# Patient Record
Sex: FEMALE | Race: WHITE | Employment: OTHER | URBAN - NONMETROPOLITAN AREA
[De-identification: names, ages, dates, MRNs, and addresses within clinical notes are randomized per-mention and may not be internally consistent; named-entity substitution may affect disease eponyms.]

---

## 2020-10-21 PROBLEM — I10 HTN (HYPERTENSION): Status: ACTIVE | Noted: 2020-10-21

## 2020-10-21 PROBLEM — D64.9 ANEMIA: Status: ACTIVE | Noted: 2020-10-21

## 2020-10-21 PROBLEM — N28.9 RENAL INSUFFICIENCY: Status: ACTIVE | Noted: 2020-10-21

## 2020-10-21 PROBLEM — E03.9 HYPOTHYROIDISM: Status: ACTIVE | Noted: 2020-10-21

## 2020-10-21 PROBLEM — M19.90 ARTHRITIS: Status: ACTIVE | Noted: 2020-10-21

## 2020-10-21 PROBLEM — Z79.899 OTHER LONG TERM (CURRENT) DRUG THERAPY: Status: ACTIVE | Noted: 2020-10-21

## 2020-10-22 PROBLEM — D63.1 ANEMIA DUE TO STAGE 3 CHRONIC KIDNEY DISEASE (HCC): Status: ACTIVE | Noted: 2020-10-22

## 2020-10-22 PROBLEM — N18.30 ANEMIA DUE TO STAGE 3 CHRONIC KIDNEY DISEASE (HCC): Status: ACTIVE | Noted: 2020-10-22

## 2021-02-01 RX ORDER — CEPHALEXIN 250 MG/1
CAPSULE ORAL
Qty: 30 CAPSULE | Refills: 5 | Status: ON HOLD
Start: 2021-02-01 | End: 2021-04-25 | Stop reason: HOSPADM

## 2021-02-04 ENCOUNTER — TELEPHONE (OUTPATIENT)
Dept: UROLOGY | Age: 86
End: 2021-02-04

## 2021-02-04 RX ORDER — CEFDINIR 300 MG/1
300 CAPSULE ORAL 2 TIMES DAILY
Qty: 20 CAPSULE | Refills: 0 | Status: SHIPPED | OUTPATIENT
Start: 2021-02-04 | End: 2021-02-14

## 2021-02-04 NOTE — TELEPHONE ENCOUNTER
Please call patient's daughter. UACS is positive for infection. I sent in culture specific omnicef. Take this antibiotic until gone. Take this with food and eat yogurt once per day to prevent GI upset. If you develop nausea, vomiting, or fevers call the office or go to the ER. If your urinary symptoms do not improve once completing the antibiotics call our office. Stop prophylaxis dose of Keflex while on Omnicef after she is completed a course of Omnicef she is to resume the Keflex.     Make sure she is either eating yogurt every day or taking a daily probiotic

## 2021-02-24 PROBLEM — E78.2 MIXED HYPERLIPIDEMIA: Status: ACTIVE | Noted: 2021-02-24

## 2021-02-24 PROBLEM — K21.9 GASTROESOPHAGEAL REFLUX DISEASE WITHOUT ESOPHAGITIS: Status: ACTIVE | Noted: 2021-02-24

## 2021-02-24 PROBLEM — N39.0 CHRONIC UTI: Status: ACTIVE | Noted: 2021-02-24

## 2021-03-10 PROBLEM — M16.0 PRIMARY OSTEOARTHRITIS OF BOTH HIPS: Status: ACTIVE | Noted: 2021-03-10

## 2021-03-10 PROBLEM — Z96.652 S/P REVISION OF TOTAL KNEE, LEFT: Status: ACTIVE | Noted: 2021-03-10

## 2021-03-10 PROBLEM — M25.362 OTHER INSTABILITY, LEFT KNEE: Status: ACTIVE | Noted: 2021-03-10

## 2021-03-10 PROBLEM — M17.11 PRIMARY OSTEOARTHRITIS OF RIGHT KNEE: Status: ACTIVE | Noted: 2021-03-10

## 2021-03-10 PROBLEM — M62.81 MUSCULAR WEAKNESS: Status: ACTIVE | Noted: 2021-03-10

## 2021-04-01 ENCOUNTER — APPOINTMENT (OUTPATIENT)
Dept: CT IMAGING | Age: 86
DRG: 062 | End: 2021-04-01
Payer: MEDICARE

## 2021-04-01 ENCOUNTER — HOSPITAL ENCOUNTER (INPATIENT)
Age: 86
LOS: 6 days | Discharge: NURSING, REHAB OR LONG-TERM CARE | DRG: 062 | End: 2021-04-07
Attending: EMERGENCY MEDICINE | Admitting: PSYCHIATRY & NEUROLOGY
Payer: MEDICARE

## 2021-04-01 DIAGNOSIS — I63.9 CEREBROVASCULAR ACCIDENT (CVA), UNSPECIFIED MECHANISM (HCC): Primary | ICD-10-CM

## 2021-04-01 LAB
% CKMB: 2.5 % (ref 0–3)
ABSOLUTE EOS #: 0.17 K/UL (ref 0–0.44)
ABSOLUTE IMMATURE GRANULOCYTE: <0.03 K/UL (ref 0–0.3)
ABSOLUTE LYMPH #: 1.21 K/UL (ref 1.1–3.7)
ABSOLUTE MONO #: 0.62 K/UL (ref 0.1–1.2)
ALLEN TEST: ABNORMAL
ANION GAP SERPL CALCULATED.3IONS-SCNC: 12 MMOL/L (ref 9–17)
ANION GAP: 8 MMOL/L (ref 7–16)
BASOPHILS # BLD: 1 % (ref 0–2)
BASOPHILS ABSOLUTE: 0.05 K/UL (ref 0–0.2)
BUN BLDV-MCNC: 18 MG/DL (ref 8–23)
BUN/CREAT BLD: ABNORMAL (ref 9–20)
CALCIUM SERPL-MCNC: 8.8 MG/DL (ref 8.6–10.4)
CHLORIDE BLD-SCNC: 100 MMOL/L (ref 98–107)
CK MB: 1.6 NG/ML
CKMB INTERPRETATION: ABNORMAL
CO2: 21 MMOL/L (ref 20–31)
CREAT SERPL-MCNC: 0.93 MG/DL (ref 0.5–0.9)
DIFFERENTIAL TYPE: ABNORMAL
EOSINOPHILS RELATIVE PERCENT: 2 % (ref 1–4)
FIO2: ABNORMAL
GFR AFRICAN AMERICAN: >60 ML/MIN
GFR NON-AFRICAN AMERICAN: 45 ML/MIN
GFR NON-AFRICAN AMERICAN: 57 ML/MIN
GFR SERPL CREATININE-BSD FRML MDRD: 55 ML/MIN
GFR SERPL CREATININE-BSD FRML MDRD: ABNORMAL ML/MIN/{1.73_M2}
GLUCOSE BLD-MCNC: 106 MG/DL (ref 74–100)
GLUCOSE BLD-MCNC: 90 MG/DL (ref 70–99)
HCO3 VENOUS: 26.3 MMOL/L (ref 22–29)
HCT VFR BLD CALC: 34.9 % (ref 36.3–47.1)
HEMOGLOBIN: 11.2 G/DL (ref 11.9–15.1)
IMMATURE GRANULOCYTES: 0 %
INR BLD: 1
LYMPHOCYTES # BLD: 16 % (ref 24–43)
MCH RBC QN AUTO: 31 PG (ref 25.2–33.5)
MCHC RBC AUTO-ENTMCNC: 32.1 G/DL (ref 28.4–34.8)
MCV RBC AUTO: 96.7 FL (ref 82.6–102.9)
MODE: ABNORMAL
MONOCYTES # BLD: 8 % (ref 3–12)
MYOGLOBIN: 51 NG/ML (ref 25–58)
NEGATIVE BASE EXCESS, VEN: ABNORMAL (ref 0–2)
NRBC AUTOMATED: 0 PER 100 WBC
O2 DEVICE/FLOW/%: ABNORMAL
O2 SAT, VEN: 9 % (ref 60–85)
PARTIAL THROMBOPLASTIN TIME: 23.9 SEC (ref 20.5–30.5)
PATIENT TEMP: ABNORMAL
PCO2, VEN: 45 MM HG (ref 41–51)
PDW BLD-RTO: 14 % (ref 11.8–14.4)
PH VENOUS: 7.38 (ref 7.32–7.43)
PLATELET # BLD: 255 K/UL (ref 138–453)
PLATELET ESTIMATE: ABNORMAL
PMV BLD AUTO: 11.2 FL (ref 8.1–13.5)
PO2, VEN: 10.2 MM HG (ref 30–50)
POC CHLORIDE: 103 MMOL/L (ref 98–107)
POC CREATININE: 1.13 MG/DL (ref 0.51–1.19)
POC HEMATOCRIT: 36 % (ref 36–46)
POC HEMOGLOBIN: 12.3 G/DL (ref 12–16)
POC IONIZED CALCIUM: 1.17 MMOL/L (ref 1.15–1.33)
POC LACTIC ACID: 1.14 MMOL/L (ref 0.56–1.39)
POC PCO2 TEMP: ABNORMAL MM HG
POC PH TEMP: ABNORMAL
POC PO2 TEMP: ABNORMAL MM HG
POC POTASSIUM: 4.6 MMOL/L (ref 3.5–4.5)
POC SODIUM: 137 MMOL/L (ref 138–146)
POSITIVE BASE EXCESS, VEN: 1 (ref 0–3)
POTASSIUM SERPL-SCNC: 4.5 MMOL/L (ref 3.7–5.3)
PROTHROMBIN TIME: 10.6 SEC (ref 9.1–12.3)
RBC # BLD: 3.61 M/UL (ref 3.95–5.11)
RBC # BLD: ABNORMAL 10*6/UL
SAMPLE SITE: ABNORMAL
SEG NEUTROPHILS: 73 % (ref 36–65)
SEGMENTED NEUTROPHILS ABSOLUTE COUNT: 5.41 K/UL (ref 1.5–8.1)
SODIUM BLD-SCNC: 133 MMOL/L (ref 135–144)
TOTAL CK: 65 U/L (ref 26–192)
TOTAL CO2, VENOUS: 28 MMOL/L (ref 23–30)
TROPONIN INTERP: ABNORMAL
TROPONIN T: ABNORMAL NG/ML
TROPONIN, HIGH SENSITIVITY: 19 NG/L (ref 0–14)
WBC # BLD: 7.5 K/UL (ref 3.5–11.3)
WBC # BLD: ABNORMAL 10*3/UL

## 2021-04-01 PROCEDURE — 99223 1ST HOSP IP/OBS HIGH 75: CPT | Performed by: PSYCHIATRY & NEUROLOGY

## 2021-04-01 PROCEDURE — 82550 ASSAY OF CK (CPK): CPT

## 2021-04-01 PROCEDURE — 82553 CREATINE MB FRACTION: CPT

## 2021-04-01 PROCEDURE — 82803 BLOOD GASES ANY COMBINATION: CPT

## 2021-04-01 PROCEDURE — 70450 CT HEAD/BRAIN W/O DYE: CPT

## 2021-04-01 PROCEDURE — 84132 ASSAY OF SERUM POTASSIUM: CPT

## 2021-04-01 PROCEDURE — 84484 ASSAY OF TROPONIN QUANT: CPT

## 2021-04-01 PROCEDURE — 70496 CT ANGIOGRAPHY HEAD: CPT

## 2021-04-01 PROCEDURE — 82435 ASSAY OF BLOOD CHLORIDE: CPT

## 2021-04-01 PROCEDURE — 80048 BASIC METABOLIC PNL TOTAL CA: CPT

## 2021-04-01 PROCEDURE — 6360000002 HC RX W HCPCS: Performed by: STUDENT IN AN ORGANIZED HEALTH CARE EDUCATION/TRAINING PROGRAM

## 2021-04-01 PROCEDURE — 3E03317 INTRODUCTION OF OTHER THROMBOLYTIC INTO PERIPHERAL VEIN, PERCUTANEOUS APPROACH: ICD-10-PCS | Performed by: STUDENT IN AN ORGANIZED HEALTH CARE EDUCATION/TRAINING PROGRAM

## 2021-04-01 PROCEDURE — 93005 ELECTROCARDIOGRAM TRACING: CPT | Performed by: STUDENT IN AN ORGANIZED HEALTH CARE EDUCATION/TRAINING PROGRAM

## 2021-04-01 PROCEDURE — 6360000004 HC RX CONTRAST MEDICATION: Performed by: STUDENT IN AN ORGANIZED HEALTH CARE EDUCATION/TRAINING PROGRAM

## 2021-04-01 PROCEDURE — 82947 ASSAY GLUCOSE BLOOD QUANT: CPT

## 2021-04-01 PROCEDURE — 82565 ASSAY OF CREATININE: CPT

## 2021-04-01 PROCEDURE — 99283 EMERGENCY DEPT VISIT LOW MDM: CPT

## 2021-04-01 PROCEDURE — 85025 COMPLETE CBC W/AUTO DIFF WBC: CPT

## 2021-04-01 PROCEDURE — 83605 ASSAY OF LACTIC ACID: CPT

## 2021-04-01 PROCEDURE — 85610 PROTHROMBIN TIME: CPT

## 2021-04-01 PROCEDURE — 2000000003 HC NEURO ICU R&B

## 2021-04-01 PROCEDURE — 84295 ASSAY OF SERUM SODIUM: CPT

## 2021-04-01 PROCEDURE — 83874 ASSAY OF MYOGLOBIN: CPT

## 2021-04-01 PROCEDURE — 85014 HEMATOCRIT: CPT

## 2021-04-01 PROCEDURE — 82330 ASSAY OF CALCIUM: CPT

## 2021-04-01 PROCEDURE — 85730 THROMBOPLASTIN TIME PARTIAL: CPT

## 2021-04-01 RX ORDER — SENNA PLUS 8.6 MG/1
1 TABLET ORAL DAILY PRN
Status: DISCONTINUED | OUTPATIENT
Start: 2021-04-01 | End: 2021-04-07 | Stop reason: HOSPADM

## 2021-04-01 RX ORDER — ONDANSETRON 2 MG/ML
4 INJECTION INTRAMUSCULAR; INTRAVENOUS EVERY 6 HOURS PRN
Status: DISCONTINUED | OUTPATIENT
Start: 2021-04-01 | End: 2021-04-07 | Stop reason: HOSPADM

## 2021-04-01 RX ORDER — SODIUM CHLORIDE 0.9 % (FLUSH) 0.9 %
10 SYRINGE (ML) INJECTION PRN
Status: DISCONTINUED | OUTPATIENT
Start: 2021-04-01 | End: 2021-04-07 | Stop reason: HOSPADM

## 2021-04-01 RX ORDER — AMLODIPINE BESYLATE 5 MG/1
5 TABLET ORAL DAILY
Status: DISCONTINUED | OUTPATIENT
Start: 2021-04-02 | End: 2021-04-07 | Stop reason: HOSPADM

## 2021-04-01 RX ORDER — SODIUM CHLORIDE 0.9 % (FLUSH) 0.9 %
10 SYRINGE (ML) INJECTION EVERY 12 HOURS SCHEDULED
Status: DISCONTINUED | OUTPATIENT
Start: 2021-04-01 | End: 2021-04-07 | Stop reason: HOSPADM

## 2021-04-01 RX ORDER — ASPIRIN 81 MG/1
81 TABLET ORAL DAILY
Status: DISCONTINUED | OUTPATIENT
Start: 2021-04-02 | End: 2021-04-07 | Stop reason: HOSPADM

## 2021-04-01 RX ORDER — ATORVASTATIN CALCIUM 80 MG/1
80 TABLET, FILM COATED ORAL NIGHTLY
Status: DISCONTINUED | OUTPATIENT
Start: 2021-04-01 | End: 2021-04-02

## 2021-04-01 RX ORDER — PROMETHAZINE HYDROCHLORIDE 12.5 MG/1
12.5 TABLET ORAL EVERY 6 HOURS PRN
Status: DISCONTINUED | OUTPATIENT
Start: 2021-04-01 | End: 2021-04-07 | Stop reason: HOSPADM

## 2021-04-01 RX ORDER — LEVOTHYROXINE SODIUM 112 UG/1
112 TABLET ORAL DAILY
Status: DISCONTINUED | OUTPATIENT
Start: 2021-04-02 | End: 2021-04-07 | Stop reason: HOSPADM

## 2021-04-01 RX ORDER — DEXTROSE MONOHYDRATE 25 G/50ML
12.5 INJECTION, SOLUTION INTRAVENOUS
Status: ACTIVE | OUTPATIENT
Start: 2021-04-01 | End: 2021-04-01

## 2021-04-01 RX ORDER — 0.9 % SODIUM CHLORIDE 0.9 %
50 INTRAVENOUS SOLUTION INTRAVENOUS ONCE
Status: DISCONTINUED | OUTPATIENT
Start: 2021-04-01 | End: 2021-04-07 | Stop reason: HOSPADM

## 2021-04-01 RX ORDER — ASPIRIN 300 MG/1
300 SUPPOSITORY RECTAL DAILY
Status: DISCONTINUED | OUTPATIENT
Start: 2021-04-02 | End: 2021-04-07 | Stop reason: HOSPADM

## 2021-04-01 RX ORDER — LANOLIN ALCOHOL/MO/W.PET/CERES
325 CREAM (GRAM) TOPICAL 2 TIMES DAILY
Status: DISCONTINUED | OUTPATIENT
Start: 2021-04-01 | End: 2021-04-07 | Stop reason: HOSPADM

## 2021-04-01 RX ORDER — OXYBUTYNIN CHLORIDE 5 MG/1
5 TABLET, EXTENDED RELEASE ORAL DAILY
Status: DISCONTINUED | OUTPATIENT
Start: 2021-04-02 | End: 2021-04-07 | Stop reason: HOSPADM

## 2021-04-01 RX ORDER — PANTOPRAZOLE SODIUM 40 MG/1
40 TABLET, DELAYED RELEASE ORAL
Status: DISCONTINUED | OUTPATIENT
Start: 2021-04-02 | End: 2021-04-07 | Stop reason: HOSPADM

## 2021-04-01 RX ORDER — SODIUM CHLORIDE 9 MG/ML
25 INJECTION, SOLUTION INTRAVENOUS PRN
Status: DISCONTINUED | OUTPATIENT
Start: 2021-04-01 | End: 2021-04-07 | Stop reason: HOSPADM

## 2021-04-01 RX ADMIN — IOPAMIDOL 90 ML: 755 INJECTION, SOLUTION INTRAVENOUS at 19:26

## 2021-04-01 RX ADMIN — ALTEPLASE 60.6 MG: KIT at 20:34

## 2021-04-01 RX ADMIN — ALTEPLASE 6.7 MG: KIT at 20:28

## 2021-04-01 ASSESSMENT — PAIN SCALES - GENERAL: PAINLEVEL_OUTOF10: 0

## 2021-04-01 ASSESSMENT — PAIN DESCRIPTION - PAIN TYPE: TYPE: ACUTE PAIN

## 2021-04-01 NOTE — CONSULTS
Department of Endovascular Neurosurgery                                         Resident Consult Note  Stroke Alert paged @ 5247  ER Room # 14  Arrival to patient bedside @ 3684        Reason for Consult:  RACE ALERT  Requesting Physician:  Dr. Stan Lewis  Endovascular Neurosurgeon:   [x]Dr. Ranjit Be  []Dr. Carlitos Jiménez    History Obtained From:  patient, family member - daughter    CHIEF COMPLAINT:       Weakness, dysarthria    HISTORY OF PRESENT ILLNESS:       The patient is a 80 y.o. female who presents with acute left side weakness causing her to fall out of her chair that started suddenly around 200 per family. She called the neighbors who subsequently called her daughter. 911 was called and she was found to be have significant left sided weakness, worse than her previous state. She does have chronic LLE weakness from previous double knee replacements and LUE weakness due to a shoulder injury per the daughter. However MES noticed left facial droop and dysarthria. [facility] was called and transferred to [facility] as a RACE alert with a score of 5. On arrival,  squad reports NIH 5 with improving LUE drift that had resolved, however on ym examination she did have signifncant drift in the LUE with moderate left facial droop and dysarthria. The patient was adamant that this was baeline however after speaking to family it clearly was not. The aptient was AOx3. CTH was negative, CTA H/N negative. Decision was made to give IV tPA to which the patient and gave cenonest. The daughter was also made aware and agreed with the patients decision. The patient takes a baby aspirin at home. No other history fo previous TIA/CVA. Not on AC. SBP in the field 140s and on arrival SBP maintained 140s without chemical intervention. IV tPA bolus administered @ 2028 followed by infusion.  Approximately 10 minutes after the tPA was infusied, the patient began to have worsening dysarthria and became hysterical, where she would cry and laugh and was not able to explain why. She appeared frightened. SBP increased to 170s for x3 readings. Exam remains unchanged with the exception of the worsening dysarthria and LUE weakness appeared slightly weaker than previous examination. TPA was immediately stopped and she was sent for a STAT NCCT. This proved to be unremarkable and her tPA was restarted. Family updated at bedside. NIHSS 6  LKW 1715  SBP 140s  On ASA 81    IV TPA @         NIH Stroke Scale       Time: 10:00 PM  Person Administering Scale: Mely Archer   Administer stroke scale items in the order listed. Record performance in each category after each subscale exam. Do not go back and change scores. Follow directions provided for each exam technique. Scores should reflect what the patient does, not what the clinician thinks the patient can do. The clinician should record answers while administering the exam and work quickly. Except where indicated, the patient should not be coached (i.e., repeated requests to patient to make a special effort). 1a  Level of consciousness: 0=alert; keenly responsive   1b. LOC questions:  0=Performs both tasks correctly   1c. LOC commands: 0=Performs both tasks correctly   2. Best Gaze: 0=normal   3. Visual: 0=No visual loss   4. Facial Palsy: 2=Partial paralysis (total or near total paralysis of the lower face)   5a. Motor left arm: 2=Some effort against gravity, limb cannot get to or maintain (if cured) 90 (or 45) degrees, drifts down to bed, but has some effort against gravity   5b. Motor right arm: 0=No drift, limb holds 90 (or 45) degrees for full 10 seconds   6a. motor left le=Drift, limb holds 90 (or 45) degrees but drifts down before full 10 seconds: does not hit bed   6b  Motor right le=No drift, limb holds 90 (or 45) degrees for full 10 seconds   7. Limb Ataxia: 0=Absent   8. Sensory: 0=Normal; no sensory loss   9. Best Language:  0=No aphasia, normal   10. Dysarthria: 1=Mild to moderate, patient slurs at least some words and at worst, can be understood with some difficulty   11. Extinction and Inattention: 0=No abnormality   12. Distal motor function: 0=Normal    Total:  6            PAST MEDICAL HISTORY :       Past Medical History:        Diagnosis Date    Anemia     Hypertension     Hypothyroidism     Osteoarthritis     Other long term (current) drug therapy     Renal insufficiency        Past Surgical History:    No past surgical history on file. Social History:   Social History     Socioeconomic History    Marital status:       Spouse name: Not on file    Number of children: Not on file    Years of education: Not on file    Highest education level: Not on file   Occupational History    Not on file   Social Needs    Financial resource strain: Not on file    Food insecurity     Worry: Not on file     Inability: Not on file    Transportation needs     Medical: Not on file     Non-medical: Not on file   Tobacco Use    Smoking status: Never Smoker    Smokeless tobacco: Never Used   Substance and Sexual Activity    Alcohol use: Never     Frequency: Never    Drug use: Never    Sexual activity: Not on file   Lifestyle    Physical activity     Days per week: Not on file     Minutes per session: Not on file    Stress: Not on file   Relationships    Social connections     Talks on phone: Not on file     Gets together: Not on file     Attends Jain service: Not on file     Active member of club or organization: Not on file     Attends meetings of clubs or organizations: Not on file     Relationship status: Not on file    Intimate partner violence     Fear of current or ex partner: Not on file     Emotionally abused: Not on file     Physically abused: Not on file     Forced sexual activity: Not on file   Other Topics Concern    Not on file   Social History Narrative    Not on file       Family History:   No family history on PRN    Historical Provider, MD   Apoaequorin (PREVAGEN) 10 MG CAPS Take by mouth daily    Historical Provider, MD   GLUCOSA-CHONDR-NA CHONDR-MSM PO Take by mouth    Historical Provider, MD       Current Medications:   Current Facility-Administered Medications: [COMPLETED] alteplase (ACTIVASE) injection 6.7 mg, 0.09 mg/kg, Intravenous, Once **FOLLOWED BY** [COMPLETED] alteplase (ACTIVASE) injection 60.6 mg, 0.81 mg/kg, Intravenous, Once **FOLLOWED BY** 0.9 % sodium chloride bolus, 50 mL, Intravenous, Once  sodium chloride flush 0.9 % injection 10 mL, 10 mL, Intravenous, 2 times per day  sodium chloride flush 0.9 % injection 10 mL, 10 mL, Intravenous, PRN  0.9 % sodium chloride infusion, 25 mL, Intravenous, PRN  dextrose 50 % IV solution, 12.5 g, Intravenous, Once PRN    REVIEW OF SYSTEMS:       CONSTITUTIONAL: negative for fatigue and malaise   EYES: negative for double vision and photophobia    HEENT: negative for tinnitus and sore throat   RESPIRATORY: negative for cough, shortness of breath   CARDIOVASCULAR: negative for chest pain, palpitations   GASTROINTESTINAL: negative for nausea, vomiting   GENITOURINARY: negative for incontinence   MUSCULOSKELETAL: negative for neck or back pain   NEUROLOGICAL: negative for seizures   PSYCHIATRIC: negative for fatigue     Review of systems otherwise negative. PHYSICAL EXAM:       BP (!) 177/93   Pulse 85   Resp 12   Wt 165 lb (74.8 kg)   SpO2 98%   BMI 25.84 kg/m²     Physical Exam  Vitals signs and nursing note reviewed. Constitutional:       General: She is not in acute distress. Appearance: She is well-developed. She is not diaphoretic. HENT:      Head: Normocephalic and atraumatic. Right Ear: External ear normal.      Left Ear: External ear normal.   Eyes:      General: No visual field deficit or scleral icterus. Right eye: No discharge. Left eye: No discharge.       Conjunctiva/sclera: Conjunctivae normal.      Pupils: Pupils are equal, round, and reactive to light. Neck:      Musculoskeletal: Normal range of motion. Pulmonary:      Effort: Pulmonary effort is normal.   Abdominal:      General: There is no distension. Palpations: Abdomen is soft. Tenderness: There is no abdominal tenderness. There is no guarding. Musculoskeletal: Normal range of motion. General: No tenderness or deformity. Skin:     General: Skin is warm and dry. Capillary Refill: Capillary refill takes less than 2 seconds. Findings: No erythema or rash. Neurological:      Mental Status: She is alert and oriented to person, place, and time. GCS: GCS eye subscore is 4. GCS verbal subscore is 5. GCS motor subscore is 6. Cranial Nerves: Dysarthria and facial asymmetry present. No cranial nerve deficit. Sensory: Sensation is intact. No sensory deficit. Motor: Weakness present. No abnormal muscle tone or seizure activity. Coordination: Coordination normal. Finger-Nose-Finger Test normal.      Deep Tendon Reflexes: Reflexes are normal and symmetric. Reflexes normal.      Reflex Scores:       Tricep reflexes are 2+ on the right side and 2+ on the left side. Bicep reflexes are 2+ on the right side and 2+ on the left side. Brachioradialis reflexes are 2+ on the right side and 2+ on the left side. Patellar reflexes are 2+ on the right side and 2+ on the left side. Achilles reflexes are 2+ on the right side and 2+ on the left side. Psychiatric:         Behavior: Behavior normal.         Neurologic Exam     Mental Status   Oriented to person, place, and time. Cranial Nerves     CN III, IV, VI   Pupils are equal, round, and reactive to light.     Gait, Coordination, and Reflexes     Coordination   Finger to nose coordination: normal    Reflexes   Right brachioradialis: 2+  Left brachioradialis: 2+  Right biceps: 2+  Left biceps: 2+  Right triceps: 2+  Left triceps: 2+  Right patellar: 2+  Left patellar: 2+  Right achilles: 2+  Left achilles: 2+         Modified Bighorn Score Scale:     [] Zero: No symptoms at all   [] 1: No significant disability despite symptoms; able to carry out all usual duties and activities   [x] 2: Slight disability; unable to carry out all previous activities, but able to look after own affairs without assistance   [] 3:Moderate disability; requiring some help, but able to walk without assistance   [] 4: Moderately severe disability; unable to walk and attend to bodily needs without assistance   [] 5:Severe disability; bedridden, incontinent and requiring constant nursing care and attention    LABS AND IMAGING:     CBC with Differential:    Lab Results   Component Value Date    WBC 7.5 04/01/2021    RBC 3.61 04/01/2021    RBC 3.51 02/25/2021    HGB 11.2 04/01/2021    HCT 34.9 04/01/2021     04/01/2021    MCV 96.7 04/01/2021    MCH 31.0 04/01/2021    MCHC 32.1 04/01/2021    RDW 14.0 04/01/2021    LYMPHOPCT 16 04/01/2021    LYMPHOPCT 12 02/25/2021    MONOPCT 8 04/01/2021    BASOPCT 1 04/01/2021    MONOSABS 0.62 04/01/2021    MONOSABS 0.3 02/25/2021    LYMPHSABS 1.21 04/01/2021    LYMPHSABS 0.7 02/25/2021    EOSABS 0.17 04/01/2021    EOSABS 0.1 02/25/2021    BASOSABS 0.05 04/01/2021    DIFFTYPE NOT REPORTED 04/01/2021     BMP:    Lab Results   Component Value Date     04/01/2021    K 4.5 04/01/2021     04/01/2021    CO2 21 04/01/2021    BUN 18 04/01/2021    LABALBU 3.9 02/25/2021    CREATININE 0.93 04/01/2021    CREATININE 1.06 02/25/2021    CALCIUM 8.8 04/01/2021    GFRAA >60 04/01/2021    LABGLOM 57 04/01/2021    GLUCOSE 90 04/01/2021    GLUCOSE 96 02/25/2021       Radiology Review:    1.) CT Head without contrast: (Reviewed at 0720PM)  Impression   No evidence for acute intracranial hemorrhage, territorial infarction or   intracranial mass lesion.       Moderate chronic microangiopathic ischemic disease.       Moderate generalized volume loss.       Scattered areas of calcification throughout the head and neck vasculature. Otherwise unremarkable CTA of the head and neck.  No hemodynamically   significant stenosis.  No dissection.  No aneurysm. 2.) CTA Head/Neck: (Reviewed at 0750 PM)  Impression   No evidence for acute intracranial hemorrhage, territorial infarction or   intracranial mass lesion.       Moderate chronic microangiopathic ischemic disease.       Moderate generalized volume loss.       Scattered areas of calcification throughout the head and neck vasculature. Otherwise unremarkable CTA of the head and neck.  No hemodynamically   significant stenosis.  No dissection.  No aneurysm. 3.) Brain MRI W/O:   PENDING    ASSESSMENT AND PLAN:       Patient Active Problem List   Diagnosis    Anemia    Arthritis    HTN (hypertension)    Hypothyroidism    Renal insufficiency    Other long term (current) drug therapy    Anemia due to stage 3 chronic kidney disease    Chronic UTI    Mixed hyperlipidemia    Gastroesophageal reflux disease without esophagitis    S/P revision of total knee, left    Muscular weakness    Other instability, left knee    Primary osteoarthritis of both hips    Primary osteoarthritis of right knee    Acute ischemic stroke [facility])       Assessment                 80 y.o. female who presents with acute onset left sided weakness. 1. Last Known Well (date and time): [id number]    2. Candidate for IV tPA therapy     Yes [x]     No  [] due to the following exclusion criteria:     3.  Candidate for Thrombectomy    Yes []      No [x] due to the following exclusion criteria: no LVO    - Discussed with Dr. Princess Jones     Recommendations:    [] General Neurology Care Status - prefer 5th floor (5A/5C)   [] Internal Medicine General Care Status   [x] NICU Status - (5B)     [] MICU Status   [] Observation Status    Please use the following admission order set for stroke admission:   [] [id number] - ANAMARIA Intercerebral Hemorrhage Admission   [] [id number] - ANAMARIA Sub Arachnoid Hemorrhage Admission   [x] [id number] - ANAMARIA Ischemic Stroke TPA Treatment Focused   [] [id number] - IP Ischemic Stroke ICU Post Alteplase (TPA) Admission    [] [id number] - GEN Ischemic Stroke Non-Thrombolytic Focussed     - CTH WO -negative   - CTA H/N - negative    - MRI Brain WO - PENDING    - ECHO    - Folic acid 1mg BID    - Lipitor 40mg nightly     - Fasting Lipid panel    - PT, OT, Speech eval     - Telemetry     - Neuro checks per protocol   - We recommend SBP <180   - Blood glucose goal less than 180   - Please avoid dextrose containing solutions    Additional recommendations may follow    Please contact [facility] with any changes in patients neurologic status. Thank you for your consult.        Lore Reich MD, [facility]  PGY-3 Neurology   4/1/2021 at 10:01 PM

## 2021-04-01 NOTE — ED PROVIDER NOTES
[facility]  ED  Emergency Department Encounter  Emergency Medicine Resident     Pt Name: Gaurang Plata  MRN: [id number]  Armstrongfjessica 2/2/1932  Date of evaluation: 4/1/21  PCP:  VERNELL Cagle CNP    CHIEF COMPLAINT       Chief Complaint   Patient presents with    Cerebrovascular Accident       HISTORY OFPRESENT ILLNESS  (Location/Symptom, Timing/Onset, Context/Setting, Quality, Duration, Modifying Cristóbal Sunil.)      Gaurang Plata is a 80 y.o. female who presents with [facility] for from a scene stroke call, [facility] floor landed in patient's backyard after per family patient had a last known well of 1815, when an event happened where patient slumped over in her chair and fell to the ground. Unknown what patient's physical exam findings are at baseline, however her chief complaint was slurred speech, left-sided facial droop left-sided hemiparesis with drift. Patient is not on anticoagulation antiplatelet therapy. Patient is alert and oriented answering questions appropriately. Patient denies any symptoms of chest pain shortness of breath, nausea vomiting, fever, chills. Stroke alert called at 1850, stroke team present during initial evaluation when patient was brought in. Patient taken to the CT scanner at 1910. Delay in CT scan as there was already a patient in the CT scanner prior to arrival.    PAST MEDICAL / SURGICAL / SOCIAL / FAMILY HISTORY      has a past medical history of Anemia, Hypertension, Hypothyroidism, Osteoarthritis, Other long term (current) drug therapy, and Renal insufficiency. has no past surgical history on file. Social History     Socioeconomic History    Marital status:       Spouse name: Not on file    Number of children: Not on file    Years of education: Not on file    Highest education level: Not on file   Occupational History    Not on file   Social Needs    Financial resource strain: Not on file    Food insecurity     Worry: Not on file     Inability: Not on file    Transportation needs     Medical: Not on file     Non-medical: Not on file   Tobacco Use    Smoking status: Never Smoker    Smokeless tobacco: Never Used   Substance and Sexual Activity    Alcohol use: Never     Frequency: Never    Drug use: Never    Sexual activity: Not on file   Lifestyle    Physical activity     Days per week: Not on file     Minutes per session: Not on file    Stress: Not on file   Relationships    Social connections     Talks on phone: Not on file     Gets together: Not on file     Attends Mandaen service: Not on file     Active member of club or organization: Not on file     Attends meetings of clubs or organizations: Not on file     Relationship status: Not on file    Intimate partner violence     Fear of current or ex partner: Not on file     Emotionally abused: Not on file     Physically abused: Not on file     Forced sexual activity: Not on file   Other Topics Concern    Not on file   Social History Narrative    Not on file       No family history on file. Allergies:  Ciprofloxacin, Hydrocodone, Macrobid [nitrofurantoin], and Ciprofloxacin    Home Medications:  Prior to Admission medications    Medication Sig Start Date End Date Taking?  Authorizing Provider   amLODIPine (NORVASC) 5 MG tablet Take 1 tablet by mouth daily 3/1/21   VERNELL Hartmann CNP   atorvastatin (LIPITOR) 20 MG tablet Take 1 tablet by mouth nightly 3/1/21   VERNELL Hartmann CNP   Ferrous Sulfate 324 MG TBEC Take 1 tablet by mouth 2 times daily 3/1/21   VERNELL Hartmann CNP   levothyroxine (SYNTHROID) 112 MCG tablet Take 1 tablet by mouth Daily 3/1/21   VERNELL Hartmann CNP   omeprazole (PRILOSEC) 20 MG delayed release capsule Take 1 capsule by mouth daily 3/1/21   VERNELL Hartmann CNP   sodium bicarbonate 650 MG tablet Take 1 tablet by mouth 4 times daily Two tabs BID 3/1/21   VERNELL Hartmann CNP   metoprolol tartrate Temp Temp src Pulse Resp SpO2 Height Weight   -- -- -- -- -- -- -- --       Physical Exam  Constitutional:       Appearance: She is well-developed. Comments: Alert and oriented female with some dysarthria and difficulty with word finding, speaking in full sentences answering questions appropriately   HENT:      Head: Normocephalic and atraumatic. Eyes:      Pupils: Pupils are equal, round, and reactive to light. Neck:      Musculoskeletal: Normal range of motion and neck supple. Cardiovascular:      Rate and Rhythm: Normal rate and regular rhythm. Pulmonary:      Effort: Pulmonary effort is normal. No respiratory distress. Breath sounds: Normal breath sounds. No stridor. Abdominal:      General: Bowel sounds are normal. There is no distension. Palpations: Abdomen is soft. Musculoskeletal: Normal range of motion. General: No deformity. Skin:     General: Skin is warm and dry. Capillary Refill: Capillary refill takes less than 2 seconds. Neurological:      Mental Status: She is alert and oriented to person, place, and time.       Comments: Left-sided facial droop, left-sided drift on upper and lower extremities with left-sided weakness,   Psychiatric:         Behavior: Behavior normal.         DIFFERENTIAL  DIAGNOSIS     PLAN (LABS / IMAGING / EKG):  Orders Placed This Encounter   Procedures    CT HEAD WO CONTRAST    CTA HEAD NECK W CONTRAST    CT HEAD WO CONTRAST    CT head without contrast    STROKE PANEL    Hemoglobin and hematocrit, blood    SODIUM (POC)    POTASSIUM (POC)    CHLORIDE (POC)    CALCIUM, IONIC (POC)    CBC    Hemoglobin A1c    Lipid panel - fasting    Basic Metabolic Panel w/ Reflex to MG    Diet NPO Effective Now    Vital signs during and post alteplase (tPA)    Notify physician prior to alteplase (tPA)    Notify physician during and post alteplase (tPA)    Notify physician during and post alteplase (tPA)    Bedrest during and post alteplase (tPA)    Elevate HOB during and post alteplase (tPA)    Pulse Oximetry    NIH Stroke Scale (NIHSS) during and post alteplase (tPA)    Implement alteplase (tPA) hemorrhage/bleeding precautions    Avoid antiplatelet and antithrombotic medications for 24 hours post administration of alteplase (tPA)    No Anticoagulants for 24 hours after alteplase (tPA)    Implement suspected intracerebral hemorrhage (ICH) guidelines    Telemetry monitoring - continuous duration    Vital signs during and post alteplase (tPA)    Notify physician during and post alteplase (tPA)    Notify physician during and post alteplase (tPA)    Bedrest during and post alteplase (tPA)    Elevate HOB during and post alteplase (tPA)    Adv Diet as Tolerated (nurse communication)  Diet General    NIH Stroke Scale (NIHSS) during and post alteplase (tPA)    Implement alteplase (tPA) hemorrhage/bleeding precautions    Avoid antiplatelet and antithrombotic medications for 24 hours post administration of alteplase (tPA)    No Anticoagulants for 24 hours after alteplase (tPA)    Implement suspected intracerebral hemorrhage (ICH) guidelines    Swallow screen by nursing before diet and oral medications started.  Stroke education    Encourage deep breathing and coughing every two hours while awake    Place intermittent pneumatic compression device    Telemetry monitoring - continuous duration    Full Code    Inpatient consult to Stroke Team    Inpatient consult to Neurocritical care    Pharmacy to Dose: Other - See Comments: The pharmacist will review this patient's medication profile to evaluate IV medications and change all base solutions to 0.9% sodium chloride if possible based on compatibility and product availability. This. .. [address] [place] [address] to change base solutions.     OT eval and treat    PT evaluation and treat    Initiate Oxygen Therapy Protocol    Initiate Oxygen Therapy Protocol    Incentive spirometry    Pulse oximetry, continuous    Speech Language Pathology (SLP) eval and treat    Venous Blood Gas, POC    Creatinine W/GFR Point of Care    Lactic Acid, POC    POCT Glucose    Anion Gap (Calc) POC    EKG 12 Lead    Initiate minimum 2 IV lines for alteplase (tPA) infusion    Initiate minimum 2 IV lines for alteplase (tPA) infusion    PATIENT STATUS (FROM ED OR OR/PROCEDURAL) Inpatient    Fall precautions    Fall precautions       MEDICATIONS ORDERED:  Orders Placed This Encounter   Medications    DISCONTD: iopamidol (ISOVUE-370) 76 % injection 90 mL    DISCONTD: iopamidol (ISOVUE-370) 76 % injection 90 mL    iopamidol (ISOVUE-370) 76 % injection 90 mL    FOLLOWED BY Linked Order Group     alteplase (ACTIVASE) injection 6.7 mg     alteplase (ACTIVASE) injection 60.6 mg     0.9 % sodium chloride bolus    sodium chloride flush 0.9 % injection 10 mL    sodium chloride flush 0.9 % injection 10 mL    0.9 % sodium chloride infusion    dextrose 50 % IV solution    amLODIPine (NORVASC) tablet 5 mg    DISCONTD: atorvastatin (LIPITOR) tablet 80 mg    ferrous sulfate (FE TABS 325) EC tablet 325 mg    levothyroxine (SYNTHROID) tablet 112 mcg    metoprolol tartrate (LOPRESSOR) tablet 12.5 mg    pantoprazole (PROTONIX) tablet 40 mg    oxybutynin (DITROPAN-XL) extended release tablet 5 mg    sodium chloride flush 0.9 % injection 10 mL    sodium chloride flush 0.9 % injection 10 mL    0.9 % sodium chloride infusion    OR Linked Order Group     promethazine (PHENERGAN) tablet 12.5 mg     ondansetron (ZOFRAN) injection 4 mg    enoxaparin (LOVENOX) injection 40 mg    OR Linked Order Group     aspirin EC tablet 81 mg     aspirin suppository 300 mg    senna (SENOKOT) tablet 8.6 mg    atorvastatin (LIPITOR) tablet 40 mg    0.9 % sodium chloride infusion    melatonin tablet 6 mg       DDX: Cerebrovascular accident, intracranial lesion or mass, encephalitis, meningitis, seizure activity, metabolic disturbance, altered mental status, acute coronary syndrome    Initial MDM/Plan: 80 y.o. female who presents with [facility] floor after a stroke seen was called, [facility] floor landed at scene, with concerns of new onset left-sided deficits with associated left-sided facial droop and difficulty with word finding. Patient was with the family when they noticed that she slumped over in her chair on 1815 this was patient's last known well. Neurology was at bedside, stroke alert was ordered, patient was taken to CT scanner, found to have negative CT head, TPA was determined to be started by neuro interventional care. Patient did have an episode where she had acute changes during TPA delivery, patient had TPA stopped, was taken to CT scanner, no acute changes. Neuro critical care was consulted, patient was continued on bolus after reviewed by interventional specialist, and taken upstairs for admission. Patient's vitals remained stable. Patient's action saturation remained stable, patient had no decline in mental status.     DIAGNOSTIC RESULTS / EMERGENCYDEPARTMENT COURSE / MDM     LABS:  Labs Reviewed   STROKE PANEL - Abnormal; Notable for the following components:       Result Value    CREATININE 0.93 (*)     Sodium 133 (*)     GFR Non- 57 (*)     RBC 3.61 (*)     Hemoglobin 11.2 (*)     Hematocrit 34.9 (*)     Seg Neutrophils 73 (*)     Lymphocytes 16 (*)     Troponin, High Sensitivity 19 (*)     All other components within normal limits   SODIUM (POC) - Abnormal; Notable for the following components:    POC Sodium 137 (*)     All other components within normal limits   POTASSIUM (POC) - Abnormal; Notable for the following components:    POC Potassium 4.6 (*)     All other components within normal limits   VENOUS BLOOD GAS, POINT OF CARE - Abnormal; Notable for the following components:    pO2, Jovi 10.2 (*)     O2 Sat, Jovi 9 (*)     All other components within normal limits   CREATININE W/GFR abnormality of the visualized skull or soft tissues. No acute fracture. No scalp hematoma. No evidence of acute intracranial process. Moderate atrophy and extensive chronic small vessel ischemic changes noted. There has been no significant change from the recent prior study. Ct Head Wo Contrast    Result Date: 4/1/2021  EXAMINATION: CT OF THE HEAD WITHOUT CONTRAST; CTA OF THE HEAD AND NECK WITH CONTRAST 4/1/2021 7:18 pm; 4/1/2021 7:20 pm: TECHNIQUE: CT of the head was performed without the administration of intravenous contrast. Dose modulation, iterative reconstruction, and/or weight based adjustment of the mA/kV was utilized to reduce the radiation dose to as low as reasonably achievable.; CTA of the head and neck was performed with the administration of intravenous contrast. Multiplanar reformatted images are provided for review. MIP images are provided for review. Stenosis of the internal carotid arteries measured using NASCET criteria. Dose modulation, iterative reconstruction, and/or weight based adjustment of the mA/kV was utilized to reduce the radiation dose to as low as reasonably achievable. Noncontrast CT of the head with reconstructed 2-D images are also provided for review. COMPARISON: None. HISTORY: ORDERING SYSTEM PROVIDED HISTORY: stroke TECHNOLOGIST PROVIDED HISTORY: stroke Decision Support Exception->Emergency Medical Condition (MA); ORDERING SYSTEM PROVIDED HISTORY: stroke TECHNOLOGIST PROVIDED HISTORY: stroke Decision Support Exception->Emergency Medical Condition (MA) Reason for Exam: stroke Acuity: Unknown FINDINGS: CT HEAD: There is no acute infarction, intracranial hemorrhage or intracranial mass lesion. No mass effect, midline shift or extra-axial collection is noted. There are moderate mild nonspecific foci of periventricular and subcortical cerebral white matter hypodensity, most likely representing chronic microangiopathic disease in this age group.  The brain parenchyma is the head was performed without the administration of intravenous contrast. Dose modulation, iterative reconstruction, and/or weight based adjustment of the mA/kV was utilized to reduce the radiation dose to as low as reasonably achievable.; CTA of the head and neck was performed with the administration of intravenous contrast. Multiplanar reformatted images are provided for review. MIP images are provided for review. Stenosis of the internal carotid arteries measured using NASCET criteria. Dose modulation, iterative reconstruction, and/or weight based adjustment of the mA/kV was utilized to reduce the radiation dose to as low as reasonably achievable. Noncontrast CT of the head with reconstructed 2-D images are also provided for review. COMPARISON: None. HISTORY: ORDERING SYSTEM PROVIDED HISTORY: stroke TECHNOLOGIST PROVIDED HISTORY: stroke Decision Support Exception->Emergency Medical Condition (MA); ORDERING SYSTEM PROVIDED HISTORY: stroke TECHNOLOGIST PROVIDED HISTORY: stroke Decision Support Exception->Emergency Medical Condition (MA) Reason for Exam: stroke Acuity: Unknown FINDINGS: CT HEAD: There is no acute infarction, intracranial hemorrhage or intracranial mass lesion. No mass effect, midline shift or extra-axial collection is noted. There are moderate mild nonspecific foci of periventricular and subcortical cerebral white matter hypodensity, most likely representing chronic microangiopathic disease in this age group. The brain parenchyma is otherwise normal. The cerebellar tonsils are in normal position. The ventricles, sulci, and cisterns are mildly prominent suggestive of moderate generalized volume loss. The globes and orbits are within normal limits. The visualized extracranial structures including paranasal sinuses and mastoid air cells are unremarkable. No fracture is identified. CTA NECK: AORTIC ARCH/ARCH VESSELS: Mild calcification of aortic arch. No dissection or arterial injury.   No significant stenosis of the brachiocephalic or subclavian arteries. CAROTID ARTERIES: No dissection, arterial injury, or hemodynamically significant stenosis by NASCET criteria. VERTEBRAL ARTERIES: No dissection, arterial injury, or significant stenosis. SOFT TISSUES: The lung apices are clear. No cervical or superior mediastinal lymphadenopathy. The larynx and pharynx are unremarkable. No acute abnormality of the salivary and thyroid glands. BONES: No acute osseous abnormality. CTA HEAD: ANTERIOR CIRCULATION: Mild calcification of bilateral cavernous and supraclinoid carotid arteries. No significant stenosis of the intracranial internal carotid, anterior cerebral, or middle cerebral arteries. No aneurysm. POSTERIOR CIRCULATION: No significant stenosis of the vertebral, basilar, or posterior cerebral arteries. No aneurysm. OTHER: No dural venous sinus thrombosis on this non-dedicated study     No evidence for acute intracranial hemorrhage, territorial infarction or intracranial mass lesion. Moderate chronic microangiopathic ischemic disease. Moderate generalized volume loss. Scattered areas of calcification throughout the head and neck vasculature. Otherwise unremarkable CTA of the head and neck. No hemodynamically significant stenosis. No dissection. No aneurysm. EKG  No acute st elevations or changes, see chart for full read     All EKG's are interpreted by the Emergency Department Physicianwho either signs or Co-signs this chart in the absence of a cardiologist.    EMERGENCY DEPARTMENT COURSE:  ED Course as of Apr 02 0044   Thu Apr 01, 2021   [facility] Patient arrived at [phone], according to Ramona Horowitz Dr last known well was 1815 as per family. Patient currently being assessed at [facility] by neuro stroke team.    [address] [facility] [address] Neurology spoke with family specifically daughter and patient, the decision has been made to start TPA.   Nursing will document times, will continue to monitor    [ARELY]   2106 After receiving bolus of TPA patient began to have neuro status changes, began to become nervous, and was crying, TPA was stopped and patient was placed back in the scanner. Neurology is continue to hold, discussion with attending will be had. [ARELY]   Fri Apr 02, 2021   1801 Patient was deemed okay by neuro interventional specialist, TPA was resumed. Patient was admitted to neuro critical care. [ARELY]      ED Course User Index  [ARELY] Armando Pena DO           PROCEDURES:  None    CONSULTS:  IP CONSULT TO STROKE TEAM  IP CONSULT TO NEUROCRITICAL CARE  IP CONSULT TO PHARMACY  PHARMACY TO CHANGE BASE FLUIDS    CRITICAL CARE:  Please see attending note    FINAL IMPRESSION      1. Cerebrovascular accident (CVA), unspecified mechanism ([facility] [address]          DISPOSITION / [address] Admitted 04/01/2021 08:51:32 PM      PATIENT REFERRED TO:  No follow-up provider specified.     DISCHARGE MEDICATIONS:  Current Discharge Medication List          Taty Robledo DO  Emergency Medicine Resident    (Please note that portions of this note were completed with a voice recognition program.Efforts were made to edit the dictations but occasionally words are mis-transcribed.)        Taty Robledo DO  Resident  04/02/21 5717

## 2021-04-01 NOTE — PROGRESS NOTES
[facility] [address]  Flight Physician       Pt Carlie Thakur  MRN: [id number]  Indiogfjessica 2/2/1932  Date of evaluation: 4/1/21  PCP:  VERNELL Solano CNP    REASON FOR FLIGHT      Patient was transported from scene to [place] [address] due to possible stroke Flight was indicated for need for emergent imaging, neurological evaluation and possible immediate intervention    HISTORY OF PRESENT ILLNESS     Taty Lai is a 80 y.o. female who has a w/PMHx of HTN, HLD who's last known well was [id number] had a sudden onset of L sided weakness causing her to fall out of her chair and onto the ground. Did not hit her head or loose consciousness. Takes a baby ASA but is not on any other thinners. Per family she is alert and oriented and largely independent. On our arrival we met with EMS who gave patient a RACE score of 8. Noted L sided facial droop, slurred speech, and left upper and lower extremity weakness They started an 18G IV in the L forearm. HTN for EMS. On our arrival AOx3. KCT11. NIH 5 for L sided facial droop, dysarthria, L lower extremity weakness and decreased sensation. No outward signs of trauma. No longer had L sided upper extremity weakness for us as compared to EMS. Patient placed on monitor and loaded onto cot as she was within window for tPA. Noticed worsening of L sided facial droop in flight. Arrived at [place] [address] Room 14 and sign out was given to Dr. Gordo Stewart and Dr. Silvia Ren. All questions were answered and home med list handed over      [facility]    Physical Exam  Vitals signs and nursing note reviewed. Constitutional:       General: She is not in acute distress. Appearance: She is well-developed. She is not diaphoretic. HENT:      Head: Normocephalic and atraumatic.       Right Ear: External ear normal.      Left Ear: External ear normal.      Nose: Nose normal.      Mouth/Throat:      Mouth: Mucous membranes are moist.   Eyes: completed with a voicerecognition program.  Efforts were made to edit the dictations but occasionally words are mis-transcribed.)

## 2021-04-01 NOTE — ED NOTES
The following specimens labeled with pt sticker and tubed to lab:     [x] Lavender   [] on ice   [x] Blue   [x] Green/yellow  [x] Green/black [] on ice  [] Pink  [] Red  [] Yellow     [] Urine Sample    [] Covid19 Swab    [] Blood Cultures x         Estela Yadav RN  04/01/21 1927

## 2021-04-01 NOTE — ED PROVIDER NOTES
[address] [facility]     Emergency Department     Faculty Attestation        I performed a history and physical examination of the patient and discussed management with the resident. I reviewed the residents note and agree with the documented findings and plan of care. Any areas of disagreement are noted on the chart. I was personally present for the key portions of any procedures. I have documented in the chart those procedures where I was not present during the key portions. I have reviewed the emergency nurses triage note. I agree with the chief complaint, past medical history, past surgical history, allergies, medications, social and family history as documented unless otherwise noted below. For mid-level providers such as nurse practitioners as well as physicians assistants:    I have personally seen and evaluated the patient. I find the patient's history and physical exam are consistent with NP/PA documentation. I agree with the care provided, treatment rendered, disposition, & follow-up plan. Additional findings are as noted. Vital Signs: Wt 165 lb (74.8 kg)   BMI 25.84 kg/m²   PCP:  VERNELL GOLDBERG - CNP    Pertinent Comments:     Patient [facility] from scene for concern of stroke. She had abrupt onset of a left-sided facial droop with left upper and left lower extremity weakness. Blood sugar was normal at that time. She is awake alert and oriented but intermittently confused with the above deficits. No signs suggest trauma. Stroke team at bedside on patient's arrival.    Due to the immediate potential for life-threatening deterioration due to stroke, I spent 15 minutes providing critical care. This time is excluding time spent performing procedures.             Slick Ash MD  Attending Emergency Medicine Physician              Marc Ball MD  04/01/21 9664

## 2021-04-02 ENCOUNTER — APPOINTMENT (OUTPATIENT)
Dept: MRI IMAGING | Age: 86
DRG: 062 | End: 2021-04-02
Payer: MEDICARE

## 2021-04-02 LAB
ANION GAP SERPL CALCULATED.3IONS-SCNC: 14 MMOL/L (ref 9–17)
BUN BLDV-MCNC: 14 MG/DL (ref 8–23)
BUN/CREAT BLD: ABNORMAL (ref 9–20)
CALCIUM SERPL-MCNC: 8.8 MG/DL (ref 8.6–10.4)
CHLORIDE BLD-SCNC: 104 MMOL/L (ref 98–107)
CHOLESTEROL/HDL RATIO: 2.2
CHOLESTEROL: 121 MG/DL
CO2: 19 MMOL/L (ref 20–31)
CREAT SERPL-MCNC: 0.84 MG/DL (ref 0.5–0.9)
EKG ATRIAL RATE: 81 BPM
EKG P AXIS: 84 DEGREES
EKG P-R INTERVAL: 152 MS
EKG Q-T INTERVAL: 432 MS
EKG QRS DURATION: 100 MS
EKG QTC CALCULATION (BAZETT): 501 MS
EKG R AXIS: 20 DEGREES
EKG T AXIS: 75 DEGREES
EKG VENTRICULAR RATE: 81 BPM
GFR AFRICAN AMERICAN: >60 ML/MIN
GFR NON-AFRICAN AMERICAN: >60 ML/MIN
GFR SERPL CREATININE-BSD FRML MDRD: ABNORMAL ML/MIN/{1.73_M2}
GFR SERPL CREATININE-BSD FRML MDRD: ABNORMAL ML/MIN/{1.73_M2}
GLUCOSE BLD-MCNC: 102 MG/DL (ref 70–99)
HCT VFR BLD CALC: 35.1 % (ref 36.3–47.1)
HDLC SERPL-MCNC: 55 MG/DL
HEMOGLOBIN: 11 G/DL (ref 11.9–15.1)
LDL CHOLESTEROL: 54 MG/DL (ref 0–130)
MCH RBC QN AUTO: 30.3 PG (ref 25.2–33.5)
MCHC RBC AUTO-ENTMCNC: 31.3 G/DL (ref 28.4–34.8)
MCV RBC AUTO: 96.7 FL (ref 82.6–102.9)
NRBC AUTOMATED: 0 PER 100 WBC
PDW BLD-RTO: 13.8 % (ref 11.8–14.4)
PLATELET # BLD: 240 K/UL (ref 138–453)
PMV BLD AUTO: 11.2 FL (ref 8.1–13.5)
POTASSIUM SERPL-SCNC: 4.2 MMOL/L (ref 3.7–5.3)
RBC # BLD: 3.63 M/UL (ref 3.95–5.11)
SODIUM BLD-SCNC: 137 MMOL/L (ref 135–144)
TRIGL SERPL-MCNC: 61 MG/DL
VLDLC SERPL CALC-MCNC: NORMAL MG/DL (ref 1–30)
WBC # BLD: 9.6 K/UL (ref 3.5–11.3)

## 2021-04-02 PROCEDURE — 70551 MRI BRAIN STEM W/O DYE: CPT

## 2021-04-02 PROCEDURE — 85027 COMPLETE CBC AUTOMATED: CPT

## 2021-04-02 PROCEDURE — 6360000002 HC RX W HCPCS: Performed by: GENERAL PRACTICE

## 2021-04-02 PROCEDURE — 2580000003 HC RX 258: Performed by: GENERAL PRACTICE

## 2021-04-02 PROCEDURE — 80048 BASIC METABOLIC PNL TOTAL CA: CPT

## 2021-04-02 PROCEDURE — 97535 SELF CARE MNGMENT TRAINING: CPT

## 2021-04-02 PROCEDURE — 83036 HEMOGLOBIN GLYCOSYLATED A1C: CPT

## 2021-04-02 PROCEDURE — 92523 SPEECH SOUND LANG COMPREHEN: CPT

## 2021-04-02 PROCEDURE — 92610 EVALUATE SWALLOWING FUNCTION: CPT

## 2021-04-02 PROCEDURE — 97530 THERAPEUTIC ACTIVITIES: CPT

## 2021-04-02 PROCEDURE — 2000000003 HC NEURO ICU R&B

## 2021-04-02 PROCEDURE — 6370000000 HC RX 637 (ALT 250 FOR IP): Performed by: GENERAL PRACTICE

## 2021-04-02 PROCEDURE — 99223 1ST HOSP IP/OBS HIGH 75: CPT | Performed by: PSYCHIATRY & NEUROLOGY

## 2021-04-02 PROCEDURE — 36415 COLL VENOUS BLD VENIPUNCTURE: CPT

## 2021-04-02 PROCEDURE — 97162 PT EVAL MOD COMPLEX 30 MIN: CPT

## 2021-04-02 PROCEDURE — 80061 LIPID PANEL: CPT

## 2021-04-02 PROCEDURE — 97166 OT EVAL MOD COMPLEX 45 MIN: CPT

## 2021-04-02 RX ORDER — CLOPIDOGREL BISULFATE 75 MG/1
75 TABLET ORAL DAILY
Status: DISCONTINUED | OUTPATIENT
Start: 2021-04-02 | End: 2021-04-07 | Stop reason: HOSPADM

## 2021-04-02 RX ORDER — LANOLIN ALCOHOL/MO/W.PET/CERES
6 CREAM (GRAM) TOPICAL NIGHTLY PRN
Status: CANCELLED | OUTPATIENT
Start: 2021-04-02

## 2021-04-02 RX ORDER — SODIUM CHLORIDE 9 MG/ML
INJECTION, SOLUTION INTRAVENOUS CONTINUOUS
Status: DISCONTINUED | OUTPATIENT
Start: 2021-04-02 | End: 2021-04-03

## 2021-04-02 RX ORDER — LANOLIN ALCOHOL/MO/W.PET/CERES
6 CREAM (GRAM) TOPICAL NIGHTLY PRN
Status: DISCONTINUED | OUTPATIENT
Start: 2021-04-02 | End: 2021-04-07 | Stop reason: HOSPADM

## 2021-04-02 RX ORDER — ATORVASTATIN CALCIUM 40 MG/1
40 TABLET, FILM COATED ORAL NIGHTLY
Status: DISCONTINUED | OUTPATIENT
Start: 2021-04-02 | End: 2021-04-07 | Stop reason: HOSPADM

## 2021-04-02 RX ADMIN — PANTOPRAZOLE SODIUM 40 MG: 40 TABLET, DELAYED RELEASE ORAL at 08:31

## 2021-04-02 RX ADMIN — METOPROLOL TARTRATE 12.5 MG: 25 TABLET ORAL at 01:04

## 2021-04-02 RX ADMIN — ONDANSETRON 4 MG: 2 INJECTION INTRAMUSCULAR; INTRAVENOUS at 04:14

## 2021-04-02 RX ADMIN — FERROUS SULFATE TAB EC 325 MG (65 MG FE EQUIVALENT) 325 MG: 325 (65 FE) TABLET DELAYED RESPONSE at 08:31

## 2021-04-02 RX ADMIN — FERROUS SULFATE TAB EC 325 MG (65 MG FE EQUIVALENT) 325 MG: 325 (65 FE) TABLET DELAYED RESPONSE at 21:56

## 2021-04-02 RX ADMIN — METOPROLOL TARTRATE 12.5 MG: 25 TABLET ORAL at 21:56

## 2021-04-02 RX ADMIN — LEVOTHYROXINE SODIUM 112 MCG: 112 TABLET ORAL at 12:14

## 2021-04-02 RX ADMIN — SODIUM CHLORIDE, PRESERVATIVE FREE 10 ML: 5 INJECTION INTRAVENOUS at 21:00

## 2021-04-02 RX ADMIN — Medication 6 MG: at 01:10

## 2021-04-02 RX ADMIN — METOPROLOL TARTRATE 12.5 MG: 25 TABLET ORAL at 08:31

## 2021-04-02 RX ADMIN — SODIUM CHLORIDE, PRESERVATIVE FREE 10 ML: 5 INJECTION INTRAVENOUS at 01:09

## 2021-04-02 RX ADMIN — FERROUS SULFATE TAB EC 325 MG (65 MG FE EQUIVALENT) 325 MG: 325 (65 FE) TABLET DELAYED RESPONSE at 01:04

## 2021-04-02 RX ADMIN — DESMOPRESSIN ACETATE 40 MG: 0.2 TABLET ORAL at 21:56

## 2021-04-02 RX ADMIN — SODIUM CHLORIDE: 9 INJECTION, SOLUTION INTRAVENOUS at 01:11

## 2021-04-02 RX ADMIN — AMLODIPINE BESYLATE 5 MG: 5 TABLET ORAL at 08:31

## 2021-04-02 RX ADMIN — OXYBUTYNIN CHLORIDE 5 MG: 5 TABLET, EXTENDED RELEASE ORAL at 08:31

## 2021-04-02 ASSESSMENT — PAIN SCALES - GENERAL
PAINLEVEL_OUTOF10: 0
PAINLEVEL_OUTOF10: 0

## 2021-04-02 NOTE — ED NOTES
Patient begins to have a raspy cough and states I cant swallow  Patient continues to cough  Patient unable to follow commands due to crying  Dr. Dionte Dillon in immediately to evaluate patient  Alteplase stopped per his order  Noted increase in slurred voice, no other changes to prior neuro deficits     Troy Fitzpatrick RN  04/01/21 2123

## 2021-04-02 NOTE — ED NOTES
Report called to 5B  Patient status has improved slightly, is able to follow some commands with NIH checklist  NIH charted on form  Family at bedside     Nereyda Bridges [facility]  04/01/21 [address]

## 2021-04-02 NOTE — PROGRESS NOTES
deficits characterized by difficulty with recall, verbal reasoning, deductive reasoning, thought flexibility, task insight, and word generation. Pt. Presents with mild dysarthria, characterized by imprecise articulatory precision. Pt presents with O/M deficits at this time, characterized by a left facial droop. ST to follow up and provide treatment to address noted deficits. Education provided. Further therapy recommended at discharge. Recommendations:  Requires SLP Intervention: Yes  Duration/Frequency of Treatment: 3-5x week  D/C Recommendations: Further therapy recommended at discharge. Plan:   Goals:  Short-term Goals  Goal 1: Patient will recall 3-5 units with and without distractions with 90% accuracy  Goal 2: Patient will utilize memory compensatory strategies to aid in recall  Goal 3: Patient will complete verbal and deductive reasoning tasks with 90% accuracy  Goal 4: Patient will complete thought flexibility tasks with 90% accuracy  Goal 5: Patient will complete task insight tasks with 90% accuracy  Goal 6: Patient will generate 6 units in a category with 90% accuracy   Patient/family involved in developing goals and treatment plan: Yes    Subjective:   Previous level of function and limitations: Patient did not work or drive.   General  Chart Reviewed: No  Family / Caregiver Present: No  Social/Functional History  Lives With: Alone  Vision  Vision: Within Functional Limits  Hearing  Hearing: Within functional limits        Objective:     Oral/Motor  Oral Motor: Exceptions to [facility]  Labial ROM: Reduced left  Labial Symmetry: Abnormal symmetry left       Motor Speech  Motor Speech: Exceptions to [place]  Intelligibility: Mild  Dysarthria : Mild     Cognition:      Orientation  Overall Orientation Status: Within Normal Limits  Attention  Attention: Within Functional Limits  Memory  Memory: Exceptions to WFL(immediate recall: 3/3, 3/5, 3/3)  Short-term Memory: Severe(delayed recall: 0/3, 0/3)  Problem Solving  Problem Solving: Exceptions to [facility]  Verbal Reasoning Skills: Mild(antonyms: 3/4; deductive reasonin/4; word associations: 4/5)  Abstract Reasoning  Abstract Reasoning: Exceptions to [facility]  Divergent Thinking: Mild(named 5 animals in 30 seconds)  Safety/Judgement  Safety/Judgement: Exceptions to [facility]  Insight: Mild  (task insight: 2/3)  Flexibility of Thought: Moderate(1/3, 1/3)   Sequencing: Within Functional Limits (4/4)       Prognosis:  Speech Therapy Prognosis  Prognosis: Fair    Education:  Patient Education: Yes  Patient Education Response: Verbalizes understanding          Therapy Time:   Individual Concurrent Group Co-treatment   Time In 4780         Time Out 1042         Minutes 27                 Completed by:  Greg Lenz  Clinician    Cosigned By: Brianna LOOMIS CCC/SLP      2021 11:02 AM

## 2021-04-02 NOTE — CARE COORDINATION
Case Management Initial Discharge Plan  Ashleigh Coats,             Met with:patient to discuss discharge plans. Information verified: address, contacts, phone number, , insurance Yes    Emergency Contact/Next of Kin name & number: Olivia Schafer and Derrick Portillo as per face sheet    PCP: VERNELL Tejada CNP  Date of last visit: unknown cant remember    Insurance Provider: medicare and medical mutual    Discharge Planning    Living Arrangements:  Alone   Support Systems:  Children, Family Members, Friends/Neighbors    Home has 2 stories  no stairs to climb to get into front door, no stairs to climb to reach second floor  Location of bedroom/bathroom in home main    Patient able to perform ADL's:Independent    Current Services (outpatient & in home) none  DME equipment: ramp, wheelchair, walker  DME provider: na    Receiving oral anticoagulation therapy? No    If indicated:   Physician managing anticoagulation treatment: na  Where does patient obtain lab work for ATC treatment? na      Potential Assistance Needed:  Outpatient PT/OT    Patient agreeable to home care: No  [place] of choice provided:  has had in past cant remember who    Prior SNF/Rehab Placement and Facility: none  Agreeable to SNF/Rehab: Yes  [facility] of choice provided: patient would like daughter to decide a facility     Evaluation: no    Expected Discharge date:  21    Patient expects to be discharged to:  rehab vs home  Follow Up Appointment: Best Day/ Time: Monday AM    Transportation provider: NORA  Transportation arrangements needed for discharge: Yes    Readmission Risk              Risk of Unplanned Readmission:        14             Does patient have a readmission risk score greater than 14?: No  If yes, follow-up appointment must be made within 7 days of discharge.      Goals of Care: comfort      Discharge Plan: SNF, spoke with patients daughter [employer] she relates patient has been in [facility] skilled nursing and rehab and is agreeable to referral, referral sent          Electronically signed by Praveen Sinclair RN on 4/2/21 at 5:18 PM EDT

## 2021-04-02 NOTE — ED NOTES
Back in room  Dr. Toy Singh and Dr. Basilio Hdz at bedside  Patient continues to have same deficits     Alexandre López RN  04/01/21 2126

## 2021-04-02 NOTE — PROGRESS NOTES
Physical Therapy    Facility/Department: [address]  Initial Assessment    NAME: Melissa Hickey  : 1932  MRN: [id number]    Date of Service: 2021  Per H&P\"   The patient is a 80 y.o. female presented with acute sudden onset left-sided weakness at approximately 1835 last night. EMS was called by patient's daughter. Patient has baseline left lower weakness from knee and hip pain, and a prior shoulder injury causing some baseline deficits in the left shoulder and inability to raise arm above the head. Patient states her weakness on the left is increased from baseline as well as a left sided facial droop and dysarthria which are new. Patient underwent thrombolytic therapy in the emergency department, and is now admitted to the neuro ICU post TPA administration. The strength in her left upper and lower extremity have improved from initial presentation\"  Discharge Recommendations:  Patient would benefit from continued therapy after discharge   PT Equipment Recommendations  Equipment Needed: Yes  Other: continue to assess    Assessment   Body structures, Functions, Activity limitations: Decreased functional mobility ; Decreased balance;Decreased strength;Decreased endurance  Assessment: Pt presents with worsening weakness of the L LE and L UE per Dr. Elizabeth Cortez. Pt is unable to move L side IND for mobility. Pt also presents with deconditioning which increases her fall risk. Pt would greatly benefit from continued therapy in order to build strength, endurance, and improve balance. Prognosis: Guarded  Decision Making: Medium Complexity  PT Education: Goals;Gait Training;General Safety;PT Role;Plan of Care; Functional Mobility Training;Transfer Training  REQUIRES PT FOLLOW UP: Yes  Activity Tolerance  Activity Tolerance: Patient Tolerated treatment well;Patient limited by fatigue;Patient limited by endurance       Patient Diagnosis(es): The encounter diagnosis was Cerebrovascular accident (CVA), unspecified mechanism [facility] has a past medical history of Anemia, Hypertension, Hypothyroidism, Osteoarthritis, Other long term (current) drug therapy, and Renal insufficiency. has no past surgical history on file. Restrictions  Restrictions/Precautions  Restrictions/Precautions: Fall Risk, General Precautions  Required Braces or Orthoses?: No  Vision/Hearing  Vision: Impaired  Vision Exceptions: Wears glasses at all times  Hearing: Within functional limits     Subjective  General  Patient assessed for rehabilitation services?: Yes  Response To Previous Treatment: Not applicable  Family / Caregiver Present: No  Follows Commands: Within Functional Limits  General Comment  Comments: During session Dr. Mehrdad Osborne entered room and began talking to the patient. Report on what the patient was able to do was provided by the SPT.  states that the pt has gotten weaker since he has seen her and believes it is an envolving situation. PT is okay to continue and he will check back later. Subjective  Subjective: Pt supine in bed upon entry, asleep. Pt states she will do whatever she needs to do and will try anything. However pt reports fatigue. Pain Screening  Patient Currently in Pain: No  Vital Signs  Patient Currently in Pain: No       Orientation  Orientation  Overall Orientation Status: Within Functional Limits  Social/Functional History  Social/Functional History  Lives With: Alone  Type of Home: House  Home Layout: One level  Home Access: Level entry  Bathroom Shower/Tub: Walk-in shower, Shower chair with back  [employer]: Standard  Bathroom Equipment: (railings and grab bars throughout the house.)  Home Equipment: Alphonso Benavides, 4 wheeled walker  Receives Help From: Family, Personal care attendant(Pt has three ladies who come to help her bath, dress, cook and clean.  Family is also close by to assist.)  ADL Assistance: Needs assistance  Homemaking Assistance: Needs assistance  Ambulation Assistance: Independent(uses railings throughout the

## 2021-04-02 NOTE — H&P
Neuro ICU History & Physical    Patient Name: Melissa Hickey  Patient : 1932  Room/Bed: 5097/0637-33  Code Status: Full  Allergies: Allergies   Allergen Reactions    Ciprofloxacin     Hydrocodone     Macrobid [Nitrofurantoin]     Ciprofloxacin Rash       CHIEF COMPLAINT     CVA    HPI    History Obtained From: EMS and Patient    The patient is a 80 y.o. female presented with acute sudden onset left-sided weakness at approximately 1835 last night. EMS was called by patient's daughter. Patient has baseline left lower weakness from knee and hip pain, and a prior shoulder injury causing some baseline deficits in the left shoulder and inability to raise arm above the head. Patient states her weakness on the left is increased from baseline as well as a left sided facial droop and dysarthria which are new. Patient underwent thrombolytic therapy in the emergency department, and is now admitted to the neuro ICU post TPA administration. The strength in her left upper and lower extremity have improved from initial presentation    Admitted to ICU From: ER  Reason for ICU Admission: CVA       PATIENT HISTORY   Past Medical History:        Diagnosis Date    Anemia     Hypertension     Hypothyroidism     Osteoarthritis     Other long term (current) drug therapy     Renal insufficiency        Past Surgical History:    No past surgical history on file. Social History:   Social History     Socioeconomic History    Marital status:       Spouse name: Not on file    Number of children: Not on file    Years of education: Not on file    Highest education level: Not on file   Occupational History    Not on file   Social Needs    Financial resource strain: Not on file    Food insecurity     Worry: Not on file     Inability: Not on file    Transportation needs     Medical: Not on file     Non-medical: Not on file   Tobacco Use    Smoking status: Never Smoker    Smokeless tobacco: Never Used Substance and Sexual Activity    Alcohol use: Never     Frequency: Never    Drug use: Never    Sexual activity: Not on file   Lifestyle    Physical activity     Days per week: Not on file     Minutes per session: Not on file    Stress: Not on file   Relationships    Social connections     Talks on phone: Not on file     Gets together: Not on file     Attends [employer] service: Not on file     Active member of club or organization: Not on file     Attends meetings of clubs or organizations: Not on file     Relationship status: Not on file    Intimate partner violence     Fear of current or ex partner: Not on file     Emotionally abused: Not on file     Physically abused: Not on file     Forced sexual activity: Not on file   Other Topics Concern    Not on file   Social History Narrative    Not on file       Family History:   No family history on file. Allergies:    Ciprofloxacin, Hydrocodone, Macrobid [nitrofurantoin], and Ciprofloxacin    Medications Prior to Admission:    Medications Prior to Admission: amLODIPine (NORVASC) 5 MG tablet, Take 1 tablet by mouth daily  atorvastatin (LIPITOR) 20 MG tablet, Take 1 tablet by mouth nightly  Ferrous Sulfate 324 MG TBEC, Take 1 tablet by mouth 2 times daily  levothyroxine (SYNTHROID) 112 MCG tablet, Take 1 tablet by mouth Daily  omeprazole (PRILOSEC) 20 MG delayed release capsule, Take 1 capsule by mouth daily  sodium bicarbonate 650 MG tablet, Take 1 tablet by mouth 4 times daily Two tabs BID  metoprolol tartrate (LOPRESSOR) 25 MG tablet, Take 0.5 tablets by mouth 2 times daily Take 1/2 tablet BID  docusate sodium (COLACE) 100 MG capsule, Take 1 capsule by mouth every other day  cephALEXin (KEFLEX) 250 MG capsule, TAKE ONE CAPSULE BY MOUTH ONCE A DAY. oxybutynin (DITROPAN-XL) 5 MG extended release tablet, Take 1 tablet by mouth daily  fluorouracil (EFUDEX) 5 % cream, Apply topically for 4 weeks .   Calcium Carb-Cholecalciferol (CALCIUM + D3) 600-200 MG-UNIT TABS tablet, Take 1 tablet by mouth Daily with lunch  Multiple Vitamins-Minerals (THERAPEUTIC MULTIVITAMIN-MINERALS) tablet, Take 1 tablet by mouth daily  magnesium oxide (MAG-OX) 400 MG tablet, Take 400 mg by mouth daily  acetaminophen (TYLENOL) 325 MG tablet, Take 650 mg by mouth Take 2 tablets at bedtime for sleeping comfort PRN  Apoaequorin (PREVAGEN) 10 MG CAPS, Take by mouth daily  GLUCOSA-CHONDR-NA CHONDR-MSM PO, Take by mouth    Current Medications:  Current Facility-Administered Medications: [COMPLETED] alteplase (ACTIVASE) injection 6.7 mg, 0.09 mg/kg, Intravenous, Once **FOLLOWED BY** [COMPLETED] alteplase (ACTIVASE) injection 60.6 mg, 0.81 mg/kg, Intravenous, Once **FOLLOWED BY** 0.9 % sodium chloride bolus, 50 mL, Intravenous, Once  sodium chloride flush 0.9 % injection 10 mL, 10 mL, Intravenous, 2 times per day  sodium chloride flush 0.9 % injection 10 mL, 10 mL, Intravenous, PRN  0.9 % sodium chloride infusion, 25 mL, Intravenous, PRN  amLODIPine (NORVASC) tablet 5 mg, 5 mg, Oral, Daily  atorvastatin (LIPITOR) tablet 80 mg, 80 mg, Oral, Nightly  ferrous sulfate (FE TABS 325) EC tablet 325 mg, 325 mg, Oral, BID  levothyroxine (SYNTHROID) tablet 112 mcg, 112 mcg, Oral, Daily  metoprolol tartrate (LOPRESSOR) tablet 12.5 mg, 12.5 mg, Oral, BID  pantoprazole (PROTONIX) tablet 40 mg, 40 mg, Oral, QAM AC  oxybutynin (DITROPAN-XL) extended release tablet 5 mg, 5 mg, Oral, Daily  sodium chloride flush 0.9 % injection 10 mL, 10 mL, Intravenous, 2 times per day  sodium chloride flush 0.9 % injection 10 mL, 10 mL, Intravenous, PRN  0.9 % sodium chloride infusion, 25 mL, Intravenous, PRN  promethazine (PHENERGAN) tablet 12.5 mg, 12.5 mg, Oral, Q6H PRN **OR** ondansetron (ZOFRAN) injection 4 mg, 4 mg, Intravenous, Q6H PRN  enoxaparin (LOVENOX) injection 40 mg, 40 mg, Subcutaneous, Daily  aspirin EC tablet 81 mg, 81 mg, Oral, Daily **OR** aspirin suppository 300 mg, 300 mg, Rectal, Daily  senna (SENOKOT) tablet 8.6 mg, 1 tablet, Oral, Daily PRN    REVIEW OF SYSTEMS     CONSTITUTIONAL: negative for fatigue and malaise   EYES: negative for double vision and photophobia    HEENT: negative for tinnitus and sore throat   RESPIRATORY: negative for cough, shortness of breath   CARDIOVASCULAR: negative for chest pain, palpitations, or syncope   GASTROINTESTINAL: negative for abdominal pain, nausea, vomiting, diarrhea, or constipation    GENITOURINARY: negative for incontinence or retention    MUSCULOSKELETAL: negative for neck or back pain, negative for extremity pain   NEUROLOGICAL:  Positive for left sided weakness and dysarthria   PSYCHIATRIC: negative for agitation, hallucination, SI/HI   SKIN Negative for spontaneous contusions, rashes, or lesions      PHYSICAL EXAM:     BP (!) 156/69   Pulse 79   Temp 97.1 °F (36.2 °C) (Oral)   Resp 20   Wt 165 lb (74.8 kg)   SpO2 98%   BMI 25.84 kg/m²     PHYSICAL EXAM:  CONSTITUTIONAL:  Well developed, well nourished, alert and oriented x 3, in no acute distress. GCS 15. Nontoxic. Mild dysarthria   HEAD:  normocephalic, atraumatic left sided facial droop   EYES:  PERRLA, EOMI. visual acuity intact in both eyes   ENT:  moist mucous membranes   LUNGS:  Equal air entry bilaterally   CARDIOVASCULAR:  normal s1 / s2   ABDOMEN:  Soft, no rigidity   NECK supple, symmetric, no midline tenderness to palpation    BACK without midline tenderness, step-offs or deformities    EXTREMITIES  Limited range of motion of left upper extremity and pain at the hip and knee on the left (chronic per patient)   NEUROLOGIC:  Mental Status: Alert and oriented x3             Cranial Nerves:    Radial nerves II through XII grossly intact    Motor Exam:    Drift: Drift in left upper   extremity tone: Weakness in left upper extremity    Strength: 4/5 in left upper and lower extremities, 5/5 in right upper and lower extremities. Sensation intact throughout with no deficits.   Finger-to-nose intact bilaterally, however difficult secondary to weakness in the left. Visual acuity normal in both eyes with no extinction. Mild dysarthria with no appreciable aphasia     SKIN No obvious ecchymosis, rashes, or lesions    NIH Stroke Scale Total (if not done complete detailed one below):    1a.  Level of consciousness:  0 - alert; keenly responsive  1b. Level of consciousness questions:  0 - answers both questions correctly  1c. Level of consciousness questions:  0 - performs both tasks correctly  2. Best Gaze:  0 - normal  3. Visual:  0 - no visual loss  4. Facial Palsy:  1 - minor paralysis (flattened nasolabial fold, asymmetric on smiling)  5a. Motor left arm:  1 - drift, limb holds 90 (or 45) degrees but drifts down before full 10 seconds: does not hit bed  5b. Motor right arm:  0 - no drift, limb holds 90 (or 45) degrees for full 10 seconds  6a. Motor left le - some effort against gravity; leg falls to bed by 5 seconds but has some effort against gravity  6b. Motor right le - no drift; leg holds 30 degree position for full 5 seconds  7. Limb Ataxia:  0 - absent  8. Sensory:  0 - normal; no sensory loss  9. Best Language:  0 - no aphasia, normal  10. Dysarthria:  1 - mild to moderate, patient slurs at least some words and at worst, can be understood with some difficulty  11.   Extinction and Inattention:  0 - no abnormality    LABS AND IMAGING:     RECENT LABS:  CBC with Differential:    Lab Results   Component Value Date    WBC 7.5 2021    RBC 3.61 2021    RBC 3.51 2021    HGB 11.2 2021    HCT 34.9 2021     2021    MCV 96.7 2021    MCH 31.0 2021    MCHC 32.1 2021    RDW 14.0 2021    LYMPHOPCT 16 2021    LYMPHOPCT 12 2021    MONOPCT 8 2021    BASOPCT 1 2021    MONOSABS 0.62 2021    MONOSABS 0.3 2021    LYMPHSABS 1.21 2021    LYMPHSABS 0.7 2021    EOSABS 0.17 2021    EOSABS 0.1 02/25/2021    BASOSABS 0.05 04/01/2021    DIFFTYPE NOT REPORTED 04/01/2021     BMP:    Lab Results   Component Value Date     04/01/2021    K 4.5 04/01/2021     04/01/2021    CO2 21 04/01/2021    BUN 18 04/01/2021    LABALBU 3.9 02/25/2021    CREATININE 0.93 04/01/2021    CREATININE 1.06 02/25/2021    CALCIUM 8.8 04/01/2021    GFRAA >60 04/01/2021    LABGLOM 57 04/01/2021    GLUCOSE 90 04/01/2021    GLUCOSE 96 02/25/2021       RADIOLOGY:   CT HEAD WO CONTRAST   Final Result   No evidence of acute intracranial process. Moderate atrophy and extensive   chronic small vessel ischemic changes noted. There has been no significant change from the recent prior study. CTA HEAD NECK W CONTRAST   Final Result   No evidence for acute intracranial hemorrhage, territorial infarction or   intracranial mass lesion. Moderate chronic microangiopathic ischemic disease. Moderate generalized volume loss. Scattered areas of calcification throughout the head and neck vasculature. Otherwise unremarkable CTA of the head and neck. No hemodynamically   significant stenosis. No dissection. No aneurysm. CT HEAD WO CONTRAST   Final Result   No evidence for acute intracranial hemorrhage, territorial infarction or   intracranial mass lesion. Moderate chronic microangiopathic ischemic disease. Moderate generalized volume loss. Scattered areas of calcification throughout the head and neck vasculature. Otherwise unremarkable CTA of the head and neck. No hemodynamically   significant stenosis. No dissection. No aneurysm. CT head without contrast    (Results Pending)               Labs and Images reviewed with:    [] Arline Hughes MD    [] Sonia Morataya MD  [x] Jami Johnson MD  --[] there are no new interval images to review. ASSESSMENT AND PLAN:         ASSESSMENT:     This is a 80 y.o. female with acute left-sided weakness, dysarthria.   Status post TPA    Patient care will be discussed with attending, will reevaluate patient along with attending. PLAN/MEDICAL DECISION MAKING:    NEUROLOGIC:  -Repeat head CT in 24 hours  -MRI brain pending  -Aspirin, Plavix to start 24 hours after TPA  -Lipitor 40 mg daily.   Pending fasting lipid panel  -SBP <180  - Neuro checks per protocol    CARDIOVASCULAR:  - Goal SBP <180  - Continue telemetry    PULMONARY:  -SPO2 normal on room air    RENAL/FLUID/ELECTROLYTE:  - BUN 18, creatinine 0.93  -Monitor UOP    GI/NUTRITION:  NUTRITION:  Diet NPO Effective Now pending swallow and speech evaluation  - Bowel regimen: Senna  - GI prophylaxis: Not applicable    ID:  -Afebrile no concern for infectious process  - Continue to monitor for fevers  - Daily CBC    HEME:   -Hemoglobin 11.2, platelets 112  - Daily CBC    ENDOCRINE:  - Continue to monitor blood glucose, goal <180  -No home insulin requirements    OTHER:  - PT/OT/ST pending Recs    PROPHYLAXIS:  Stress ulcer: Not applicable    DVT PROPHYLAXIS:  -Lovenox held till 24 hours post TPA  -Aspirin and Plavix to start 24 hours after TPA    DISPOSITION: Remain in [address] [place] [address],   Neuro Critical Care Service   Pager [phone]  4/2/2021     12:02 AM

## 2021-04-02 NOTE — ED NOTES
Patient taken to CT  Patient tearful and laughing at same time  NAN now present at bedside  Dr. Catrina Lynn present at bedside     [id number]  04/01/21 2124

## 2021-04-02 NOTE — ED NOTES
Per Dr. Cornelius Sin restarted  Family at bedside  This caregiver remains at bedside     Alexandre López [facility]  04/01/21 2127

## 2021-04-02 NOTE — PROGRESS NOTES
least three hours of therapy per day over 5 days or 15 hours over 7 days. Treatment Diagnosis: CVA  Prognosis: Good  Decision Making: Medium Complexity  OT Education: Plan of Care;OT Role;Orientation; Family Education  Patient Education: Pt and family educated on OT role and POC with verbalized understanding. Pt educated on orientation, today's date, and why she is in the hospital with acknowledgement. Barriers to Learning: decreased cognition, confusion of recent events  REQUIRES OT FOLLOW UP: Yes  Activity Tolerance  Activity Tolerance: Patient limited by fatigue  Activity Tolerance: fatigue noted during sitting at EOB,needing to lay supine following. Safety Devices  Safety Devices in place: Yes  Type of devices: Bed alarm in place; Left in bed;Call light within reach;Nurse notified  Restraints  Initially in place: No           Patient Diagnosis(es): The encounter diagnosis was Cerebrovascular accident (CVA), unspecified mechanism ([facility] [address] has a past medical history of Anemia, Hypertension, Hypothyroidism, Osteoarthritis, Other long term (current) drug therapy, and Renal insufficiency. has no past surgical history on file. Treatment Diagnosis: CVA      Restrictions  Restrictions/Precautions  Restrictions/Precautions: Fall Risk, General Precautions  Required Braces or Orthoses?: No    Subjective   General  Chart Reviewed: Yes  Patient assessed for rehabilitation services?: Yes  Family / Caregiver Present: Yes(daughter and son in law)  General Comment  Comments: RN okayed for Pt to be seen for OT evaluation. Pt pleasant and participative throughout.   Patient Currently in Pain: Denies  Vital Signs  Level of Consciousness: Alert (0)  Patient Currently in Pain: Denies  Social/Functional History  Social/Functional History  Lives With: Alone  Type of Home: House  Home Layout: One level  Home Access: Level entry  Bathroom Shower/Tub: Walk-in shower, Shower chair with back  Bathroom Toilet: Standard  Bathroom Equipment: Grab bars in shower, Grab bars around toilet  Bathroom Accessibility: Tip Bazan: [employer], 4 wheeled walker  Gianfranco Help From: Family, Personal care attendant, Friend(s)  ADL Assistance: Needs assistance  Bath: Maximum assistance(Pt recieves help daily for bathing from friend that comes every morning.)  Dressing: Stand by assistance(Pt gets help from friend as needed, fluctuating between MOD IND and MIN A for dressing tasks.)  Grooming: Modified independent   Feeding: Modified independent   Toileting: Needs assistance(SBA)  Homemaking Assistance: Needs assistance(Pt's friends and family complete all IADL tasks.)  Homemaking Responsibilities: Yes  Meal Prep Responsibility: Secondary(light meal prep, microwave meals)  Ambulation Assistance: Independent(using grab bars or RW)  Transfer Assistance: Independent  Active : No  Patient's  Info: family  Mode of Transportation: Car  Occupation: Retired  Leisure & Hobbies: put together puzzles  Additional Comments: Pt's daughter is supportive and able to assist as needed. Pt's friends come daily to assist with bathing/dressing. Objective   Vision: Impaired  Vision Exceptions: Wears glasses at all times  Hearing: Exceptions to [facility]  Hearing Exceptions: Hard of hearing/hearing concerns    Orientation  Overall Orientation Status: Impaired  Orientation Level: Oriented to person;Disoriented to time;Disoriented to situation;Disoriented to place     Balance  Sitting Balance: Minimal assistance(Pt required MIN A initially to sit at EOB, progressing to SBA, and then requiring increased assist of MIN/MOD A d/t fatigue. Pt able to sit up about 8 minutes.)  Standing Balance: Unable to assess(comment)(DNT d/t fatigue from sitting EOB and need to lay supine.)  Functional Mobility  Functional Mobility Comments: DNT d/t fatigue from sitting EOB and need to lay supine.   ADL  Feeding: Stand by assistance;Setup  Grooming: Minimal assistance  UE Bathing: Maximum assistance  LE Bathing: Maximum assistance  UE Dressing: Maximum assistance  LE Dressing: Dependent/Total  Toileting: Dependent/Total  Additional Comments: Seated at EOB, Pt washed face and doff/donned glasses with MIN A needed for thoroughness of left side of face. Pt appeared to have decreased sensation in left side of face but Pt denied. Pt unable to lean down or trial donning socks, needing DEP assist. Pt is able to log roll side to side with MOD A x2 but does not participate in brief change task, needing DEP. Pt required MAX A to don a gown seated at EOB and would require MAX A to don a typical tshift. Other ADL scores based on skilld observations and clinical impressions unless otherwise indicated. Tone RUE  RUE Tone: Normotonic  Tone LUE  LUE Tone: Normotonic  Coordination  Movements Are Fluid And Coordinated: No  Coordination and Movement description: Decreased speed;Decreased accuracy; Left UE;Resting tremors; Intention tremors;Gross motor impairments; Fine motor impairments  Quality of Movement Other  Comment: impaired LUE ROM, strength, FMC/GMC     Bed mobility  Rolling to Left: Moderate assistance;2 Person assistance  Rolling to Right: Moderate assistance;2 Person assistance  Supine to Sit: Moderate assistance  Sit to Supine: Moderate assistance;2 Person assistance  Scooting: Moderate assistance  Comment: OTR facilitated Pt in log rolling side to side with MOD A x2 for brief change. Pt able to use bed rail to assist in bed mobility. Pt engaged in supine to EOB transfer with MOD A and MOD A for scooting, needing direct verbal instruction to engage in task. Pt engaged in sit to supine transfer with MOD A x2 secondary to overall fatigue from sitting up. Pt required MAX A x2 to be boosted up in bed. Transfers  Transfer Comments: DNT d/t fatigue from sitting EOB and need to lay supine.      Cognition  Overall Cognitive Status: Exceptions  Arousal/Alertness: Appropriate responses to stimuli  Following Commands: Follows one step commands consistently; Follows one step commands with increased time  Attention Span: Attends with cues to redirect  Memory: Decreased recall of recent events  Safety Judgement: Decreased awareness of need for assistance;Decreased awareness of need for safety  Problem Solving: Assistance required to generate solutions;Decreased awareness of errors;Assistance required to implement solutions  Insights: Decreased awareness of deficits  Initiation: Requires cues for all  Sequencing: Requires cues for all        Sensation  Overall Sensation Status: WFL(OTR tested face, arms, and legs with no sensation impairments noted.)        LUE PROM (degrees)  LUE PROM: Exceptions  LUE General PROM: limited shoulder movement at baseline, elbow/wrist/fingers WFL for PROM  L Shoulder Flex  0-180: 0-90  L Shoulder ABduction 0-180: 0-80  LUE AROM (degrees)  LUE AROM : Exceptions  L Shoulder Flexion 0-180: 0 degrees actively  L Shoulder Extension 0-45: 0 degrees actively  L Shoulder ABduction 0-180: 0 degrees actively  L Elbow Flexion 0-145: 0-45  L Elbow Extension 145-0: 45-0  L Wrist Flexion 0-80: 0-10  L Wrist Extension 0-70: 0-10  Left Hand PROM (degrees)  Left Hand PROM: WFL  Left Hand AROM (degrees)  Left Hand AROM: Exceptions  Left Hand General AROM: unable to make a fist  RUE AROM (degrees)  RUE AROM : WFL  Right Hand AROM (degrees)  Right Hand AROM: WFL  LUE Strength  Gross LUE Strength: Exceptions to [facility]  L Shoulder Flex: 1+/5  L Shoulder ABduction: 1+/5  L Elbow Flex: 2-/5  L Elbow Ext: 2-/5  L Wrist Flex: 2-/5  L Wrist Ext: 2-/5  L Hand General: 2-/5  RUE Strength  Gross RUE Strength: Exceptions to [facility]  R Shoulder Flex: 3+/5  R Shoulder ABduction: 3+/5  R Elbow Flex: 3+/5  R Elbow Ext: 3+/5  R Wrist Flex: 3+/5  R Wrist Ext: 3+/5  R Hand General: 4-/5                   Plan   Plan  Times per week: 4-5x/wk  Current Treatment Recommendations: Strengthening, ROM, Safety Education & Training, Positioning, Balance Training, Patient/Caregiver Education & Training, Self-Care / ADL, Functional Mobility Training, Endurance Training, Neuromuscular Re-education      AM-PAC Score        AM-PAC Inpatient Daily Activity Raw Score: 13 (04/02/21 1507)  AM-PAC Inpatient ADL T-Scale Score : 32.03 (04/02/21 1507)  ADL Inpatient CMS 0-100% Score: 63.03 (04/02/21 1507)  ADL Inpatient CMS G-Code Modifier : CL (04/02/21 1507)    Goals  Short term goals  Time Frame for Short term goals: Pt will, by discharge  Short term goal 1: Demonstrate MIN A for UB dressing tasks while incorporating bilateral integration as able. Short term goal 2: Demonstrate MOD A for LB dressing tasks while incorporating bilateral integration as able and with good safety awareness using LRD. Short term goal 3: Increase sitting tolerance at EOB to 10+ minutes with fair sitting balance while performing an ADL tasks for improved endurance for self care. Short term goal 4: Complete sit to stand transfers with CGA using LRD during ADL tasks. Short term goal 5: Notify OTR if goals need to be upgraded as Pt progresses.   Patient Goals   Patient goals : be strong and get back to my puzzles       Therapy Time   Individual Concurrent Group Co-treatment   Time In 0216         Time Out 0245         Minutes 29         Timed Code Treatment Minutes: 24 Minutes       Sagar Tommie, OTD, OTR/L

## 2021-04-02 NOTE — FLOWSHEET NOTE
[facility] [address]     Emergency/Trauma Note    PATIENT NAME: Samira Mendosa    Shift date: 2021  Shift day: Thursday   Shift # 2    Room # 0581/0851-46   Name: Samira Mendosa            Age: 80 y.o. Gender: female          Restorationist: Unknown  Place of Catholic: Unknown    Trauma/Incident type: Stroke Alert  Admit Date & Time: 2021  6:53 PM  TRAUMA NAME: None    ADVANCE DIRECTIVES IN CHART? No    NAME OF DECISION MAKER: None    RELATIONSHIP OF DECISION MAKER TO PATIENT: None    PATIENT/EVENT DESCRIPTION:  Samira Mendosa is a 80 y.o. female who arrived as a STROKE ALERT due to stroke-like symptoms. Pt to be admitted to . SPIRITUAL ASSESSMENT/INTERVENTION:  Patient appeared to be calm and coping. States that she has two children, son Bhavin Stallings [phone]) and daughter Jewels Godwin [phone]). Patient states that she is almost 80 and her  . Uses laughter to cope with stressful situations. Patient seemed to be coping fairly well but then when she was told that she had a stroke, patient appeared to become emotional and anxious.  remained bedside with patient and followed up with physician to see if family had been contacted. Family was in contact with medical staff.  provided space for patient to express feelings, thoughts, and concerns. Determined support to be available. PATIENT BELONGINGS:  No belongings noted    ANY BELONGINGS OF SIGNIFICANT VALUE NOTED:  None    REGISTRATION STAFF NOTIFIED? Yes      WHAT IS YOUR SPIRITUAL CARE PLAN FOR THIS PATIENT?:   Chaplains will remain available to offer spiritual and emotional support as needed.       Electronically signed by Laura Rucker on 2021 at 2:06 AM.  [facility]  [phone]       21   Encounter Summary   Services provided to: Patient   Referral/Consult From: Multi-disciplinary team   Support

## 2021-04-02 NOTE — PLAN OF CARE
Problem: HEMODYNAMIC STATUS  Goal: Patient has stable vital signs and fluid balance  Outcome: Ongoing   Neuro assessment completed, fall precautions in place, aspirations precautions in place, assess for barriers in communication and mobility, interventions to assist in communication and mobility in place, encouraged to call for assistance, adaptive devices used as needed, assess emotional state and support offered, encouraged patient to communicate by available means, and support systems included in patient care. Problem: ACTIVITY INTOLERANCE/IMPAIRED MOBILITY  Goal: Mobility/activity is maintained at optimum level for patient  Outcome: Ongoing     Problem: COMMUNICATION IMPAIRMENT  Goal: Ability to express needs and understand communication  Outcome: Ongoing     Problem: Skin Integrity:  Goal: Will show no infection signs and symptoms  Description: Will show no infection signs and symptoms  Outcome: Ongoing   Skin assessed for breakdown and redness, patient turned regularly, and heels elevated off of bed on pillows. Problem: Falls - Risk of:  Goal: Absence of physical injury  Description: Absence of physical injury  Outcome: Ongoing   Fall assessment preformed. Bed in low locked position with call light and tray table within reach. Education given. Will continue to monitor.

## 2021-04-02 NOTE — PROGRESS NOTES
Speech Language Pathology  Facility/Department: [address]   CLINICAL BEDSIDE SWALLOW EVALUATION    NAME: Anette Brambila  : 1932  MRN: [id number]    ADMISSION DATE: 2021  ADMITTING DIAGNOSIS: has Anemia; Arthritis; HTN (hypertension); Hypothyroidism; Renal insufficiency; Other long term (current) drug therapy; Anemia due to stage 3 chronic kidney disease; Chronic UTI; Mixed hyperlipidemia; Gastroesophageal reflux disease without esophagitis; S/P revision of total knee, left; Muscular weakness; Other instability, left knee; Primary osteoarthritis of both hips; Primary osteoarthritis of right knee; and Acute ischemic stroke ([facility] [address] on their problem list.    Date of Eval: 2021  Evaluating Therapist: Ana Pruett    Current Diet level:  Current Diet : Regular  Current Liquid Diet : Thin    Primary Complaint  The patient is a 80 y.o. female presented with acute sudden onset left-sided weakness at approximately 1835 last night. EMS was called by patient's daughter. Patient has baseline left lower weakness from knee and hip pain, and a prior shoulder injury causing some baseline deficits in the left shoulder and inability to raise arm above the head. Patient states her weakness on the left is increased from baseline as well as a left sided facial droop and dysarthria which are new. Patient underwent thrombolytic therapy in the emergency department, and is now admitted to the neuro ICU post TPA administration.   The strength in her left upper and lower extremity have improved from initial presentation    Pain:  Pain Assessment  Pain Assessment: 0-10  Pain Level: 0  Patient's Stated Pain Goal: No pain  Pain Type: Acute pain    Reason for Referral  Anette Brambila was referred for a bedside swallow evaluation to assess the efficiency of her swallow function, identify signs and symptoms of aspiration and make recommendations regarding safe dietary consistencies, effective compensatory strategies, and safe eating environment. Impression  Patient presents with probable safe swallow for Dysphagia III: Soft and bite sized diet with Mildly Thick (Nectar) liquids as evidenced by no overt s/s of aspiration noted with consistencies tested. Recommend small sips and bites, only feed when alert and awake and upright at 90 degrees for all PO intake. Recommend close monitoring for overt/clinical s/s of aspiration and D/C PO intake and complete Modified Barium Swallow Study should they occur. Results and recommendations reported to RN. Dysphagia Diagnosis: Suspected needs further assessment;Mild pharyngeal stage dysphagia;Mild oral stage dysphagia  Dysphagia Outcome Severity Scale: Level 4: Mild moderate dysphagia- Intermittent supervision/cueing. One - two diet consistencies restricted     Treatment Plan  Requires SLP Intervention: Yes  Duration/Frequency of Treatment: 1-2x per week  D/C Recommendations: Further therapy recommended at discharge. Recommended Diet and Intervention  Diet Solids Recommendation: Dysphagia Soft and Bite-Sized (Dysphagia III)  Liquid Consistency Recommendation: Mildly Thick (Nectar)  Recommended Form of Meds: Meds in puree  Therapeutic Interventions: Diet tolerance monitoring;Patient/Family education    Compensatory Swallowing Strategies  Compensatory Swallowing Strategies: Upright as possible for all oral intake;Small bites/sips; Check for pocketing of food on the Left;Assist feed    Treatment/Goals  Dysphagia Goals: The patient will tolerate recommended diet without observed clinical signs of aspiration    General  Chart Reviewed: Yes  Behavior/Cognition: Alert; Cooperative  Temperature Spikes Noted: No  Respiratory Status: Room air  O2 Device: None (Room air)  Communication Observation: Functional;Dysarthria  Follows Directions: Simple  Dentition: Dentures bottom; Dentures top  Patient Positioning: Upright in bed  Baseline Vocal Quality: Normal  Consistencies Administered: Dysphagia Soft and Bite-Sized (Dysphagia III); Dysphagia Pureed (Dysphagia I); Nectar - straw;Nectar - teaspoon; Thin - teaspoon; Thin - straw    Vision/Hearing  Vision  Vision: Within Functional Limits  Hearing  Hearing: Within functional limits    Oral Motor Deficits  Oral/Motor  Oral Motor: Exceptions to [facility]  Labial ROM: Reduced left  Labial Symmetry: Abnormal symmetry left    Oral Phase Dysfunction  Oral Phase  Oral Phase: WFL  Oral Phase  Oral Phase - Comment: +mod extended mastication with soft solid consistency, however bolus manipulation and oral tranasit appear WFL for consistencies tested     Indicators of Pharyngeal Phase Dysfunction   Pharyngeal Phase  Pharyngeal Phase: WFL  Pharyngeal Phase   Pharyngeal: +immediate throat clear, +latent cough with thin liquid by teaspoon and straw. +throat clear x1/2 trials of soft solid. However, no other overt s/s of aspiration noted.     Prognosis  Prognosis  Prognosis for safe diet advancement: fair  Individuals consulted  Consulted and agree with results and recommendations: Patient;RN    Education  Patient Education: yes  Patient Education Response: Verbalizes understanding             Therapy Time  SLP Individual Minutes  Time In: 3701  Time Out: 0444  Minutes: DORON Lugo  4/2/2021 1:26 PM

## 2021-04-03 LAB
ANION GAP SERPL CALCULATED.3IONS-SCNC: 11 MMOL/L (ref 9–17)
BUN BLDV-MCNC: 12 MG/DL (ref 8–23)
BUN/CREAT BLD: ABNORMAL (ref 9–20)
CALCIUM SERPL-MCNC: 8 MG/DL (ref 8.6–10.4)
CHLORIDE BLD-SCNC: 107 MMOL/L (ref 98–107)
CO2: 18 MMOL/L (ref 20–31)
CREAT SERPL-MCNC: 0.91 MG/DL (ref 0.5–0.9)
GFR AFRICAN AMERICAN: >60 ML/MIN
GFR NON-AFRICAN AMERICAN: 58 ML/MIN
GFR SERPL CREATININE-BSD FRML MDRD: ABNORMAL ML/MIN/{1.73_M2}
GFR SERPL CREATININE-BSD FRML MDRD: ABNORMAL ML/MIN/{1.73_M2}
GLUCOSE BLD-MCNC: 83 MG/DL (ref 70–99)
HCT VFR BLD CALC: 30.9 % (ref 36.3–47.1)
HEMOGLOBIN: 9.6 G/DL (ref 11.9–15.1)
MCH RBC QN AUTO: 30.3 PG (ref 25.2–33.5)
MCHC RBC AUTO-ENTMCNC: 31.1 G/DL (ref 28.4–34.8)
MCV RBC AUTO: 97.5 FL (ref 82.6–102.9)
NRBC AUTOMATED: 0 PER 100 WBC
PDW BLD-RTO: 13.7 % (ref 11.8–14.4)
PLATELET # BLD: 215 K/UL (ref 138–453)
PMV BLD AUTO: 11 FL (ref 8.1–13.5)
POTASSIUM SERPL-SCNC: 3.9 MMOL/L (ref 3.7–5.3)
RBC # BLD: 3.17 M/UL (ref 3.95–5.11)
SODIUM BLD-SCNC: 136 MMOL/L (ref 135–144)
WBC # BLD: 7.9 K/UL (ref 3.5–11.3)

## 2021-04-03 PROCEDURE — 80048 BASIC METABOLIC PNL TOTAL CA: CPT

## 2021-04-03 PROCEDURE — 83036 HEMOGLOBIN GLYCOSYLATED A1C: CPT

## 2021-04-03 PROCEDURE — 6370000000 HC RX 637 (ALT 250 FOR IP): Performed by: GENERAL PRACTICE

## 2021-04-03 PROCEDURE — 2580000003 HC RX 258: Performed by: GENERAL PRACTICE

## 2021-04-03 PROCEDURE — 85027 COMPLETE CBC AUTOMATED: CPT

## 2021-04-03 PROCEDURE — 99222 1ST HOSP IP/OBS MODERATE 55: CPT | Performed by: PSYCHIATRY & NEUROLOGY

## 2021-04-03 PROCEDURE — 36415 COLL VENOUS BLD VENIPUNCTURE: CPT

## 2021-04-03 PROCEDURE — 99233 SBSQ HOSP IP/OBS HIGH 50: CPT | Performed by: PSYCHIATRY & NEUROLOGY

## 2021-04-03 PROCEDURE — 2580000003 HC RX 258: Performed by: STUDENT IN AN ORGANIZED HEALTH CARE EDUCATION/TRAINING PROGRAM

## 2021-04-03 PROCEDURE — 6360000002 HC RX W HCPCS: Performed by: GENERAL PRACTICE

## 2021-04-03 PROCEDURE — 97530 THERAPEUTIC ACTIVITIES: CPT

## 2021-04-03 PROCEDURE — 2060000000 HC ICU INTERMEDIATE R&B

## 2021-04-03 PROCEDURE — 97535 SELF CARE MNGMENT TRAINING: CPT

## 2021-04-03 RX ADMIN — ONDANSETRON 4 MG: 2 INJECTION INTRAMUSCULAR; INTRAVENOUS at 16:27

## 2021-04-03 RX ADMIN — METOPROLOL TARTRATE 12.5 MG: 25 TABLET ORAL at 21:42

## 2021-04-03 RX ADMIN — SENNOSIDES 8.6 MG: 8.6 TABLET, FILM COATED ORAL at 09:32

## 2021-04-03 RX ADMIN — SODIUM CHLORIDE, PRESERVATIVE FREE 10 ML: 5 INJECTION INTRAVENOUS at 09:34

## 2021-04-03 RX ADMIN — CLOPIDOGREL 75 MG: 75 TABLET, FILM COATED ORAL at 09:31

## 2021-04-03 RX ADMIN — METOPROLOL TARTRATE 12.5 MG: 25 TABLET ORAL at 09:30

## 2021-04-03 RX ADMIN — AMLODIPINE BESYLATE 5 MG: 5 TABLET ORAL at 09:33

## 2021-04-03 RX ADMIN — FERROUS SULFATE TAB EC 325 MG (65 MG FE EQUIVALENT) 325 MG: 325 (65 FE) TABLET DELAYED RESPONSE at 09:32

## 2021-04-03 RX ADMIN — SODIUM CHLORIDE, PRESERVATIVE FREE 10 ML: 5 INJECTION INTRAVENOUS at 21:43

## 2021-04-03 RX ADMIN — Medication 81 MG: at 09:32

## 2021-04-03 RX ADMIN — FERROUS SULFATE TAB EC 325 MG (65 MG FE EQUIVALENT) 325 MG: 325 (65 FE) TABLET DELAYED RESPONSE at 21:42

## 2021-04-03 RX ADMIN — SODIUM CHLORIDE, PRESERVATIVE FREE 10 ML: 5 INJECTION INTRAVENOUS at 21:42

## 2021-04-03 RX ADMIN — OXYBUTYNIN CHLORIDE 5 MG: 5 TABLET, EXTENDED RELEASE ORAL at 09:33

## 2021-04-03 RX ADMIN — DESMOPRESSIN ACETATE 40 MG: 0.2 TABLET ORAL at 21:40

## 2021-04-03 RX ADMIN — LEVOTHYROXINE SODIUM 112 MCG: 112 TABLET ORAL at 06:09

## 2021-04-03 RX ADMIN — PANTOPRAZOLE SODIUM 40 MG: 40 TABLET, DELAYED RELEASE ORAL at 06:09

## 2021-04-03 RX ADMIN — ENOXAPARIN SODIUM 40 MG: 40 INJECTION SUBCUTANEOUS at 09:29

## 2021-04-03 ASSESSMENT — PAIN DESCRIPTION - PAIN TYPE: TYPE: ACUTE PAIN

## 2021-04-03 ASSESSMENT — PAIN SCALES - GENERAL: PAINLEVEL_OUTOF10: 0

## 2021-04-03 NOTE — PROGRESS NOTES
Daily Progress Note  Neuro Critical Care    Patient Name: Elijah Joseph  Patient : 1932  Room/Bed: 6933/2541-79  Code Status: full code  Allergies: Allergies   Allergen Reactions    Ciprofloxacin     Hydrocodone     Macrobid [Nitrofurantoin]     Ciprofloxacin Rash       CHIEF COMPLAINT:     Left-sided weakness  INTERVAL HISTORY    Initial Presentation (Admitted 2021): The patient is a 80 y.o. female presented with acute sudden onset left-sided weakness at approximately 1835 last night. EMS was called by patient's daughter. Patient has baseline left lower weakness from knee and hip pain, and a prior shoulder injury causing some baseline deficits in the left shoulder and inability to raise arm above the head. Patient states her weakness on the left is increased from baseline as well as a left sided facial droop and dysarthria which are new. Patient underwent thrombolytic therapy in the emergency department, and is now admitted to the neuro ICU post TPA administration. The strength in her left upper and lower extremity have improved from initial presentation      Last 24h:   MRI performed yesterday showed a pontine infarct. Patient's overall neurological status continues to improve, she is antigravity with the left upper and lower extremity along with her dysarthria showing significant improvement. PT/OT along with speech are on board. Patient has been started on dual antiplatelet therapy will. Echocardiogram is still pending.     CURRENT MEDICATIONS:  SCHEDULED MEDICATIONS:   atorvastatin  40 mg Oral Nightly    clopidogrel  75 mg Oral Daily    sodium chloride  50 mL Intravenous Once    sodium chloride flush  10 mL Intravenous 2 times per day    amLODIPine  5 mg Oral Daily    ferrous sulfate  325 mg Oral BID    levothyroxine  112 mcg Oral Daily    metoprolol tartrate  12.5 mg Oral BID    pantoprazole  40 mg Oral QAM AC    oxybutynin  5 mg Oral Daily    sodium chloride flush  10 04/03/2021    CHOL 121 04/02/2021    TRIG 61 04/02/2021    HDL 55 04/02/2021    ALT 18 02/25/2021    AST 35 02/25/2021     04/03/2021    K 3.9 04/03/2021     04/03/2021    CREATININE 0.91 (H) 04/03/2021    BUN 12 04/03/2021    CO2 18 (L) 04/03/2021    TSH 2.470 11/04/2020    INR 1.0 04/01/2021     24 HOUR INTAKE/OUTPUT:    Intake/Output Summary (Last 24 hours) at 4/3/2021 0918  Last data filed at 4/3/2021 0500  Gross per 24 hour   Intake 900 ml   Output 1200 ml   Net -300 ml       IMAGING:  MRI -pontine infarct      . Labs and Images reviewed with:  [] Dr. Lawrence Young. Donovan    [] Dr. Silvia Mejias  [x] Dr. Junaid Tejeda  [] There are no new interval images to review. PHYSICAL EXAM       CONSTITUTIONAL:  Well developed, well nourished, alert and oriented x 2, in no acute distress. GCS 15. Nontoxic. No aphasia, mild dysarthria   HEAD:  normocephalic, atraumatic    EYES:  PERRLA, EOMI. no gaze deviation   ENT:  moist mucous membranes   NECK:  supple, symmetric   LUNGS:  Equal air entry bilaterally   CARDIOVASCULAR:  normal s1 / s2, RRR, distal pulses intact   ABDOMEN:  Soft, no rigidity   NEUROLOGIC:  Mental Status:  A & O x2, able to follow two-step commands. Pupils equal and reactive, EOMI, no gaze deviation, left facial droop, able to close eyes and raise eyebrows  Left upper and lower extremity paresis, antigravity 3+/5 with extension and flexion at the elbow, wrist, knee and ankle. Spontaneously moving right upper and lower extremity  Decreased sensation to LT in left upper and lower extremity. Coordination-ataxia secondary to weakness on the left side  Gait deferred     NIH Stroke Scale Total (if not done complete detailed one below):    1a.  Level of consciousness:  0 - alert; keenly responsive  1b. Level of consciousness questions:  0 - answers both questions correctly  1c. Level of consciousness questions:  0 - performs both tasks correctly  2. Best Gaze:  0 - normal  3. Visual:  0 - no visual loss  4. Facial Palsy:  2 - partial paralysis (total or near total paralysis of the lower face)  5a. Motor left arm:  1 - drift, limb holds 90 (or 45) degrees but drifts down before full 10 seconds: does not hit bed  5b. Motor right arm:  0 - no drift, limb holds 90 (or 45) degrees for full 10 seconds  6a. Motor left le - drift; leg falls by the end of the 5 second period but does not hit bed  6b. Motor right le - no drift; leg holds 30 degree position for full 5 seconds  7. Limb Ataxia:  0 - absent  8. Sensory:  1 - mild to moderate sensory loss; patient feels pinprick is less sharp or is dull on the affected side; there is a loss of superficial pain with pinprick but patient is aware of being touched   9. Best Language:  0 - no aphasia, normal  10. Dysarthria:  1 - mild to moderate, patient slurs at least some words and at worst, can be understood with some difficulty  11. Extinction and Inattention:  0 - no abnormality  TOTAL:  6    DRAINS:  [x] There are no drains for Neuro Critical Care to monitor at this time. ASSESSMENT AND PLAN:       55-year-old female who presented with left-sided weakness. She is s/p TPA. Patient's current NIH stroke scale is 6, there has been significant improvement in her dysarthria and left-sided paresis. Etiology remains unclear at this time, additional stroke work-up including A1c and echocardiogram are still pending. She continues to work with PT/OT.      NEUROLOGIC: Pontine infarct  -MRI shows right pontine infarct  -Continue to monitor for any neurological changes  -Continue dual antiplatelet therapy  -Additional stroke work-up pending including echocardiogram and A1c  -Continue statin therapy  -PT/OT on board  -SLP follow-up appreciated    CARDIOVASCULAR:  - Goal SBP <180  - Continue telemetry  -Echocardiogram with bubble study pending.  -Labetalol/hydralazine as needed if BP out of parameters  -Cleviprex drip if BP persistently elevated    PULMONARY:  -Monitor for any changes in patient's respiratory status      RENAL/FLUID/ELECTROLYTE:  - BUN 12/ Creatinine 0.91  - Urine output 1199/1200  -We will discontinue IV fluids  - Replace electrolytes PRN  -     GI/NUTRITION:  NUTRITION:  DIET GENERAL; Dysphagia Soft and Bite-Sized; Mildly Thick (Nectar)  - Bowel regimen: In place  -     ID:  -Monitor for hypo and hyperthermia, T-max 98.6  -Tylenol as needed if T-max greater than 101.  -Panculture if febrile    HEME:   -CBC stable, follow-up in a.m.  -DVT prophylaxis    ENDOCRINE:  - Continue to monitor blood glucose, goal <180  -Follow-up A1c    OTHER:  - PT/OT/ST on board  - Code Status: Full    PROPHYLAXIS:  Stress ulcer: Not indicated    DVT PROPHYLAXIS:  - SCD sleeves - Thigh High   -Enoxaparin was initiated yesterday    DISPOSITION:  [] To remain ICU:  [x] OK for out of ICU from Neuro Critical Care standpoint    We will continue to follow along. For any changes in exam or patient status please contact Neuro Critical Care.       Sheela Restrepo MD  Neuro Critical Care  Pager [phone]  4/3/2021     9:18 AM

## 2021-04-03 NOTE — PLAN OF CARE
Resting comfortably in bed significant improvement in left side now able to lift left arn and leg up.  VSS   remains a/ox4

## 2021-04-03 NOTE — H&P
[facility], [address], [address] [place] [address]  Ph: [phone] or [phone]  FAX: [phone]        Reason for consult: Ischemic stroke    I had the pleasure of seeing your patient in neurology consultation for her symptoms. As you would recall Ana Obrien is a 80 y.o. yo female admitted on 4/1/2021. The patient presented with acute onset of the left face arm and leg weakness. She came in as a stroke. The patient received IV TPA. Initial NIH stroke scale was 5. CT angiogram head and neck was negative. MRI of the scan of the brain showed acute right paramedian pontine ischemic stroke. The patient reports improvement in her weakness. Previously she was not taking any antiplatelet medication. Past Medical History:   Diagnosis Date    Anemia     Hypertension     Hypothyroidism     Osteoarthritis     Other long term (current) drug therapy     Renal insufficiency      No past surgical history on file. Social History     Socioeconomic History    Marital status:       Spouse name: Not on file    Number of children: Not on file    Years of education: Not on file    Highest education level: Not on file   Occupational History    Not on file   Social Needs    Financial resource strain: Not on file    Food insecurity     Worry: Not on file     Inability: Not on file    Transportation needs     Medical: Not on file     Non-medical: Not on file   Tobacco Use    Smoking status: Never Smoker    Smokeless tobacco: Never Used   Substance and Sexual Activity    Alcohol use: Never     Frequency: Never    Drug use: Never    Sexual activity: Not on file   Lifestyle    Physical activity     Days per week: Not on file     Minutes per session: Not on file    Stress: Not on file   Relationships    Social connections     Talks on phone: Not on file     Gets together: Not on file     Attends Yazidi service: Not on file     Active member of club or organization: Not on file     Attends meetings of clubs or organizations: Not on file     Relationship status: Not on file    Intimate partner violence     Fear of current or ex partner: Not on file     Emotionally abused: Not on file     Physically abused: Not on file     Forced sexual activity: Not on file   Other Topics Concern    Not on file   Social History Narrative    Not on file     No family history on file. Allergies   Allergen Reactions    Ciprofloxacin     Hydrocodone     Macrobid [Nitrofurantoin]     Ciprofloxacin Rash      BP (!) 148/54   Pulse 61   Temp 98 °F (36.7 °C) (Oral)   Resp (!) 0   Ht 5' 7\" (1.702 m)   Wt 165 lb (74.8 kg)   SpO2 99%   BMI 25.84 kg/m²      ROS:  Constitutional Negative for fever and chills   HEENT Negative for ear discharge, ear pain, nosebleed   Eyes Negative for photophobia, pain and discharge   Respiratory Negative for hemoptysis and sputum   Cardiovascular Negative for orthopnea, claudication and PND   Gastrointestinal Negative for abdominal pain, diarrhea, blood in stool   Musculoskeletal Negative for joint pain, negative for myalgia   Skin Negative for rash or itching   Endo/heme/allergies Negative for polydipsia, environmental allergy   Psychiatric/behavioral Negative for suicidal ideation.   Patient is not anxious   General examination:    Head: Normocephalic, atraumatic  Eyes: Extraocular movements intact  Lungs: Respirations unlabored, chest wall no deformity  ENT: Normal external ear canals, no sinus tenderness  Heart: Regular rate rhythm  Abdomen: No masses, tenderness  Extremities: No cyanosis or edema, 2+ pulses  Skin: Intact, normal skin color    Neurological examination:    Mental status   Alert and oriented; intact memory with no confusion, speech or language problems; no hallucinations or delusions     Cranial nerves   II - visual fields intact to confrontation                                                III, IV, VI - extra-ocular muscles full: no pupillary defect; no PEDRITO, no nystagmus, no ptosis                                                                      V - normal facial sensation                                                               VII -left lower facial droop                                                     VIII - intact hearing                                                                             IX, X - symmetrical palate                                                                  XI - symmetrical shoulder shrug                                                       XII - midline tongue without atrophy or fasciculation     Motor function   left upper and lower extremity 4/5     Sensory function Intact to touch, pin, vibration, proprioception     Cerebellar Intact fine motor movement. No involuntary movements or tremors     Reflex function Intact 2+ DTR and symmetric. Negative Babinski     Gait                  Not tested         Lab Results   Component Value Date    LDLCHOLESTEROL 54 04/02/2021     No components found for: CHLPL  Lab Results   Component Value Date    TRIG 61 04/02/2021     Lab Results   Component Value Date    HDL 55 04/02/2021     No results found for: LDLCALC  No results found for: LABVLDL  No results found for: LABA1C  No results found for: EAG  No results found for: [id number]   Neurological work up:  CT head  CTA head and neck with no evidence of LVO. Scattered calcifications throughout head and neck vasculature. MRI brain                                       FLAIR                                           DWI     2 D echo     Assessment and Recommendations:   Right paramedian pontine acute ischemic stroke, status post IV TPA 4/1/2021  Intracranial and extracranial atherosclerosis with no large vessel occlusion    The patient shows improvement in her left hemiparesis.   Recommend continued aspirin and Plavix 75 mg a day for 3 weeks and then long-term aspirin along with Lipitor 40 mg at bedtime  Await 2D echo  Lipid profile and hemoglobin A1c pending  PT OT evaluation. Will also consult rehab  Anticipate discharge once stable. Leila Olivares MD  Neurology    This note is created with the assistance of a speech-recognition program. While intending to generate a document that actually reflects the content of the visit, the document can still have some errors including those of syntax and sound a- like substitutions which may escape proofreading. In such instances, actual meaning can be extrapolated by contextual derivation.

## 2021-04-03 NOTE — PROGRESS NOTES
participative throughout. Vital Signs  Patient Currently in Pain: No   Orientation  Orientation  Orientation Level: Oriented to time;Oriented to person;Oriented to place; Disoriented to situation  Objective    ADL  Feeding: Stand by assistance;Setup; Increased time to complete(Pt req assistance with opening containers, able to feed self. HOB elevated. Pt encouraged to utilize LUE for feeding tasks with fair/good return )  Grooming: Minimal assistance;Setup; Increased time to complete(Pt washed face, brushed hair sitting EOB. Pt req [place] assist with LUE to brush left side)  Additional Comments: Pt CGA progressing SBA for sitting balance for ADL tasks. Per RN, pt displays significant improvement in L side movement compared to yesterday  Balance  Sitting Balance: Contact guard assistance(Pt required CGA initially to sit at EOB, progressing to SBA. Pt tolerated approx 25 min sitting EOB. Pt reports no dizziness or fatigue sitting EOB)  Standing Balance: Minimal assistance  Standing Balance  Time: 30 seconds x4  Activity: Static standing EOB, 2 side steps towards HOB  Comment: Pt req Mod A for balance for first 2 stands, progressing to Min A for last 2. No LOB noted. Pt eager and motivated to continue standing  Bed mobility  Rolling to Left: Minimal assistance  Rolling to Right: Minimal assistance  Supine to Sit: Moderate assistance(For trunk and BLE progression)  Sit to Supine: Moderate assistance(BLE progression)  Scooting: Moderate assistance  Comment: Pt req extra time for bed mobility d/t initial fatigue. Pt reports feeling better once sitting EOB and maintaining balance  Transfers  Stand Step Transfers: Moderate assistance(2 side steps to [facility])  Sit to stand: Moderate assistance  Stand to sit: Moderate assistance  Transfer Comments: Pt completed sit to stand x4, unable to stand fully erect on first 2 stands but able to clear surface.  Pt able to stand fully erect on last 2 stands  Cognition  Overall Cognitive Status: Exceptions  Arousal/Alertness: Appropriate responses to stimuli  Following Commands: Follows one step commands consistently; Follows one step commands with increased time  Attention Span: Attends with cues to redirect  Memory: Decreased recall of recent events  Safety Judgement: Decreased awareness of need for assistance;Decreased awareness of need for safety  Problem Solving: Assistance required to generate solutions;Decreased awareness of errors;Assistance required to implement solutions  Insights: Decreased awareness of deficits  Initiation: Requires cues for some  Sequencing: Requires cues for some  Plan   Plan  Times per week: 4-5x/wk  Current Treatment Recommendations: Strengthening, ROM, Safety Education & Training, Positioning, Balance Training, Patient/Caregiver Education & Training, Self-Care / ADL, Functional Mobility Training, Endurance Training, Neuromuscular Re-education  Goals  Short term goals  Time Frame for Short term goals: Pt will, by discharge  Short term goal 1: Demonstrate MIN A for UB dressing tasks while incorporating bilateral integration as able. Short term goal 2: Demonstrate MOD A for LB dressing tasks while incorporating bilateral integration as able and with good safety awareness using LRD. Short term goal 3: Increase sitting tolerance at EOB to 10+ minutes with fair sitting balance while performing an ADL tasks for improved endurance for self care. Short term goal 4: Complete sit to stand transfers with CGA using LRD during ADL tasks. Short term goal 5: Notify OTR if goals need to be upgraded as Pt progresses. Patient Goals   Patient goals : be strong and get back to my puzzles       Therapy Time   Individual Concurrent Group Co-treatment   Time In 0840         Time Out 0936         Minutes 56         Timed Code Treatment Minutes: 64 Minutes   Pt in bed upon arrival, pleasant and agreeable to tx this date.  Pt retired to bed at end of session, call light within reach, RN present at writer departure    YOSI Ruiz/ARCADIO

## 2021-04-03 NOTE — PROGRESS NOTES
Neuro Critical Care Sign Out to Neurology Resident      Date and time: 4/3/2021 9:38 AM  Patient's name:  Yanira Borja  Medical Record Number: [id number]  Patient's account/billing number: [de-identified]  Patient's YOB: 1932  Age: 80 y.o. Date of Admission: 4/1/2021  6:53 PM  Length of stay during current admission: 2    Primary Care Physician: VERNELL Zmaora CNP    Code Status: Full Code    Mode of physician to physician communication:        [] Via telephone   [x] In person     Date and time of sign-out: 4/3/2021 9:38 AM      Accepting team's attending: Dr. Ni Chamorro    Patient's current ICU Bed: [id number]    Patient's assigned bed on floor:  unknown        [] Med-Surg Monitored [x] Step-down         Reason for ICU admission:     Left-sided weakness s/p TPA    ICU course summary:     89 y.o. female presented with acute sudden onset left-sided weakness at approximately 1835 last night.  EMS was called by patient's daughter.      Patient initial NIH stroke scale was 5. Initial CT head was negative and she subsequently received TPA. After initially receiving TPA for 10 minutes, her dysarthria worsened with change in mentation. tPA was held and patient had a repeat CT head to rule out any hemorrhage. tPA was reinitiated after CT head was negative. Patient initially had significant left-sided paresis and exam has improved in the past 24 hours where it is currently antigravity. Her dysarthria has also significantly improved with her facial droop. Her NIH stroke scale is currently 5. She has been working with PT/OT and SLP. Etiology remains unclear as echocardiogram and A1c are still pending. Patient denies any history of smoking. MRI showed patient had a right pontine infarct, patient was started on dual antiplatelet therapy along with continuation of statin therapy. Patient will most likely be a candidate for rehab once stroke work-up is been completed.     Procedures during patient's ICU pending. Recommended Follow-up:     Neurological-pontine infarct  -Monitor for any neurological changes  -Additional stroke work-up still pending including echocardiogram and A1c  -SBP goal<180  -Continue dual antiplatelet for 21 days and then aspirin daily  -Continue statin therapy  -PT/OT ongoing  -SLP follow-up  -P.o. diet as tolerating  -DVT prophylaxis  -Labetalol/hydralazine as needed if BP out of parameters          Above mentioned assessment and plan was discussed by me with the admitting medicine resident. The medicine team assigned to the patient by medicine admitting resident will be following up the patient from now onwards on the floor.      Taty Serna MD  Neuro Critical Care  4/3/2021, 9:38 AM

## 2021-04-04 LAB
ANION GAP SERPL CALCULATED.3IONS-SCNC: 10 MMOL/L (ref 9–17)
BUN BLDV-MCNC: 14 MG/DL (ref 8–23)
BUN/CREAT BLD: ABNORMAL (ref 9–20)
CALCIUM SERPL-MCNC: 8.5 MG/DL (ref 8.6–10.4)
CHLORIDE BLD-SCNC: 104 MMOL/L (ref 98–107)
CO2: 19 MMOL/L (ref 20–31)
CREAT SERPL-MCNC: 0.99 MG/DL (ref 0.5–0.9)
GFR AFRICAN AMERICAN: >60 ML/MIN
GFR NON-AFRICAN AMERICAN: 53 ML/MIN
GFR SERPL CREATININE-BSD FRML MDRD: ABNORMAL ML/MIN/{1.73_M2}
GFR SERPL CREATININE-BSD FRML MDRD: ABNORMAL ML/MIN/{1.73_M2}
GLUCOSE BLD-MCNC: 92 MG/DL (ref 70–99)
HCT VFR BLD CALC: 33.3 % (ref 36.3–47.1)
HEMOGLOBIN: 10.4 G/DL (ref 11.9–15.1)
MCH RBC QN AUTO: 30.1 PG (ref 25.2–33.5)
MCHC RBC AUTO-ENTMCNC: 31.2 G/DL (ref 28.4–34.8)
MCV RBC AUTO: 96.2 FL (ref 82.6–102.9)
NRBC AUTOMATED: 0 PER 100 WBC
PDW BLD-RTO: 13.7 % (ref 11.8–14.4)
PLATELET # BLD: 229 K/UL (ref 138–453)
PMV BLD AUTO: 11.2 FL (ref 8.1–13.5)
POTASSIUM SERPL-SCNC: 4.1 MMOL/L (ref 3.7–5.3)
RBC # BLD: 3.46 M/UL (ref 3.95–5.11)
SODIUM BLD-SCNC: 133 MMOL/L (ref 135–144)
VITAMIN D 25-HYDROXY: 42 NG/ML (ref 30–100)
WBC # BLD: 7.9 K/UL (ref 3.5–11.3)

## 2021-04-04 PROCEDURE — 97535 SELF CARE MNGMENT TRAINING: CPT

## 2021-04-04 PROCEDURE — 97116 GAIT TRAINING THERAPY: CPT

## 2021-04-04 PROCEDURE — 6360000002 HC RX W HCPCS: Performed by: GENERAL PRACTICE

## 2021-04-04 PROCEDURE — 2580000003 HC RX 258: Performed by: GENERAL PRACTICE

## 2021-04-04 PROCEDURE — 80048 BASIC METABOLIC PNL TOTAL CA: CPT

## 2021-04-04 PROCEDURE — 85027 COMPLETE CBC AUTOMATED: CPT

## 2021-04-04 PROCEDURE — 99232 SBSQ HOSP IP/OBS MODERATE 35: CPT | Performed by: PSYCHIATRY & NEUROLOGY

## 2021-04-04 PROCEDURE — 2060000000 HC ICU INTERMEDIATE R&B

## 2021-04-04 PROCEDURE — 36415 COLL VENOUS BLD VENIPUNCTURE: CPT

## 2021-04-04 PROCEDURE — 97530 THERAPEUTIC ACTIVITIES: CPT

## 2021-04-04 PROCEDURE — 97110 THERAPEUTIC EXERCISES: CPT

## 2021-04-04 PROCEDURE — 82306 VITAMIN D 25 HYDROXY: CPT

## 2021-04-04 PROCEDURE — 2580000003 HC RX 258: Performed by: STUDENT IN AN ORGANIZED HEALTH CARE EDUCATION/TRAINING PROGRAM

## 2021-04-04 PROCEDURE — 6370000000 HC RX 637 (ALT 250 FOR IP): Performed by: GENERAL PRACTICE

## 2021-04-04 RX ADMIN — METOPROLOL TARTRATE 12.5 MG: 25 TABLET ORAL at 09:29

## 2021-04-04 RX ADMIN — PANTOPRAZOLE SODIUM 40 MG: 40 TABLET, DELAYED RELEASE ORAL at 05:26

## 2021-04-04 RX ADMIN — ENOXAPARIN SODIUM 40 MG: 40 INJECTION SUBCUTANEOUS at 09:27

## 2021-04-04 RX ADMIN — SODIUM CHLORIDE, PRESERVATIVE FREE 10 ML: 5 INJECTION INTRAVENOUS at 09:30

## 2021-04-04 RX ADMIN — SODIUM CHLORIDE, PRESERVATIVE FREE 10 ML: 5 INJECTION INTRAVENOUS at 20:49

## 2021-04-04 RX ADMIN — FERROUS SULFATE TAB EC 325 MG (65 MG FE EQUIVALENT) 325 MG: 325 (65 FE) TABLET DELAYED RESPONSE at 09:29

## 2021-04-04 RX ADMIN — DESMOPRESSIN ACETATE 40 MG: 0.2 TABLET ORAL at 20:48

## 2021-04-04 RX ADMIN — AMLODIPINE BESYLATE 5 MG: 5 TABLET ORAL at 09:28

## 2021-04-04 RX ADMIN — LEVOTHYROXINE SODIUM 112 MCG: 112 TABLET ORAL at 05:26

## 2021-04-04 RX ADMIN — Medication 81 MG: at 09:29

## 2021-04-04 RX ADMIN — OXYBUTYNIN CHLORIDE 5 MG: 5 TABLET, EXTENDED RELEASE ORAL at 09:29

## 2021-04-04 RX ADMIN — FERROUS SULFATE TAB EC 325 MG (65 MG FE EQUIVALENT) 325 MG: 325 (65 FE) TABLET DELAYED RESPONSE at 20:48

## 2021-04-04 RX ADMIN — METOPROLOL TARTRATE 12.5 MG: 25 TABLET ORAL at 20:49

## 2021-04-04 RX ADMIN — SENNOSIDES 8.6 MG: 8.6 TABLET, FILM COATED ORAL at 09:29

## 2021-04-04 RX ADMIN — CLOPIDOGREL 75 MG: 75 TABLET, FILM COATED ORAL at 09:29

## 2021-04-04 RX ADMIN — Medication 6 MG: at 20:50

## 2021-04-04 ASSESSMENT — PAIN SCALES - GENERAL: PAINLEVEL_OUTOF10: 0

## 2021-04-04 NOTE — PROGRESS NOTES
Physical Therapy  Facility/Department: [facility] NEURO  Daily Treatment Note  NAME: Truman Aparicio  : 1932  MRN: [id number]    Date of Service: 2021    Discharge Recommendations:  Patient would benefit from continued therapy after discharge   PT Equipment Recommendations  Other: continue to assess    Assessment   Body structures, Functions, Activity limitations: Decreased functional mobility ; Decreased balance;Decreased strength;Decreased endurance  Assessment: Pt demos increased strenth of LUE and LLE. Pt ambulated 3 ft toward HOB with modA. Pt is still limted due to decreased balance and L side weakness. Would benfit from continued therapy after d/c  Prognosis: Good  REQUIRES PT FOLLOW UP: Yes  Activity Tolerance  Activity Tolerance: Patient Tolerated treatment well;Patient limited by fatigue;Patient limited by endurance     Patient Diagnosis(es): The encounter diagnosis was Cerebrovascular accident (CVA), unspecified mechanism ([facility] [address] has a past medical history of Anemia, Hypertension, Hypothyroidism, Osteoarthritis, Other long term (current) drug therapy, and Renal insufficiency. has no past surgical history on file. Restrictions  Restrictions/Precautions  Restrictions/Precautions: Fall Risk, General Precautions  Required Braces or Orthoses?: No  Subjective   General  Chart Reviewed: Yes  Response To Previous Treatment: Patient with no complaints from previous session. Family / Caregiver Present: No  Pain Screening  Patient Currently in Pain: Denies  Vital Signs  Patient Currently in Pain: Denies       Orientation  Orientation  Overall Orientation Status: Within Functional Limits  Cognition      Objective   Bed mobility  Supine to Sit: Moderate assistance  Sit to Supine: Moderate assistance  Comment: cueing for hand placement and sequencing increased time and effort to complete  Transfers  Sit to Stand:  Moderate Assistance  Stand to sit: Moderate Assistance  Comment: Pt performed DTD 2 x from EOB with RW. Cues for pt to correct flexed posture, poo return. mild posterior lean. Ambulation  Ambulation?: Yes  Ambulation 1  Surface: level tile  Device: Rolling Walker  Assistance: Moderate assistance  Gait Deviations: Slow Zuly;Decreased step height;Decreased step length  Distance: 3 ft toward [facility]  Comments: cues for pt to initiate ambulation,modA for RW management. Stairs/Curb  Stairs?: No     Balance  Posture: Fair  Sitting - Static: Fair;+  Sitting - Dynamic: Fair  Standing - Static: Fair;-  Standing - Dynamic: Poor;+  Comments: PT sat EOB for 12 minutes requiring CGA/Rico for balance. PT stood with RW for 1' x2  Exercise:   Seated LE exercise program: Long Arc Quads, hip abduction/adduction, heel/toe raises, and marches.  Reps: x10  Goals  Short term goals  Time Frame for Short term goals: 15  Short term goal 1: Pt to demonstrate mod-I bed mobility  Short term goal 2: Pt to demonstrate transfers at [facility] 346 term goal 3: Pt to amb 10 ft with arturo walker with CGA  Short term goal 4: Pt to tolerate 30 minutes of activity to improve endurance    Plan    Plan  Times per week: 5-6  Current Treatment Recommendations: Strengthening, Neuromuscular Re-education, Home Exercise Program, Safety Education & Training, Patient/Caregiver Education & Training, Equipment Evaluation, Education, & procurement, Functional Mobility Training, Balance Training, Endurance Training, Transfer Training, Gait Training  Safety Devices  Type of devices: Nurse notified, Patient at risk for falls, Left in bed  Restraints  Initially in place: No     Therapy Time   Individual Concurrent Group Co-treatment   Time In 0842         Time Out 0920         Minutes 38         Timed Code Treatment Minutes: 109 [facility]

## 2021-04-04 NOTE — PROGRESS NOTES
drug therapy     Renal insufficiency         Past Surgical History:     No past surgical history on file. Medications during admission:      atorvastatin  40 mg Oral Nightly    clopidogrel  75 mg Oral Daily    sodium chloride  50 mL Intravenous Once    sodium chloride flush  10 mL Intravenous 2 times per day    amLODIPine  5 mg Oral Daily    ferrous sulfate  325 mg Oral BID    levothyroxine  112 mcg Oral Daily    metoprolol tartrate  12.5 mg Oral BID    pantoprazole  40 mg Oral QAM AC    oxybutynin  5 mg Oral Daily    sodium chloride flush  10 mL Intravenous 2 times per day    enoxaparin  40 mg Subcutaneous Daily    aspirin  81 mg Oral Daily    Or    aspirin  300 mg Rectal Daily         Physical Exam:   BP (!) 146/82   Pulse 74   Temp 97.6 °F (36.4 °C) (Oral)   Resp 11   Ht 5' 7\" (1.702 m)   Wt 165 lb (74.8 kg)   SpO2 99%   BMI 25.84 kg/m²   Temp (24hrs), Av.7 °F (36.5 °C), Min:97 °F (36.1 °C), Max:98 °F (36.7 °C)        General examination:      General Appearance:  alert, well appearing, and in no acute distress  HEENT: Normocephalic, atraumatic, moist mucus membranes  Neck: supple, no carotid bruits, (-) nuchal rigidity  Lungs:  Respirations unlabored, chest wall no deformity, BS normal  Cardiovascular: normal rate, regular rhythm  Abdomen: Soft, nontender, nondistended, normal bowel sounds  Skin: No gross lesions, rashes, bruising or bleeding on exposed skin area  Extremities:  peripheral pulses palpable, clubbing or edema  Psych: normal affect      Neurological examination:      Mental status   Alert and oriented x 3; following all commands;   speech is fluent, mild dysarthria, aphasia.       Cranial nerves   II - visual fields intact to confrontation; pupils reactive  III, IV, VI - extraocular muscles intact; no PEDRITO; no nystagmus; no ptosis   V - normal facial sensation                                                               VII - left facial droop the rhonda, on the right-hand side.             2D ECHO : Pending         Impression:      Primary Problem  <principal problem not specified>    Active Hospital Problems    Diagnosis Date Noted    Received intravenous tissue plasminogen activator (tPA) in emergency department [Z92.82]     Acute left hemiparesis ([facility] [address] [G81.94]     Cerebrovascular accident (CVA) ([facility] [address] [I63.9] 04/01/2021               Plan:     Patient status Admit as inpatient in the  Progressive Unit/Step down    63-year-old female presented with left-sided weakness, NIH 6,  received TPA, with significant improvement in her dysarthria and left-sided paresis. PM&R consult with possible inpatient rehab    -Right paramedian pontine acute ischemic stroke  -S/p TPA 4/1/2021  -ICAD     Aspirin 81   Plavix 75 for 3 weeks then long-term aspirin   Lipitor 40   LDL 54   A1c: Pending   Echo: Pending   PT/OT   PM&R consult for inpatient rehab         DVT prophylaxis: Lovenox 40 mg SC  GI prophylaxis: Protonix 40 mg daily  Code status: Full code    Consultations:   IP CONSULT TO STROKE TEAM  IP CONSULT TO NEUROCRITICAL CARE  IP CONSULT TO PHARMACY  PHARMACY TO CHANGE BASE FLUIDS  IP CONSULT TO PHYSICAL MEDICINE REHAB  IP CONSULT TO NEUROLOGY  PT/OT       Electronically signed by Todd Mayorga MD on 4/4/2021 at 6:49 AM      Genaro Ovalle MD  PGY 2 Neurology  Resident.    Neurology [facility]

## 2021-04-04 NOTE — PROGRESS NOTES
Occupational Therapy  Facility/Department: [facility] NEURO  Daily Treatment Note  NAME: Tobin Dooley  : 1932  MRN: [id number]    Date of Service: 2021    Discharge Recommendations:  Patient would benefit from continued therapy after discharge   Ed on OT services, bed mob, ADLs, walker safety/technique, EC/WS, orientation review- good return       Assessment   Performance deficits / Impairments: Decreased functional mobility ; Decreased strength;Decreased endurance;Decreased balance;Decreased high-level IADLs;Decreased posture;Decreased ADL status; Decreased ROM  Treatment Diagnosis: CVA  Prognosis: Good  REQUIRES OT FOLLOW UP: Yes  Activity Tolerance  Activity Tolerance: Patient Tolerated treatment well  Safety Devices  Safety Devices in place: Yes  Type of devices: All fall risk precautions in place; Bed alarm in place;Call light within reach; Left in bed;Nurse notified(RN in room)         Patient Diagnosis(es): The encounter diagnosis was Cerebrovascular accident (CVA), unspecified mechanism ([facility] [address] has a past medical history of Anemia, Hypertension, Hypothyroidism, Osteoarthritis, Other long term (current) drug therapy, and Renal insufficiency. has no past surgical history on file. Restrictions  Restrictions/Precautions  Restrictions/Precautions: Fall Risk, General Precautions  Required Braces or Orthoses?: No  Subjective   General  Chart Reviewed: Yes  Patient assessed for rehabilitation services?: Yes  Family / Caregiver Present: Yes(supportive daughter)    Vital Signs  Patient Currently in Pain: No   Orientation  Orientation  Overall Orientation Status: Within Normal Limits  Objective    Pt in bed upon arrival awake and alert visiting w/ family. Pt pleasant and motivated. Increased time to complete the session d/t pt slow to complete transfers, ADLs, and 3 briefs were changed d/t pt being incontinent. Pt receptive to ed and retired to bed at end of session.   Pt required more assistance w/

## 2021-04-05 LAB
ANION GAP SERPL CALCULATED.3IONS-SCNC: 11 MMOL/L (ref 9–17)
BUN BLDV-MCNC: 17 MG/DL (ref 8–23)
BUN/CREAT BLD: ABNORMAL (ref 9–20)
CALCIUM SERPL-MCNC: 8.4 MG/DL (ref 8.6–10.4)
CHLORIDE BLD-SCNC: 105 MMOL/L (ref 98–107)
CO2: 18 MMOL/L (ref 20–31)
CREAT SERPL-MCNC: 1.04 MG/DL (ref 0.5–0.9)
GFR AFRICAN AMERICAN: >60 ML/MIN
GFR NON-AFRICAN AMERICAN: 50 ML/MIN
GFR SERPL CREATININE-BSD FRML MDRD: ABNORMAL ML/MIN/{1.73_M2}
GFR SERPL CREATININE-BSD FRML MDRD: ABNORMAL ML/MIN/{1.73_M2}
GLUCOSE BLD-MCNC: 92 MG/DL (ref 70–99)
HCT VFR BLD CALC: 33.9 % (ref 36.3–47.1)
HEMOGLOBIN: 10.7 G/DL (ref 11.9–15.1)
LV EF: 55 %
LVEF MODALITY: NORMAL
MAGNESIUM: 1.7 MG/DL (ref 1.6–2.6)
MCH RBC QN AUTO: 30.3 PG (ref 25.2–33.5)
MCHC RBC AUTO-ENTMCNC: 31.6 G/DL (ref 28.4–34.8)
MCV RBC AUTO: 96 FL (ref 82.6–102.9)
MYOGLOBIN: 41 NG/ML (ref 25–58)
NRBC AUTOMATED: 0 PER 100 WBC
PDW BLD-RTO: 13.8 % (ref 11.8–14.4)
PHOSPHORUS: 3.7 MG/DL (ref 2.6–4.5)
PLATELET # BLD: 235 K/UL (ref 138–453)
PMV BLD AUTO: 10.8 FL (ref 8.1–13.5)
POTASSIUM SERPL-SCNC: 4.2 MMOL/L (ref 3.7–5.3)
RBC # BLD: 3.53 M/UL (ref 3.95–5.11)
SARS-COV-2, RAPID: NOT DETECTED
SODIUM BLD-SCNC: 134 MMOL/L (ref 135–144)
SPECIMEN DESCRIPTION: NORMAL
TROPONIN INTERP: ABNORMAL
TROPONIN T: ABNORMAL NG/ML
TROPONIN, HIGH SENSITIVITY: 20 NG/L (ref 0–14)
WBC # BLD: 7.5 K/UL (ref 3.5–11.3)

## 2021-04-05 PROCEDURE — 85027 COMPLETE CBC AUTOMATED: CPT

## 2021-04-05 PROCEDURE — 93306 TTE W/DOPPLER COMPLETE: CPT

## 2021-04-05 PROCEDURE — 2060000000 HC ICU INTERMEDIATE R&B

## 2021-04-05 PROCEDURE — 83874 ASSAY OF MYOGLOBIN: CPT

## 2021-04-05 PROCEDURE — 84484 ASSAY OF TROPONIN QUANT: CPT

## 2021-04-05 PROCEDURE — 6370000000 HC RX 637 (ALT 250 FOR IP)

## 2021-04-05 PROCEDURE — 2580000003 HC RX 258: Performed by: STUDENT IN AN ORGANIZED HEALTH CARE EDUCATION/TRAINING PROGRAM

## 2021-04-05 PROCEDURE — 83735 ASSAY OF MAGNESIUM: CPT

## 2021-04-05 PROCEDURE — 92526 ORAL FUNCTION THERAPY: CPT

## 2021-04-05 PROCEDURE — 6370000000 HC RX 637 (ALT 250 FOR IP): Performed by: GENERAL PRACTICE

## 2021-04-05 PROCEDURE — 36415 COLL VENOUS BLD VENIPUNCTURE: CPT

## 2021-04-05 PROCEDURE — 87635 SARS-COV-2 COVID-19 AMP PRB: CPT

## 2021-04-05 PROCEDURE — 99233 SBSQ HOSP IP/OBS HIGH 50: CPT | Performed by: PSYCHIATRY & NEUROLOGY

## 2021-04-05 PROCEDURE — 80048 BASIC METABOLIC PNL TOTAL CA: CPT

## 2021-04-05 PROCEDURE — 6360000002 HC RX W HCPCS: Performed by: GENERAL PRACTICE

## 2021-04-05 PROCEDURE — 2580000003 HC RX 258: Performed by: GENERAL PRACTICE

## 2021-04-05 PROCEDURE — 84100 ASSAY OF PHOSPHORUS: CPT

## 2021-04-05 PROCEDURE — 93005 ELECTROCARDIOGRAM TRACING: CPT | Performed by: STUDENT IN AN ORGANIZED HEALTH CARE EDUCATION/TRAINING PROGRAM

## 2021-04-05 PROCEDURE — 97530 THERAPEUTIC ACTIVITIES: CPT

## 2021-04-05 PROCEDURE — 99221 1ST HOSP IP/OBS SF/LOW 40: CPT | Performed by: PHYSICAL MEDICINE & REHABILITATION

## 2021-04-05 PROCEDURE — 92507 TX SP LANG VOICE COMM INDIV: CPT

## 2021-04-05 PROCEDURE — 97129 THER IVNTJ 1ST 15 MIN: CPT

## 2021-04-05 RX ORDER — NITROGLYCERIN 0.4 MG/1
TABLET SUBLINGUAL
Status: COMPLETED
Start: 2021-04-05 | End: 2021-04-05

## 2021-04-05 RX ADMIN — OXYBUTYNIN CHLORIDE 5 MG: 5 TABLET, EXTENDED RELEASE ORAL at 11:17

## 2021-04-05 RX ADMIN — LEVOTHYROXINE SODIUM 112 MCG: 112 TABLET ORAL at 11:24

## 2021-04-05 RX ADMIN — PANTOPRAZOLE SODIUM 40 MG: 40 TABLET, DELAYED RELEASE ORAL at 11:23

## 2021-04-05 RX ADMIN — METOPROLOL TARTRATE 12.5 MG: 25 TABLET ORAL at 11:17

## 2021-04-05 RX ADMIN — SODIUM CHLORIDE, PRESERVATIVE FREE 10 ML: 5 INJECTION INTRAVENOUS at 11:25

## 2021-04-05 RX ADMIN — Medication 81 MG: at 11:24

## 2021-04-05 RX ADMIN — FERROUS SULFATE TAB EC 325 MG (65 MG FE EQUIVALENT) 325 MG: 325 (65 FE) TABLET DELAYED RESPONSE at 11:24

## 2021-04-05 RX ADMIN — DESMOPRESSIN ACETATE 40 MG: 0.2 TABLET ORAL at 21:39

## 2021-04-05 RX ADMIN — CLOPIDOGREL 75 MG: 75 TABLET, FILM COATED ORAL at 11:24

## 2021-04-05 RX ADMIN — AMLODIPINE BESYLATE 5 MG: 5 TABLET ORAL at 11:24

## 2021-04-05 RX ADMIN — ENOXAPARIN SODIUM 40 MG: 40 INJECTION SUBCUTANEOUS at 11:23

## 2021-04-05 RX ADMIN — FERROUS SULFATE TAB EC 325 MG (65 MG FE EQUIVALENT) 325 MG: 325 (65 FE) TABLET DELAYED RESPONSE at 21:39

## 2021-04-05 RX ADMIN — SODIUM CHLORIDE, PRESERVATIVE FREE 10 ML: 5 INJECTION INTRAVENOUS at 21:44

## 2021-04-05 RX ADMIN — NITROGLYCERIN: 0.4 TABLET SUBLINGUAL at 16:10

## 2021-04-05 RX ADMIN — SODIUM CHLORIDE, PRESERVATIVE FREE 10 ML: 5 INJECTION INTRAVENOUS at 11:17

## 2021-04-05 ASSESSMENT — PAIN SCALES - GENERAL: PAINLEVEL_OUTOF10: 0

## 2021-04-05 NOTE — SIGNIFICANT EVENT
Significant event      Around 4 PM patient started to have chest pain, located in the left side, 8/10 intensity, sharp nonradiating      Plan:    -Cardiology consulted, EKG forward to cardio fellow  -Stat: EKG  Normal sinus rhythm, anteroseptal infarct cannot be ruled out,  No acute ST-T changes, shortened QT    -Troponin, magnesium, phosphorus  -Sublingual nitroglycerin 0.4, patient's pain reduced with subclinical nitroglycerin from 8/10 intensity to 3-4/10 in intensity.  -Blood pressure 99/70, heart rate 80.  -Patient feels comfortable, endorses improvement in chest pain.  -Cardiology aware.   - Spoke to [place] ( daughter in law and updated about the plan)         Carlos Hassan   PGY-2 Neurology Resident  4/5/2021 at 4:19 PM.

## 2021-04-05 NOTE — PROCEDURES
PATIENT HEALTH QUESTIONNAIRE-9                                            (PHQ-9)    Over the past 2 weeks, how often have you been bothered by any of the following problems?     -------------------------------------------------------------------------------------------------------------------  1. Little interest or pleasure in doing things   0x 1 2 3  I stay home mostly, only appt. I am very fortunate, many family and close neighbors that all look after me. I use cane or walker to get around.   -------------------------------------------------------------------------------------------------------------------  2. Feeling down, depressed or hopeless   0x 1 2 3  Reports no, does relay that she lost her  in December 2 days before maude. She fondly speaks about his. She reports they have been  79 yrs.   -------------------------------------------------------------------------------------------------------------------  3. Trouble falling or staying asleep, or sleeping too much  0x 1 2 3  -------------------------------------------------------------------------------------------------------------------  4. Feeling tired or having little energy   0x 1 2 3  -------------------------------------------------------------------------------------------------------------------  5. Poor appetite or overeating    0x 1 2 3  -------------------------------------------------------------------------------------------------------------------  6. Feeling bad about yourself--or that you are      A failure or have let yourself or family down. 0x 1 2 3   ------------------------------------------------------------------------------------------------------------------  7. Trouble concentrating on things, such as reading        the newspaper or watching T.V.     0x 1 2 3  ------------------------------------------------------------------------------------------------------------------  8.  Moving or speaking so slowly that other people could noticed? Or the opposite-- being so fidgety or restless that you have been  0 1 2 3x      moving around a lot more than usual  Facial droop, dysarthric, left side weakness. -------------------------------------------------------------------------------------------------------------------------------------------  9. Thoughts that you would be better off dead or of hurting   0x 1 2 3       yourself in some way  \"I m ready to go if have too, but not yet\" she said with nimco. ------------------------------------------------------------------------------------------------------------------------------------------              ---------------------------------------------------                   Total score______3_________    ---------------------------------------------------------------------------------------------------------------------------------------------  If you checked off any problems, how difficult have these problems made it for you to do your work, take care of things at home, or get along with other people?      Not difficult at all    ______       Somewhat difficult ______    Very difficult  _____    Extremely difficult ______

## 2021-04-05 NOTE — DISCHARGE INSTR - COC
Continuity of Care Form    Patient Name: Melissa Hickey   :  1932  MRN:  [id number]    [facility] date:  2021  Discharge date:  ***    Code Status Order: Full Code   Advance Directives:      Admitting Physician:  Jessica Dee MD  PCP: Sindi Olmedo, APRN - CNP    Discharging Nurse: [facility] Unit/Room#: 0374/2403-78  Discharging Unit Phone Number: ***    Emergency Contact:   Extended Emergency Contact Information  Primary Emergency Contact: Silver Barroso, [address] [facility] Phone: [phone]  Relation: Child   needed? No  Secondary Emergency Contact: Silver Chio, [address] Phone: [phone]  Relation: Other  Preferred language: English   needed? No    Past Surgical History:  No past surgical history on file.     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    Muscular weakness M62.81    Other instability, left knee M25.362    Primary osteoarthritis of both hips M16.0    Primary osteoarthritis of right knee M17.11    Cerebrovascular accident (CVA) ([address] I63.9    Received intravenous tissue plasminogen activator (tPA) in emergency department Z92.82    Acute left hemiparesis ([address] G81.94       Isolation/Infection:   Isolation            No Isolation          Patient Infection Status       Infection Onset Added Last Indicated Last Indicated By Review Planned Expiration Resolved Resolved By    None active    Resolved    COVID-19 20 20 COVID-19 (Sent to [facility])   21     COVID-19 Rule Out 20 COVID-19 (Sent to [facility]) (Ordered)   20 Rule-Out Test Resulted            Nurse Assessment:  Last Vital Signs: /82   Pulse 78   Temp 97.3 °F (36.3 °C) (Oral)   Resp 13   Ht 5' 7\" (1.702 m)   Wt 165 lb (74.8 kg)   SpO2 96%   BMI 25.84 kg/m²     Last documented pain score (0-10 scale): Pain Level: 0  Last Weight:   Wt Readings from Last 1 Encounters:   21 165 lb (74.8 kg)     Mental Status:  {IP PT MENTAL STATUS:52475}    IV Access:  { LIEN IV ACCESS:743355360}    Nursing Mobility/ADLs:  Walking   {CHP DME LDPZ:235456682}  Transfer  {CHP DME VFJJ:111225080}  Bathing  {CHP DME HUTL:306014332}  Dressing  {CHP DME RGMO:577401348}  Toileting  {CHP DME TDFA:824846973}  Feeding  {P DME UGUB:035705692}  Med Admin  {P DME BZFS:057302920}  Med Delivery   { LIEN MED Delivery:917050881}    Wound Care Documentation and Therapy:        Elimination:  Continence: Bowel: {YES / UM:17840}  Bladder: {YES / CP:59014}  Urinary Catheter: {Urinary Catheter:656117573}   Colostomy/Ileostomy/Ileal Conduit: {YES / UO:57178}       Date of Last BM: ***  No intake or output data in the 24 hours ending 21 0827  No intake/output data recorded.     Safety Concerns:     [employer] Safety Concerns:652580054}    Impairments/Disabilities:      [employer] Impairments/Disabilities:463794640}    Nutrition Therapy:  Current Nutrition Therapy:   [employer] Diet List:868334132}    Routes of Feeding: {CHP DME Other Feedings:163636792}  Liquids: {Slp liquid thickness:74515}  Daily Fluid Restriction: {CHP DME Yes amt example:875577810}  Last Modified Barium Swallow with Video (Video Swallowing Test): {Done Not Done IYSL:821481110}    Treatments at the Time of Hospital Discharge:   Respiratory Treatments: ***  Oxygen Therapy:  {Therapy; copd oxygen:53764}  Ventilator:    {MH CC Vent ZXK:710585240}    Rehab Therapies: {THERAPEUTIC INTERVENTION:9949197579}  Weight Bearing Status/Restrictions: [address] [employer] CC Weight Bearin}  Other Medical Equipment (for information only, NOT a DME order):  {EQUIPMENT:734807085}  Other Treatments: ***    Patient's personal belongings (please select all that are sent with patient):  {CHP DME Belongings:925120773}    RN SIGNATURE:  {Esignature:463257138}    CASE MANAGEMENT/SOCIAL WORK SECTION    Inpatient Status Date: ***    Readmission Risk Assessment Score:  Readmission Risk              Risk of Unplanned Readmission:        18           Discharging to Facility/ Agency   Name:   Address:  Phone:  Fax:    Dialysis Facility (if applicable)   Name:  Address:  Dialysis Schedule:  Phone:  Fax:    / signature: {Esignature:683492754}    PHYSICIAN SECTION    Prognosis: {Prognosis:6558480384}    Condition at Discharge: Stable    Rehab Potential (if transferring to Rehab): Fair    Recommended Labs or Other Treatments After Discharge:      Physician Certification: I certify the above information and transfer of Yanni Castro  is necessary for the continuing treatment of the diagnosis listed and that she requires Acute Rehab for greater 30 days.      Update Admission H&P: No change in H&P    PHYSICIAN SIGNATURE:  Electronically signed by Ibrahima Bach MD on 21 at 10:28 AM EDT

## 2021-04-05 NOTE — CONSULTS
Physical Medicine & Rehabilitation  Consult Note      Admitting Physician: Hitesh De La Vegar, MD    Primary Care Provider: VERNELL Munoz CNP     Reason for Consult:  Acute Inpatient Rehabilitation    Chief Complaint: Left face and arm and leg weakness    History of Present Illness:  Referring Provider is requesting an evaluation for appropriate placement upon discharge from acute care. Ms. Marco A Alba is a 80 y.o.  female who was admitted to [facility] on 4/1/2021 with Cerebrovascular Accident    80year-old female admitted with acute left hemiparesis causing a fall of her chair. Noted acute left-sided weakness worse than previous date. .  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 Jonel Albertina She was life flighted to [facility]. Patient on baby aspirin at home. Patient given TPA during TPA became hysterical repeat CT showed no change completed TPA    Neurology-found to have right paramedian pontine acute ischemic infarct status post TPA-on aspirin, Plavix for 3 weeks and long-term aspirin, Lipitor, echo and A1c pending, continue Lovenox    MRI brain with and without contrast 4/2/2021  Impression:        Acute infarct within the rhonda, on the right-hand side. CTA  Impression:        No evidence for acute intracranial hemorrhage, territorial infarction or   intracranial mass lesion. Moderate chronic microangiopathic ischemic disease. Moderate generalized volume loss. Scattered areas of calcification throughout the head and neck vasculature. Otherwise unremarkable CTA of the head and neck.  No hemodynamically   significant stenosis.  No dissection.  No aneurysm.           Review of Systems:  Constitutional: negative for anorexia, chills, fatigue, fevers, sweats and weight loss  Eyes: negative for redness and visual disturbance  Ears, nose, mouth, throat, and face: negative for earaches, sore throat and tinnitus  Respiratory: negative for cough and shortness of breath  Cardiovascular: negative for chest pain, dyspnea and palpitations  Gastrointestinal: negative for abdominal pain, change in bowel habits, constipation, nausea and vomiting  Genitourinary:negative for dysuria, frequency, hesitancy and urinary incontinence  Integument/breast: negative for pruritus and rash  Musculoskeletal:negative for muscle weakness and stiff joints  Neurological: negative for dizziness, headaches and weakness  Behavioral/Psych: negative for decreased appetite, depression and fatigue    Functional History:  PTA: Independent with all activities. Current:  PT:  Restrictions/Precautions: Fall Risk, General Precautions   Transfers  Sit to Stand: Moderate Assistance  Stand to sit: Moderate Assistance  Comment: Pt performed DTD 2 x from EOB with RW. Cues for pt to correct flexed posture, poo return. mild posterior lean. Ambulation 1  Surface: level tile  Device: Rolling Walker  Assistance: Moderate assistance  Gait Deviations: Slow Zuly, Decreased step height, Decreased step length  Distance: 3 ft toward [facility]  Comments: cues for pt to initiate ambulation,modA for RW management. OT:   ADL  Grooming: Setup; Increased time to complete;Stand by assistance(seated on EOB- combed hair, denture mgt, oral care, putting on lotion)  UE Bathing: Setup; Increased time to complete;Minimal assistance(writer assisted w/ washing pt's back)  LE Bathing: Setup;Maximum assistance; Increased time to complete(below knees)  UE Dressing: Setup;Minimal assistance(don/doff gown, tie and button sleeve)  LE Dressing: Setup;Maximum assistance  Toileting: Setup;Maximum assistance; Increased time to complete(3 brief changes during the session, static standing w/ RW)  Additional Comments: pt sat on EOB to complete ADLs. pt demo multiple sit<>stand transfers for silvia care/backside mgt and 3 brief changes d/t pt wetting self.       ST: Patient presents with probable safe swallow for Dysphagia III: Soft and bite sized diet with Mildly Thick (Nectar) liquids as evidenced by no overt s/s of aspiration noted with consistencies tested. Recommend small sips and bites, only feed when alert and awake and upright at 90 degrees for all PO intake. Recommend close monitoring for overt/clinical s/s of aspiration and D/C PO intake and complete Modified Barium Swallow Study should they occur. Results and recommendations reported to RN. Dysphagia Diagnosis: Suspected needs further assessment;Mild pharyngeal stage dysphagia;Mild oral stage dysphagia  Dysphagia Outcome Severity Scale: Level 4: Mild moderate dysphagia- Intermittent supervision/cueing. One - two diet consistencies restricted     Pt presents with moderate to severe cognitive deficits characterized by difficulty with recall, verbal reasoning, deductive reasoning, thought flexibility, task insight, and word generation. Pt. Presents with mild dysarthria, characterized by imprecise articulatory precision. Pt presents with O/M deficits at this time, characterized by a left facial droop. ST to follow up and provide treatment to address noted deficits. Education provided. Further therapy recommended at discharge. Past Medical History:        Diagnosis Date    Anemia     Hypertension     Hypothyroidism     Osteoarthritis     Other long term (current) drug therapy     Renal insufficiency        Past Surgical History:    No past surgical history on file. Allergies:     Allergies   Allergen Reactions    Ciprofloxacin     Hydrocodone     Macrobid [Nitrofurantoin]     Ciprofloxacin Rash        Current Medications:   Current Facility-Administered Medications: atorvastatin (LIPITOR) tablet 40 mg, 40 mg, Oral, Nightly  melatonin tablet 6 mg, 6 mg, Oral, Nightly PRN  clopidogrel (PLAVIX) tablet 75 mg, 75 mg, Oral, Daily  [COMPLETED] alteplase (ACTIVASE) injection 6.7 mg, 0.09 mg/kg, Intravenous, Once **FOLLOWED BY** [COMPLETED] alteplase (ACTIVASE) injection 60.6 mg, 0.81 mg/kg, Intravenous, Once **FOLLOWED BY** 0.9 % sodium chloride bolus, 50 mL, Intravenous, Once  sodium chloride flush 0.9 % injection 10 mL, 10 mL, Intravenous, 2 times per day  sodium chloride flush 0.9 % injection 10 mL, 10 mL, Intravenous, PRN  0.9 % sodium chloride infusion, 25 mL, Intravenous, PRN  amLODIPine (NORVASC) tablet 5 mg, 5 mg, Oral, Daily  ferrous sulfate (FE TABS 325) EC tablet 325 mg, 325 mg, Oral, BID  levothyroxine (SYNTHROID) tablet 112 mcg, 112 mcg, Oral, Daily  metoprolol tartrate (LOPRESSOR) tablet 12.5 mg, 12.5 mg, Oral, BID  pantoprazole (PROTONIX) tablet 40 mg, 40 mg, Oral, QAM AC  oxybutynin (DITROPAN-XL) extended release tablet 5 mg, 5 mg, Oral, Daily  sodium chloride flush 0.9 % injection 10 mL, 10 mL, Intravenous, 2 times per day  sodium chloride flush 0.9 % injection 10 mL, 10 mL, Intravenous, PRN  0.9 % sodium chloride infusion, 25 mL, Intravenous, PRN  promethazine (PHENERGAN) tablet 12.5 mg, 12.5 mg, Oral, Q6H PRN **OR** ondansetron (ZOFRAN) injection 4 mg, 4 mg, Intravenous, Q6H PRN  enoxaparin (LOVENOX) injection 40 mg, 40 mg, Subcutaneous, Daily  aspirin EC tablet 81 mg, 81 mg, Oral, Daily **OR** aspirin suppository 300 mg, 300 mg, Rectal, Daily  senna (SENOKOT) tablet 8.6 mg, 1 tablet, Oral, Daily PRN    Social History:  Social History     Socioeconomic History    Marital status:       Spouse name: Not on file    Number of children: Not on file    Years of education: Not on file    Highest education level: Not on file   Occupational History    Not on file   Social Needs    Financial resource strain: Not on file    Food insecurity     Worry: Not on file     Inability: Not on file    Transportation needs     Medical: Not on file     Non-medical: Not on file   Tobacco Use    Smoking status: Never Smoker    Smokeless tobacco: Never Used   Substance and Sexual Activity    Alcohol use: Never     Frequency: Never    Drug use: Never    Sexual activity: Not on file   Lifestyle    Physical activity     Days per week: Not on file     Minutes per session: Not on file    Stress: Not on file   Relationships    Social connections     Talks on phone: Not on file     Gets together: Not on file     Attends [employer] service: Not on file     Active member of club or organization: Not on file     Attends meetings of clubs or organizations: Not on file     Relationship status: Not on file    Intimate partner violence     Fear of current or ex partner: Not on file     Emotionally abused: Not on file     Physically abused: Not on file     Forced sexual activity: Not on file   Other Topics Concern    Not on file   Social History Narrative    Not on file     Social/Functional History  Lives With: Alone  Type of Home: House  Home Layout: One level  Home Access: Level entry  Bathroom Shower/Tub: Walk-in shower, Shower chair with back  Bathroom Toilet: Standard  Bathroom Equipment: (railings and grab bars throughout the house.)  Home Equipment: Cane, 4 wheeled walker  Receives Help From: Family, Personal care attendant(Pt has three ladies who come to help her bath, dress, cook and clean. Family is also close by to assist.)  ADL Assistance: Needs assistance  Homemaking Assistance: Needs assistance  Ambulation Assistance: Independent(uses railings throughout the house)  Transfer Assistance: Independent    Family History:   No family history on file. Physical Exam:    /82   Pulse 78   Temp 97.3 °F (36.3 °C) (Oral)   Resp 13   Ht 5' 7\" (1.702 m)   Wt 165 lb (74.8 kg)   SpO2 96%   BMI 25.84 kg/m²     General appearance: alert, appears stated age, cooperative, and no distress  Head: Normocephalic, without obvious abnormality, atraumatic  Eyes: conjunctivae clear. Throat: lips, mucosa, and tongue normal.  Neck:  symmetrical, trachea midline. Lungs: clear to auscultation bilaterally.   Heart: regular rate and rhythm, no murmur. Abdomen: soft, non-tender; bowel sounds normal.  Extremities: extremities normal, atraumatic, no edema, normal tone. Mental status: Alert, orientedX3, thought content appropriate, knew year, president location, follows commands, names objects. Sensory: Intact in BUE and BLE to soft and pin sensation. Intact upper and lower extremities  Motor: Muscle tone and bulk are normal bilaterally. No pronator drift. ,  Left lower extremity at least 3+/5, left upper extremity  2/5 proximally patient unable to lift arm however after I lifted patient able to hold in place antigravity          Diagnostics:  CBC   Lab Results   Component Value Date    WBC 7.5 04/05/2021    RBC 3.53 04/05/2021    RBC 3.51 02/25/2021    HGB 10.7 04/05/2021    HCT 33.9 04/05/2021    MCV 96.0 04/05/2021    RDW 13.8 04/05/2021     04/05/2021     BMP    Lab Results   Component Value Date     04/05/2021    K 4.2 04/05/2021     04/05/2021    CO2 18 04/05/2021    BUN 17 04/05/2021     Uric Acid  No components found for: URIC  VITAMIN B12 No components found for: B12  PT/INR  No results found for: PTINR    Radiology:     Impression: Ms. Elijah Joseph is a 80 y.o.  female with a history of <principal problem not specified>    1. Acute right pontine infarct status post TPA with left hemiparesis -aspirin, Lipitor, Plavix, Plavix for 3 weeks then long-term aspirin  2. Hypertension-Norvasc, Lopressor  3. Suspect concomitant left rotator cuff injury  4. Osteoarthritis  5. Chronic kidney disease  6. Hypothyroid-Synthroid  7. Anemia-iron  8. History of chronic cystitis-Ditropan    Recommendations:  1. Diagnosis: CVA  2. Therapy: Transfers mod assist, ambulate 3 feet mod assist, ADLs max assist, has mild severe? cognitive deficits and dysphagia  3. Medical  Necessity: As above  4. Support: Clarify, lives alone however notes son and daughter live nearby, neighbors are available  5.  Rehab recommendation: Clarify support eventual discharge however suspect would benefit acute inpatient rehabilitation when medically ready  6. DVT proph: Lovenox    It was my pleasure to evaluate Jenelle Goss today. Please call with questions. Lula Collado. Hipolito Ibarra MD          This note is created with the assistance of a speech recognition program.  While intending to generate a document that actually reflects the content of the visit, the document can still have some errors including those of syntax and sound a like substitutions which may escape proof reading.   In such instances, actual meaning can be extrapolated by contextual diversion

## 2021-04-05 NOTE — PROGRESS NOTES
Physical Therapy    DATE: 2021    NAME: Edy Osborn  MRN: [id number]   : 1932      Patient not seen this date for Physical Therapy due to:    Testing: Pt off unit at [facility]. PT will check back as time allows or 21.        Electronically signed by Terri Stanley PT on 2021 at 9:25 AM

## 2021-04-05 NOTE — CARE COORDINATION
Spoke with Nghia Barajas at [facility] and [address] [place] [address]  ([phone]) to check status of referral.  She requested that referral and clinical notes be re-faxed to [phone] and she will return call and inform if patient is accepted. Pt will require a negative CoVid test within 48 hours of d/c, PS sent to Dr Gertrudis Doty with PM&R in to see patient and evaluate for placement for Acute In-Patient Rehab. Recommends that patient would benefit from ARU. Spoke with patient to discuss her options and obtain her decision. Pt requested that writer speak with patient's daughter Jaimie Gonzalez at [phone] and present the options that patient has. Writer spoke with [address] at length, answered questions and discussed options. Naheed Steven will contact her brother so they can come to an agreement and will return call to . 1230 Call received from patient's daughter [address] and her brother discussed the options for patient's rehab and have decided they want her to go to [facility] as long as their mother is in agreement. Writer spoke with patient to notify her of her children's recommendations and she is in agreement that she wishes to go to [facility]. Referral sent to [facility] and call placed to [facility]. [phone] Pt approved for [facility]. Transport paperwork faxed to [facility]. Pt to be picked up between [phone]. RN to call report to [phone]. Pt and family updated. Nghia Barajas at [facility] notified that patient and family have decided not to go to [facility].

## 2021-04-05 NOTE — PLAN OF CARE
Neuro assessment completed, fall precautions in place, aspirations precautions in place, assess for barriers in communication and mobility, interventions to assist in communication and mobility in place, encouraged to call for assistance, adaptive devices used as needed, assess emotional state and support offered, encouraged patient to communicate by available means, and support systems included in patient care. Fall assessment preformed. Bed in low locked position with call light and tray table within reach. Education given. Will continue to monitor. Skin assessed for breakdown and redness, patient turned regularly, and heels elevated off of bed on pillows.       Problem: HEMODYNAMIC STATUS  Goal: Patient has stable vital signs and fluid balance  4/5/2021 0653 by Glenna Lopez RN  Outcome: Ongoing  4/4/2021 1947 by Tam Prakash RN  Outcome: Ongoing     Problem: ACTIVITY INTOLERANCE/IMPAIRED MOBILITY  Goal: Mobility/activity is maintained at optimum level for patient  4/5/2021 0653 by Glenna Lopez RN  Outcome: Ongoing  4/4/2021 1947 by Tam Prakash RN  Outcome: Ongoing     Problem: Skin Integrity:  Goal: Will show no infection signs and symptoms  Description: Will show no infection signs and symptoms  4/5/2021 0653 by Glenna Lopez RN  Outcome: Ongoing  4/4/2021 1947 by Tam Prakash RN  Outcome: Ongoing  Goal: Absence of new skin breakdown  Description: Absence of new skin breakdown  4/5/2021 0653 by Glenna Lopez RN  Outcome: Ongoing  4/4/2021 1947 by Tam Prakash RN  Outcome: Ongoing     Problem: Falls - Risk of:  Goal: Will remain free from falls  Description: Will remain free from falls  4/5/2021 0653 by Glenna Lopez RN  Outcome: Ongoing  4/4/2021 1947 by Tam Prakash RN  Outcome: Ongoing  Goal: Absence of physical injury  Description: Absence of physical injury  4/5/2021 0653 by Glenna Lopez RN  Outcome: Ongoing  4/4/2021 1947 by Tam Prakash RN  Outcome: Ongoing

## 2021-04-05 NOTE — PROGRESS NOTES
[facility]   Acute Inpatient Rehab Preadmission Assessment    Patient Name: Truman Aparicio        MRN: [id number]    : 1932  (80 y.o.)  Gender: female     Admitted from:   [facility]  [x][facility]   [][facility]   [facility]   []Outside Admission - Location:                                 [x]Initial         []Updated    Date of Onset / Admission to the acute hospital:  21    Inpatient Rehabilitation Admitting Diagnosis:  CVA    Did patient have surgery? [x]No  []Yes:      Physicians: [employer]    Risk for clinical complications:  High    Co-morbidities:      1. Hypertension  2. Suspect concomitant left rotator cuff injury  3. Osteoarthritis  4. Chronic kidney disease  5. Hypothyroid  6. Anemia  7.  History of chronic cystitis    Financial Information  Primary insurance:  [x]Medicare [] Medicare HMO  []Commercial insurance    []Medicaid   [] Medicaid HMO []Workers Compensation   []Personal Pay    Secondary Insurance:  []Medicare [] Medicare HMO  [x]Commercial insurance    []Medicaid  []Medicaid HMO  []Workers Compensation []None    Precautions:   []Cardiac Precautions:    []Total hip precautions:    []Weight Bearing status:  [x]Safety Precautions/Concerns:  Fall Risk, General Precautions  [x]Visually impaired:  Wears glasses at all times   []Hard of Hearing:     Isolation Precautions:         []Yes  [x]No  If Yes:  [] Droplet  []Contact []Airborne     []VRE     []MRSA     []C-diff         [] TB       [] ESBL [] MDRO          [] Other:        Physiatrist:  [x]        []   Dr. Marilynn Larson  []   Dr. Inez Borja  []   Dr. Rigoberto Davenport    Patients Occupation: Retired    Reviewed Lab and Diagnostic reports from Current Admission: Yes    Patients Prior Functional  Level: Prior Function  Receives Help From: Family, Personal care attendant, Friend(s)  ADL Assistance: Needs assistance  Bath: Maximum assistance(Pt recieves help daily for bathing from friend that comes every morning.)  Dressing: Stand by assistance(Pt gets help from friend as needed, fluctuating between MOD IND and MIN A for dressing tasks.)  Grooming: Modified independent   Feeding: Modified independent   Toileting: Needs assistance(SBA)  Homemaking Assistance: Needs assistance(Pt's friends and family complete all IADL tasks.)  Ambulation Assistance: Independent(using grab bars or RW)  Transfer Assistance: Independent  Additional Comments: Pt's daughter is supportive and able to assist as needed. Pt's friends come daily to assist with bathing/dressing. History of current illness:  66-year-old female admitted with acute left hemiparesis causing a fall of her chair. Noted acute left-sided weakness worse than previous date. .  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 Reji Les She was life flighted to [facility]. Patient on baby aspirin at home. Patient given TPA during TPA became hysterical repeat CT showed no change completed TPA     Neurology-found to have right paramedian pontine acute ischemic infarct status post TPA-on aspirin, Plavix for 3 weeks and long-term aspirin, Lipitor, echo and A1c pending, continue Lovenox    Current functional status for upper extremity ADLs:  UE Bathing: Setup, Increased time to complete, Minimal assistance(writer assisted w/ washing pt's back)  UE Dressing: Setup, Minimal assistance(don/doff gown, tie and button sleeve)    Current functional status for lower extremity ADLs:  LE Bathing: Setup, Maximum assistance, Increased time to complete(below knees)  LE Dressing: Setup, Maximum assistance    Current functional status for bed, chair, wheelchair transfers:  Transfers  Sit to Stand: Moderate Assistance, 2 Person Assistance, Minimal Assistance  Stand to sit: Moderate Assistance, 2 Person Assistance, Minimal Assistance  Comment: Pt performed STS transfer x3 throughout treatment.  Pt required modAx2 to perform STS from EOB, demonstrating significant posterior lean requiring max verbal cueing to increase LEN and BLE progression. Pt required minAx2 to perform STS from EOB in [facility] stedy for transfer to restroom. ModAx2 required from [facility] with max verbal cueing to maintain upright standing posture for pericare. Current functional status for toilet transfers: Moderate Assistance       Current functional status for locomotion:  Ambulation  Ambulation?: No(Unable to attempt ambulation due to poor standing tolerance this date)  More Ambulation?: No  Ambulation 1  Surface: level tile  Device: Rolling Walker  Assistance: Moderate assistance  Gait Deviations: Slow Zuly, Decreased step height, Decreased step length  Distance: 3 ft toward [facility]  Comments: cues for pt to initiate ambulation,modA for RW management. Current functional status for comprehension: Minimal contact assistance    Current functional status for expression: Minimal contact assistance    Current functional status for social interaction: Close supervision    Current functional status for problem solving:  Moderate assistance    Current functional status for memory: Maximal Assistance    Current Deficits R/T Impairment: Impaired Functional Mobility and Decreased ADLs    Required Therapy:   [x] Physical Therapy  [x] Occupational Therapy   [x] Speech Therapy, as appropriate    Additional Services:  [x]   [] Recreational Therapy, as appropriate  [x] Nutrition  [] Dialysis  [] Other:     Rehab Justification:  Needs 3 hrs therapy per day or 15 hours per week:  Yes  Identified Rehab Nursing needs: Yes  Intense Interdisciplinary need:  Yes  Need for 24 hr physician supervision:  Yes  Measurable improved quality of life:  Yes  Willingness to participate:  Yes  Medical Necessity:  Yes  Patient able to tolerate care proposed:  Yes    Expected Discharge Destination/Functional Level:  Home with assist  Expected length of time to achieve that level

## 2021-04-05 NOTE — PROGRESS NOTES
[facility]   IN-PATIENT SERVICE      NEUROLOGY PROGRESS  NOTE            Date:   4/5/2021  Patient name:  Santo Luis  Date of admission:  4/1/2021  YOB: 1932      Interval History:   Santo Luis is a  80 y.o. female admitted on 4/1/2021 with Acute ischemic stroke ([facility] [address] [I63.9]. This is a follow-up neurology progress note. The patient was seen and examined and the chart was reviewed. Vitals: Stable  Including blood pressure. Patient did not have acute events overnight  Patient feel much better,with improvement of dysarthria and left sided weakness, mild left facial droop. Patient currently on ASA, Plavix and lipitor. LDL 54. A1c: Pending   Echo : 55 % . Asymmetric septal hypertrophy (KURT) without left ventricular outflow   obstruction. Negative bubble study   History of Present Illness:      80 y.o. female presented with acute sudden onset left-sided weakness       Patient initial NIH stroke scale was 5. Initial CT head was negative and she subsequently received TPA. After initially receiving TPA for 10 minutes, her dysarthria worsened with change in mentation. tPA was held and patient had a repeat CT head to rule out any hemorrhage. tPA was reinitiated after CT head was negative. Patient initially had significant left-sided paresis and exam has improved in the past 24 hours where it is currently antigravity. Her dysarthria has also significantly improved with her facial droop. Her NIH stroke scale is currently 5. She has been working with PT/OT and SLP. Etiology remains unclear as echocardiogram and A1c are still pending. Patient denies any history of smoking. MRI showed patient had a right pontine infarct, patient was started on dual antiplatelet therapy along with continuation of statin therapy. Patient will most likely be a candidate for rehab once stroke work-up is been completed.         Past Medical History:     Past Medical History:   Diagnosis Date    Anemia     Hypertension     Hypothyroidism     Osteoarthritis     Other long term (current) drug therapy     Renal insufficiency         Past Surgical History:     No past surgical history on file. Medications during admission:      atorvastatin  40 mg Oral Nightly    clopidogrel  75 mg Oral Daily    sodium chloride  50 mL Intravenous Once    sodium chloride flush  10 mL Intravenous 2 times per day    amLODIPine  5 mg Oral Daily    ferrous sulfate  325 mg Oral BID    levothyroxine  112 mcg Oral Daily    metoprolol tartrate  12.5 mg Oral BID    pantoprazole  40 mg Oral QAM AC    oxybutynin  5 mg Oral Daily    sodium chloride flush  10 mL Intravenous 2 times per day    enoxaparin  40 mg Subcutaneous Daily    aspirin  81 mg Oral Daily    Or    aspirin  300 mg Rectal Daily         Physical Exam:   /82   Pulse 78   Temp 97.3 °F (36.3 °C) (Oral)   Resp 13   Ht 5' 7\" (1.702 m)   Wt 165 lb (74.8 kg)   SpO2 96%   BMI 25.84 kg/m²   Temp (24hrs), Av.5 °F (36.4 °C), Min:97.2 °F (36.2 °C), Max:98.1 °F (36.7 °C)        General examination:      General Appearance:  alert, well appearing, and in no acute distress  HEENT: Normocephalic, atraumatic, moist mucus membranes  Neck: supple, no carotid bruits, (-) nuchal rigidity  Lungs:  Respirations unlabored, chest wall no deformity, BS normal  Cardiovascular: normal rate, regular rhythm  Abdomen: Soft, nontender, nondistended, normal bowel sounds  Skin: No gross lesions, rashes, bruising or bleeding on exposed skin area  Extremities:  peripheral pulses palpable, clubbing or edema  Psych: normal affect      Neurological examination:      Mental status   Alert and oriented x 3; following all commands;   speech is fluent, mild dysarthria, aphasia.       Cranial nerves   II - visual fields intact to confrontation; pupils reactive  III, IV, VI - extraocular muscles intact; no PEDRITO; no nystagmus; no ptosis   V - normal facial sensation VII - left facial droop                                                             VIII - intact hearing                                                                             IX, X - symmetrical palate elevation                                               XI - symmetrical shoulder shrug                                                       XII - midline tongue without atrophy or fasciculation     Motor function  Strength:   5/5 RUE, 5/5 RLE  4+/5 LUE, 4+/5  LLE  Normal bulk and tone. Sensory function Decreased sensation on the left side ,    Cerebellar  mld dysmetria on the left side   No tremors                        Reflex function 2/4 symmetric throughout . Downgoing plantar response bilaterally.  (-)Calhoun's sign bilaterally      Gait                   not tested              Diagnostics:      Laboratory Testing:  CBC:   Recent Labs     04/03/21  0416 04/04/21  0506 04/05/21  0523   WBC 7.9 7.9 7.5   HGB 9.6* 10.4* 10.7*    229 235       BMP:    Recent Labs     04/03/21 0416 04/04/21  0506 04/05/21  0523    133* 134*   K 3.9 4.1 4.2    104 105   CO2 18* 19* 18*   BUN 12 14 17   CREATININE 0.91* 0.99* 1.04*   GLUCOSE 83 92 92         Lab Results   Component Value Date    CHOL 121 04/02/2021    LDLCHOLESTEROL 54 04/02/2021    HDL 55 04/02/2021    TRIG 61 04/02/2021    ALT 18 02/25/2021    AST 35 02/25/2021    TSH 2.470 11/04/2020    INR 1.0 04/01/2021         No results found for: PHENYTOIN, PHENYTOIN, VALPROATE, CBMZ        Imaging/Diagnostics:           CT head   Impression   No evidence of acute intracranial process.  Moderate atrophy and extensive   chronic small vessel ischemic changes noted.       There has been no significant change          CTA head and neck   Impression   No evidence for acute intracranial hemorrhage, territorial infarction or   intracranial mass lesion.       Moderate chronic microangiopathic ischemic

## 2021-04-05 NOTE — PROGRESS NOTES
Speech Language Pathology  Speech Language Pathology  [facility]    Speech Language/Oral Motor Treatment Note    Date: 4/5/2021  Patients Name: Leticia Umaña  MRN: [id number]    Patient Active Problem List   Diagnosis Code    Anemia D64.9    Arthritis M19.90    HTN (hypertension) I10    Hypothyroidism E03.9    Renal insufficiency N28.9    Other long term (current) drug therapy Z79.899    Anemia due to stage 3 chronic kidney disease N18.30, D63.1    Chronic UTI N39.0    Mixed hyperlipidemia E78.2    Gastroesophageal reflux disease without esophagitis K21.9    S/P revision of total knee, 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    Muscular weakness M62.81    Other instability, left knee M25.362    Primary osteoarthritis of both hips M16.0    Primary osteoarthritis of right knee M17.11    Cerebrovascular accident (CVA) ([facility] [address] I63.9    Received intravenous tissue plasminogen activator (tPA) in emergency department Z92.82    Acute left hemiparesis ([facility] [address] G81.94       Pain: Denies    Cognitive Treatment    Treatment time: 10:14-10:55      Subjective: [x] Alert [x] Cooperative     [] Confused     [] Agitated    [] Lethargic      Objective/Assessment:  Attention: Pt maintained attention throughout the therapy session    Recall: immediate recall: 2/3 not increasing with max verbal cues    Oral Motor Exercises (dysphagia & facial weakness): Pt completed oral motor exercises for facial weakness x 10 repetitions x 1 set with min to mod verbal and visual cues. Pt continues to have left facial droop. Pt completed lingual protrusions for dysphagia x15 reps. Other: Pt seen during mealtime. Pt with soft and bite sized diet with nectar-thick liquids. Pt. with no overt s/s of aspiration with all consistencies. Oral phase is functional for consistencies tested. Pt became tearful x2 during the session. ST left swallowing and facial weakness exercises at bedside.  Pt educated on the importance of these exercises. ST will continue to follow. Plan:  [x] Continue ST services    [] Discharge from ST:      Discharge recommendations: [] Inpatient Rehab   [] [place]   [] Outpatient Therapy  [] Follow up at trauma clinic   [x] Other: Further therapy recommended at discharge. Treatment Completed by:  Henry Garcia  Clinician    Cosigned By: Ashely LOOMIS CCC/SLP

## 2021-04-05 NOTE — PROCEDURES
Please review your answers. If you need to make any changes, use the BACK button on your browser. Your answer to Question 1 - \"Do you have any symptoms that are bothering you? \" = YES    Your answer to Question 2 - \"Are you able to do the same work as before? \" = YES    Your answer to Question 3 - \"Are you able to keep up with your hobbies? \" = YES    Your answer to Question 4 - \"Have you maintained your ties to friends and family? \" = YES    Your answer to Question 5 - \"Do you need help making a simple meal, doing household chores, or balancing a checkbook? \" = NO    Your answer to Question 6 - \"Do you need help with shopping or traveling close to home? \" = YES    Your answer to Question 7 - \"Do you need another person to help you walk? \" = YES    Your answer to Question 8 - \"Do you need help with eating, going to the toilet, or bathing? \" = NO    Your answer to Question 9 - \"Do you stay in bed most of the day and need constant nursing care? \" = NO      The modified [place] Scale (mRS) is: <b=\"\"3     Potential conflicts are listed here (use the BACK button on your browser to fix errors): The moderately severe disability of mRS = 4 conflicts with the ability to do work described in Q#2. The moderately severe disability of mRS = 4 conflicts with the ability to keep up with hobbies described in Q#3. The moderately severe disability of mRS = 4 conflicts with the activities described in Q#5. Levels of the modified [place] Scale (mRS):   0 - No symptoms. 1 - No significant disability. Able to carry out all usual activities, despite some symptoms. 2 - Slight disability. Able to look after own affairs without assistance, but unable to carry out all previous activities. 3 - Moderate disability. Requires some help, but able to walk unassisted. 4 - Moderately severe disability. Unable to attend to own bodily needs without assistance or unable to walk unassisted. 5 - Severe disability.  Requires constant nursing care and attention, bedridden.

## 2021-04-05 NOTE — PROGRESS NOTES
Spoke with LESLY Patiño CM, who states pt is medically ready for ARU. Heidy Cronin confirms pt has good family support and family is able to assist as needed upon d/c. Writer requested d/c readmit be completed with continuation of DVT prophylaxis and report be called to [phone]. Admission Assessment completed and Dr Gina Kendrick notified. Writer noted pt had an incident of CP, and pt will need cardiology clearance prior to ARU. Nicole notified.

## 2021-04-05 NOTE — PROGRESS NOTES
Physical Therapy  Facility/Department: [facility]  Daily Treatment Note  NAME: Sharona Jiang  : 1932  MRN: [id number]    Date of Service: 2021    Discharge Recommendations:  Patient would benefit from continued therapy after discharge   PT Equipment Recommendations  Equipment Needed: No    Assessment   Body structures, Functions, Activity limitations: Decreased functional mobility ; Decreased balance;Decreased strength;Decreased endurance  Assessment: Pt required modAx2 to perform sit to stand with RW, unable to perform ambulation due to weakness and decreased balance. Pt is a high fall risk due to level of assistance required and balance deficits. Recommending continued skilled physical therapy to address balance deficits to return pt to prior level of independence. Prognosis: Good  PT Education: Goals;Gait Training;General Safety;PT Role;Plan of Care; Functional Mobility Training;Transfer Training  REQUIRES PT FOLLOW UP: Yes  Activity Tolerance  Activity Tolerance: Patient Tolerated treatment well;Patient limited by fatigue;Patient limited by endurance     Patient Diagnosis(es): The encounter diagnosis was Cerebrovascular accident (CVA), unspecified mechanism ([facility] [address] has a past medical history of Anemia, Hypertension, Hypothyroidism, Osteoarthritis, Other long term (current) drug therapy, and Renal insufficiency. has no past surgical history on file. Restrictions  Restrictions/Precautions  Restrictions/Precautions: Fall Risk, General Precautions  Required Braces or Orthoses?: No  Subjective   General  Response To Previous Treatment: Patient with no complaints from previous session.   Family / Caregiver Present: No  Subjective  Subjective: RN and pt in agreement for PT eval; pt supine in bed upon PT arrival, pt pleasant and cooperative throughout session  Pain Screening  Patient Currently in Pain: Denies  Vital Signs  Patient Currently in Pain: Denies       Orientation  Orientation  Overall Orientation Status: Within Functional Limits  Cognition   Cognition  Overall Cognitive Status: Exceptions  Arousal/Alertness: Appropriate responses to stimuli  Following Commands: Follows one step commands consistently; Follows one step commands with increased time  Attention Span: Attends with cues to redirect  Memory: Decreased recall of recent events  Safety Judgement: Decreased awareness of need for assistance;Decreased awareness of need for safety  Problem Solving: Assistance required to generate solutions;Decreased awareness of errors;Assistance required to implement solutions  Insights: Decreased awareness of deficits  Initiation: Requires cues for some  Sequencing: Requires cues for some  Objective   Bed mobility  Supine to Sit: Minimal assistance(Assist provided for trunk support)  Sit to Supine: Maximum assistance(Assist provided for BLE progression)  Comment: HOB elevated to ~50 degrees; increased time required to complete  Transfers  Sit to Stand: Moderate Assistance;2 Person Assistance;Minimal Assistance  Stand to sit: Moderate Assistance;2 Person Assistance;Minimal Assistance  Comment: Pt performed STS transfer x3 throughout treatment. Pt required modAx2 to perform STS from EOB, demonstrating significant posterior lean requiring max verbal cueing to increase LEN and BLE progression. Pt required minAx2 to perform STS from EOB in [address] [place] [address] for transfer to restroom. ModAx2 required from [facility] with max verbal cueing to maintain upright standing posture for pericare.   Ambulation  Ambulation?: No(Unable to attempt ambulation due to poor standing tolerance this date)  More Ambulation?: No  Stairs/Curb  Stairs?: No     Balance  Posture: Fair  Sitting - Static: Fair;+  Sitting - Dynamic: Fair  Standing - Static: Fair;-  Standing - Dynamic: Poor;+  Comments: pt able to sit EOB CGA once established, pt required modAx2 to maintain standing balance with RW, CGA in [facility] LE exercise program: Long Arc Quads, hip abduction/adduction, heel/toe raises, and marches. Reps: x10 BLE    Goals  Short term goals  Time Frame for Short term goals: 15  Short term goal 1: Pt to demonstrate mod-I bed mobility  Short term goal 2: Pt to demonstrate transfers at [facility] 346 term goal 3: Pt to amb 10 ft with arturo walker with CGA  Short term goal 4: Pt to tolerate 30 minutes of activity to improve endurance    Plan    Plan  Times per week: 5-6x/week  Current Treatment Recommendations: Strengthening, Neuromuscular Re-education, Home Exercise Program, Safety Education & Training, Patient/Caregiver Education & Training, Equipment Evaluation, Education, & procurement, Functional Mobility Training, Balance Training, Endurance Training, Transfer Training, Gait Training  Safety Devices  Type of devices: Nurse notified, Patient at risk for falls, Left in bed, Bed alarm in place, Call light within reach(Pt reporting L sided chest pain with shortness of breath upon writer's exit. Vitals stable with HR 78bpm, SpO2 100%, RR 28bpm. RN informed and present at bedside upon writer's exit.  RN to notify resident.)  Restraints  Initially in place: No     Therapy Time   Individual Concurrent Group Co-treatment   Time In 1432         Time Out 1523         Minutes 51         Timed Code Treatment Minutes: [address] [facility] [address], PT

## 2021-04-05 NOTE — PROGRESS NOTES
[facility]  Documentation Note                Admission Dx: Acute ischemic stroke [facility]) [I63.9]    Past Medical History:   has a past medical history of Anemia, Hypertension, Hypothyroidism, Osteoarthritis, Other long term (current) drug therapy, and Renal insufficiency. Previous Testing:     ECHO 4/5/2021: EF 55%, grade I DD, negative bubble study, mild-moderate MR, trivial TR.      Previous office/hospital visit:   None     Sierra Ortega, [facility]

## 2021-04-05 NOTE — CARE COORDINATION
Pt with a Cardiology Consult for new onset chest pain.   Trish at [facility], Manju Yee at [facility] notified that patient will not be d/c today, placed on Will Call for 4/6  Pt will need to have D/C-Readmit placed with continuation of Lovenox

## 2021-04-06 LAB
ANION GAP SERPL CALCULATED.3IONS-SCNC: 10 MMOL/L (ref 9–17)
BUN BLDV-MCNC: 20 MG/DL (ref 8–23)
BUN/CREAT BLD: ABNORMAL (ref 9–20)
CALCIUM SERPL-MCNC: 8.3 MG/DL (ref 8.6–10.4)
CHLORIDE BLD-SCNC: 104 MMOL/L (ref 98–107)
CO2: 18 MMOL/L (ref 20–31)
CREAT SERPL-MCNC: 1.06 MG/DL (ref 0.5–0.9)
EKG ATRIAL RATE: 70 BPM
EKG P-R INTERVAL: 150 MS
EKG Q-T INTERVAL: 406 MS
EKG QRS DURATION: 86 MS
EKG QTC CALCULATION (BAZETT): 438 MS
EKG R AXIS: -28 DEGREES
EKG T AXIS: 5 DEGREES
EKG VENTRICULAR RATE: 70 BPM
ESTIMATED AVERAGE GLUCOSE: 103 MG/DL
ESTIMATED AVERAGE GLUCOSE: 105 MG/DL
GFR AFRICAN AMERICAN: 59 ML/MIN
GFR NON-AFRICAN AMERICAN: 49 ML/MIN
GFR SERPL CREATININE-BSD FRML MDRD: ABNORMAL ML/MIN/{1.73_M2}
GFR SERPL CREATININE-BSD FRML MDRD: ABNORMAL ML/MIN/{1.73_M2}
GLUCOSE BLD-MCNC: 93 MG/DL (ref 70–99)
HBA1C MFR BLD: 5.2 % (ref 4–6)
HBA1C MFR BLD: 5.3 % (ref 4–6)
HCT VFR BLD CALC: 32.9 % (ref 36.3–47.1)
HEMOGLOBIN: 10.4 G/DL (ref 11.9–15.1)
MCH RBC QN AUTO: 30.7 PG (ref 25.2–33.5)
MCHC RBC AUTO-ENTMCNC: 31.6 G/DL (ref 28.4–34.8)
MCV RBC AUTO: 97.1 FL (ref 82.6–102.9)
MYOGLOBIN: 36 NG/ML (ref 25–58)
MYOGLOBIN: 41 NG/ML (ref 25–58)
MYOGLOBIN: 45 NG/ML (ref 25–58)
NRBC AUTOMATED: 0 PER 100 WBC
PDW BLD-RTO: 13.7 % (ref 11.8–14.4)
PLATELET # BLD: 231 K/UL (ref 138–453)
PMV BLD AUTO: 10.7 FL (ref 8.1–13.5)
POTASSIUM SERPL-SCNC: 4.2 MMOL/L (ref 3.7–5.3)
RBC # BLD: 3.39 M/UL (ref 3.95–5.11)
SODIUM BLD-SCNC: 132 MMOL/L (ref 135–144)
TROPONIN INTERP: ABNORMAL
TROPONIN T: ABNORMAL NG/ML
TROPONIN, HIGH SENSITIVITY: 19 NG/L (ref 0–14)
TROPONIN, HIGH SENSITIVITY: 19 NG/L (ref 0–14)
TROPONIN, HIGH SENSITIVITY: 21 NG/L (ref 0–14)
WBC # BLD: 7.1 K/UL (ref 3.5–11.3)

## 2021-04-06 PROCEDURE — 83874 ASSAY OF MYOGLOBIN: CPT

## 2021-04-06 PROCEDURE — 80048 BASIC METABOLIC PNL TOTAL CA: CPT

## 2021-04-06 PROCEDURE — 36415 COLL VENOUS BLD VENIPUNCTURE: CPT

## 2021-04-06 PROCEDURE — 99232 SBSQ HOSP IP/OBS MODERATE 35: CPT | Performed by: PSYCHIATRY & NEUROLOGY

## 2021-04-06 PROCEDURE — 85027 COMPLETE CBC AUTOMATED: CPT

## 2021-04-06 PROCEDURE — 92526 ORAL FUNCTION THERAPY: CPT

## 2021-04-06 PROCEDURE — 84484 ASSAY OF TROPONIN QUANT: CPT

## 2021-04-06 PROCEDURE — 97535 SELF CARE MNGMENT TRAINING: CPT

## 2021-04-06 PROCEDURE — 97130 THER IVNTJ EA ADDL 15 MIN: CPT

## 2021-04-06 PROCEDURE — 2580000003 HC RX 258: Performed by: STUDENT IN AN ORGANIZED HEALTH CARE EDUCATION/TRAINING PROGRAM

## 2021-04-06 PROCEDURE — 2060000000 HC ICU INTERMEDIATE R&B

## 2021-04-06 PROCEDURE — 6360000002 HC RX W HCPCS: Performed by: GENERAL PRACTICE

## 2021-04-06 PROCEDURE — 6370000000 HC RX 637 (ALT 250 FOR IP): Performed by: GENERAL PRACTICE

## 2021-04-06 PROCEDURE — 97129 THER IVNTJ 1ST 15 MIN: CPT

## 2021-04-06 RX ORDER — SODIUM CHLORIDE 9 MG/ML
INJECTION, SOLUTION INTRAVENOUS CONTINUOUS
Status: DISCONTINUED | OUTPATIENT
Start: 2021-04-06 | End: 2021-04-07 | Stop reason: HOSPADM

## 2021-04-06 RX ADMIN — METOPROLOL TARTRATE 12.5 MG: 25 TABLET ORAL at 20:41

## 2021-04-06 RX ADMIN — OXYBUTYNIN CHLORIDE 5 MG: 5 TABLET, EXTENDED RELEASE ORAL at 11:16

## 2021-04-06 RX ADMIN — FERROUS SULFATE TAB EC 325 MG (65 MG FE EQUIVALENT) 325 MG: 325 (65 FE) TABLET DELAYED RESPONSE at 20:42

## 2021-04-06 RX ADMIN — ENOXAPARIN SODIUM 40 MG: 40 INJECTION SUBCUTANEOUS at 11:18

## 2021-04-06 RX ADMIN — SODIUM CHLORIDE: 9 INJECTION, SOLUTION INTRAVENOUS at 11:28

## 2021-04-06 RX ADMIN — FERROUS SULFATE TAB EC 325 MG (65 MG FE EQUIVALENT) 325 MG: 325 (65 FE) TABLET DELAYED RESPONSE at 11:18

## 2021-04-06 RX ADMIN — DESMOPRESSIN ACETATE 40 MG: 0.2 TABLET ORAL at 20:42

## 2021-04-06 RX ADMIN — CLOPIDOGREL 75 MG: 75 TABLET, FILM COATED ORAL at 11:18

## 2021-04-06 RX ADMIN — Medication 81 MG: at 11:18

## 2021-04-06 RX ADMIN — PANTOPRAZOLE SODIUM 40 MG: 40 TABLET, DELAYED RELEASE ORAL at 11:16

## 2021-04-06 RX ADMIN — METOPROLOL TARTRATE 12.5 MG: 25 TABLET ORAL at 00:10

## 2021-04-06 RX ADMIN — SENNOSIDES 8.6 MG: 8.6 TABLET, FILM COATED ORAL at 20:42

## 2021-04-06 RX ADMIN — AMLODIPINE BESYLATE 5 MG: 5 TABLET ORAL at 11:18

## 2021-04-06 RX ADMIN — METOPROLOL TARTRATE 12.5 MG: 25 TABLET ORAL at 11:16

## 2021-04-06 RX ADMIN — LEVOTHYROXINE SODIUM 112 MCG: 112 TABLET ORAL at 11:18

## 2021-04-06 ASSESSMENT — PAIN SCALES - GENERAL: PAINLEVEL_OUTOF10: 0

## 2021-04-06 NOTE — PROGRESS NOTES
[id number] [facility] Neurology   IN-PATIENT SERVICE      NEUROLOGY PROGRESS  NOTE            Date:   4/6/2021  Patient name:  Taty Lai  Date of admission:  4/1/2021  YOB: 1932      Interval History:   Taty Lai is a  80 y.o. female admitted on 4/1/2021 with Acute ischemic stroke ([facility] [address] [I63.9]. This is a follow-up neurology progress note. The patient was seen and examined and the chart was reviewed. Vitals: Stable  Including blood pressure. Yesterday afternoon patient developed left-sided chest pain, which was relieved with sublingual nitroglycerin. Cardiology consulted        Patient currently on ASA, Plavix and lipitor. LDL 54. A1c: Pending   Echo : 55 % . Asymmetric septal hypertrophy (KURT) without left ventricular outflow   obstruction. Negative bubble study   History of Present Illness:      80 y.o. female presented with acute sudden onset left-sided weakness       Patient initial NIH stroke scale was 5. Initial CT head was negative and she subsequently received TPA. After initially receiving TPA for 10 minutes, her dysarthria worsened with change in mentation. tPA was held and patient had a repeat CT head to rule out any hemorrhage. tPA was reinitiated after CT head was negative. Patient initially had significant left-sided paresis and exam has improved in the past 24 hours where it is currently antigravity. Her dysarthria has also significantly improved with her facial droop. Her NIH stroke scale is currently 5. She has been working with PT/OT and SLP. Etiology remains unclear as echocardiogram and A1c are still pending. Patient denies any history of smoking. MRI showed patient had a right pontine infarct, patient was started on dual antiplatelet therapy along with continuation of statin therapy. Patient will most likely be a candidate for rehab once stroke work-up is been completed.         Past Medical History:     Past Medical History:   Diagnosis Date  Anemia     Hypertension     Hypothyroidism     Osteoarthritis     Other long term (current) drug therapy     Renal insufficiency         Past Surgical History:     No past surgical history on file. Medications during admission:      atorvastatin  40 mg Oral Nightly    clopidogrel  75 mg Oral Daily    sodium chloride  50 mL Intravenous Once    sodium chloride flush  10 mL Intravenous 2 times per day    amLODIPine  5 mg Oral Daily    ferrous sulfate  325 mg Oral BID    levothyroxine  112 mcg Oral Daily    metoprolol tartrate  12.5 mg Oral BID    pantoprazole  40 mg Oral QAM AC    oxybutynin  5 mg Oral Daily    sodium chloride flush  10 mL Intravenous 2 times per day    enoxaparin  40 mg Subcutaneous Daily    aspirin  81 mg Oral Daily    Or    aspirin  300 mg Rectal Daily         Physical Exam:   /67   Pulse 66   Temp 98.9 °F (37.2 °C) (Oral)   Resp 23   Ht 5' 7\" (1.702 m)   Wt 165 lb (74.8 kg)   SpO2 95%   BMI 25.84 kg/m²   Temp (24hrs), Av °F (36.7 °C), Min:97.4 °F (36.3 °C), Max:98.9 °F (37.2 °C)        General examination:      General Appearance:  alert, well appearing, and in no acute distress  HEENT: Normocephalic, atraumatic, moist mucus membranes  Neck: supple, no carotid bruits, (-) nuchal rigidity  Lungs:  Respirations unlabored, chest wall no deformity, BS normal  Cardiovascular: normal rate, regular rhythm  Abdomen: Soft, nontender, nondistended, normal bowel sounds  Skin: No gross lesions, rashes, bruising or bleeding on exposed skin area  Extremities:  peripheral pulses palpable, clubbing or edema  Psych: normal affect      Neurological examination:      Mental status   Alert and oriented x 3; following all commands;   speech is fluent, mild dysarthria, aphasia.       Cranial nerves   II - visual fields intact to confrontation; pupils reactive  III, IV, VI - extraocular muscles intact; no PEDRITO; no nystagmus; no ptosis   V - normal facial sensation VII - left facial droop                                                             VIII - intact hearing                                                                             IX, X - symmetrical palate elevation                                               XI - symmetrical shoulder shrug                                                       XII - midline tongue without atrophy or fasciculation     Motor function  Strength:   5/5 RUE, 5/5 RLE  4+/5 LUE, 4+/5  LLE  Normal bulk and tone. Sensory function Decreased sensation on the left side ,    Cerebellar  mld dysmetria on the left side   No tremors                        Reflex function 2/4 symmetric throughout . Downgoing plantar response bilaterally.  (-)Calhoun's sign bilaterally      Gait                   not tested              Diagnostics:      Laboratory Testing:  CBC:   Recent Labs     04/04/21  0506 04/05/21  0523 04/06/21  0156   WBC 7.9 7.5 7.1   HGB 10.4* 10.7* 10.4*    235 231       BMP:    Recent Labs     04/04/21  0506 04/05/21  0523 04/06/21  0156   * 134* 132*   K 4.1 4.2 4.2    105 104   CO2 19* 18* 18*   BUN 14 17 20   CREATININE 0.99* 1.04* 1.06*   GLUCOSE 92 92 93         Lab Results   Component Value Date    CHOL 121 04/02/2021    LDLCHOLESTEROL 54 04/02/2021    HDL 55 04/02/2021    TRIG 61 04/02/2021    ALT 18 02/25/2021    AST 35 02/25/2021    TSH 2.470 11/04/2020    INR 1.0 04/01/2021         No results found for: PHENYTOIN, PHENYTOIN, VALPROATE, CBMZ        Imaging/Diagnostics:           CT head   Impression   No evidence of acute intracranial process.  Moderate atrophy and extensive   chronic small vessel ischemic changes noted.       There has been no significant change          CTA head and neck   Impression   No evidence for acute intracranial hemorrhage, territorial infarction or   intracranial mass lesion.       Moderate chronic microangiopathic ischemic disease.       Moderate generalized volume loss. MRI Brain   Impression   Acute infarct within the rhonda, on the right-hand side.             2D ECHO : Pending         Impression:      Primary Problem  <principal problem not specified>    Active Hospital Problems    Diagnosis Date Noted    Chest pain in adult [R07.9]     Received intravenous tissue plasminogen activator (tPA) in emergency department [Z92.82]     Acute left hemiparesis (HCC) [G81.94]     Cerebrovascular accident (CVA) ([facility] [address] [I63.9] 04/01/2021               Plan:     Patient status Admit as inpatient in the  Progressive Unit/Step down    75-year-old female presented with left-sided weakness, NIH 6,  received TPA, with significant improvement in her dysarthria and left-sided paresis. PM&R consult with possible inpatient rehab    -Right paramedian pontine acute ischemic stroke  -S/p TPA 4/1/2021  -ICAD     Aspirin 81   Plavix 75 for 3 weeks then long-term aspirin   Lipitor 40   LDL 54   A1c: Pending   Echo: EF 55%, negative bubble study   Cardiology consulted   o OP stress test    PT/OT   PM&R consult for inpatient rehab   o Pending  Placement         DVT prophylaxis: Lovenox 40 mg SC  GI prophylaxis: Protonix 40 mg daily  Code status: Full code    Consultations:   IP CONSULT TO STROKE TEAM  IP CONSULT TO NEUROCRITICAL CARE  IP CONSULT TO PHARMACY  PHARMACY TO CHANGE BASE FLUIDS  IP CONSULT TO PHYSICAL MEDICINE REHAB  IP CONSULT TO NEUROLOGY  IP CONSULT TO CARDIOLOGY  PT/OT       Electronically signed by Baltazar Mak MD on 4/6/2021 at 8:46 AM      Franklin Sandifer, MD  PGY 2 Neurology  Resident.    Neurology [facility]

## 2021-04-06 NOTE — PROGRESS NOTES
Speech Language Pathology  Speech Language Pathology  [facility]    Cognitive / Dysphagia Treatment Note    Date: 4/6/2021  Patients Name: Jac Castelan  MRN: [id number]    Patient Active Problem List   Diagnosis Code    Anemia D64.9    Arthritis M19.90    HTN (hypertension) I10    Hypothyroidism E03.9    Renal insufficiency N28.9    Other long term (current) drug therapy Z79.899    Anemia due to stage 3 chronic kidney disease N18.30, D63.1    Chronic UTI N39.0    Mixed hyperlipidemia E78.2    Gastroesophageal reflux disease without esophagitis K21.9    S/P revision of total knee, 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    Muscular weakness M62.81    Other instability, left knee M25.362    Primary osteoarthritis of both hips M16.0    Primary osteoarthritis of right knee M17.11    Cerebrovascular accident (CVA) ([facility] [address] I63.9    Received intravenous tissue plasminogen activator (tPA) in emergency department Z92.82    Acute left hemiparesis ([facility] [address] G81.94    Chest pain in adult R07.9       Pain: Denies    Cognitive Treatment    Treatment time: 9:55-10:32      Subjective: [x] Alert [x] Cooperative     [] Confused     [] Agitated    [] Lethargic      Objective/Assessment:  Attention: Pt maintained attention throughout the therapy session    Recall: immediate recall: 3/3 independently; delayed recall: 0/3 increasing to 3/3 with mod verbal cues, 0/3 increasing to 3/3 with mod verbal cues; word list retention category inclusion: 7/14 increasing to 13/14 with repetition and mod verbal cues    Problem solving/Reasoning: word deduction: 2/6 increasing to 5/6 with repetition and mod to max verbal and phonemic cues; multiple definitions: 5/6 increasing to 6/6 with mod verbal cues; solving medical situations: 2/3 increasing to 3/3 with mod verbal cues    Oral Motor Exercises (dysphagia): Pt completed oral motor exercises for dysphagia x15 reps x 1 set with mod verbal and visual cues.  Patient completed tongue resistance exercises (up, down, left, right) x 2 repetitions x 3 seconds. Patient unable to complete Briseyda maneuver despite max verbal and visual cues and encouragement. Other: ST left memory compensatory strategy tip sheet at bedside. ST will continue to follow. Plan:  [x] Continue ST services    [] Discharge from ST:      Discharge recommendations: [] Inpatient Rehab   [] [place]   [] Outpatient Therapy  [] Follow up at trauma clinic   [x] Other: Further therapy recommended at discharge.     Treatment Completed by:  Margarita Dunn  Clinician    Cosigned By: Fabrizio Villalobos M.S., CCC/SLP

## 2021-04-06 NOTE — CARE COORDINATION
Transitional planning. Cardiology consult noted. Called Trish and left VM to see if pt can come today. [facility] calls and they can take pt tomorrow. Spoke with pt and she is A&O x 3. She has been up to the chair and states she can use a W/C to go to [facility]. I asked her if she was sure and she said yes. She also added she took 2 steps forward and back \"and I did that twice\"    [facility] [facility] set up for 10:30 AM per [employer]. Pt aware. Left VM for Trish. Pt aware. 200 Called daughter Charline Platt per pt request and she is aware of D/C time.

## 2021-04-06 NOTE — PROGRESS NOTES
DATE: 2021    NAME: Ashleigh Coats  MRN: [id number]   : 1932      Patient not seen this date for Physical Therapy due to: Other: Pt anticipating discharge later today, eager to get better but wanting to rest right now.       Electronically signed by Lady Jones PTA on 2021 at 4:03 PM

## 2021-04-06 NOTE — PROGRESS NOTES
Occupational Therapy  Facility/Department: [facility] NEURO  Daily Treatment Note  NAME: Sharona Jiang  : 1932  MRN: [id number]    Date of Service: 2021    Discharge Recommendations:  Patient would benefit from continued therapy after discharge to increase pt func mobility, strength, ROM, and endurance  Further therapy recommended at discharge. The patient should be able to tolerate at least three hours of therapy per day over 5 days or 15 hours over 7 days. Assessment   Performance deficits / Impairments: Decreased functional mobility ; Decreased strength;Decreased endurance;Decreased balance;Decreased high-level IADLs;Decreased posture;Decreased ADL status; Decreased ROM  Treatment Diagnosis: CVA  Prognosis: Good  OT Education: Plan of Care;OT Role;Orientation  Patient Education: Pt  educated on OT role, OT POC, proper hand placement for sit to stand transfer, correct posture, benefits of utilizing LUE for ADL tasks with fair/good return  REQUIRES OT FOLLOW UP: Yes  Activity Tolerance  Activity Tolerance: Patient Tolerated treatment well  Safety Devices  Safety Devices in place: Yes  Type of devices: All fall risk precautions in place; Bed alarm in place;Call light within reach; Left in bed;Nurse notified;Gait belt  Restraints  Initially in place: No     Patient Diagnosis(es): The encounter diagnosis was Cerebrovascular accident (CVA), unspecified mechanism ([facility] [address] has a past medical history of Anemia, Hypertension, Hypothyroidism, Osteoarthritis, Other long term (current) drug therapy, and Renal insufficiency. has no past surgical history on file. Restrictions  Restrictions/Precautions  Restrictions/Precautions: Fall Risk, General Precautions  Required Braces or Orthoses?: No  Subjective   General  Chart Reviewed: Yes  Patient assessed for rehabilitation services?: Yes  Family / Caregiver Present: No  General Comment  Comments: RN okayed for Pt to be seen for OT tx.  Pt pleasant and participative throughout. Vital Signs  Patient Currently in Pain: Denies   Orientation  Orientation  Overall Orientation Status: Within Functional Limits  Objective    ADL  Feeding: Stand by assistance;Setup(Pt req assistance with opening containers, able to feed self. HOB elevated)  Grooming: Setup; Increased time to complete;Stand by assistance(Washed face, brushed hair sitting EOB)  LE Dressing: Setup;Maximum assistance; Increased time to complete(Pt attempted to don/doff socks seated EOB. 4 figure technique attempted with poor return)  Additional Comments: pt sat on EOB to complete ADLs. Pt would benefit from sockaid and reacher to perform LE dressing  Balance  Sitting Balance: Stand by assistance(Pt seated EOB approx 30 min displaying good sitting balance)  Standing Balance: Minimal assistance(Jonel x1 for balance)  Standing Balance  Time: Approx 2 min x3  Activity: static standing at bedside/ taking 2 steps forward and back  Comment: No LOB noted. Pt motivated to walk. Pt reports no SOB or dizziness  Functional Mobility  Functional - Mobility Device: Rolling Walker  Activity: Other  Assist Level: Minimal assistance  Bed mobility  Supine to Sit: Minimal assistance(HHA for trunk support)  Sit to Supine: Maximum assistance(Assist provided for BLE progression)  Scooting: Contact guard assistance(Scooting towards EOB to place feet on floor)  Comment: HOB elevated to ~50 degrees; increased time required to complete  Transfers  Stand Step Transfers: Minimal assistance(With RW, 2 steps forward and back x3)  Sit to stand: Moderate assistance(ModA x1)  Stand to sit: Minimal assistance(Jonel x1)  Transfer Comments: verbal/tactile cues to improve UB posture and push pelvis forward to improve standing - fair return   Cognition  Overall Cognitive Status: Exceptions  Arousal/Alertness: Appropriate responses to stimuli  Following Commands: Follows one step commands consistently; Follows one step commands with increased time  Attention Span: Attends with cues to redirect  Memory: Decreased recall of recent events  Safety Judgement: Decreased awareness of need for assistance;Decreased awareness of need for safety  Problem Solving: Assistance required to generate solutions;Decreased awareness of errors;Assistance required to implement solutions  Insights: Decreased awareness of deficits  Initiation: Requires cues for some  Sequencing: Requires cues for some  Plan   Plan  Times per week: 4-5x/wk  Current Treatment Recommendations: Strengthening, ROM, Safety Education & Training, Positioning, Balance Training, Patient/Caregiver Education & Training, Self-Care / ADL, Functional Mobility Training, Endurance Training, Neuromuscular Re-education  Goals  Short term goals  Time Frame for Short term goals: Pt will, by discharge  Short term goal 1: Demonstrate MIN A for UB dressing tasks while incorporating bilateral integration as able. Short term goal 2: Demonstrate MOD A for LB dressing tasks while incorporating bilateral integration as able and with good safety awareness using LRD. Short term goal 3: Increase sitting tolerance at EOB to 10+ minutes with fair sitting balance while performing an ADL tasks for improved endurance for self care. Short term goal 4: Complete functional transfers and functional mobility with CGA using LRD during ADL tasks. Short term goal 5: Notify OTR if goals need to be upgraded as Pt progresses. Patient Goals   Patient goals : be strong and get back to my puzzles     Therapy Time   Individual Concurrent Group Co-treatment   Time In 0836         Time Out 0929         Minutes 53         Timed Code Treatment Minutes: 48 Minutes   Pt in bed upon arrival, pleasant and motivated to participate this date. Pt retired to bed at end of session. Call light within reach, bed alarm on, RN notified, [address] elevated.     AYESHA Ruiz

## 2021-04-06 NOTE — DISCHARGE SUMMARY
[facility]     Department of Neurology    INPATIENT DISCHARGE SUMMARY        Patient Identification:  Ana Obrien is a 80 y.o. female. :  1932  MRN: [id number]     Acct: [de-identified]   Admit Date:  2021  Discharge date and time: 2021 11:15 AM   Attending Provider: No att. providers found                                     Admission Diagnoses:   Acute ischemic stroke [facility]) [I63.9]    Discharge Diagnoses: Active Problems:    Cerebrovascular accident (CVA) ([address]    Received intravenous tissue plasminogen activator (tPA) in emergency department    Acute left hemiparesis (HCC)    Chest pain in adult  Resolved Problems:    * No resolved hospital problems. *       Consults:   Cardiology  PM&R        Brief Inpatient course:      42-year-old female with Hx of anemia, hypertension, hypothyroidism presented with acute onset left-sided weakness, EMS was called the patient daughter, arrival to ER patient had a score of 8. Noted to have left-sided facial droop, slurring of speech, left upper lower extremity weakness. CTHead no  bleed,  CTA no LVO, received TPA. Repeat head CT no hemorrhage TPA complete and transfer to ICU per post TPA protocol  MRI: Acute right paramedian pontine ischemic stroke. Patient was transferred out of ICU on general neurology floor. Continue to improve middle left upper and lower extremity along with speech. Patient was recommended to continue aspirin and Plavix for 3 weeks. LDL 54, echo 55 - bubble study. A1c 5.3  On 2021: Patient had left-sided chest pain, cardiology consulted, ruled out NSTEMI.   Patient will follow cardiology outpatient in 2 weeks and stress test outpatient for further risk stratification once stabilized post stroke    She will be going to inpatient rehab at [facility].      Procedures:     Any Hospital Acquired Infections: none    Discharge Functional Status:  stable    Disposition: Inpatient rehab     Patient Instructions:   Current Discharge Medication List      CONTINUE these medications which have NOT CHANGED    Details   amLODIPine (NORVASC) 5 MG tablet Take 1 tablet by mouth daily  Qty: 90 tablet, Refills: 0    Associated Diagnoses: Essential hypertension; Other long term (current) drug therapy      atorvastatin (LIPITOR) 20 MG tablet Take 1 tablet by mouth nightly  Qty: 90 tablet, Refills: 0    Associated Diagnoses: Mixed hyperlipidemia      Ferrous Sulfate 324 MG TBEC Take 1 tablet by mouth 2 times daily  Qty: 180 tablet, Refills: 0      levothyroxine (SYNTHROID) 112 MCG tablet Take 1 tablet by mouth Daily  Qty: 90 tablet, Refills: 0    Associated Diagnoses: Hypothyroidism due to acquired atrophy of thyroid      omeprazole (PRILOSEC) 20 MG delayed release capsule Take 1 capsule by mouth daily  Qty: 90 capsule, Refills: 0    Associated Diagnoses: Anemia due to stage 3 chronic kidney disease, unspecified whether stage 3a or 3b CKD      sodium bicarbonate 650 MG tablet Take 1 tablet by mouth 4 times daily Two tabs BID  Qty: 120 tablet, Refills: 2    Associated Diagnoses: Other long term (current) drug therapy      metoprolol tartrate (LOPRESSOR) 25 MG tablet Take 0.5 tablets by mouth 2 times daily Take 1/2 tablet BID  Qty: 90 tablet, Refills: 0    Associated Diagnoses: Essential hypertension; Other long term (current) drug therapy      docusate sodium (COLACE) 100 MG capsule Take 1 capsule by mouth every other day  Qty: 45 capsule, Refills: 1    Associated Diagnoses: Constipation, unspecified constipation type      cephALEXin (KEFLEX) 250 MG capsule TAKE ONE CAPSULE BY MOUTH ONCE A DAY. Qty: 30 capsule, Refills: 5      oxybutynin (DITROPAN-XL) 5 MG extended release tablet Take 1 tablet by mouth daily  Qty: 30 tablet, Refills: 0    Associated Diagnoses: Other long term (current) drug therapy      fluorouracil (EFUDEX) 5 % cream Apply topically for 4 weeks .       Calcium Carb-Cholecalciferol (CALCIUM + D3) 600-200 MG-UNIT TABS tablet Take 1 tablet by mouth Daily with lunch      Multiple Vitamins-Minerals (THERAPEUTIC MULTIVITAMIN-MINERALS) tablet Take 1 tablet by mouth daily      magnesium oxide (MAG-OX) 400 MG tablet Take 400 mg by mouth daily      acetaminophen (TYLENOL) 325 MG tablet Take 650 mg by mouth Take 2 tablets at bedtime for sleeping comfort PRN      Apoaequorin (PREVAGEN) 10 MG CAPS Take by mouth daily      GLUCOSA-CHONDR-NA CHONDR-MSM PO Take by mouth             Activity: activity as tolerated    Diet: Dysphagia diet     Follow-up:    [facility] Rehab  [address] [facility]        MD Marv Bowers [address] [phone]    Schedule an appointment as soon as possible for a visit in 4 weeks  Cardiology -- need to discuss possible stress test as outpatient       Follow up labs:    Follow up imaging:     Note that over 30 minutes was spent in preparing discharge papers, discussing discharge with patient, medication review, etc.      Baltazar Mak MD,  PGY 2 Neurology Resident  Department of Neurology  [address] [place] [address]  4/7/2021, 12:55 PM

## 2021-04-06 NOTE — CONSULTS
ischemia work-up. Past Medical History:   has a past medical history of Anemia, Hypertension, Hypothyroidism, Osteoarthritis, Other long term (current) drug therapy, and Renal insufficiency. Past Surgical History:   has no past surgical history on file. Home Medications:    Prior to Admission medications    Medication Sig Start Date End Date Taking? Authorizing Provider   amLODIPine (NORVASC) 5 MG tablet Take 1 tablet by mouth daily 3/1/21   VERNELL Velez CNP   atorvastatin (LIPITOR) 20 MG tablet Take 1 tablet by mouth nightly 3/1/21   Ifeoma PalmVERNELL CNP   Ferrous Sulfate 324 MG TBEC Take 1 tablet by mouth 2 times daily 3/1/21   Ifeoma PalmVERNELL CNP   levothyroxine (SYNTHROID) 112 MCG tablet Take 1 tablet by mouth Daily 3/1/21   Ifeoma PalmVERNELL CNP   omeprazole (PRILOSEC) 20 MG delayed release capsule Take 1 capsule by mouth daily 3/1/21   Ifeoma PalmVERNELL CNP   sodium bicarbonate 650 MG tablet Take 1 tablet by mouth 4 times daily Two tabs BID 3/1/21   Ifeoma PalmVERNELL CNP   metoprolol tartrate (LOPRESSOR) 25 MG tablet Take 0.5 tablets by mouth 2 times daily Take 1/2 tablet BID 3/1/21   Ifeoma PalmVERNELL CNP   docusate sodium (COLACE) 100 MG capsule Take 1 capsule by mouth every other day 2/24/21   Ifeoma PalmVERNELL CNP   cephALEXin (KEFLEX) 250 MG capsule TAKE ONE CAPSULE BY MOUTH ONCE A DAY. 2/1/21   VERNELL Kulkarni CNP   oxybutynin (DITROPAN-XL) 5 MG extended release tablet Take 1 tablet by mouth daily 1/5/21   Ifeoma HartshornVERNELL CNP   fluorouracil (EFUDEX) 5 % cream Apply topically for 4 weeks .  6/29/20   Historical Provider, MD   Calcium Carb-Cholecalciferol (CALCIUM + D3) 600-200 MG-UNIT TABS tablet Take 1 tablet by mouth Daily with lunch    Historical Provider, MD   Multiple Vitamins-Minerals (THERAPEUTIC MULTIVITAMIN-MINERALS) tablet Take 1 tablet by mouth daily    Historical Provider, MD   magnesium oxide (MAG-OX) 400 MG 8.6 mg, 1 tablet, Oral, Daily PRN    Allergies:  Ciprofloxacin, Hydrocodone, Macrobid [nitrofurantoin], and Ciprofloxacin    Social History:   reports that she has never smoked. She has never used smokeless tobacco. She reports that she does not drink alcohol or use drugs. Family History: family history is not on file. No h/o sudden cardiac death. No for premature CAD    REVIEW OF SYSTEMS:    · Constitutional: there has been no unanticipated weight loss. There's been No change in energy level, No change in activity level. · Eyes: No visual changes or diplopia. No scleral icterus. · ENT: No Headaches  · Cardiovascular: As mentioned in H&P. · Respiratory: No previous pulmonary problems, No cough  · Gastrointestinal: No abdominal pain. No change in bowel or bladder habits. · Genitourinary: No dysuria, trouble voiding, or hematuria. · Musculoskeletal:  No gait disturbance, No weakness or joint complaints. · Integumentary: No rash or pruritis. · Neurological: No headache, diplopia, change in muscle strength, numbness or tingling. No change in gait, balance, coordination, mood, affect, memory, mentation, behavior. ·       PHYSICAL EXAM:      /67   Pulse 66   Temp 98.9 °F (37.2 °C) (Oral)   Resp 23   Ht 5' 7\" (1.702 m)   Wt 165 lb (74.8 kg)   SpO2 95%   BMI 25.84 kg/m²    Constitutional and General Appearance: alert, cooperative, no distress and appears stated age  HEENT: PERRL, no cervical lymphadenopathy. No masses palpable. Normal oral mucosa  Respiratory:  · Normal excursion and expansion without use of accessory muscles  · Resp Auscultation: Good respiratory effort. No for increased work of breathing.  On auscultation: clear to auscultation bilaterally  Cardiovascular:  · The apical impulse is not displaced  · Heart tones are crisp and normal. regular S1 and S2.  · Jugular venous pulsation Normal  · The carotid upstroke is normal in amplitude and contour without delay or bruit  · Peripheral

## 2021-04-07 ENCOUNTER — HOSPITAL ENCOUNTER (INPATIENT)
Age: 86
LOS: 20 days | Discharge: HOME HEALTH | DRG: 057 | End: 2021-04-27
Attending: PHYSICAL MEDICINE & REHABILITATION | Admitting: PHYSICAL MEDICINE & REHABILITATION
Payer: MEDICARE

## 2021-04-07 ENCOUNTER — APPOINTMENT (OUTPATIENT)
Dept: GENERAL RADIOLOGY | Age: 86
DRG: 057 | End: 2021-04-07
Attending: PHYSICAL MEDICINE & REHABILITATION
Payer: MEDICARE

## 2021-04-07 VITALS
RESPIRATION RATE: 19 BRPM | HEART RATE: 66 BPM | OXYGEN SATURATION: 98 % | WEIGHT: 165 LBS | SYSTOLIC BLOOD PRESSURE: 139 MMHG | HEIGHT: 67 IN | DIASTOLIC BLOOD PRESSURE: 68 MMHG | TEMPERATURE: 97.9 F | BODY MASS INDEX: 25.9 KG/M2

## 2021-04-07 PROBLEM — I63.9 ACUTE CVA (CEREBROVASCULAR ACCIDENT) (HCC): Status: ACTIVE | Noted: 2021-04-07

## 2021-04-07 LAB
ANION GAP SERPL CALCULATED.3IONS-SCNC: 12 MMOL/L (ref 9–17)
BUN BLDV-MCNC: 20 MG/DL (ref 8–23)
BUN/CREAT BLD: ABNORMAL (ref 9–20)
CALCIUM SERPL-MCNC: 8.2 MG/DL (ref 8.6–10.4)
CHLORIDE BLD-SCNC: 105 MMOL/L (ref 98–107)
CO2: 18 MMOL/L (ref 20–31)
CREAT SERPL-MCNC: 1.03 MG/DL (ref 0.5–0.9)
GFR AFRICAN AMERICAN: >60 ML/MIN
GFR NON-AFRICAN AMERICAN: 50 ML/MIN
GFR SERPL CREATININE-BSD FRML MDRD: ABNORMAL ML/MIN/{1.73_M2}
GFR SERPL CREATININE-BSD FRML MDRD: ABNORMAL ML/MIN/{1.73_M2}
GLUCOSE BLD-MCNC: 89 MG/DL (ref 70–99)
HCT VFR BLD CALC: 33 % (ref 36.3–47.1)
HEMOGLOBIN: 10.6 G/DL (ref 11.9–15.1)
MCH RBC QN AUTO: 30.8 PG (ref 25.2–33.5)
MCHC RBC AUTO-ENTMCNC: 32.1 G/DL (ref 28.4–34.8)
MCV RBC AUTO: 95.9 FL (ref 82.6–102.9)
MYOGLOBIN: 34 NG/ML (ref 25–58)
MYOGLOBIN: 36 NG/ML (ref 25–58)
NRBC AUTOMATED: 0 PER 100 WBC
PDW BLD-RTO: 13.7 % (ref 11.8–14.4)
PLATELET # BLD: 251 K/UL (ref 138–453)
PMV BLD AUTO: 10.9 FL (ref 8.1–13.5)
POTASSIUM SERPL-SCNC: 4.3 MMOL/L (ref 3.7–5.3)
RBC # BLD: 3.44 M/UL (ref 3.95–5.11)
SODIUM BLD-SCNC: 135 MMOL/L (ref 135–144)
TROPONIN INTERP: ABNORMAL
TROPONIN INTERP: ABNORMAL
TROPONIN T: ABNORMAL NG/ML
TROPONIN T: ABNORMAL NG/ML
TROPONIN, HIGH SENSITIVITY: 18 NG/L (ref 0–14)
TROPONIN, HIGH SENSITIVITY: 20 NG/L (ref 0–14)
WBC # BLD: 7.3 K/UL (ref 3.5–11.3)

## 2021-04-07 PROCEDURE — 1180000000 HC REHAB R&B

## 2021-04-07 PROCEDURE — 6370000000 HC RX 637 (ALT 250 FOR IP): Performed by: STUDENT IN AN ORGANIZED HEALTH CARE EDUCATION/TRAINING PROGRAM

## 2021-04-07 PROCEDURE — 99239 HOSP IP/OBS DSCHRG MGMT >30: CPT | Performed by: PSYCHIATRY & NEUROLOGY

## 2021-04-07 PROCEDURE — 6370000000 HC RX 637 (ALT 250 FOR IP): Performed by: GENERAL PRACTICE

## 2021-04-07 PROCEDURE — 6360000002 HC RX W HCPCS: Performed by: GENERAL PRACTICE

## 2021-04-07 PROCEDURE — 97162 PT EVAL MOD COMPLEX 30 MIN: CPT

## 2021-04-07 PROCEDURE — 74230 X-RAY XM SWLNG FUNCJ C+: CPT

## 2021-04-07 PROCEDURE — 36415 COLL VENOUS BLD VENIPUNCTURE: CPT

## 2021-04-07 PROCEDURE — 92611 MOTION FLUOROSCOPY/SWALLOW: CPT

## 2021-04-07 PROCEDURE — 85027 COMPLETE CBC AUTOMATED: CPT

## 2021-04-07 PROCEDURE — 6370000000 HC RX 637 (ALT 250 FOR IP): Performed by: PHYSICAL MEDICINE & REHABILITATION

## 2021-04-07 PROCEDURE — 80048 BASIC METABOLIC PNL TOTAL CA: CPT

## 2021-04-07 PROCEDURE — 97530 THERAPEUTIC ACTIVITIES: CPT

## 2021-04-07 PROCEDURE — 83874 ASSAY OF MYOGLOBIN: CPT

## 2021-04-07 PROCEDURE — 92526 ORAL FUNCTION THERAPY: CPT

## 2021-04-07 PROCEDURE — 84484 ASSAY OF TROPONIN QUANT: CPT

## 2021-04-07 PROCEDURE — 2580000003 HC RX 258: Performed by: GENERAL PRACTICE

## 2021-04-07 RX ORDER — LANOLIN ALCOHOL/MO/W.PET/CERES
6 CREAM (GRAM) TOPICAL NIGHTLY PRN
Status: CANCELLED | OUTPATIENT
Start: 2021-04-07

## 2021-04-07 RX ORDER — SODIUM CHLORIDE 9 MG/ML
INJECTION, SOLUTION INTRAVENOUS CONTINUOUS
Status: DISCONTINUED | OUTPATIENT
Start: 2021-04-07 | End: 2021-04-07

## 2021-04-07 RX ORDER — SODIUM CHLORIDE 0.9 % (FLUSH) 0.9 %
10 SYRINGE (ML) INJECTION PRN
Status: DISCONTINUED | OUTPATIENT
Start: 2021-04-07 | End: 2021-04-07

## 2021-04-07 RX ORDER — CLOPIDOGREL BISULFATE 75 MG/1
75 TABLET ORAL DAILY
Status: COMPLETED | OUTPATIENT
Start: 2021-04-08 | End: 2021-04-22

## 2021-04-07 RX ORDER — SODIUM CHLORIDE 9 MG/ML
INJECTION, SOLUTION INTRAVENOUS CONTINUOUS
Status: CANCELLED | OUTPATIENT
Start: 2021-04-07

## 2021-04-07 RX ORDER — ACETAMINOPHEN 325 MG/1
650 TABLET ORAL EVERY 4 HOURS PRN
Status: DISCONTINUED | OUTPATIENT
Start: 2021-04-07 | End: 2021-04-27 | Stop reason: HOSPADM

## 2021-04-07 RX ORDER — SODIUM CHLORIDE 0.9 % (FLUSH) 0.9 %
10 SYRINGE (ML) INJECTION PRN
Status: CANCELLED | OUTPATIENT
Start: 2021-04-07

## 2021-04-07 RX ORDER — ONDANSETRON 2 MG/ML
4 INJECTION INTRAMUSCULAR; INTRAVENOUS EVERY 6 HOURS PRN
Status: CANCELLED | OUTPATIENT
Start: 2021-04-07

## 2021-04-07 RX ORDER — SODIUM CHLORIDE 0.9 % (FLUSH) 0.9 %
10 SYRINGE (ML) INJECTION EVERY 12 HOURS SCHEDULED
Status: DISCONTINUED | OUTPATIENT
Start: 2021-04-07 | End: 2021-04-12

## 2021-04-07 RX ORDER — LANOLIN ALCOHOL/MO/W.PET/CERES
6 CREAM (GRAM) TOPICAL NIGHTLY PRN
Status: DISCONTINUED | OUTPATIENT
Start: 2021-04-07 | End: 2021-04-27 | Stop reason: HOSPADM

## 2021-04-07 RX ORDER — LEVOTHYROXINE SODIUM 112 UG/1
112 TABLET ORAL DAILY
Status: CANCELLED | OUTPATIENT
Start: 2021-04-08

## 2021-04-07 RX ORDER — SODIUM CHLORIDE 9 MG/ML
25 INJECTION, SOLUTION INTRAVENOUS PRN
Status: DISCONTINUED | OUTPATIENT
Start: 2021-04-07 | End: 2021-04-12

## 2021-04-07 RX ORDER — PANTOPRAZOLE SODIUM 40 MG/1
40 TABLET, DELAYED RELEASE ORAL
Status: DISCONTINUED | OUTPATIENT
Start: 2021-04-08 | End: 2021-04-27 | Stop reason: HOSPADM

## 2021-04-07 RX ORDER — POLYETHYLENE GLYCOL 3350 17 G/17G
17 POWDER, FOR SOLUTION ORAL DAILY
Status: DISCONTINUED | OUTPATIENT
Start: 2021-04-07 | End: 2021-04-27 | Stop reason: HOSPADM

## 2021-04-07 RX ORDER — ASPIRIN 81 MG/1
81 TABLET ORAL DAILY
Status: CANCELLED | OUTPATIENT
Start: 2021-04-08

## 2021-04-07 RX ORDER — SENNA PLUS 8.6 MG/1
1 TABLET ORAL DAILY PRN
Status: CANCELLED | OUTPATIENT
Start: 2021-04-07

## 2021-04-07 RX ORDER — OXYBUTYNIN CHLORIDE 5 MG/1
5 TABLET, EXTENDED RELEASE ORAL DAILY
Status: CANCELLED | OUTPATIENT
Start: 2021-04-08

## 2021-04-07 RX ORDER — FERROUS SULFATE 325(65) MG
325 TABLET ORAL 2 TIMES DAILY
Status: DISCONTINUED | OUTPATIENT
Start: 2021-04-07 | End: 2021-04-27 | Stop reason: HOSPADM

## 2021-04-07 RX ORDER — AMLODIPINE BESYLATE 5 MG/1
5 TABLET ORAL DAILY
Status: DISCONTINUED | OUTPATIENT
Start: 2021-04-08 | End: 2021-04-22

## 2021-04-07 RX ORDER — ASPIRIN 300 MG/1
300 SUPPOSITORY RECTAL DAILY
Status: DISCONTINUED | OUTPATIENT
Start: 2021-04-08 | End: 2021-04-07

## 2021-04-07 RX ORDER — ASPIRIN 300 MG/1
300 SUPPOSITORY RECTAL DAILY
Status: CANCELLED | OUTPATIENT
Start: 2021-04-08

## 2021-04-07 RX ORDER — CLOPIDOGREL BISULFATE 75 MG/1
75 TABLET ORAL DAILY
Status: CANCELLED | OUTPATIENT
Start: 2021-04-08 | End: 2021-04-23

## 2021-04-07 RX ORDER — ATORVASTATIN CALCIUM 40 MG/1
40 TABLET, FILM COATED ORAL NIGHTLY
Status: DISCONTINUED | OUTPATIENT
Start: 2021-04-07 | End: 2021-04-27 | Stop reason: HOSPADM

## 2021-04-07 RX ORDER — SODIUM CHLORIDE 9 MG/ML
25 INJECTION, SOLUTION INTRAVENOUS PRN
Status: CANCELLED | OUTPATIENT
Start: 2021-04-07

## 2021-04-07 RX ORDER — LEVOTHYROXINE SODIUM 112 UG/1
112 TABLET ORAL DAILY
Status: DISCONTINUED | OUTPATIENT
Start: 2021-04-08 | End: 2021-04-27 | Stop reason: HOSPADM

## 2021-04-07 RX ORDER — SENNA PLUS 8.6 MG/1
1 TABLET ORAL DAILY PRN
Status: DISCONTINUED | OUTPATIENT
Start: 2021-04-07 | End: 2021-04-07

## 2021-04-07 RX ORDER — PROMETHAZINE HYDROCHLORIDE 12.5 MG/1
12.5 TABLET ORAL EVERY 6 HOURS PRN
Status: DISCONTINUED | OUTPATIENT
Start: 2021-04-07 | End: 2021-04-07

## 2021-04-07 RX ORDER — LANOLIN ALCOHOL/MO/W.PET/CERES
325 CREAM (GRAM) TOPICAL 2 TIMES DAILY
Status: CANCELLED | OUTPATIENT
Start: 2021-04-07

## 2021-04-07 RX ORDER — ASPIRIN 81 MG/1
81 TABLET ORAL DAILY
Status: DISCONTINUED | OUTPATIENT
Start: 2021-04-08 | End: 2021-04-27 | Stop reason: HOSPADM

## 2021-04-07 RX ORDER — SODIUM CHLORIDE 0.9 % (FLUSH) 0.9 %
10 SYRINGE (ML) INJECTION EVERY 12 HOURS SCHEDULED
Status: CANCELLED | OUTPATIENT
Start: 2021-04-07

## 2021-04-07 RX ORDER — BISACODYL 10 MG
10 SUPPOSITORY, RECTAL RECTAL DAILY PRN
Status: DISCONTINUED | OUTPATIENT
Start: 2021-04-07 | End: 2021-04-27 | Stop reason: HOSPADM

## 2021-04-07 RX ORDER — PANTOPRAZOLE SODIUM 40 MG/1
40 TABLET, DELAYED RELEASE ORAL
Status: CANCELLED | OUTPATIENT
Start: 2021-04-08

## 2021-04-07 RX ORDER — ONDANSETRON 2 MG/ML
4 INJECTION INTRAMUSCULAR; INTRAVENOUS EVERY 6 HOURS PRN
Status: DISCONTINUED | OUTPATIENT
Start: 2021-04-07 | End: 2021-04-07

## 2021-04-07 RX ORDER — AMLODIPINE BESYLATE 5 MG/1
5 TABLET ORAL DAILY
Status: CANCELLED | OUTPATIENT
Start: 2021-04-08

## 2021-04-07 RX ORDER — PROMETHAZINE HYDROCHLORIDE 12.5 MG/1
12.5 TABLET ORAL EVERY 6 HOURS PRN
Status: CANCELLED | OUTPATIENT
Start: 2021-04-07

## 2021-04-07 RX ORDER — ATORVASTATIN CALCIUM 40 MG/1
40 TABLET, FILM COATED ORAL NIGHTLY
Status: CANCELLED | OUTPATIENT
Start: 2021-04-07

## 2021-04-07 RX ORDER — OXYBUTYNIN CHLORIDE 5 MG/1
5 TABLET, EXTENDED RELEASE ORAL DAILY
Status: DISCONTINUED | OUTPATIENT
Start: 2021-04-08 | End: 2021-04-27 | Stop reason: HOSPADM

## 2021-04-07 RX ADMIN — SODIUM CHLORIDE, PRESERVATIVE FREE 10 ML: 5 INJECTION INTRAVENOUS at 08:30

## 2021-04-07 RX ADMIN — PANTOPRAZOLE SODIUM 40 MG: 40 TABLET, DELAYED RELEASE ORAL at 08:30

## 2021-04-07 RX ADMIN — FERROUS SULFATE TAB EC 325 MG (65 MG FE EQUIVALENT) 325 MG: 325 (65 FE) TABLET DELAYED RESPONSE at 08:31

## 2021-04-07 RX ADMIN — Medication 81 MG: at 08:31

## 2021-04-07 RX ADMIN — CLOPIDOGREL 75 MG: 75 TABLET, FILM COATED ORAL at 08:31

## 2021-04-07 RX ADMIN — ATORVASTATIN CALCIUM 40 MG: 40 TABLET, FILM COATED ORAL at 21:39

## 2021-04-07 RX ADMIN — FERROUS SULFATE TAB 325 MG (65 MG ELEMENTAL FE) 325 MG: 325 (65 FE) TAB at 21:39

## 2021-04-07 RX ADMIN — MAGNESIUM HYDROXIDE 30 ML: 400 SUSPENSION ORAL at 21:39

## 2021-04-07 RX ADMIN — Medication 6 MG: at 21:39

## 2021-04-07 RX ADMIN — METOPROLOL TARTRATE 12.5 MG: 25 TABLET ORAL at 08:31

## 2021-04-07 RX ADMIN — AMLODIPINE BESYLATE 5 MG: 5 TABLET ORAL at 08:31

## 2021-04-07 RX ADMIN — LEVOTHYROXINE SODIUM 112 MCG: 112 TABLET ORAL at 08:31

## 2021-04-07 RX ADMIN — ENOXAPARIN SODIUM 40 MG: 40 INJECTION SUBCUTANEOUS at 08:31

## 2021-04-07 RX ADMIN — OXYBUTYNIN CHLORIDE 5 MG: 5 TABLET, EXTENDED RELEASE ORAL at 08:30

## 2021-04-07 RX ADMIN — METOPROLOL TARTRATE 12.5 MG: 25 TABLET, FILM COATED ORAL at 21:39

## 2021-04-07 ASSESSMENT — PAIN SCALES - GENERAL: PAINLEVEL_OUTOF10: 5

## 2021-04-07 NOTE — PROCEDURES
INSTRUMENTAL SWALLOW REPORT  MODIFIED BARIUM SWALLOW    NAME: Sade Ware   : 1932  MRN: [id number]       Date of Eval: 2021              Referring Diagnosis(es):  dysphagia    Past Medical History:  has a past medical history of Anemia, Hypertension, Hypothyroidism, Osteoarthritis, Other long term (current) drug therapy, and Renal insufficiency. Past Surgical History:  has no past surgical history on file. Current Diet Solid Consistency: Dysphagia Soft and Bite-Sized (Dysphagia III)  Current Diet Liquid Consistency: Mildly Thick (Nectar)       Type of Study: Initial MBS       Recent CXR/CT of Chest: Date n/a    Patient Complaints/Reason for Referral:  Sade Ware was referred for a MBS to assess the efficiency of his/her swallow function, assess for aspiration, and to make recommendations regarding safe dietary consistencies, effective compensatory strategies, and safe eating environment. Onset of problem:    Per ARU preadmission assessment:80year-old female admitted with acute left hemiparesis causing a fall of her chair.  Noted acute left-sided weakness worse than previous date. Gail Desir has chronic left lower extremity weakness from previous double knee replacements and left upper extremity weakness due to shoulder surgery per the daughter. Gail Desir was life flighted to [facility] on baby aspirin at home.  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 c L sided facial weakness, reduced ROM. Behavior/Cognition/Vision/Hearing:  Behavior/Cognition: Alert; Cooperative; Requires cueing  Vision: Impaired  Vision Exceptions: Wears glasses at all times  Hearing: Exceptions to [facility]  Hearing Exceptions: Hard of hearing/hearing concerns Darlene GALVIN A.CCC/SLP   4/7/2021, 3:28 PM

## 2021-04-07 NOTE — PROGRESS NOTES
Physical Therapy    Facility/Department: [facility] ACUTE REHAB  Initial Assessment    NAME: Ashleigh Coats  : 1932  MRN: [id number]    Date of Service: 2021    Discharge Recommendations:  Patient would benefit from continued therapy after discharge, Home with assist PRN, Home with Home health PT   PT Equipment Recommendations  Other: continue to assess    Assessment   Body structures, Functions, Activity limitations: Decreased functional mobility ; Decreased balance;Decreased strength;Decreased endurance  Assessment: Pt reports her left knee was unstable weak prior to surgery. Reports has arthritis in her R knee too. Reports she was not able to stand too long due to \"bad\" knees prior to her stroke. Pt required modAx2 to perform sit to stand with RW, unable to perform ambulation due to weakness and decreased balance. Pt is a high fall risk due to level of assistance required and balance deficits. Recommending continued skilled physical therapy to address balance deficits to return pt to prior level of independence. Treatment Diagnosis: Pt presents with Left side weakness from CVA. Pt needs assists for bed mobility, transfers and able to take few steps with rolling walker. Pt presents with weakness and balance deficits  Prognosis: Good  Decision Making: Medium Complexity  History: Pt had Dec 2020. PT Education: Goals;Gait Training;General Safety;PT Role;Plan of Care; Functional Mobility Training;Transfer Training;Precautions  REQUIRES PT FOLLOW UP: Yes  Activity Tolerance  Activity Tolerance: Patient Tolerated treatment well;Patient limited by fatigue;Patient limited by endurance       Patient Diagnosis(es): There were no encounter diagnoses. has a past medical history of Anemia, Hypertension, Hypothyroidism, Osteoarthritis, Other long term (current) drug therapy, and Renal insufficiency. has no past surgical history on file.     Restrictions  Restrictions/Precautions  Restrictions/Precautions: Fall Risk, General Precautions, Modified Diet( Moderately Thick (Honey). )  Required Braces or Orthoses?: No  Implants present? : (L TKA)  Vision/Hearing  Vision: Impaired  Vision Exceptions: Wears glasses at all times  Hearing: Exceptions to [facility]  Hearing Exceptions: Hard of hearing/hearing concerns;Bilateral hearing aid     Subjective  General  Chart Reviewed: Yes  Patient assessed for rehabilitation services?: Yes  Additional Pertinent Hx: Per ARU preadmission assessment:80year-old female admitted with acute left hemiparesis causing a fall of her chair. Noted acute left-sided weakness worse than previous date. .  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 Venkata Carcamo She was life flighted to [facility]. Patient on baby aspirin at home. Patient given TPA during TPA became hysterical repeat CT showed no change completed TPA.  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 Pt admitted to rehab unit on 4/7/21. Family / Caregiver Present: No  Referral Date : 04/07/21  Diagnosis: CVA  Follows Commands: Within Functional Limits  Subjective  Subjective: RN assisting pt with changing her brief and gown, RN reports pt to go for swallow study soon.    Pain Screening  Patient Currently in Pain: No    Oxygen Therapy  O2 Device: None (Room air)       Orientation  Orientation  Overall Orientation Status: Within Functional Limits  Social/Functional History  Social/Functional History  Lives With: Alone(Spopuse passed away in Jan 2021)  Type of Home: House  Home Layout: Able to Live on Main level with bedroom/bathroom, Two level  Home Access: Ramped entrance  Bathroom Shower/Tub: Walk-in shower, Shower chair with back, Doors  Bathroom Toilet: Handicap height  Bathroom Equipment: Grab bars in shower, Grab bars around toilet, Training, Transfer Training, Gait Training, ROM, Stair training  Safety Devices  Type of devices: Nurse notified, Patient at risk for falls, Call light within reach, Left in chair  Restraints  Initially in place: No    G-Code       OutComes Score  Postural Assessment Scale for Stroke Patients   (PASS) Scoring Form     Give the subject instructions for each item as written below. When scoring the item, record the lowest response category that applies for each item. Maintaining a Posture  1. Sitting Without Support  Examiner: Have the subject sit on a bench/mat without support and with feet flat on the floor. 2 Can sit for more than 10 seconds without support  2. Standing with Support Examiner: Have the subject stand, providing support as needed. Evaluate only the ability to stand with or without support. Do not consider the quality of the stance. 1     Can stand with strong support of 2 people   3. Standing Without Support  Examiner:  Have the subject stand without support. Evaluate only the ability to stand with or without support. Do not consider the quality of the stance. 0 Cannot stand without support. 4.    Standing on Non-paretic Leg  Examiner: Have the subject stand on the non-paretic leg. Evaluate only the ability to bear weight entirely on the non-paretic leg. Do not consider how the subject accomplishes the task. 0  Cannot stand on non-paretic leg    5. Standing on Paretic Leg  Examiner: Have the subject stand on the paretic leg. Evaluate only the ability to bear weight entirely on the paretic leg. Do not consider how the subject accomplishes the task. 0  Cannot stand of paretic leg     Maintaining Posture SUBTOTAL     3/15    Changing a Posture  6. Supine to Paretic Side Lateral  Examiner:  Begin with the subject in supine on a treatment mat. Instruct the subject to roll to the paretic side (lateral movement). Assist as necessary.  Evaluated the subject's performance on the amount of help required. Do not consider the quality of performance. 2  Can perform with little help  7. Supine to Non-paretic Side Lateral  Examiner:  Begin with the subject in supine on a treatment mat. Instruct the subject to roll to the non-paretic side (lateral movement). Assist as necessary. Evaluate the subject's Performance on the amount of help required. Do not consider the quality of performance. 1  Can perform with much help   8. Supine to Sitting up on the Edge of the Mat   Examiner:  Begin with the subject in supine on a treatment mat. Instruct the subject to come to sitting on the edge of the mat. Assist as necessary. Evaluate the subject's performance on the amount of help required. Do not considered the quality performance. 1  Can perform with much help    9. Sitting on the Edge of the Mat to Supine  Examiner: Begin with the subject sitting on the edge of a treatment mat. Instruct the subject to return to supine. Assist as necessary. Evaluate the subjects performance on the amount of help required. Co not consider the quality of performance. 1  Can perform with much help   10. Sitting to Standing Up  Examiner:  Begin with the subject sitting on the edge of a treatment mat. Instruct the subject to stand up without support. Assist if necessary. Evaluated the subject's performance on the amount ot help required. Do not consider the quality of the performance. 1  Can perform with much help   11. Standing Up to Sitting Down  Examiner:  Begin with the subject standing by the edge of a treatment mat. Instruct the subject to sit on the edge of mat without support. Assist if necessary. Evaluated the subject's performance on the amount of help required. Do not consider the quality of the performance. 1  Can perform with much help   12 . Standing, Picking up a Pencil from the Floor  Examiner:  Begin with the subject standing.   Instruct the subject to  a pencil from Short term goal 5: Pt able to go up and down 5 steps with 2 UE support, mod A   Long term goals  Time Frame for Long term goals : By LOS  Long term goal 1: Pt able to perform bed mobility mod-I  Long term goal 2: Pt able to perform transfers from varies surfaces at mod-I /supervsion level. Long term goal 3: Pt able to ambulate with appropriate device distance of 80 ft, mod-I/supervsion level. Long term goal 4: Pt able to perform step curb with assisitive device at min A  Long term goal 5: Pt able to go up and down 5 steps with 2 rails at min A to improve LE strength. Long term goal 6: Improve PASS score to atleast 25//36 improve overall fucntion  Long term goal 7: Improve Tinetti balance score to 20 or more/28 to reduce fall risk. Long term goal 8: Pt able to propel w/c on level surfaces 150 ft, with set up/supervsion.   Patient Goals   Patient goals : I want to move my Left side to walk better       Therapy Time        04/07/21 1455 04/07/21 1644   PT Individual Minutes   Time In 1430 1620   Time Out 1455 1640   Minutes 25 20   Time Code Minutes   Timed Code Treatment Minutes 5 Minutes 20 Minutes     Milla Webber, PT

## 2021-04-07 NOTE — PROGRESS NOTES
Speech Language Pathology  [facility] Acute Rehab Unit at [facility]  Speech Language Pathology    Date: 4/7/2021  Patient Name: Sophie Tovar  YOB: 1932   AGE: 80 y.o. MRN: [id number]       recommending video swallow study as pt. Did not have one while in [facility]. RN, lAphonse Nash updated. ST to await orders for video swallow study. Completed by: Abdulkadir Dodd. JIMMIE/SLP

## 2021-04-07 NOTE — CARE COORDINATION
CASE MANAGEMENT NOTE:    Admission Date:  4/7/2021 Gabriel Mar is a 80 y.o.  female    Admitted for : Acute CVA (cerebrovascular accident) ([facility] [address] [I63.9]    Met with:  Patient    PCP:  VERNELL Bass                                 Insurance:  Medicare and Medical [place]      Current Residence/ Living Arrangements:  independently at home             Current Services PTA:  No    Is patient agreeable to VNS: Yes    Freedom of choice provided:  Yes    List of [address] provided: Yes    VNS chosen:  No    DME:  walker and shower chair    Home Oxygen: No    Nebulizer: No    CPAP/BIPAP: No    Supplier: N/A    Potential Assistance Needed: No    SNF needed: No    [address] and [address]       Does Patient want to use MEDS to BEDS? No    Is patient currently receiving oral anticoagulation therapy? No    Is the Patient an [facility] with Readmission Risk Score greater than 14%? Yes  If yes, pt needs a follow up appointment made within 7 days. Family Members/Caregivers that pt would like involved in their care:    Yes    If yes, list name here:  Son- Trupti Bethea and daughter [facility]    Transportation Provider:  Family             Is patient in Isolation/One on One/Altered Mental Status? No  If yes, skip next question. If no, would they like an I-Pad to  use? No  If yes, call [phone]. PHQ-9: o; pt denied any s/s of depression and denied SI or thoughts of self harm    Mental Health History: pt denied     Substance use: none    Discharge Plan:  Pt plans to return home and is agreeable for home health services if necessary. List of choices was left in her room. Pt lives alone. Her son Trupti Bethea lives 3 miles away and her daughter [facility] lives in [place]. Pt has 3 \"girls\" who stay with her during the day to help and stated they were not family \"just friends\".                   Electronically signed by: CHARITO Dunham on 4/7/2021 at 2:47 PM

## 2021-04-07 NOTE — PLAN OF CARE
-- Message is from the [facility]--    Patient is requesting a medication refill - medication is on active medication list    Patient is currently OUT of the requested medication.    Was Medication Pended?  Yes.    Rx Name and Dose:  fluconazole (DIFLUCAN) 100 MG tablet    Rx Name and Dose:  triamcinolone (KENALOG) 0.5 % ointment    Duration: 90 days    Pharmacy  [id number] [place] [address] [facility] Alec Egan    Patient confirmed the above pharmacy as correct?  Yes    Caller Information       Type Contact Phone    10/02/2019 10:44 AM Phone (Incoming) Ladi Wild (Self) [phone] (H)          Alternative phone number:     Turnaround time given to caller:   \"This message will be sent to [state Provider's name]. The clinical team will fulfill your request as soon as they review your message.\"   Problem: HEMODYNAMIC STATUS  Goal: Patient has stable vital signs and fluid balance  4/7/2021 1119 by Danyel Modi RN  Outcome: Completed  4/7/2021 0506 by Sue Ramos RN  Outcome: Met This Shift     Problem: ACTIVITY INTOLERANCE/IMPAIRED MOBILITY  Goal: Mobility/activity is maintained at optimum level for patient  Outcome: Completed     Problem: COMMUNICATION IMPAIRMENT  Goal: Ability to express needs and understand communication  Outcome: Completed     Problem: Skin Integrity:  Goal: Will show no infection signs and symptoms  Description: Will show no infection signs and symptoms  Outcome: Completed  Goal: Absence of new skin breakdown  Description: Absence of new skin breakdown  Outcome: Completed     Problem: Falls - Risk of:  Goal: Will remain free from falls  Description: Will remain free from falls  4/7/2021 1119 by Danyel Modi RN  Outcome: Completed  4/7/2021 0506 by Sue Ramos RN  Outcome: Met This Shift  Goal: Absence of physical injury  Description: Absence of physical injury  Outcome: Completed     Problem: Pain:  Goal: Pain level will decrease  Description: Pain level will decrease  Outcome: Completed  Goal: Control of acute pain  Description: Control of acute pain  Outcome: Completed  Goal: Control of chronic pain  Description: Control of chronic pain  Outcome: Completed

## 2021-04-07 NOTE — PROGRESS NOTES
[facility] Neurology   IN-PATIENT SERVICE      NEUROLOGY PROGRESS  NOTE            Date:   4/7/2021  Patient name:  Olivia Rondon  Date of admission:  4/1/2021  YOB: 1932      Interval History:   Olivia Rondon is a  80 y.o. female admitted on 4/1/2021 with Acute ischemic stroke ([facility] [address] [I63.9]. This is a follow-up neurology progress note. The patient was seen and examined and the chart was reviewed. Vitals: Stable  Including blood pressure. Patient denies any headache, dizziness, nausea, vomiting or chest pain    Stress test outpatient as per cardiology. Patient currently on ASA, Plavix and lipitor. LDL 54. A1c: Pending   Echo : 55 % . Asymmetric septal hypertrophy (KURT) without left ventricular outflow   obstruction. Negative bubble study   History of Present Illness:      80 y.o. female presented with acute sudden onset left-sided weakness       Patient initial NIH stroke scale was 5. Initial CT head was negative and she subsequently received TPA. After initially receiving TPA for 10 minutes, her dysarthria worsened with change in mentation. tPA was held and patient had a repeat CT head to rule out any hemorrhage. tPA was reinitiated after CT head was negative. Patient initially had significant left-sided paresis and exam has improved in the past 24 hours where it is currently antigravity. Her dysarthria has also significantly improved with her facial droop. Her NIH stroke scale is currently 5. She has been working with PT/OT and SLP. Etiology remains unclear as echocardiogram and A1c are still pending. Patient denies any history of smoking. MRI showed patient had a right pontine infarct, patient was started on dual antiplatelet therapy along with continuation of statin therapy. Patient will most likely be a candidate for rehab once stroke work-up is been completed.         Past Medical History:     Past Medical History:   Diagnosis Date    Anemia     Hypertension     Hypothyroidism     Osteoarthritis     Other long term (current) drug therapy     Renal insufficiency         Past Surgical History:     No past surgical history on file. Medications during admission:      atorvastatin  40 mg Oral Nightly    clopidogrel  75 mg Oral Daily    sodium chloride  50 mL Intravenous Once    sodium chloride flush  10 mL Intravenous 2 times per day    amLODIPine  5 mg Oral Daily    ferrous sulfate  325 mg Oral BID    levothyroxine  112 mcg Oral Daily    metoprolol tartrate  12.5 mg Oral BID    pantoprazole  40 mg Oral QAM AC    oxybutynin  5 mg Oral Daily    sodium chloride flush  10 mL Intravenous 2 times per day    enoxaparin  40 mg Subcutaneous Daily    aspirin  81 mg Oral Daily    Or    aspirin  300 mg Rectal Daily         Physical Exam:   /68   Pulse 66   Temp 97.9 °F (36.6 °C) (Oral)   Resp 17   Ht 5' 7\" (1.702 m)   Wt 165 lb (74.8 kg)   SpO2 98%   BMI 25.84 kg/m²   Temp (24hrs), Av.3 °F (36.8 °C), Min:97.9 °F (36.6 °C), Max:98.7 °F (37.1 °C)        General examination:      General Appearance:  alert, well appearing, and in no acute distress  HEENT: Normocephalic, atraumatic, moist mucus membranes  Neck: supple, no carotid bruits, (-) nuchal rigidity  Lungs:  Respirations unlabored, chest wall no deformity, BS normal  Cardiovascular: normal rate, regular rhythm  Abdomen: Soft, nontender, nondistended, normal bowel sounds  Skin: No gross lesions, rashes, bruising or bleeding on exposed skin area  Extremities:  peripheral pulses palpable, clubbing or edema  Psych: normal affect      Neurological examination:      Mental status   Alert and oriented x 3; following all commands;   speech is fluent, mild dysarthria, aphasia.       Cranial nerves   II - visual fields intact to confrontation; pupils reactive  III, IV, VI - extraocular muscles intact; no PEDRITO; no nystagmus; no ptosis   V - normal facial sensation VII - left facial droop                                                             VIII - intact hearing                                                                             IX, X - symmetrical palate elevation                                               XI - symmetrical shoulder shrug                                                       XII - midline tongue without atrophy or fasciculation     Motor function  Strength:   5/5 RUE, 5/5 RLE  4+/5 LUE, 4+/5  LLE  Normal bulk and tone. Sensory function Decreased sensation on the left side ,    Cerebellar  mld dysmetria on the left side   No tremors                        Reflex function 2/4 symmetric throughout . Downgoing plantar response bilaterally.  (-)Calhoun's sign bilaterally      Gait                   not tested              Diagnostics:      Laboratory Testing:  CBC:   Recent Labs     04/05/21 0523 04/06/21 0156 04/07/21 0426   WBC 7.5 7.1 7.3   HGB 10.7* 10.4* 10.6*    231 251       BMP:    Recent Labs     04/05/21 0523 04/06/21 0156 04/07/21 0426   * 132* 135   K 4.2 4.2 4.3    104 105   CO2 18* 18* 18*   BUN 17 20 20   CREATININE 1.04* 1.06* 1.03*   GLUCOSE 92 93 89         Lab Results   Component Value Date    CHOL 121 04/02/2021    LDLCHOLESTEROL 54 04/02/2021    HDL 55 04/02/2021    TRIG 61 04/02/2021    ALT 18 02/25/2021    AST 35 02/25/2021    TSH 2.470 11/04/2020    INR 1.0 04/01/2021    LABA1C 5.2 04/03/2021         No results found for: PHENYTOIN, PHENYTOIN, VALPROATE, CBMZ        Imaging/Diagnostics:           CT head   Impression   No evidence of acute intracranial process.  Moderate atrophy and extensive   chronic small vessel ischemic changes noted.       There has been no significant change          CTA head and neck   Impression   No evidence for acute intracranial hemorrhage, territorial infarction or   intracranial mass lesion.       Moderate chronic microangiopathic

## 2021-04-07 NOTE — PROGRESS NOTES
Patient admitted to room 2638. Patient oriented to unit, room, and surroundings. Medication orders reviewed via telephone with Dr. Kia Lucia.

## 2021-04-07 NOTE — CARE COORDINATION
Discharge [id number] [facility]  Clinical Case Management Department  Written by: Marcellus Espinosa RN    Patient Name: Yanira Borja  Attending Provider: No att. providers found  Admit Date: 2021  6:53 PM  MRN: [id number]  Account: [de-identified]                     : 1932  Discharge Date: 2021      Disposition: Bartolo Groves RN

## 2021-04-08 LAB
-: ABNORMAL
AMORPHOUS: ABNORMAL
ANION GAP SERPL CALCULATED.3IONS-SCNC: 12 MMOL/L (ref 9–17)
BACTERIA: ABNORMAL
BILIRUBIN URINE: NEGATIVE
BUN BLDV-MCNC: 27 MG/DL (ref 8–23)
BUN/CREAT BLD: ABNORMAL (ref 9–20)
CALCIUM SERPL-MCNC: 8.8 MG/DL (ref 8.6–10.4)
CASTS UA: ABNORMAL /LPF
CHLORIDE BLD-SCNC: 100 MMOL/L (ref 98–107)
CO2: 21 MMOL/L (ref 20–31)
COLOR: YELLOW
COMMENT UA: ABNORMAL
CREAT SERPL-MCNC: 1.47 MG/DL (ref 0.5–0.9)
CRYSTALS, UA: ABNORMAL /HPF
EPITHELIAL CELLS UA: ABNORMAL /HPF
GFR AFRICAN AMERICAN: 41 ML/MIN
GFR NON-AFRICAN AMERICAN: 33 ML/MIN
GFR SERPL CREATININE-BSD FRML MDRD: ABNORMAL ML/MIN/{1.73_M2}
GFR SERPL CREATININE-BSD FRML MDRD: ABNORMAL ML/MIN/{1.73_M2}
GLUCOSE BLD-MCNC: 153 MG/DL (ref 70–99)
GLUCOSE BLD-MCNC: 98 MG/DL (ref 65–105)
GLUCOSE URINE: NEGATIVE
HCT VFR BLD CALC: 34.6 % (ref 36–46)
HEMOGLOBIN: 11.2 G/DL (ref 12–16)
KETONES, URINE: NEGATIVE
LEUKOCYTE ESTERASE, URINE: ABNORMAL
MCH RBC QN AUTO: 30.3 PG (ref 26–34)
MCHC RBC AUTO-ENTMCNC: 32.5 G/DL (ref 31–37)
MCV RBC AUTO: 93.4 FL (ref 80–100)
MUCUS: ABNORMAL
NITRITE, URINE: NEGATIVE
NRBC AUTOMATED: ABNORMAL PER 100 WBC
OTHER OBSERVATIONS UA: ABNORMAL
PDW BLD-RTO: 14.3 % (ref 11.5–14.9)
PH UA: 5.5 (ref 5–8)
PLATELET # BLD: 240 K/UL (ref 150–450)
PMV BLD AUTO: 9.2 FL (ref 6–12)
POTASSIUM SERPL-SCNC: 4.2 MMOL/L (ref 3.7–5.3)
PROTEIN UA: ABNORMAL
RBC # BLD: 3.7 M/UL (ref 4–5.2)
RBC UA: ABNORMAL /HPF
RENAL EPITHELIAL, UA: ABNORMAL /HPF
SODIUM BLD-SCNC: 133 MMOL/L (ref 135–144)
SPECIFIC GRAVITY UA: 1.01 (ref 1–1.03)
TRICHOMONAS: ABNORMAL
TURBIDITY: ABNORMAL
URINE HGB: ABNORMAL
UROBILINOGEN, URINE: NORMAL
WBC # BLD: 14.3 K/UL (ref 3.5–11)
WBC UA: ABNORMAL /HPF
YEAST: ABNORMAL

## 2021-04-08 PROCEDURE — 85027 COMPLETE CBC AUTOMATED: CPT

## 2021-04-08 PROCEDURE — 81001 URINALYSIS AUTO W/SCOPE: CPT

## 2021-04-08 PROCEDURE — 92523 SPEECH SOUND LANG COMPREHEN: CPT

## 2021-04-08 PROCEDURE — 97110 THERAPEUTIC EXERCISES: CPT

## 2021-04-08 PROCEDURE — 51701 INSERT BLADDER CATHETER: CPT

## 2021-04-08 PROCEDURE — 51798 US URINE CAPACITY MEASURE: CPT

## 2021-04-08 PROCEDURE — 6370000000 HC RX 637 (ALT 250 FOR IP): Performed by: STUDENT IN AN ORGANIZED HEALTH CARE EDUCATION/TRAINING PROGRAM

## 2021-04-08 PROCEDURE — 97530 THERAPEUTIC ACTIVITIES: CPT

## 2021-04-08 PROCEDURE — 87086 URINE CULTURE/COLONY COUNT: CPT

## 2021-04-08 PROCEDURE — 97535 SELF CARE MNGMENT TRAINING: CPT

## 2021-04-08 PROCEDURE — 97116 GAIT TRAINING THERAPY: CPT

## 2021-04-08 PROCEDURE — 36415 COLL VENOUS BLD VENIPUNCTURE: CPT

## 2021-04-08 PROCEDURE — 80048 BASIC METABOLIC PNL TOTAL CA: CPT

## 2021-04-08 PROCEDURE — 82947 ASSAY GLUCOSE BLOOD QUANT: CPT

## 2021-04-08 PROCEDURE — 99222 1ST HOSP IP/OBS MODERATE 55: CPT | Performed by: INTERNAL MEDICINE

## 2021-04-08 PROCEDURE — 6360000002 HC RX W HCPCS: Performed by: STUDENT IN AN ORGANIZED HEALTH CARE EDUCATION/TRAINING PROGRAM

## 2021-04-08 PROCEDURE — 1180000000 HC REHAB R&B

## 2021-04-08 PROCEDURE — 87088 URINE BACTERIA CULTURE: CPT

## 2021-04-08 PROCEDURE — 97166 OT EVAL MOD COMPLEX 45 MIN: CPT

## 2021-04-08 PROCEDURE — 6370000000 HC RX 637 (ALT 250 FOR IP): Performed by: PHYSICAL MEDICINE & REHABILITATION

## 2021-04-08 PROCEDURE — 99223 1ST HOSP IP/OBS HIGH 75: CPT | Performed by: PHYSICAL MEDICINE & REHABILITATION

## 2021-04-08 PROCEDURE — 97542 WHEELCHAIR MNGMENT TRAINING: CPT

## 2021-04-08 PROCEDURE — 87186 SC STD MICRODIL/AGAR DIL: CPT

## 2021-04-08 RX ORDER — CEPHALEXIN 250 MG/1
250 CAPSULE ORAL EVERY 6 HOURS SCHEDULED
Status: DISCONTINUED | OUTPATIENT
Start: 2021-04-09 | End: 2021-04-10

## 2021-04-08 RX ADMIN — PANTOPRAZOLE SODIUM 40 MG: 40 TABLET, DELAYED RELEASE ORAL at 05:03

## 2021-04-08 RX ADMIN — CLOPIDOGREL BISULFATE 75 MG: 75 TABLET ORAL at 08:29

## 2021-04-08 RX ADMIN — ACETAMINOPHEN 650 MG: 325 TABLET ORAL at 08:29

## 2021-04-08 RX ADMIN — METOPROLOL TARTRATE 12.5 MG: 25 TABLET, FILM COATED ORAL at 08:32

## 2021-04-08 RX ADMIN — FERROUS SULFATE TAB 325 MG (65 MG ELEMENTAL FE) 325 MG: 325 (65 FE) TAB at 21:59

## 2021-04-08 RX ADMIN — FERROUS SULFATE TAB 325 MG (65 MG ELEMENTAL FE) 325 MG: 325 (65 FE) TAB at 08:29

## 2021-04-08 RX ADMIN — ATORVASTATIN CALCIUM 40 MG: 40 TABLET, FILM COATED ORAL at 21:59

## 2021-04-08 RX ADMIN — ENOXAPARIN SODIUM 40 MG: 40 INJECTION SUBCUTANEOUS at 08:29

## 2021-04-08 RX ADMIN — OXYBUTYNIN CHLORIDE 5 MG: 5 TABLET, EXTENDED RELEASE ORAL at 08:33

## 2021-04-08 RX ADMIN — AMLODIPINE BESYLATE 5 MG: 5 TABLET ORAL at 08:29

## 2021-04-08 RX ADMIN — ASPIRIN 81 MG: 81 TABLET, COATED ORAL at 08:29

## 2021-04-08 RX ADMIN — Medication 6 MG: at 21:59

## 2021-04-08 RX ADMIN — LEVOTHYROXINE SODIUM 112 MCG: 112 TABLET ORAL at 05:03

## 2021-04-08 RX ADMIN — POLYETHYLENE GLYCOL 3350 17 G: 17 POWDER, FOR SOLUTION ORAL at 08:29

## 2021-04-08 RX ADMIN — METOPROLOL TARTRATE 12.5 MG: 25 TABLET, FILM COATED ORAL at 21:59

## 2021-04-08 ASSESSMENT — PAIN SCALES - GENERAL: PAINLEVEL_OUTOF10: 3

## 2021-04-08 ASSESSMENT — PAIN DESCRIPTION - FREQUENCY: FREQUENCY: CONTINUOUS

## 2021-04-08 ASSESSMENT — PAIN DESCRIPTION - ORIENTATION: ORIENTATION: LEFT;UPPER

## 2021-04-08 ASSESSMENT — PAIN DESCRIPTION - PROGRESSION: CLINICAL_PROGRESSION: GRADUALLY WORSENING

## 2021-04-08 ASSESSMENT — PAIN DESCRIPTION - PAIN TYPE: TYPE: ACUTE PAIN

## 2021-04-08 NOTE — FLOWSHEET NOTE
04/08/21 1843   Encounter Summary   Services provided to: Patient   Referral/Consult From: Rounding   Complexity of Encounter Low   Length of Encounter 15 minutes   Spiritual/Nondenominational   Type Spiritual support   Assessment Sleeping   Intervention Prayer

## 2021-04-08 NOTE — PROGRESS NOTES
[address] [facility] [address]   Acute Rehabilitation OT Evaluation  Date: 21  Patient Name: Gaurang Plata       Room: 7724/9928-73  MRN: [id number]  Account: [de-identified]   : 1932  (80 y.o.) Gender: female     Referring Practitioner: Dorina Castillo MD  Diagnosis: Acute CVA  Additional Pertinent Hx:  Per ARU preadmission assessment:80year-old female admitted with acute left hemiparesis causing a fall of her chair. Noted acute left-sided weakness worse than previous date. .  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 Michael Unger She was life flighted to [facility]. Patient on baby aspirin at home. Patient given TPA during TPA became hysterical repeat CT showed no change completed TPA. 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 Pt admitted to rehab unit on 21. Treatment Diagnosis: Impaired self-care status. Past Medical History:  has a past medical history of Anemia, Hypertension, Hypothyroidism, Osteoarthritis, Other long term (current) drug therapy, and Renal insufficiency. Past Surgical History:   has no past surgical history on file. Restrictions  Restrictions/Precautions: Fall Risk, Modified Diet, Swallowing - Thickened Liquids, General Precautions(Dysphagia soft and bite sized, Moderately Thick (Honey))  Implants present? : (L TKA)  Required Braces or Orthoses?: No    Vitals  Temp: 99.6 °F (37.6 °C)  Pulse: 90  Resp: 16  BP: (!) 140/78  Height: 5' 7\" (170.2 cm)  Weight: 172 lb (78 kg)  BMI (Calculated): 27  Oxygen Therapy  SpO2: 98 %  O2 Device: None (Room air)  Level of Consciousness: Alert (0)    Subjective  Subjective: \"I'm just so darn slow\" Pt reports frustration with increased time to complete all tasks.   Orientation  Overall Orientation Status: Impaired Orientation Level: Oriented to person, Disoriented to place, Disoriented to time, Disoriented to situation  Vision  Vision: Impaired  Vision Exceptions: Wears glasses at all times  Hearing  Hearing: Exceptions to [facility]  Hearing Exceptions: Hard of hearing/hearing concerns, Bilateral hearing aid  Vision - Basic Assessment  Prior Vision: Wears glasses all the time  Social/Functional History  Lives With: Alone  Type of Home: House  Home Layout: Able to Live on Main level with bedroom/bathroom, Two level, Performs ADL's on one level  Home Access: Ramped entrance  Bathroom Shower/Tub: Walk-in shower, Shower chair with back, Doors  Bathroom Toilet: Handicap height  Bathroom Equipment: Grab bars in shower, Grab bars around toilet, Hand-held shower, 3-in-1 commode, Shower chair(bedside commode next to bed)  Bathroom Accessibility: [address], 4 wheeled walker, Lift chair, Rolling walker, Alert [employer], Reacher, Sock aid  Receives Help From: Family, Personal care attendant, Friend(s)  ADL Assistance: Needs assistance(SBA showering; modified IND dressing/toileting)  Bath: Stand by assistance  Dressing: Modified independent  Grooming: Modified independent   Feeding: Modified independent   Toileting: Needs assistance(assist for personal hygiene with bowel movement)  Homemaking Assistance: Needs assistance(Pt's friends and family complete all IADL tasks.)  Homemaking Responsibilities: Yes  Meal Prep Responsibility: Secondary(light meal prep, microwave meals)  Ambulation Assistance: Independent(using grab bars or RW or 4WW.)  Transfer Assistance: Independent  Active : No  Patient's  Info: Family  Mode of Transportation: Car  Occupation: Retired  Leisure & Hobbies: put together puzzles, reading  IADL Comments: Sleeps in a regular flat bed. Patient's neighbor/friend Renetta Iyer checks on her at night. Teena Benavides help during the day time. Pt is surrounded by family, friends, and helpful neighbors. Patient's son installed grab bars/rails in each room for her. Patient's support system provide assist for all laundry, meals, dishes, shopping and cleaning. Additional Comments: Patient's daughter present during OT evaluation to provide insight into prior level of function. Patient's son [place] lives 3 miles away and comes every mronring to see the patient. Patient's daughter Oli Bains comes every afternon. One of patient's friends checks in morning, lunch time, evening and patient's neighbor checks in at night. There are times patient is home alone, however frequent checks. Objective  Vision - Basic Assessment  Prior Vision: Wears glasses all the time   Cognition  Overall Cognitive Status: Impaired  Arousal/Alertness: Delayed responses to stimuli  Attention Span: Attends with cues to redirect  Memory: Decreased short term memory, Decreased recall of recent events  Following Commands:  Follows multistep commands with repetition  Safety Judgement: Decreased awareness of need for assistance  Awareness of Errors: Assistance required to identify errors made  Insights: Decreased awareness of deficits  Sequencing and Organization: Assistance required to implement solutions, Assistance required to generate solutions, Assistance required to identify errors made   Perception  Overall Perceptual Status: Impaired  Initiation: Cues to initiate tasks  Motor Planning: Cues to use objects appropriately  Perseveration: Perseverates during ADLs  Sensation  Overall Sensation Status: WFL( with no sensation impairments noted.)     UE Function  Hand Dominance  Hand Dominance: Right        LUE Strength  Gross LUE Strength: Exceptions to [facility]  L Shoulder Flex: 2-/5  L Shoulder ABduction: 2-/5  L Elbow Flex: 3+/5  L Elbow Ext: 3+/5  L Wrist Flex: 3+/5  L Wrist Ext: 3+/5  L Hand General: 3+/5  Left Hand Strength -  (lbs)  Handle Setting 2: 24# (23#, 25#, 25#; norms; 31-50#)  LUE Tone: Normotonic  LUE PROM (degrees)  LUE PROM: WFL  LUE AROM (degrees)  LUE AROM : Exceptions  L Shoulder Flexion 0-180: ~25% AROM  L Shoulder ABduction 0-180: ~25% AROM  L Elbow Flexion 0-145: WFL  L Elbow Extension 145-0: WFL  L Wrist Flexion 0-80: WFL  L Wrist Extension 0-70: WFL     Left Hand AROM (degrees)  Left Hand AROM: WFL  RUE Strength  Gross RUE Strength: WFL  R Shoulder Flex: 4-/5  R Shoulder ABduction: 4-/5  R Elbow Flex: 4-/5  R Elbow Ext: 4-/5  R Wrist Flex: 4-/5  R Wrist Ext: 4-/5  R Hand General: 4-/5   Right Hand Strength -  (lbs)  Handle Setting 2: 34# (36#, 26#, 40#; norms: 31-50#)  RUE Tone: Normotonic     RUE AROM (degrees)  RUE AROM : WFL     Right Hand AROM (degrees)  Right Hand AROM: WFL                        Fine Motor Skills  Coordination  Movements Are Fluid And Coordinated: No  Coordination and Movement description: Left UE, Decreased accuracy, Decreased speed, Gross motor impairments, Fine motor impairments   Comment: impaired LUE ROM, strength, FMC/GMC               Quality of Movement Other  Comment: impaired LUE ROM, strength, FMC/GMC       Mobility  Balance  Sitting Balance: Stand by assistance  Standing Balance: Maximum assistance  Standing Balance  Time: 1-2 minutes x 5  Activity: toileting tasks, lower body bathing/dressing  Comment: BUE support on grab bar  Functional Mobility  Functional - Mobility Device: Wheelchair  Activity: To/from bathroom  Assist Level: Maximum assistance  Bed mobility  Comment: Pt seated in chair at start and end of session     Transfers  Stand Pivot Transfers: 2 Person assistance, Maximum assistance(Max x 1 to right; Max x 2 to left)  Sit to stand: Maximum assistance  Stand to sit: Maximum assistance  Transfer Comments: Moderate verbal and tactile cues for posture and safety  Toilet Transfers  Toilet - Technique: Stand pivot, To right, To left  Equipment Used: Grab bars  Toilet Transfer: 2 Person assistance, Maximum assistance  Toilet Transfers Comments: Max x 1 to right; Max x 2 to left      Activity Tolerance: Patient Tolerated treatment well, Patient limited by fatigue         ADL     ADL  Feeding: Stand by assistance(SBA of daughter to encourage initiation)  Grooming: Stand by assistance;Verbal cueing(VC for spitting for safety; seated at sink in w/c)  UE Bathing: Stand by assistance(seated on toilet)  LE Bathing: Dependent/Total(assist B feet/lower legs, buttocks/perineal; 2 assist stand)  UE Dressing: Moderate assistance(assist to pull down over trunk and adjust)  LE Dressing: Dependent/Total(assist B ARSH, socks, shoes; assist thread BLE into pull-up; CGA thread BLE into pants; 1-2A to stand for clothing management)  Toileting: Dependent/Total(assist personal hygiene with BM and voiding; 1-2 assist clothing management)  Additional Comments: OT facilitated Pt engagement in self-care routine this date. Pt requested extended time to sit on toilet for bowel movement. While seated on toilet, Pt agreeable to engage in bathing and dressing tasks for therapeutic intervention. Please see above for details. During toileting, black stool noted with LEWIS Dutta notified. Cloudy, malodorous urine noted as well LEWIS Dutta notified. Pt required Moderate verbal cues for initiation and attention to task as well as increased time to complete all tasks. Continue OT POC.      OT scores   Eating  Assistance Needed: Supervision or touching assistance  CARE Score: 4  Discharge Goal: Independent  Oral Hygiene  Assistance Needed: Supervision or touching assistance  CARE Score: 4  Discharge Goal: Independent  Toileting Hygiene  Assistance Needed: Dependent  CARE Score: 1  Discharge Goal: Independent  Shower/Bathe Self  Assistance Needed: Dependent  CARE Score: 1  Discharge Goal: Supervision or touching assistance  Upper Body Dressing  Assistance Needed: Partial/moderate assistance  CARE Score: 3  Discharge Goal: Independent  Lower Body Dressing  Assistance Needed: Dependent  Comment: two assist to stand  CARE Score: 1  Discharge Goal: Independent  Putting On/Taking Off Footwear  Assistance Needed: Dependent  CARE Score: 1  Discharge Goal: Independent  Toilet Transfer  Assistance Needed: Dependent  CARE Score: 1  Discharge Goal: Independent      Goals  Patient Goals   Patient goals : To return home with family support  Short term goals  Time Frame for Short term goals: 7 to 10 days  Short term goal 1: Pt will perform upper body bathing/dressing with stand by assist.  Short term goal 2: Pt will perform lower body bathing/dressing and toileting tasks with Moderate assist and Good safety. Short term goal 3: Pt will perform functional functional transfers and mobility during self-care with Moderate assist, least restrictive mobility device, and Good safety. Short term goal 4: Pt will for 4+ minutes with 1-2 UE support and Minimal assist while engaging in functional activity of choice. Short term goal 5: Pt will actively participate in 30+ minutes of therapeutic exercise/functional activities to promote increased independence with self-care and mobility. Short term goal 6: Pt will verbalize/demonstrate Good understanding of assistive equipment/durable medical equipment/modified techniques for increased independence with self-care and mobility. Long term goals  Time Frame for Long term goals : By discharge  Long term goal 1: Pt will perform bathing during shower with stand by assist and Good safety. Long term goal 2: Pt will perform dressing and toileting tasks with modified independence and Good safety. Long term goal 3: Pt will stand for 8+ minutes with 1-2 UE support, modified independence, no loss of balance while engaging in functional activity of choice. Long term goal 4: Pt will perform functional transfers and mobility with modified independence, least restrictive mobility device, and Good safety.   Long term goal 5: Pt will verbalize/demonstrate Good understanding of Fall Prevention Strategies for increased independence with self-care and mobility. Long term goals 6: 9 hole peg and box and blocks to be assessed for LUE as appropriate    Assessment  Performance deficits / Impairments: Decreased functional mobility , Decreased strength, Decreased endurance, Decreased balance, Decreased high-level IADLs, Decreased posture, Decreased ADL status, Decreased ROM, Decreased safe awareness, Decreased cognition, Decreased fine motor control, Decreased coordination  Treatment Diagnosis: Impaired self-care status.   Prognosis: Good  Decision Making: Medium Complexity  REQUIRES OT FOLLOW UP: Yes  Discharge Recommendations: Home with assist PRN, Home with Home health OT  Plan  Times per week: 5-7  Times per day: Twice a day  Current Treatment Recommendations: Strengthening, ROM, Safety Education & Training, Positioning, Balance Training, Patient/Caregiver Education & Training, Self-Care / ADL, Functional Mobility Training, Endurance Training, Neuromuscular Re-education, Wheelchair Mobility Training, Cognitive Reorientation, Equipment Evaluation, Education, & procurement, Home Management Training, Cognitive/Perceptual Training          04/08/21 1041 04/08/21 1307   OT Individual Minutes   Time In [id number]   Time Out [id number]   [id number]   Time Code Minutes    Timed Code Treatment Minutes 59 Minutes 17 Minutes     Electronically signed by Dan Oppenheim, OT on 4/8/21 at 3:36 PM EDT

## 2021-04-08 NOTE — H&P
Physical Medicine & Rehabilitation History and Physical  [facility] Acute Rehabilitation Unit     Primary care provider: VERNELL Banegas CNP     Chief Complaint and Reason for Rehabilitation Admission:   ADL and Mobility deficits secondary to CVA    History of Present Illness:  Sherri Lyons  is a 80 y.o. right-handed     female admitted to the [address] [place] [address] unit on 4/7/2021. She was originally admitted to [facility] on 4/1/2021 with Cerebrovascular Accident     80year-old female admitted with acute left hemiparesis causing a fall of her chair. Noted acute left-sided weakness worse than previous date. .  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 Rachel Rued She was life flighted to [facility]. Patient on baby aspirin at home. Patient given TPA during TPA became hysterical repeat CT showed no change completed TPA     Neurologyfound to have right paramedian pontine acute ischemic infarct status post TPAon aspirin, Plavix for 3 weeks and long-term aspirin, Lipitor, echo and A1c pending, continue Lovenox    Cardiology consulted 4/5 for L sided chest pain and ruled out NSTEMI. For outpatient follow-up in 2 weeks for stress test.     She is currently requiring assistance for self-care activities and mobility prompting this admission. She denies any current issues with pain. Her daughter is present today and reports frequent urine incontinence at baseline at home and recurrent UTIs. She has also had significant fatigue and poor endurance since she was diagnosed with COVID-19 in December.      Premorbid function:  Modified Independent    Current Function:  PT:  Restrictions/Precautions: Fall Risk, Modified Diet, Swallowing - Thickened Liquids, General Precautions(Dysphagia soft and bite sized, Moderately Thick (Honey))  Implants present? : (L TKA) Transfers  Sit to Stand: Moderate Assistance, 2 Person Assistance  Stand to sit: Moderate Assistance, 2 Person Assistance  Bed to Chair: Moderate assistance, 2 Person Assistance(upright walker)  Stand Pivot Transfers: Maximum Assistance  Squat Pivot Transfers: Moderate Assistance, 2 Person Assistance  Comment: STS with nicole kraft to toilet Mod A of 1 dependent to pants down; Nursing completed toileting and transfer at bedside in pm.  Ambulation 1  Surface: level tile  Device: (upright walker)  Other Apparatus: Wheelchair follow  Assistance: 2 Person assistance, Minimal assistance(2nd person min A for safety)  Quality of Gait: Unsteady steps, needs cues for safety, pt easily distracted and needs re-direction to focus on task. Pt very talkative. WBOS noted. Gait Deviations: Slow Zuly, Decreased step height, Decreased step length  Distance: 50 feet x2; 140 feet wit 2 standing rest breaks  Comments: Mod A to manage rolling walker , forward trunk lean, decreased step length R LE. Transfers  Sit to Stand: Moderate Assistance, 2 Person Assistance  Stand to sit: Moderate Assistance, 2 Person Assistance  Bed to Chair: Moderate assistance, 2 Person Assistance(upright walker)  Stand Pivot Transfers: Maximum Assistance  Squat Pivot Transfers: Moderate Assistance, 2 Person Assistance  Comment: STS with nicole kraft to toilet Mod A of 1 dependent to pants down; Nursing completed toileting and transfer at bedside in pm.  Ambulation  Ambulation?: Yes  More Ambulation?: No  Ambulation 1  Surface: level tile  Device: (upright walker)  Other Apparatus: Wheelchair follow  Assistance: 2 Person assistance, Minimal assistance(2nd person min A for safety)  Quality of Gait: Unsteady steps, needs cues for safety, pt easily distracted and needs re-direction to focus on task. Pt very talkative. WBOS noted.   Gait Deviations: Slow Zuly, Decreased step height, Decreased step length  Distance: 50 feet x2; 140 feet wit 2 standing rest Balance: Maximum assistance   Standing Balance  Time: 1-2 minutes x 5  Activity: toileting tasks, lower body bathing/dressing  Comment: BUE support on grab bar  Functional Mobility  Functional - Mobility Device: Wheelchair  Activity: To/from bathroom  Assist Level: Maximum assistance     Bed mobility  Rolling to Left: Minimal assistance  Rolling to Right: Moderate assistance  Supine to Sit: Moderate assistance  Sit to Supine: Unable to assess  Scooting: Moderate assistance  Comment: use of bed rail and mechanics of bed to assist with bed mobility today. Transfers  Stand Pivot Transfers: 2 Person assistance, Maximum assistance(Max x 1 to right; Max x 2 to left)  Sit to stand: Maximum assistance  Stand to sit: Maximum assistance  Transfer Comments: Moderate verbal and tactile cues for posture and safety   Toilet Transfers  Toilet - Technique: Stand pivot, To right, To left  Equipment Used: Grab bars  Toilet Transfer: 2 Person assistance, Maximum assistance  Toilet Transfers Comments: Max x 1 to right; Max x 2 to left             SPEECH:  Impressions:     Patient Position: Lateral      Consistencies Administered: Dysphagia Soft and Bite-Sized (Dysphagia III); Honey teaspoon;Nectar cup           Oral Phase: premature vallecular spillage of all consistencies. Prolonged mastication of soft solids.     Pharyngeal: Nectar thick liquids:  trace to deep penetration. Honey thick,  pudding and soft solids: functional.   Dry solids and thin liquids not assessed        Dysphagia Outcome Severity Scale: Level 3: Moderate dysphagia- Total assisstance, supervision or strategies.  Two or more diet consistencies restricted  Penetration-Aspiration Scale (PAS): 3 - Material enters the airway, remains above the vocal folds, and is not ejected from airway     Recommended Diet:  Solid consistency: Dysphagia Soft and Bite-Sized (Dysphagia III)  Liquid consistency: Moderately Thick (Honey)    Past Medical History:      Diagnosis Date    Anemia     Hypertension     Hypothyroidism     Osteoarthritis     Other long term (current) drug therapy     Renal insufficiency        Past Surgical History:  No past surgical history on file. Allergies:    Ciprofloxacin, Hydrocodone, Macrobid [nitrofurantoin], and Ciprofloxacin    Medications   Scheduled Meds:   enoxaparin  40 mg Subcutaneous Daily    sodium chloride flush  10 mL Intravenous 2 times per day    aspirin  81 mg Oral Daily    amLODIPine  5 mg Oral Daily    atorvastatin  40 mg Oral Nightly    clopidogrel  75 mg Oral Daily    ferrous sulfate  325 mg Oral BID    levothyroxine  112 mcg Oral Daily    metoprolol tartrate  12.5 mg Oral BID    oxybutynin  5 mg Oral Daily    pantoprazole  40 mg Oral QAM AC    polyethylene glycol  17 g Oral Daily     Continuous Infusions:   sodium chloride       PRN Meds:.sodium chloride, melatonin, acetaminophen, bisacodyl, magnesium hydroxide     Social History:  Lives With: Alone  Type of Home: House  Home Layout: One level  Home Access: Level entry  Bathroom Shower/Tub: Walk-in shower, Shower chair with back  Bathroom Toilet: Standard  Bathroom Equipment: (railings and grab bars throughout the house.)  Home Equipment: Cane, 4 wheeled walker  Receives Help From: Family, Personal care attendant(Pt has three ladies who come to help her bath, dress, cook and clean. Family is also close by to assist.)  ADL Assistance: Needs assistance  Homemaking Assistance: Needs assistance  Ambulation Assistance: Independent(uses railings throughout the house)  Transfer Assistance: Independent  Social History     Socioeconomic History    Marital status:       Spouse name: Not on file    Number of children: Not on file    Years of education: Not on file    Highest education level: Not on file   Occupational History    Not on file   Social Needs    Financial resource strain: Not on file    Food insecurity     Worry: Not on file     Inability: Not on file   Rachel Brewer Left ventricle is normal in size  Global left ventricular systolic function is normal calculated ejection  fraction by 3D Heart Model is 55 % . Asymmetric septal hypertrophy (KURT) without left ventricular outflow  obstruction. Grade I (mild) left ventricular diastolic dysfunction. Left atrium is mildly dilated. Negative bubble study, no shunt noted. Thickened mitral valve leaflets. Mild mitral annular calcification is seen. Mild to moderate mitral regurgitation. Trivial tricuspid regurgitation. CBC:   Recent Labs     04/06/21  0156 04/07/21  0426 04/08/21  1211   WBC 7.1 7.3 14.3*   RBC 3.39* 3.44* 3.70*   HGB 10.4* 10.6* 11.2*   HCT 32.9* 33.0* 34.6*   MCV 97.1 95.9 93.4   RDW 13.7 13.7 14.3    251 240     BMP:   Recent Labs     04/06/21  0156 04/07/21  0426 04/08/21  1211   * 135 133*   K 4.2 4.3 4.2    105 100   CO2 18* 18* 21   BUN 20 20 27*   CREATININE 1.06* 1.03* 1.47*   GLUCOSE 93 89 153*     HbA1c:   Lab Results   Component Value Date    LABA1C 5.2 04/03/2021     BNP: No results for input(s): BNP in the last 72 hours. PT/INR: No results for input(s): PROTIME, INR in the last 72 hours. APTT: No results for input(s): APTT in the last 72 hours. CARDIAC ENZYMES:   Recent Labs     04/06/21  1557 04/07/21  0101 04/07/21  1018   TROPONINT NOT REPORTED NOT REPORTED NOT REPORTED     FASTING LIPID PANEL:  Lab Results   Component Value Date    CHOL 121 04/02/2021    HDL 55 04/02/2021    TRIG 61 04/02/2021     LIVER PROFILE: No results for input(s): AST, ALT, ALB, BILIDIR, BILITOT, ALKPHOS in the last 72 hours. Review of Systems:  CONSTITUTIONAL:  Denies fevers, chills, sweats or fatigue. EYES:  Denies diplopia, blind spots, blurring. HEENT:  Denies hearing loss, trouble chewing or swallowing. RESPIRATORY:  No wheezing, coughing, shortness of breath. CARDIOVASCULAR:  Denies chest pain, palpitations, lightheadedness.    GASTROINTESTINAL:  Denies heartburn, nausea, constipation, diarrhea, abdominal pain. GENITOURINARY:  No urgency, frequency, incontinence, dysuria. ENDOCRINE:  Denies hot or cold intolerance. MUSCULOSKELETAL:  Denies focal joint pain, back pain, neck pain. NEUROLOGICAL:  Denies focal numbness, tingling, balance loss, headache. BEHAVIOR/PSYCH:  Denies depression, anxiety, memory loss, insomnia. SKIN:  No ulcers, rash, bruises. Physical Exam:  /61   Pulse 113   Temp 99.5 °F (37.5 °C) (Oral)   Resp 14   Ht 5' 7\" (1.702 m)   Wt 172 lb (78 kg)   SpO2 95%   BMI 26.94 kg/m²     GEN: Well developed, well nourished, in NAD  HEENT:  NCAT. PERRL. EOMI. Mucous membranes pink and moist.   PULM:  Clear to ausculation. No rales or rhonchi. Respirations WNL and unlabored. CV:  Regular rate rhythm. No murmurs or gallops. GI:  Abdomen soft. Nontender. Non-distended. BS + and equal.    NEUROLOGICAL: A&O x3. Sensation intact to light touch. DTRs 2+. MSK:  Functional ROM RUE and RLE. Impaired AROM LUE and LLE due to weakness. Motor testing 4+/5 key muscles RUE and RLEs. Strength 3/5 key muscles LUE and LLE. Garrett Hussein SKIN: Warm dry and intact. Good turgor. EXTREMITIES:  No calf tenderness to palpation. No edema BLEs. Garrett Hussein PSYCH: Mood WNL. Appropriately interactive. Affect WNL. Principal Diagnosis/plan:  The patient is a 80y.o. year old with ADL and Mobility deficits secondary to ischemic CVA. She will require close medical monitoring for the comorbidities listed below. She will benefit from intensive interdisciplinary therapies and rehab nursing care and is appropriate for inpatient rehabilitation. The post admission physician evaluation (TIEN) is consistent with the pre-admission assessment. See above findings to reflect the elements required in the TIEN. Patient's admitting condition is consistent with the findings of the preadmission assessment by the rehabilitation admissions coordinator. Diagnoses/plan:    1.  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 On 3 weeks ASA, Plavix. 2. Dysphagia: on thickened liquids. SLP to eval and treat. 3. HTN: on amlodipine, atorvastatin, metoprolol tartrate. Follow up Dr. Vivek Iyer in [facility] in 4 weeks for stress test.   4. OA/L rotator cuff injury: stable. Will monitor  5. CKD: stable. Will monitor BMP  6. Hypothyroidism: on levothyroxine  7. Anemia: on ferrous sulfate  8. Hx chronic cystitis: on oxybutynin ER. Frequent incontinence is normal baseline for patient. 9. GERD: on pantoprazole  10. Bowel Management: Miralax daily, senokot prn, milk of magnesia prn, dulcolax prn. 11. DVT Prophylaxis:  low molecular weight heparin, SCD's while in bed and ARSH's   12. Internal medicine for medical management      Estimated Length of Stay:  2 weeks. Prognosis  fair    Goals    Home at Supervision  Supervision at Discharge: Intermittent      May Enamorado MD     This note is created with the assistance of a speech recognition program.  While intending to generate a document that actually reflects the content of the visit, the document can still have some errors including those of syntax and sound a like substitutions which may escape proof reading. In such instances, actual meaning can be extrapolated by contextual diversion.

## 2021-04-08 NOTE — PROGRESS NOTES
Comprehensive Nutrition Assessment    Type and Reason for Visit:  Initial, Consult(Rehab)    Nutrition Recommendations/Plan: Will continue to provide Soft/Bite Sized diet with Honey Thick liquids. Will add Magic Cup twice daily to intake intake. Nutrition Assessment:  Pt presented to [facility] with sx of CVA. She was transferred to [facility]. She is now admitted to ARU. Nursing document intake greater than 50%. Speech path note reviewed. Observed pt with lunch tray. She states she is receiving too much food. Malnutrition Assessment:  Malnutrition Status: At risk for malnutrition (Comment)    Context:  Acute Illness     Findings of the 6 clinical characteristics of malnutrition:  Energy Intake:  Mild decrease in energy intake (Comment)  Weight Loss:  No significant weight loss     Body Fat Loss:  No significant body fat loss     Muscle Mass Loss:  No significant muscle mass loss    Fluid Accumulation:  1 - Mild Extremities   Strength:  Not Performed    Estimated Daily Nutrient Needs:  Energy (kcal): [place] x 1.2= 1500 kcal; Weight Used for Energy Requirements:  Admission     Protein (g):  1.3g/kg= 80 g; Weight Used for Protein Requirements:  Ideal          Nutrition Related Findings:  mild edema BLE, Labs (4/8) Na/K 133/4.2, Glu 153, Meds: Reviewed, BM 4/4      Wounds:  None       Current Nutrition Therapies:    DIET GENERAL; Dysphagia Soft and Bite-Sized; Moderately Thick (Honey)  Dietary Nutrition Supplements: Standard High Calorie Oral Supplement, Frozen Oral Supplement    Anthropometric Measures:  · Height: 5' 7\" (170.2 cm)  · Current Body Weight: 172 lb (78 kg)   · Admission Body Weight: 172 lb (78 kg)    · Usual Body Weight: (163-170 #)     · Ideal Body Weight: 135 lbs; BMI: 26.9  · BMI Categories: Overweight (BMI 25.0-29. 9)       Nutrition Diagnosis:   · Inadequate oral intake related to (poor appetite) as evidenced by intake 26-50%, intake 51-75%    Nutrition Interventions:   Food

## 2021-04-08 NOTE — PROGRESS NOTES
Speech Language Pathology  Facility/Department: [facility]  Initial Speech/Language/Cognitive Assessment    NAME: Melissa Hickey  : 1932   MRN: [id number]  ADMISSION DATE: 2021  ADMITTING DIAGNOSIS: has Anemia; Arthritis; HTN (hypertension); Hypothyroidism; Renal insufficiency; Other long term (current) drug therapy; Anemia due to stage 3 chronic kidney disease; Chronic UTI; Mixed hyperlipidemia; Gastroesophageal reflux disease without esophagitis; S/P revision of total knee, left; Muscular weakness; Other instability, left knee; Primary osteoarthritis of both hips; Primary osteoarthritis of right knee; Cerebrovascular accident (CVA) ([facility] [place] [address] Received intravenous tissue plasminogen activator (tPA) in emergency department; Acute left hemiparesis ([facility] [place] [address] Chest pain in adult; and Acute CVA (cerebrovascular accident) [facility]) on their problem list.    Date of Eval: 2021   Evaluating Therapist: DORON Johnson    RECENT RESULTS  CT OF HEAD/MRI: MRI Brain (): Impression   Acute infarct within the rhonda, on the right-hand side.       The findings were sent to the Radiology Results [address] at 6:16   pm on 2021to be communicated to a licensed caregiver. Primary Complaint: Per chart:   29-year-old female with Hx of anemia, hypertension, hypothyroidism presented with acute onset left-sided weakness, EMS was called the patient daughter, arrival to ER patient had a score of 8. Noted to have left-sided facial droop, slurring of speech, left upper lower extremity weakness. CTHead no  bleed,  CTA no LVO, received TPA. Repeat head CT no hemorrhage TPA complete and transfer to ICU per post TPA protocol  MRI: Acute right paramedian pontine ischemic stroke. Patient was transferred out of ICU on general neurology floor. Continue to improve middle left upper and lower extremity along with speech. Patient was recommended to continue aspirin and Plavix for 3 weeks.   LDL 54, echo 55 - bubble study.  A1c 5.3  On 4/5/2021: Patient had left-sided chest pain, cardiology consulted, ruled out NSTEMI. Patient will follow cardiology outpatient in 2 weeks and stress test outpatient for further risk stratification once stabilized post stroke     She will be going to inpatient rehab at [facility]..    Pain:  Pain Assessment  Pain Assessment: 0-10  Pain Level: 3    Assessment:  Cognitive Diagnosis: Moderate-severely impaired cognition  Aphasia Diagnosis: Expressive aphasia present  Speech Diagnosis: Mild dysarthria   Diagnosis: Pt. presents with a cognitive deficit characterized by moderately impaired following multi-step commands, verbal reasoning, problem solving, and naming and severely impaired short-term and working memory. Anomia noted in conversation and during structured tasks. Pt. noted to become frustrated with word finding and memory deficits. O/M deficits present include L sided facial droop. Mild dysathria noted, however speech 100% intelligible. ST recommended to follow up and provide treatment to address noted deficits. Education provided. Recommendations:  Requires SLP Intervention: Yes             Plan:   Goals:  Short-term Goals  Goal 1: Patient will recall 3-5 units with and without distractions with 80% accuracy  Goal 2: Increase Pt. education re: memory compensatory and word retrieval strategies to aid in recall to 100% returned demo  Goal 3: Patient will complete verbal and deductive reasoning tasks with 80% accuracy  Goal 4: Patient will complete thought organizational tasks with 80% accuracy  Goal 5: Patient will complete functional naming tasks (e.g., confrontation, convergent, divergent) with 80% accuracy   Patient/family involved in developing goals and treatment plan: Yes, Pt. Subjective:   Previous level of function and limitations:  Independent   General  Chart Reviewed: Yes  Patient assessed for rehabilitation services?: Yes  Family / Caregiver Present: No Subjective  Subjective: Pt. pleasant and cooperative for evaluation, discussing family members with writer. Anomia noted during conversation and during evaluation tasks. Pt. demonstrating frustration with deficits stating, \"Gosh darn it\" and \"Ugh why can't I think of what I want to say? \". Emotional support and encouragement provided throughout. Pt. observed eating from breakfast tray during evaluation with no overt s/s of aspiration observed. Vision  Vision: Impaired  Vision Exceptions: Wears glasses at all times  Hearing  Hearing: Exceptions to [facility]  Hearing Exceptions: Hard of hearing/hearing concerns           Objective:     Oral/Motor  Oral Motor: Exceptions to [facility]  Labial ROM: Reduced left  Labial Symmetry: Abnormal symmetry left  Labial Strength: Reduced    Auditory Comprehension  Comprehension: Exceptions  Complex Questions: Mild  Multistep Basic Commands: Moderate  Complex/Abstract Commands: Moderate  Interfering Components: Working memory;Processing speed; Hearing  Effective Techniques: Extra processing time;Pausing;Repetition         Expression  Primary Mode of Expression: Verbal    Verbal Expression  Verbal Expression: Exceptions to functional limits  Confrontation: Mild  Convergent: Moderate  Divergent: Moderate  Conversation: Mild  Interfering Components: Impaired thought organization(expressive aphasia)  Effective Techniques: Provide extra time; Word retrived strategies         Motor Speech  Motor Speech: Exceptions to [facility]  Intelligibility: Mild  Dysarthria : Mild    Pragmatics/Social Functioning  Pragmatics: Within functional limits    Cognition:      Orientation  Overall Orientation Status: Impaired  Orientation Level: Oriented to time;Oriented to situation;Oriented to person;Disoriented to place  Memory  Memory: Exceptions to WFL(\"look at that, I just can't remember\")  Short-term Memory: Severe  Working Memory: Severe  Problem Solving  Problem Solving: Exceptions to [facility]  Complex Functional Tasks: Moderate  Verbal Reasoning Skills: Moderate  Abstract Reasoning  Abstract Reasoning: Exceptions to [facility]  Convergent Thinking: Moderate  Divergent Thinking: Moderate    Additional Assessments:   See MBS note from 04/07.            Prognosis:  Individuals consulted  Consulted and agree with results and recommendations: Patient    Education:  Patient Education: yes  Patient Education Response: Verbalizes understanding;Needs reinforcement          Therapy Time:   Individual Concurrent Group Co-treatment   Time In 1000         Time Out 1034         Minutes [address] [facility], [address] [facility]  4/8/2021 11:31 AM

## 2021-04-08 NOTE — PLAN OF CARE
Problem: Skin Integrity:  Goal: Will show no infection signs and symptoms  Description: Will show no infection signs and symptoms  Outcome: Ongoing     Problem: Skin Integrity:  Goal: Absence of new skin breakdown  Description: Absence of new skin breakdown  Outcome: Ongoing     Problem: Confusion - Acute:  Goal: Absence of continued neurological deterioration signs and symptoms  Description: Absence of continued neurological deterioration signs and symptoms  Outcome: Ongoing     Problem: Confusion - Acute:  Goal: Mental status will be restored to baseline  Description: Mental status will be restored to baseline  Outcome: Ongoing     Problem: Discharge Planning:  Goal: Ability to perform activities of daily living will improve  Description: Ability to perform activities of daily living will improve  Outcome: Ongoing     Problem: Discharge Planning:  Goal: Participates in care planning  Description: Participates in care planning  Outcome: Ongoing     Problem: Injury - Risk of, Physical Injury:  Goal: Absence of physical injury  Description: Absence of physical injury  Outcome: Ongoing     Problem: Injury - Risk of, Physical Injury:  Goal: Will remain free from falls  Description: Will remain free from falls  Outcome: Ongoing     Problem: Mood - Altered:  Goal: Mood stable  Description: Mood stable  Outcome: Ongoing     Problem: Mood - Altered:  Goal: Absence of abusive behavior  Description: Absence of abusive behavior  Outcome: Ongoing     Problem: Mood - Altered:  Goal: Verbalizations of feeling emotionally comfortable while being cared for will increase  Description: Verbalizations of feeling emotionally comfortable while being cared for will increase  Outcome: Ongoing     Problem: Psychomotor Activity - Altered:  Goal: Absence of psychomotor disturbance signs and symptoms  Description: Absence of psychomotor disturbance signs and symptoms  Outcome: Ongoing     Problem: Sensory Perception - Impaired:  Goal: Demonstrations of improved sensory functioning will increase  Description: Demonstrations of improved sensory functioning will increase  Outcome: Ongoing     Problem: Sensory Perception - Impaired:  Goal: Decrease in sensory misperception frequency  Description: Decrease in sensory misperception frequency  Outcome: Ongoing     Problem: Sensory Perception - Impaired:  Goal: Able to refrain from responding to false sensory perceptions  Description: Able to refrain from responding to false sensory perceptions  Outcome: Ongoing     Problem: Sensory Perception - Impaired:  Goal: Demonstrates accurate environmental perceptions  Description: Demonstrates accurate environmental perceptions  Outcome: Ongoing     Problem: Sensory Perception - Impaired:  Goal: Able to distinguish between reality-based and nonreality-based thinking  Description: Able to distinguish between reality-based and nonreality-based thinking  Outcome: Ongoing     Problem: Sensory Perception - Impaired:  Goal: Able to interrupt nonreality-based thinking  Description: Able to interrupt nonreality-based thinking  Outcome: Ongoing     Problem: Sleep Pattern Disturbance:  Goal: Appears well-rested  Description: Appears well-rested  Outcome: Ongoing     Problem: Neurological  Goal: Maximum potential motor/sensory/cognitive function  Outcome: Ongoing

## 2021-04-08 NOTE — PROGRESS NOTES
Physical Therapy  Facility/Department: [facility]  Daily Treatment Note  NAME: Ashleigh Coats  : 1932  MRN: [id number]    Date of Service: 2021    Discharge Recommendations:  Patient would benefit from continued therapy after discharge, Home with assist PRN, Home with Home health PT        Assessment      PT Education: General Safety  Activity Tolerance  Activity Tolerance: Patient Tolerated treatment well;Patient limited by fatigue;Patient limited by endurance     Patient Diagnosis(es): There were no encounter diagnoses. has a past medical history of Anemia, Hypertension, Hypothyroidism, Osteoarthritis, Other long term (current) drug therapy, and Renal insufficiency. has no past surgical history on file. Restrictions  Restrictions/Precautions  Restrictions/Precautions: Fall Risk, Modified Diet, Swallowing - Thickened Liquids, General Precautions(Dysphagia soft and bite sized, Moderately Thick (Honey))  Required Braces or Orthoses?: No  Implants present? : (L TKA)  Subjective   General  Chart Reviewed: Yes  Family / Caregiver Present: Yes(daughter rekha)          Orientation     Cognition      Objective   Bed mobility  Rolling to Left: Minimal assistance  Rolling to Right: Moderate assistance  Supine to Sit: Moderate assistance  Sit to Supine: Unable to assess  Scooting: Moderate assistance  Comment: use of bed rail and mechanics of bed to assist with bed mobility today. Transfers  Sit to Stand: Moderate Assistance;2 Person Assistance  Stand to sit: Moderate Assistance;2 Person Assistance  Bed to Chair: Moderate assistance;2 Person Assistance(upright walker)  Stand Pivot Transfers: Maximum Assistance  Squat Pivot Transfers:  Moderate Assistance;2 Person Assistance  Comment: STS with nicole kraft to toilet Mod A of 1 dependent to pants down; Nursing completed toileting and transfer at bedside in pm.  Ambulation  Ambulation?: Yes  More Ambulation?: No  Ambulation 1  Surface: level tile  Device: (upright walker)  Assistance: 2 Person assistance;Minimal assistance(2nd person min A for safety)  Quality of Gait: Unsteady steps, needs cues for safety, pt easily distracted and needs re-direction to focus on task. Pt very talkative. WBOS noted. Gait Deviations: Slow Zuly;Decreased step height;Decreased step length  Distance: 50 feet x2; 140 feet wit 2 standing rest breaks  Comments: Mod A to manage rolling walker , forward trunk lean, decreased step length R LE. Stairs/Curb  Stairs?: No  Wheelchair Activities  Wheelchair Parts Management: Yes  Right Leg Rest Level of Assistance: Dependent/Total  Left Brakes Level of Assistance: Dependent/Total  Right Brakes Level of Assistance: Minimal assistance  Anti-tippers Level of Assistance: Minimal assistance  Propulsion: Yes        Other exercises  Other exercises?: Yes  Other exercises 1: UBE 5 minutes  Other exercises 2: Seated BLE therex 20 reps  Other exercises 3: sit to stands with nicole stedy 3 minute standing activity with standing upright holding 45 secs x 4                        G-Code     OutComes Score                                                     AM-PAC Score             Goals  Short term goals  Time Frame for Short term goals:  8 to 9 days  Short term goal 1: Pt to perform bed mobility at SBA with rail  Short term goal 2: Pt to demonstrate transfers at min A  Short term goal 3: Pt to amb 50 to 80 ft with appropriate device, min/mod A   Short term goal 4: Pt to improve L LE strength by 1/3 MMG to improve fucntion. Short term goal 5: Pt able to go up and down 5 steps with 2 UE support, mod A   Long term goals  Time Frame for Long term goals : By LOS  Long term goal 1: Pt able to perform bed mobility mod-I  Long term goal 2: Pt able to perform transfers from varies surfaces at mod-I /supervsion level. Long term goal 3: Pt able to ambulate with appropriate device distance of 80 ft, mod-I/supervsion level.    Long term goal 4: Pt able to perform step curb with assisitive device at min A  Long term goal 5: Pt able to go up and down 5 steps with 2 rails at min A to improve LE strength. Long term goal 6: Improve PASS score to atleast 25//36 improve overall fucntion  Long term goal 7: Improve Tinetti balance score to 20 or more/28 to reduce fall risk. Long term goal 8: Pt able to propel w/c on level surfaces 150 ft, with set up/supervsion.   Patient Goals   Patient goals : I want to move my Left side to walk better    Plan    Plan  Times per week: 1.5 hr/day, 5 to 7 days/week  Current Treatment Recommendations: Strengthening, Neuromuscular Re-education, Home Exercise Program, Safety Education & Training, Patient/Caregiver Education & Training, Equipment Evaluation, Education, & procurement, Functional Mobility Training, Balance Training, Endurance Training, Transfer Training, Gait Training, ROM, Stair training  Safety Devices  Type of devices: Nurse notified, Patient at risk for falls, Call light within reach, Left in chair  Restraints  Initially in place: No     Therapy Time     04/08/21 0900 04/08/21 1437   PT Individual Minutes   Time In 0900 1332   Time Out 1000 1408   Minutes 60 476 [facility]

## 2021-04-08 NOTE — PLAN OF CARE
Nutrition Problem #1: Inadequate oral intake  Intervention: Food and/or Nutrient Delivery: Continue Current Diet, Start Oral Nutrition Supplement  Nutritional Goals: intake more than 75%

## 2021-04-08 NOTE — PLAN OF CARE
Problem: Skin Integrity:  Goal: Will show no infection signs and symptoms  Description: Will show no infection signs and symptoms  Outcome: Ongoing     Problem: Injury - Risk of, Physical Injury:  Goal: Absence of physical injury  Description: Absence of physical injury  Outcome: Ongoing

## 2021-04-08 NOTE — CONSULTS
[place] [address] Internal Medicine    CONSULTATION / HISTORY AND PHYSICAL EXAMINATION            Date:   4/8/2021  Patient name:  Leticia Umaña  Date of admission:  4/7/2021 12:21 PM  MRN:   [id number]  Account:  [de-identified]  YOB: 1932  PCP:    VERNELL Bar CNP  Room:   7692/0274-89  Code Status:    Full Code    Physician Requesting Consult: Cecilia Chow MD    Reason for Consult:  medical management    Chief Complaint:     No chief complaint on file. Ischemic CVA    History Obtained From:     Patient medical record nursing staff    History of Present Illness:     51-year-old elderly lady was admitted to [facility] earlier this month with the acute left-sided weakness after she finished her physical therapy at home she has chronic left leg weakness post multiple surgeries and has chronic shoulder issues and difficulty overhead abduction  Patient received a TPA      Past Medical History:     Past Medical History:   Diagnosis Date    Anemia     Hypertension     Hypothyroidism     Osteoarthritis     Other long term (current) drug therapy     Renal insufficiency         Past Surgical History:     No past surgical history on file. Medications Prior to Admission:     Prior to Admission medications    Medication Sig Start Date End Date Taking?  Authorizing Provider   atorvastatin (LIPITOR) 20 MG tablet Take 1 tablet by mouth nightly 3/1/21  Yes VENRELL Salmon CNP   levothyroxine (SYNTHROID) 112 MCG tablet Take 1 tablet by mouth Daily 3/1/21  Yes VERNELL Salmon CNP   oxybutynin (DITROPAN-XL) 5 MG extended release tablet Take 1 tablet by mouth daily 1/5/21  Yes VERNELL Salmon CNP   acetaminophen (TYLENOL) 325 MG tablet Take 650 mg by mouth Take 2 tablets at bedtime for sleeping comfort PRN   Yes Historical Provider, MD   amLODIPine (NORVASC) 5 MG tablet Take 1 tablet by mouth daily 3/1/21   VERNELL Salmon CNP   Ferrous Sulfate 324 MG TBEC Take 1 tablet by mouth 2 times daily 3/1/21   Marcos Border, APRN - CNP   omeprazole (PRILOSEC) 20 MG delayed release capsule Take 1 capsule by mouth daily 3/1/21   Marcos Border, APRN - CNP   sodium bicarbonate 650 MG tablet Take 1 tablet by mouth 4 times daily Two tabs BID 3/1/21   Marcos Border, APRN - CNP   metoprolol tartrate (LOPRESSOR) 25 MG tablet Take 0.5 tablets by mouth 2 times daily Take 1/2 tablet BID 3/1/21   Marcos Border, APRN - CNP   docusate sodium (COLACE) 100 MG capsule Take 1 capsule by mouth every other day 2/24/21   Marcos Border, APRN - CNP   cephALEXin (KEFLEX) 250 MG capsule TAKE ONE CAPSULE BY MOUTH ONCE A DAY. 2/1/21   Kate Sumner APRN - CNP   fluorouracil (EFUDEX) 5 % cream Apply topically for 4 weeks . 6/29/20   Historical Provider, MD   Calcium Carb-Cholecalciferol (CALCIUM + D3) 600-200 MG-UNIT TABS tablet Take 1 tablet by mouth Daily with lunch    Historical Provider, MD   Multiple Vitamins-Minerals (THERAPEUTIC MULTIVITAMIN-MINERALS) tablet Take 1 tablet by mouth daily    Historical Provider, MD   magnesium oxide (MAG-OX) 400 MG tablet Take 400 mg by mouth daily    Historical Provider, MD   Apoaequorin (PREVAGEN) 10 MG CAPS Take by mouth daily    Historical Provider, MD   GLUCOSA-CHONDR-NA CHONDR-MSM PO Take by mouth    Historical Provider, MD        Allergies:     Ciprofloxacin, Hydrocodone, Macrobid [nitrofurantoin], and Ciprofloxacin    Social History:     Tobacco:    reports that she has never smoked. She has never used smokeless tobacco.  Alcohol:      reports no history of alcohol use. Drug Use:  reports no history of drug use. Family History:     No family history on file. Review of Systems:     Positive and Negative as described in HPI.     CONSTITUTIONAL:  negative for fevers, chills, sweats, fatigue, weight loss  HEENT:  negative for vision, hearing changes, runny nose, throat pain  RESPIRATORY:  negative for shortness of breath, cough, congestion, wheezing. CARDIOVASCULAR:  negative for chest pain, palpitations. GASTROINTESTINAL:  negative for nausea, vomiting, diarrhea, constipation, change in bowel habits, abdominal pain   GENITOURINARY:  negative for difficulty of urination, burning with urination, frequency   INTEGUMENT:  negative for rash, skin lesions, easy bruising   HEMATOLOGIC/LYMPHATIC:  negative for swelling/edema   ALLERGIC/IMMUNOLOGIC:  negative for urticaria , itching  ENDOCRINE:  negative increase in drinking, increase in urination, hot or cold intolerance  MUSCULOSKELETAL:  negative joint pains, muscle aches, swelling of joints  NEUROLOGICAL: Positive for left upper and lower extremity weakness extremities      Physical Exam:     BP (!) 140/78   Pulse 90   Temp 99.6 °F (37.6 °C) (Oral)   Resp 16   Ht 5' 7\" (1.702 m)   Wt 172 lb (78 kg)   SpO2 98%   BMI 26.94 kg/m²   Temp (24hrs), Av.2 °F (37.3 °C), Min:98.8 °F (37.1 °C), Max:99.6 °F (37.6 °C)    Recent Labs     21  0727   POCGLU 98     No intake or output data in the 24 hours ending 21 1653    General Appearance:  alert, well appearing, and in no acute distress  Mental status: oriented to person, place, and time with normal affect  Head:  normocephalic, atraumatic. Eye: no icterus, redness, pupils equal and reactive, extraocular eye movements intact, conjunctiva clear  Ear: normal external ear, no discharge, hearing intact  Nose:  no drainage noted  Mouth: mucous membranes moist  Neck: supple, no carotid bruits, thyroid not palpable  Lungs: Bilateral equal air entry, clear to ausculation, no wheezing, rales or rhonchi, normal effort  Cardiovascular: normal rate, regular rhythm, no murmur, gallop, rub.   Abdomen: Soft, nontender, nondistended, normal bowel sounds, no hepatomegaly or splenomegaly  Neurologic: Left upper and left lower extremity weakness  Skin: No gross lesions, rashes, bruising or bleeding on exposed skin area  Extremities:  peripheral pulses palpable, no pedal edema or calf pain with palpation      Investigations:      Laboratory Testing:  Recent Results (from the past 24 hour(s))   POC Glucose Fingerstick    Collection Time: 04/08/21  7:27 AM   Result Value Ref Range    POC Glucose 98 65 - 105 mg/dL   Basic Metabolic Panel w/ Reflex to MG    Collection Time: 04/08/21 12:11 PM   Result Value Ref Range    Glucose 153 (H) 70 - 99 mg/dL    BUN 27 (H) 8 - 23 mg/dL    CREATININE 1.47 (H) 0.50 - 0.90 mg/dL    Bun/Cre Ratio NOT REPORTED 9 - 20    Calcium 8.8 8.6 - 10.4 mg/dL    Sodium 133 (L) 135 - 144 mmol/L    Potassium 4.2 3.7 - 5.3 mmol/L    Chloride 100 98 - 107 mmol/L    CO2 21 20 - 31 mmol/L    Anion Gap 12 9 - 17 mmol/L    GFR Non-African American 33 (L) >60 mL/min    GFR  41 (L) >60 mL/min    GFR Comment          GFR Staging NOT REPORTED    CBC    Collection Time: 04/08/21 12:11 PM   Result Value Ref Range    WBC 14.3 (H) 3.5 - 11.0 k/uL    RBC 3.70 (L) 4.0 - 5.2 m/uL    Hemoglobin 11.2 (L) 12.0 - 16.0 g/dL    Hematocrit 34.6 (L) 36 - 46 %    MCV 93.4 80 - 100 fL    MCH 30.3 26 - 34 pg    MCHC 32.5 31 - 37 g/dL    RDW 14.3 11.5 - 14.9 %    Platelets 051 363 - 273 k/uL    MPV 9.2 6.0 - 12.0 fL    NRBC Automated NOT REPORTED per 100 WBC           Consultations:   IP CONSULT TO SOCIAL WORK  IP CONSULT TO INTERNAL MEDICINE  Assessment :      Primary Problem  <principal problem not specified>    Active Hospital Problems    Diagnosis Date Noted    [facility] [I63.9] 04/07/2021       Plan:     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 Lisa Noble, APRN - CNP    Please note that this chart was generated using voice recognition Dragon dictation software. Although every effort was made to ensure the accuracy of this automated transcription, some errors in transcription may have occurred.

## 2021-04-09 LAB
ABSOLUTE BANDS #: 1.01 K/UL (ref 0–1)
ABSOLUTE EOS #: 0 K/UL (ref 0–0.4)
ABSOLUTE IMMATURE GRANULOCYTE: ABNORMAL K/UL (ref 0–0.3)
ABSOLUTE LYMPH #: 1.01 K/UL (ref 1–4.8)
ABSOLUTE MONO #: 0.67 K/UL (ref 0.1–1.3)
BANDS: 6 % (ref 0–10)
BASOPHILS # BLD: 0 % (ref 0–2)
BASOPHILS ABSOLUTE: 0 K/UL (ref 0–0.2)
DIFFERENTIAL TYPE: ABNORMAL
EOSINOPHILS RELATIVE PERCENT: 0 % (ref 0–4)
HCT VFR BLD CALC: 32.3 % (ref 36–46)
HEMOGLOBIN: 10.3 G/DL (ref 12–16)
IMMATURE GRANULOCYTES: ABNORMAL %
LYMPHOCYTES # BLD: 6 % (ref 24–44)
MCH RBC QN AUTO: 29.6 PG (ref 26–34)
MCHC RBC AUTO-ENTMCNC: 31.8 G/DL (ref 31–37)
MCV RBC AUTO: 93 FL (ref 80–100)
MONOCYTES # BLD: 4 % (ref 1–7)
MORPHOLOGY: NORMAL
NRBC AUTOMATED: ABNORMAL PER 100 WBC
PDW BLD-RTO: 14.8 % (ref 11.5–14.9)
PLATELET # BLD: 208 K/UL (ref 150–450)
PLATELET ESTIMATE: ABNORMAL
PMV BLD AUTO: 9.2 FL (ref 6–12)
RBC # BLD: 3.48 M/UL (ref 4–5.2)
RBC # BLD: ABNORMAL 10*6/UL
SEG NEUTROPHILS: 84 % (ref 36–66)
SEGMENTED NEUTROPHILS ABSOLUTE COUNT: 14.11 K/UL (ref 1.3–9.1)
WBC # BLD: 16.8 K/UL (ref 3.5–11)
WBC # BLD: ABNORMAL 10*3/UL

## 2021-04-09 PROCEDURE — 36415 COLL VENOUS BLD VENIPUNCTURE: CPT

## 2021-04-09 PROCEDURE — 51701 INSERT BLADDER CATHETER: CPT

## 2021-04-09 PROCEDURE — 6370000000 HC RX 637 (ALT 250 FOR IP): Performed by: PHYSICAL MEDICINE & REHABILITATION

## 2021-04-09 PROCEDURE — 51798 US URINE CAPACITY MEASURE: CPT

## 2021-04-09 PROCEDURE — 97116 GAIT TRAINING THERAPY: CPT

## 2021-04-09 PROCEDURE — 97535 SELF CARE MNGMENT TRAINING: CPT

## 2021-04-09 PROCEDURE — 6360000002 HC RX W HCPCS: Performed by: STUDENT IN AN ORGANIZED HEALTH CARE EDUCATION/TRAINING PROGRAM

## 2021-04-09 PROCEDURE — 1180000000 HC REHAB R&B

## 2021-04-09 PROCEDURE — 97112 NEUROMUSCULAR REEDUCATION: CPT

## 2021-04-09 PROCEDURE — 92507 TX SP LANG VOICE COMM INDIV: CPT

## 2021-04-09 PROCEDURE — 97110 THERAPEUTIC EXERCISES: CPT

## 2021-04-09 PROCEDURE — 97530 THERAPEUTIC ACTIVITIES: CPT

## 2021-04-09 PROCEDURE — 92526 ORAL FUNCTION THERAPY: CPT

## 2021-04-09 PROCEDURE — 99232 SBSQ HOSP IP/OBS MODERATE 35: CPT | Performed by: PHYSICAL MEDICINE & REHABILITATION

## 2021-04-09 PROCEDURE — 6370000000 HC RX 637 (ALT 250 FOR IP): Performed by: STUDENT IN AN ORGANIZED HEALTH CARE EDUCATION/TRAINING PROGRAM

## 2021-04-09 PROCEDURE — 85025 COMPLETE CBC W/AUTO DIFF WBC: CPT

## 2021-04-09 PROCEDURE — 99232 SBSQ HOSP IP/OBS MODERATE 35: CPT | Performed by: INTERNAL MEDICINE

## 2021-04-09 PROCEDURE — 97542 WHEELCHAIR MNGMENT TRAINING: CPT

## 2021-04-09 RX ADMIN — FERROUS SULFATE TAB 325 MG (65 MG ELEMENTAL FE) 325 MG: 325 (65 FE) TAB at 08:50

## 2021-04-09 RX ADMIN — CEPHALEXIN 250 MG: 250 CAPSULE ORAL at 00:49

## 2021-04-09 RX ADMIN — METOPROLOL TARTRATE 12.5 MG: 25 TABLET, FILM COATED ORAL at 08:50

## 2021-04-09 RX ADMIN — CEPHALEXIN 250 MG: 250 CAPSULE ORAL at 05:13

## 2021-04-09 RX ADMIN — METOPROLOL TARTRATE 12.5 MG: 25 TABLET, FILM COATED ORAL at 21:47

## 2021-04-09 RX ADMIN — CEPHALEXIN 250 MG: 250 CAPSULE ORAL at 17:32

## 2021-04-09 RX ADMIN — PANTOPRAZOLE SODIUM 40 MG: 40 TABLET, DELAYED RELEASE ORAL at 05:13

## 2021-04-09 RX ADMIN — ASPIRIN 81 MG: 81 TABLET, COATED ORAL at 08:50

## 2021-04-09 RX ADMIN — OXYBUTYNIN CHLORIDE 5 MG: 5 TABLET, EXTENDED RELEASE ORAL at 08:55

## 2021-04-09 RX ADMIN — FERROUS SULFATE TAB 325 MG (65 MG ELEMENTAL FE) 325 MG: 325 (65 FE) TAB at 21:47

## 2021-04-09 RX ADMIN — POLYETHYLENE GLYCOL 3350 17 G: 17 POWDER, FOR SOLUTION ORAL at 08:50

## 2021-04-09 RX ADMIN — ENOXAPARIN SODIUM 40 MG: 40 INJECTION SUBCUTANEOUS at 08:50

## 2021-04-09 RX ADMIN — CEPHALEXIN 250 MG: 250 CAPSULE ORAL at 13:35

## 2021-04-09 RX ADMIN — ATORVASTATIN CALCIUM 40 MG: 40 TABLET, FILM COATED ORAL at 21:47

## 2021-04-09 RX ADMIN — AMLODIPINE BESYLATE 5 MG: 5 TABLET ORAL at 08:50

## 2021-04-09 RX ADMIN — CLOPIDOGREL BISULFATE 75 MG: 75 TABLET ORAL at 08:50

## 2021-04-09 RX ADMIN — CEPHALEXIN 250 MG: 250 CAPSULE ORAL at 21:49

## 2021-04-09 RX ADMIN — LEVOTHYROXINE SODIUM 112 MCG: 112 TABLET ORAL at 05:13

## 2021-04-09 RX ADMIN — Medication 6 MG: at 21:47

## 2021-04-09 ASSESSMENT — PAIN DESCRIPTION - PROGRESSION: CLINICAL_PROGRESSION: GRADUALLY WORSENING

## 2021-04-09 NOTE — PROGRESS NOTES
pontine acute ischemic infarct status post TPAon aspirin, Plavix for 3 weeks and long-term aspirin, Lipitor. Pt admitted to rehab unit on 4/7/21. Response To Previous Treatment: Patient with no complaints from previous session. Family / Caregiver Present: No  Subjective  Subjective: Patient is pleasant. Patient reports nausea and feeling really weak, however is agreeable for therapy. Pain Screening  Patient Currently in Pain: Denies  Vital Signs  Patient Currently in Pain: Denies       Orientation  Orientation  Overall Orientation Status: Within Functional Limits       Objective   Bed mobility  Comment: Unable to assess this date. Patient in recliner start and end of sessions. Transfers  Sit to Stand: Moderate Assistance;2 Person Assistance  Stand to sit: Moderate Assistance;2 Person Assistance  Stand Pivot Transfers: Maximum Assistance  Squat Pivot Transfers: Moderate Assistance;2 Person Assistance  Comment: Transfers with RW and upright walker. Ambulation  Ambulation?: Yes  Ambulation 1  Surface: level tile  Device: (upright walker)  Assistance: 2 Person assistance;Minimal assistance(2nd person min A for safety)  Quality of Gait: Unsteady steps, needs cues for safety, pt easily distracted and needs re-direction to focus on task. Pt very talkative. WBOS noted. Gait Deviations: Slow Zuly;Decreased step height;Decreased step length  Distance: 100 ft x 2, with ~3 min rest break inbetween attempts. 2 standing rest breaks each way. Comments: Mod A to manage rolling walker , forward trunk lean, decreased step length R LE, increased fatigue.   Stairs/Curb  Stairs?: No    Wheelchair Activities  Wheelchair Parts Management: Yes  Right Leg Rest Level of Assistance: Dependent/Total  Left Brakes Level of Assistance: Dependent/Total  Right Brakes Level of Assistance: Minimal assistance  Anti-tippers Level of Assistance: Minimal assistance    Propulsion: Yes  Propulsion 1  Propulsion: Manual  Level: Level Tile Method: RUE;LUE;RLE;LLE  Level of Assistance: Contact guard assistance  Description/ Details: slow pace, gets easily distracted, fatigues easily  Distance: 50 ft x 1     Balance  Posture: Fair  Sitting - Static: Fair;+  Sitting - Dynamic: Fair  Standing - Static: Fair;-  Standing - Dynamic: Poor;+  Comments: EOB SBA once established, pt required modA x 1 to 2 person to maintain standing balance with RW. Other exercises  Other exercises?: Yes  Other exercises 1: Nustep Workload 2 x15 mins  Other exercises 2: Seated BLE therex 20 reps              Goals  Short term goals  Time Frame for Short term goals:  8 to 9 days  Short term goal 1: Pt to perform bed mobility at SBA with rail  Short term goal 2: Pt to demonstrate transfers at min A  Short term goal 3: Pt to amb 50 to 80 ft with appropriate device, min/mod A   Short term goal 4: Pt to improve L LE strength by 1/3 MMG to improve fucntion. Short term goal 5: Pt able to go up and down 5 steps with 2 UE support, mod A   Long term goals  Time Frame for Long term goals : By LOS  Long term goal 1: Pt able to perform bed mobility mod-I  Long term goal 2: Pt able to perform transfers from varies surfaces at mod-I /supervsion level. Long term goal 3: Pt able to ambulate with appropriate device distance of 80 ft, mod-I/supervsion level. Long term goal 4: Pt able to perform step curb with assisitive device at min A  Long term goal 5: Pt able to go up and down 5 steps with 2 rails at min A to improve LE strength. Long term goal 6: Improve PASS score to atleast 25//36 improve overall fucntion  Long term goal 7: Improve Tinetti balance score to 20 or more/28 to reduce fall risk. Long term goal 8: Pt able to propel w/c on level surfaces 150 ft, with set up/supervsion.   Patient Goals   Patient goals : I want to move my Left side to walk better    Plan    Plan  Times per week: 1.5 hr/day, 5 to 7 days/week  Current Treatment Recommendations: Strengthening, Neuromuscular

## 2021-04-09 NOTE — PLAN OF CARE
Problem: Skin Integrity:  Goal: Will show no infection signs and symptoms  Description: Will show no infection signs and symptoms  4/9/2021 1644 by Jonathon Miller RN  Outcome: Ongoing  4/9/2021 1644 by Jonathon Miller RN  Outcome: Ongoing  4/9/2021 0414 by Barb Seymour RN  Outcome: Ongoing  Goal: Absence of new skin breakdown  Description: Absence of new skin breakdown  4/9/2021 1644 by Jonathon Miller RN  Outcome: Ongoing  4/9/2021 1644 by Jonathon Miller RN  Outcome: Ongoing  4/9/2021 0414 by Barb Seymour RN  Outcome: Ongoing     Problem: Confusion - Acute:  Goal: Absence of continued neurological deterioration signs and symptoms  Description: Absence of continued neurological deterioration signs and symptoms  4/9/2021 1644 by Jonathon Miller RN  Outcome: Ongoing  4/9/2021 1644 by Jonathon Miller RN  Outcome: Ongoing  4/9/2021 0414 by Barb Seymour RN  Outcome: Ongoing  Goal: Mental status will be restored to baseline  Description: Mental status will be restored to baseline  4/9/2021 1644 by Jonathon Millre RN  Outcome: Ongoing  4/9/2021 1644 by Jonathon Miller RN  Outcome: Ongoing  4/9/2021 0414 by Barb Seymour RN  Outcome: Ongoing     Problem: Discharge Planning:  Goal: Ability to perform activities of daily living will improve  Description: Ability to perform activities of daily living will improve  4/9/2021 1644 by Jonathon Miller RN  Outcome: Ongoing  4/9/2021 1644 by Jonathon Miller RN  Outcome: Ongoing  4/9/2021 0414 by Barb Seymour RN  Outcome: Ongoing  Goal: Participates in care planning  Description: Participates in care planning  4/9/2021 1644 by Jonathon Miller RN  Outcome: Ongoing  4/9/2021 1644 by Jonathon Miller RN  Outcome: Ongoing  4/9/2021 0414 by Barb Seymour RN  Outcome: Ongoing     Problem: Injury - Risk of, Physical Injury:  Goal: Absence of physical injury  Description: Absence of physical injury  4/9/2021 1644 by Jonathon Miller RN  Outcome: Ongoing  4/9/2021 1644 by Sherita Maki LEWIS Bhardwaj  Outcome: Ongoing  4/9/2021 0414 by Tequila Sue RN  Outcome: Ongoing  Goal: Will remain free from falls  Description: Will remain free from falls  4/9/2021 1644 by Chucky Haley RN  Outcome: Ongoing  4/9/2021 1644 by Chucky Haley RN  Outcome: Ongoing  4/9/2021 0414 by Tequila Sue RN  Outcome: Ongoing     Problem: Mood - Altered:  Goal: Mood stable  Description: Mood stable  4/9/2021 1644 by Chucky Haley RN  Outcome: Ongoing  4/9/2021 1644 by Chucky Haley RN  Outcome: Ongoing  4/9/2021 0414 by Tequila Sue RN  Outcome: Ongoing  Goal: Absence of abusive behavior  Description: Absence of abusive behavior  4/9/2021 1644 by Chucky Haley RN  Outcome: Ongoing  4/9/2021 1644 by Chucky Haley RN  Outcome: Ongoing  4/9/2021 0414 by Tequila Sue RN  Outcome: Ongoing  Goal: Verbalizations of feeling emotionally comfortable while being cared for will increase  Description: Verbalizations of feeling emotionally comfortable while being cared for will increase  4/9/2021 1644 by Chucky Haley RN  Outcome: Ongoing  4/9/2021 1644 by Chucky Haley RN  Outcome: Ongoing  4/9/2021 0414 by Tequila Sue RN  Outcome: Ongoing     Problem: Psychomotor Activity - Altered:  Goal: Absence of psychomotor disturbance signs and symptoms  Description: Absence of psychomotor disturbance signs and symptoms  4/9/2021 1644 by Chucky Haley RN  Outcome: Ongoing  4/9/2021 1644 by Chucky Haley RN  Outcome: Ongoing  4/9/2021 0414 by Tequila Sue RN  Outcome: Ongoing     Problem: Sensory Perception - Impaired:  Goal: Demonstrations of improved sensory functioning will increase  Description: Demonstrations of improved sensory functioning will increase  4/9/2021 1644 by Chucky Haley RN  Outcome: Ongoing  4/9/2021 1644 by Chucky Haley RN  Outcome: Ongoing  4/9/2021 0414 by Tequila Sue RN  Outcome: Ongoing  Goal: Decrease in sensory misperception frequency  Description: Decrease in sensory misperception Nutrition Problem #1: Inadequate oral intake  Intervention: Food and/or Nutrient Delivery: Continue Current Diet, Start Oral Nutrition Supplement  Nutritional Goals: intake more than 75%     4/9/2021 1644 by Zoey Blank RN  Outcome: Ongoing  4/9/2021 1644 by Zoey Blank RN  Outcome: Ongoing  4/9/2021 0414 by Bill Mccrary RN  Outcome: Ongoing     Problem: Neurological  Goal: Maximum potential motor/sensory/cognitive function  4/9/2021 1644 by Zoey Blank RN  Outcome: Ongoing  4/9/2021 1644 by Zoey Blank RN  Outcome: Ongoing  4/9/2021 0414 by Bill Mccrary RN  Outcome: Ongoing

## 2021-04-09 NOTE — PROGRESS NOTES
[facility]: BEATA ARAIZA   ACUTE REHABILITATION OCCUPATIONAL THERAPY  DAILY NOTE    Date: 21  Patient Name: Anette Brambila      Room: 6828/6283-41    MRN: [id number]   : 1932  (80 y.o.)  Gender: female   Referring Practitioner: Virginie Aguilera MD  Diagnosis: Acute CVA  Additional Pertinent Hx:  Per ARU preadmission assessment:80year-old female admitted with acute left hemiparesis causing a fall of her chair. Noted acute left-sided weakness worse than previous date. .  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 Willa Burleson She was life flighted to [facility]. Patient on baby aspirin at home. Patient given TPA during TPA became hysterical repeat CT showed no change completed TPA. 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 Pt admitted to rehab unit on 21. Restrictions  Restrictions/Precautions: Fall Risk, Modified Diet, Swallowing - Thickened Liquids, General Precautions  Implants present? : (L TKA)  Required Braces or Orthoses?: No    Subjective  Subjective: \"Can I have some regular water. I don't like this thick stuff. \" Pt remains with decreased insight to elevated risk of aspiration, requiring max education and encouragement for compliance. Comments: Pt is pleasant. She moves very slow requires redirection to tend to task frequently distaracted by perseveration of her current level of function: slow pace, restricted diet, decreased ability to complete self cares. She requires cues to tend to L side positioning. Patient Currently in Pain: Denies  Restrictions/Precautions: Fall Risk;Modified Diet;Swallowing - Thickened Liquids; General Precautions  Overall Orientation Status: Impaired  Orientation Level: Oriented to person;Disoriented to place; Disoriented to time;Disoriented to situation     Pain Assessment  Clinical Progression: Gradually worsening    Objective  Cognition  Overall Cognitive Status: Impaired  Arousal/Alertness: Delayed responses to stimuli  Attention Span: Attends with cues to redirect  Memory: Decreased short term memory;Decreased recall of recent events  Following Commands: Follows multistep commands with repetition  Safety Judgement: Decreased awareness of need for assistance  Awareness of Errors: Assistance required to identify errors made  Insights: Decreased awareness of deficits  Sequencing and Organization: Assistance required to implement solutions;Assistance required to generate solutions;Assistance required to identify errors made  Perception  Overall Perceptual Status: Impaired  Initiation: Cues to initiate tasks  Motor Planning: Cues to use objects appropriately  Perseveration: Perseverates during ADLs  Balance  Sitting Balance: Stand by assistance, VC for assessment of LUE positioning, 3x resting hand with wrist heavily flexed and WB into doral surface of hand  Standing Balance: Moderate assistance  Bed mobility  Supine to Sit: Minimal assistance  Sit to Supine: Unable to assess  Scooting: Minimal assistance  Comment: increased time, G initation, exit to R/strong side, use of bedrail/HOB ~70deg  Transfers  Stand Step Transfers: Minimal assistance(With RW, 2 steps forward and back x3)  Stand Pivot Transfers: 2 Person assistance;Maximum assistance(Max x 1 to right; Max x 2 to left)  Sit to stand:  Moderate assistance  Stand to sit: Moderate assistance  Transfer Comments: Moderate verbal and tactile cues for breathing, posture attention to L side and safety,; imrpoved eccentric control with real time cuing  Standing Balance  Time: 1-2 minutes x 5  Activity: toileting tasks, lower body bathing/dressing  Comment: BUE support primarily on grab bar of Vitriflex; 3 incidences of alternating UE support as well as one 2 unsupported (~30sec each) while compensating with knees into DME for stability, 2 LOB, VC for RUE use to correct, Jonel required to recover  Functional Mobility  Functional Mobility Comments: deferred due to fatigue; use of saratedy this date for transfers  Toilet Transfers  Toilet Transfers Comments: Mayda Blue due to urgency and multiple BM in AM           Additional Activities: Cup with ice present in room; writer removed, pt perseverating on 'real water' oppose to thicken. Writer reviewed importance due to elevated aspiration risk/ importnace of not using ice due to change in consistency and encouraged smaller portions more frequently due to thickening agent with time progression. Pt remained with poor insight and understanding. ADL  Feeding: Stand by assistance(SBA of daughter to encourage initiation)  Grooming: Stand by assistance;Verbal cueing(VC for spitting for safety; seated at sink in w/c)  UE Bathing: Stand by assistance(seated on toilet)  LE Bathing: Maximum assistance(assist B feet/lower legs,buttocks/perineal; sarstedy standin)  UE Dressing: Moderate assistance(assist to pull down over trunk and adjust)  LE Dressing: Maximum assistance(assist B ARSH, socks, shoes; assist thread BLE into pull-up; CGA thread BLE into pants, pt dons over hips while stabilizing in sarstedy)  Toileting: Maximum assistance(assist personal hygiene with BM and voiding; Pt dons over hips, assist required to lower; Ax1 clothing management)  Additional Comments: SHARIF facilitated Pt engagement in self-care routine this date. Use of sarstedy for standing tasks due to fatigue/need for multiple transfers. Pt requested extended time to sit on toilet for bowel movement. While seated on toilet, Pt agreeable to engage in bathing and dressing tasks for therapeutic intervention. Please see above for details. During toilet. Pt required Moderate verbal cues for initiation and attention to task as well as increased time to complete all tasks. Continue OT POC.   Positioning Re-education, Wheelchair Mobility Training, Cognitive Reorientation, Equipment Evaluation, Education, & procurement, Home Management Training, Cognitive/Perceptual Training  Patient Goals   Patient goals : To return home with family support  Short term goals  Time Frame for Short term goals: 7 to 10 days  Short term goal 1: Pt will perform upper body bathing/dressing with stand by assist.  Short term goal 2: Pt will perform lower body bathing/dressing and toileting tasks with Moderate assist and Good safety. Short term goal 3: Pt will perform functional functional transfers and mobility during self-care with Moderate assist, least restrictive mobility device, and Good safety. Short term goal 4: Pt will for 4+ minutes with 1-2 UE support and Minimal assist while engaging in functional activity of choice. Short term goal 5: Pt will actively participate in 30+ minutes of therapeutic exercise/functional activities to promote increased independence with self-care and mobility. Short term goal 6: Pt will verbalize/demonstrate Good understanding of assistive equipment/durable medical equipment/modified techniques for increased independence with self-care and mobility. Long term goals  Time Frame for Long term goals : By discharge  Long term goal 1: Pt will perform bathing during shower with stand by assist and Good safety. Long term goal 2: Pt will perform dressing and toileting tasks with modified independence and Good safety. Long term goal 3: Pt will stand for 8+ minutes with 1-2 UE support, modified independence, no loss of balance while engaging in functional activity of choice. Long term goal 4: Pt will perform functional transfers and mobility with modified independence, least restrictive mobility device, and Good safety. Long term goal 5: Pt will verbalize/demonstrate Good understanding of Fall Prevention Strategies for increased independence with self-care and mobility.   Long term goals 6: 9 hole peg and box and blocks to be assessed for LUE as appropriate        04/09/21 1015 04/09/21 1629   OT Individual Minutes   Time In 0732  --    Time Out 0845  --    Minutes 73  --    Minute Variance   Variance  --  17   Reason  --  Illness (Emesis)     Electronically signed by SHARIF Braswell on 4/9/21 at 4:37 PM EDT

## 2021-04-09 NOTE — PLAN OF CARE
Problem: Skin Integrity:  Goal: Will show no infection signs and symptoms  Description: Will show no infection signs and symptoms  4/9/2021 0414 by Adam Aguilar RN  Outcome: Ongoing     Problem: Skin Integrity:  Goal: Absence of new skin breakdown  Description: Absence of new skin breakdown  Outcome: Ongoing     Problem: Confusion - Acute:  Goal: Absence of continued neurological deterioration signs and symptoms  Description: Absence of continued neurological deterioration signs and symptoms  Outcome: Ongoing     Problem: Confusion - Acute:  Goal: Mental status will be restored to baseline  Description: Mental status will be restored to baseline  Outcome: Ongoing     Problem: Discharge Planning:  Goal: Ability to perform activities of daily living will improve  Description: Ability to perform activities of daily living will improve  Outcome: Ongoing     Problem: Discharge Planning:  Goal: Participates in care planning  Description: Participates in care planning  Outcome: Ongoing     Problem: Injury - Risk of, Physical Injury:  Goal: Absence of physical injury  Description: Absence of physical injury  4/9/2021 0414 by Adam Aguilar RN  Outcome: Ongoing     Problem: Injury - Risk of, Physical Injury:  Goal: Will remain free from falls  Description: Will remain free from falls  Outcome: Ongoing     Problem: Mood - Altered:  Goal: Mood stable  Description: Mood stable  Outcome: Ongoing     Problem: Mood - Altered:  Goal: Absence of abusive behavior  Description: Absence of abusive behavior  Outcome: Ongoing     Problem: Mood - Altered:  Goal: Verbalizations of feeling emotionally comfortable while being cared for will increase  Description: Verbalizations of feeling emotionally comfortable while being cared for will increase  Outcome: Ongoing     Problem: Psychomotor Activity - Altered:  Goal: Absence of psychomotor disturbance signs and symptoms  Description: Absence of psychomotor disturbance signs and symptoms Outcome: Ongoing     Problem: Sensory Perception - Impaired:  Goal: Demonstrations of improved sensory functioning will increase  Description: Demonstrations of improved sensory functioning will increase  Outcome: Ongoing     Problem: Sensory Perception - Impaired:  Goal: Decrease in sensory misperception frequency  Description: Decrease in sensory misperception frequency  Outcome: Ongoing     Problem: Sensory Perception - Impaired:  Goal: Able to refrain from responding to false sensory perceptions  Description: Able to refrain from responding to false sensory perceptions  Outcome: Ongoing     Problem: Sensory Perception - Impaired:  Goal: Demonstrates accurate environmental perceptions  Description: Demonstrates accurate environmental perceptions  Outcome: Ongoing     Problem: Sensory Perception - Impaired:  Goal: Able to distinguish between reality-based and nonreality-based thinking  Description: Able to distinguish between reality-based and nonreality-based thinking  Outcome: Ongoing     Problem: Sensory Perception - Impaired:  Goal: Able to interrupt nonreality-based thinking  Description: Able to interrupt nonreality-based thinking  Outcome: Ongoing     Problem: Sleep Pattern Disturbance:  Goal: Appears well-rested  Description: Appears well-rested  Outcome: Ongoing     Problem: Neurological  Goal: Maximum potential motor/sensory/cognitive function  Outcome: Ongoing     Problem: Nutrition  Goal: Optimal nutrition therapy  Description: Nutrition Problem #1: Inadequate oral intake  Intervention: Food and/or Nutrient Delivery: Continue Current Diet, Start Oral Nutrition Supplement  Nutritional Goals: intake more than 75%     Outcome: Ongoing     Problem: Neurological  Goal: Maximum potential motor/sensory/cognitive function  Outcome: Ongoing

## 2021-04-09 NOTE — PLAN OF CARE
Individualized Plan of Care  1 [facility] Unit    Rehabilitation physician: Dr Rahul Irvin Date: 4/7/2021     Rehabilitation Diagnosis: Acute CVA (cerebrovascular accident) [facility]) [I63.9]     Rehabilitation impairments: self care, mobility, motor dysfunction, bowel/bladder management and safety    Factors facilitating achievement of predicted outcomes: Family support, Motivated, Cooperative, Pleasant and Has homemaker services  Barriers to the achievement of predicted outcomes: Depression, Upper extremity weakness, Lower extremity weakness, Incontinence of bladder and Medication managment    Patient Goals: Improve independence with mobility, Increase overall strength and endurance, Increase balance, Increase independence with activities of daily living, Improve cognition, Increase safety awareness, Assure adequate nutritional option for discharge, Continence of bowel and bladder and Provide appropriate patient and family education      NURSING:  Nursing goals for Isiah Blue while on the rehabilitation unit will include:  Continence of bowel and bladder, Adequate number of bowel movements, Urinate with no urinary retention >300ml in bladder, Maintain O2 SATs at an acceptable level during stay, Absence of skin breakdown while on the rehabilitation unit, Avoidance of any hospital acquired infections, Freedom from injury during hospitalization and Complete education with patient/family with understanding demonstrated regarding disease process and resultant impairment     In order to achieve these goals, nursing interventions may include bowel/bladder training, education for medical assistive devices, medication education, O2 saturation management, energy conservation, stress management techniques, fall prevention, alarms protocol, seating and positioning, skin/wound care, pressure relief instruction, dressing changes, infection protection, DVT prophylaxis, assistance with safe transfers , and/or assistance with bathroom activities and hygiene. PHYSICAL THERAPY:  Goals:        Short term goals  Time Frame for Short term goals:  8 to 9 days  Short term goal 1: Pt to perform bed mobility at SBA with rail  Short term goal 2: Pt to demonstrate transfers at min A  Short term goal 3: Pt to amb 50 to 80 ft with appropriate device, min/mod A   Short term goal 4: Pt to improve L LE strength by 1/3 MMG to improve fucntion. Short term goal 5: Pt able to go up and down 5 steps with 2 UE support, mod A   Long term goals  Time Frame for Long term goals : By LOS  Long term goal 1: Pt able to perform bed mobility mod-I  Long term goal 2: Pt able to perform transfers from varies surfaces at mod-I /supervsion level. Long term goal 3: Pt able to ambulate with appropriate device distance of 80 ft, mod-I/supervsion level. Long term goal 4: Pt able to perform step curb with assisitive device at min A  Long term goal 5: Pt able to go up and down 5 steps with 2 rails at min A to improve LE strength. Long term goal 6: Improve PASS score to atleast 25//36 improve overall fucntion  Long term goal 7: Improve Tinetti balance score to 20 or more/28 to reduce fall risk. Long term goal 8: Pt able to propel w/c on level surfaces 150 ft, with set up/supervsion. Plan of Care: Pt to be seen by physical therapy services 1 Hour 30 Minutes per day at least 5 out of 7 days per week     Anticipated interventions may include therapeutic exercises, gait training, neuromuscular re-ed, transfer training, community reintegration, bed mobility, w/c mobility and training. OCCUPATIONAL THERAPY:  Goals:             Short term goals  Time Frame for Short term goals: 7 to 10 days  Short term goal 1: Pt will perform upper body bathing/dressing with stand by assist.  Short term goal 2: Pt will perform lower body bathing/dressing and toileting tasks with Moderate assist and Good safety.   Short term goal 3: Pt will perform functional functional transfers and mobility during self-care with Moderate assist, least restrictive mobility device, and Good safety. Short term goal 4: Pt will for 4+ minutes with 1-2 UE support and Minimal assist while engaging in functional activity of choice. Short term goal 5: Pt will actively participate in 30+ minutes of therapeutic exercise/functional activities to promote increased independence with self-care and mobility. Short term goal 6: Pt will verbalize/demonstrate Good understanding of assistive equipment/durable medical equipment/modified techniques for increased independence with self-care and mobility. Long term goals  Time Frame for Long term goals : By discharge  Long term goal 1: Pt will perform bathing during shower with stand by assist and Good safety. Long term goal 2: Pt will perform dressing and toileting tasks with modified independence and Good safety. Long term goal 3: Pt will stand for 8+ minutes with 1-2 UE support, modified independence, no loss of balance while engaging in functional activity of choice. Long term goal 4: Pt will perform functional transfers and mobility with modified independence, least restrictive mobility device, and Good safety. Long term goal 5: Pt will verbalize/demonstrate Good understanding of Fall Prevention Strategies for increased independence with self-care and mobility. Long term goals 6: 9 hole peg and box and blocks to be assessed for LUE as appropriate    Plan of Care: Patient to be seen by occupational therapy services 1 Hour 30 Minutes per day at least 5 out of 7 days per week     Anticipated interventions may include ADL and IADL retraining, strengthening, safety education and training, patient/caregiver education and training, equipment evaluation/ training/procurement, neuromuscular reeducation, wheelchair mobility training. SPEECH THERAPY:   Goals:  Short-term Goals  Goal 1:  Increase oral tongue and tongue base strength to improve swallow reviewed with the patient on 4/9/2021. The patient has had the opportunity to provide input to the therapy team.    I have reviewed this Individualized Plan of Care and agree with its contents. Above documentation has been expanded, modified, adjusted to reflect the findings of my evaluations and goals for the patient.     Physician:  Electronically signed by Luz Elena Koenig MD on 4/9/21 at 1:00 PM EDT

## 2021-04-09 NOTE — PLAN OF CARE
Outcome: Ongoing  4/9/2021 0414 by Pascale Moore RN  Outcome: Ongoing  Goal: Absence of abusive behavior  Description: Absence of abusive behavior  4/9/2021 1644 by Juanita Moulton RN  Outcome: Ongoing  4/9/2021 0414 by Pascale Moore RN  Outcome: Ongoing  Goal: Verbalizations of feeling emotionally comfortable while being cared for will increase  Description: Verbalizations of feeling emotionally comfortable while being cared for will increase  4/9/2021 1644 by Juanita Moulton RN  Outcome: Ongoing  4/9/2021 0414 by Pascale Moore RN  Outcome: Ongoing     Problem: Psychomotor Activity - Altered:  Goal: Absence of psychomotor disturbance signs and symptoms  Description: Absence of psychomotor disturbance signs and symptoms  4/9/2021 1644 by Juanita Moulton RN  Outcome: Ongoing  4/9/2021 0414 by Pascale Moore RN  Outcome: Ongoing     Problem: Sensory Perception - Impaired:  Goal: Demonstrations of improved sensory functioning will increase  Description: Demonstrations of improved sensory functioning will increase  4/9/2021 1644 by Juanita Moulton RN  Outcome: Ongoing  4/9/2021 0414 by Pascale Moore RN  Outcome: Ongoing  Goal: Decrease in sensory misperception frequency  Description: Decrease in sensory misperception frequency  4/9/2021 1644 by Juanita Moulton RN  Outcome: Ongoing  4/9/2021 0414 by Pascale Moore RN  Outcome: Ongoing  Goal: Able to refrain from responding to false sensory perceptions  Description: Able to refrain from responding to false sensory perceptions  4/9/2021 1644 by Juanita Moulton RN  Outcome: Ongoing  4/9/2021 0414 by Pascale Moore RN  Outcome: Ongoing  Goal: Demonstrates accurate environmental perceptions  Description: Demonstrates accurate environmental perceptions  4/9/2021 1644 by Juanita Moulton RN  Outcome: Ongoing  4/9/2021 0414 by Pascale Moore RN  Outcome: Ongoing  Goal: Able to distinguish between reality-based and nonreality-based thinking  Description: Able to distinguish between reality-based and nonreality-based thinking  4/9/2021 1644 by Paul Brito RN  Outcome: Ongoing  4/9/2021 0414 by Kenney Flores RN  Outcome: Ongoing  Goal: Able to interrupt nonreality-based thinking  Description: Able to interrupt nonreality-based thinking  4/9/2021 1644 by Paul Brito RN  Outcome: Ongoing  4/9/2021 0414 by Kenney Flores RN  Outcome: Ongoing     Problem: Sleep Pattern Disturbance:  Goal: Appears well-rested  Description: Appears well-rested  4/9/2021 1644 by Paul Brito RN  Outcome: Ongoing  4/9/2021 0414 by Kenney Flores RN  Outcome: Ongoing     Problem: Neurological  Goal: Maximum potential motor/sensory/cognitive function  4/9/2021 1644 by Paul Brito RN  Outcome: Ongoing  4/9/2021 0414 by Kenney Flores RN  Outcome: Ongoing     Problem: Nutrition  Goal: Optimal nutrition therapy  Description: Nutrition Problem #1: Inadequate oral intake  Intervention: Food and/or Nutrient Delivery: Continue Current Diet, Start Oral Nutrition Supplement  Nutritional Goals: intake more than 75%     4/9/2021 1644 by Paul Brito RN  Outcome: Ongoing  4/9/2021 0414 by Kenney Flores RN  Outcome: Ongoing     Problem: Neurological  Goal: Maximum potential motor/sensory/cognitive function  4/9/2021 1644 by Paul Brito RN  Outcome: Ongoing  4/9/2021 0414 by Kenney Flores RN  Outcome: Ongoing

## 2021-04-09 NOTE — PROGRESS NOTES
Patient bladder scanned and revealed 607ML urine retained. Writer attempted to straight cath patient. Writer had to call [employer] for help getting straight cath to flow due to \"gunk\" at tip of catheter from patient's ureter/bladder. Sample sent for UA. Patient rescanned retaining 50ML urine post-straight cath. Patient states pain is now gone. Purewick placed to monitor patient's output due to patient's incontinence. Will continue to monitor patient.

## 2021-04-09 NOTE — PROGRESS NOTES
Solving/Reasoning: n/a     Dysphagia: Patient seen for dysphagia treatment this date x40 minutes using NMES as an adjunct: used a 2-channel supra- and infrahyoid placement with both channels oriented horizontally. Parameters: max intensity = 6mA. Placement used to target the supra- and infrahyoid musculature and the pharyngeal constrictor muscles. Pt. Able to complete volitional effortful swallow x18. Pt. Unable to complete elan maneuver at this time. Therapeutic feeding trials of moderately thick liquids per standard cup edge presented with NMES x10 c no overt s/s of aspiration observed. Pt. Reporting does not prefer thickened liquids. Extensive education provided re: results and recommendations following 4/7 MBS and aspiration risk with thin liquids. Pt. Verbalized understanding stating, \"oh goodness, I didn't know that\". At 900 AM, Pt. observed given medications via ice water with RN. Education provided re: aspiration risk with thin liquid. Pt. Verbalized understanding stating, \"Oh I'm so sorry, I didn't know\". Ice water removed from Pt's room by writer and Pt. Given moderately thick liquid instead. Pt. Observed taking meds via moderately thick liquids without overt s/s of aspiration. Following all meds, Pt. requesting water. RN providing Pt. with thin liquid from sink. Thin liquid removed from Pt's room by writer. Reeducated Pt. Re: aspiration risk with thin liquid. SafeCARE completed. Other: No family present. Plan:  [x] Continue ST services    [] Discharge from ST:      Discharge recommendations: [] Inpatient Rehab   [] [place]   [] Outpatient Therapy  [] Follow up at trauma clinic   [] Other:       Treatment completed by:  Prnaav Eduarod M.A., CCC-SLP

## 2021-04-09 NOTE — PROGRESS NOTES
Physical Medicine & Rehabilitation  Progress Note      Subjective:      80year-old patient with ischemic CVA with L nondominant hemiparesis. Patient is well, and has had no acute complaints or problems. She denies fever or chills, cough, dysuria. Nursing did find small PVR < 300 mls last night. She slept well. No new issues with appetite or bowel. ROS:  Denies fevers, chills, sweats. No chest pain, palpitations, lightheadedness. Denies coughing, wheezing or shortness of breath. Denies abdominal pain, nausea, diarrhea or constipation. No new areas of joint pain. Denies new areas of numbness or weakness. Denies new anxiety or depression issues. No new skin problems. Rehabilitation:   Progressing in therapies. PT:  Restrictions/Precautions: Fall Risk, Modified Diet, Swallowing - Thickened Liquids, General Precautions(Dysphagia soft and bite sized, Moderately Thick (Honey))  Implants present? : (L TKA)   Transfers  Sit to Stand: Moderate Assistance, 2 Person Assistance  Stand to sit: Moderate Assistance, 2 Person Assistance  Bed to Chair: Moderate assistance, 2 Person Assistance(upright walker)  Stand Pivot Transfers: Maximum Assistance  Squat Pivot Transfers: Moderate Assistance, 2 Person Assistance  Comment: STS with nicole phamelmer to toilet Mod A of 1 dependent to pants down; Nursing completed toileting and transfer at bedside in pm.  Ambulation 1  Surface: level tile  Device: (upright walker)  Other Apparatus: Wheelchair follow  Assistance: 2 Person assistance, Minimal assistance(2nd person min A for safety)  Quality of Gait: Unsteady steps, needs cues for safety, pt easily distracted and needs re-direction to focus on task. Pt very talkative. WBOS noted. Gait Deviations: Slow Zuly, Decreased step height, Decreased step length  Distance: 50 feet x2; 140 feet wit 2 standing rest breaks  Comments: Mod A to manage rolling walker , forward trunk lean, decreased step length R LE.      Transfers  Sit to to left             SPEECH:  Subjective: [x]? Alert     [x]? Cooperative     []? Confused     []? Agitated    []? Lethargic        Objective/Assessment:  Attention: Tangential throughout, required frequent redirection.      Orientation: n/a     Recall: n/a      Organization:   Confrontation naming, objects in pictures- 80% accuracy jenny, 90% c cues. Naming given description- 75% accuracy jenny, 90% c cues. Pt. demonstrating expressive aphasia during conversational speech and with structured tasks. Semantic paraphasias noted. Pt. Expressing frustration with deficits stating, \"I know it. I can't get it out. Isn't that infuriating? \". Emotional support provided. Education provided re: circumlocution strategy to assist in word retrieval.  Pt. Verbalized understanding, required cues to facilitate. ST to continue to address.         Problem Solving/Reasoning: n/a      Dysphagia: Patient seen for dysphagia treatment this date x40 minutes using NMES as an adjunct: used a 2-channel supra- and infrahyoid placement with both channels oriented horizontally. Parameters: max intensity = 6mA. Placement used to target the supra- and infrahyoid musculature and the pharyngeal constrictor muscles. Pt. Able to complete volitional effortful swallow x18. Pt. Unable to complete elan maneuver at this time. Therapeutic feeding trials of moderately thick liquids per standard cup edge presented with NMES x10 c no overt s/s of aspiration observed.      Pt. Reporting does not prefer thickened liquids. Extensive education provided re: results and recommendations following 4/7 MBS and aspiration risk with thin liquids. Pt. Verbalized understanding stating, \"oh goodness, I didn't know that\".    At 900 AM, Pt. observed given medications via ice water with RN. Education provided re: aspiration risk with thin liquid. Pt. Verbalized understanding stating, \"Oh I'm so sorry, I didn't know\".   Ice water removed from Pt's room by Mis Laughlin and Pt. Given moderately thick liquid instead. Pt. Observed taking meds via moderately thick liquids without overt s/s of aspiration. Following all meds, Pt. requesting water. RN providing Pt. with thin liquid from sink. Thin liquid removed from Pt's room by writer. Reeducated Pt. Re: aspiration risk with thin liquid. SafeCARE completed.       Other: No family present. Objective:  BP (!) 114/58   Pulse 98   Temp 98.8 °F (37.1 °C) (Oral)   Resp 18   Ht 5' 7\" (1.702 m)   Wt 172 lb (78 kg)   SpO2 96%   BMI 26.94 kg/m²       GEN: Well developed, well nourished, in NAD  HEENT:  NCAT. PERRL. EOMI. Mucous membranes pink and moist.   PULM:  Clear to ausculation. No rales or rhonchi. Respirations WNL and unlabored. CV:  Regular rate rhythm. No murmurs or gallops. GI:  Abdomen soft. Nontender. Non-distended. BS + and equal.    NEUROLOGICAL: A&O x3. Sensation intact to light touch. MSK:  Functional ROM RUE and RLE. Impaired AROM LUE and LLE due to weakness. Motor testing 4+/5 key muscles RUE and RLEs. Strength 3/5 key muscles LUE and LLE. SKIN: Warm dry and intact. Good turgor. EXTREMITIES:  No calf tenderness to palpation. No edema BLEs. Gemma Shauna PSYCH: Mood WNL. Appropriately interactive. Affect WNL. Diagnostics:     CBC:   Recent Labs     04/07/21  0426 04/08/21  1211 04/09/21  0716   WBC 7.3 14.3* 16.8*   RBC 3.44* 3.70* 3.48*   HGB 10.6* 11.2* 10.3*   HCT 33.0* 34.6* 32.3*   MCV 95.9 93.4 93.0   RDW 13.7 14.3 14.8    240 208     BMP:   Recent Labs     04/07/21  0426 04/08/21  1211    133*   K 4.3 4.2    100   CO2 18* 21   BUN 20 27*   CREATININE 1.03* 1.47*   GLUCOSE 89 153*     BNP: No results for input(s): BNP in the last 72 hours. PT/INR: No results for input(s): PROTIME, INR in the last 72 hours. APTT: No results for input(s): APTT in the last 72 hours.   CARDIAC ENZYMES:   Recent Labs     04/06/21  1557 04/07/21  0101 04/07/21  1018   TROPONINT NOT REPORTED NOT 4 weeks for stress test.   4. OA/L rotator cuff injury: stable. Will monitor  5. CKD: stable. Will monitor BMP  6. Hypothyroidism: on levothyroxine  7. Anemia: on ferrous sulfate  8. Hx chronic cystitis: on oxybutynin ER. Frequent incontinence is normal baseline for patient. Started Keflex 4/8 for positive urinalysis, culture pending. Sepsis alert noted - patient is asymptomatic and has been initiated on Keflex last night. Urine culture pending. 9. GERD: on pantoprazole  10. Bowel Management: Miralax daily, senokot prn, milk of magnesia prn, dulcolax prn. 11. DVT Prophylaxis:  low molecular weight heparin, SCD's while in bed and ARSH's   12. Internal medicine for medical management    Electronically signed by May Enamorado MD on 4/9/2021 at 9:50 AM      This note is created with the assistance of a speech recognition program.  While intending to generate a document that actually reflects the content of the visit, the document can still have some errors including those of syntax and sound a like substitutions which may escape proof reading. In such instances, actual meaning can be extrapolated by contextual diversion.

## 2021-04-10 PROBLEM — R53.83 MALAISE AND FATIGUE: Status: ACTIVE | Noted: 2021-04-10

## 2021-04-10 PROBLEM — R53.81 MALAISE AND FATIGUE: Status: ACTIVE | Noted: 2021-04-10

## 2021-04-10 PROBLEM — R11.0 NAUSEA: Status: ACTIVE | Noted: 2021-04-10

## 2021-04-10 LAB
ANION GAP SERPL CALCULATED.3IONS-SCNC: 14 MMOL/L (ref 9–17)
BUN BLDV-MCNC: 34 MG/DL (ref 8–23)
BUN/CREAT BLD: ABNORMAL (ref 9–20)
CALCIUM SERPL-MCNC: 8.5 MG/DL (ref 8.6–10.4)
CHLORIDE BLD-SCNC: 101 MMOL/L (ref 98–107)
CO2: 18 MMOL/L (ref 20–31)
CREAT SERPL-MCNC: 1.49 MG/DL (ref 0.5–0.9)
CULTURE: ABNORMAL
GFR AFRICAN AMERICAN: 40 ML/MIN
GFR NON-AFRICAN AMERICAN: 33 ML/MIN
GFR SERPL CREATININE-BSD FRML MDRD: ABNORMAL ML/MIN/{1.73_M2}
GFR SERPL CREATININE-BSD FRML MDRD: ABNORMAL ML/MIN/{1.73_M2}
GLUCOSE BLD-MCNC: 233 MG/DL (ref 70–99)
Lab: ABNORMAL
POTASSIUM SERPL-SCNC: 3.7 MMOL/L (ref 3.7–5.3)
SODIUM BLD-SCNC: 133 MMOL/L (ref 135–144)
SPECIMEN DESCRIPTION: ABNORMAL

## 2021-04-10 PROCEDURE — 97129 THER IVNTJ 1ST 15 MIN: CPT

## 2021-04-10 PROCEDURE — 92507 TX SP LANG VOICE COMM INDIV: CPT

## 2021-04-10 PROCEDURE — 6360000002 HC RX W HCPCS: Performed by: STUDENT IN AN ORGANIZED HEALTH CARE EDUCATION/TRAINING PROGRAM

## 2021-04-10 PROCEDURE — 1180000000 HC REHAB R&B

## 2021-04-10 PROCEDURE — 97110 THERAPEUTIC EXERCISES: CPT

## 2021-04-10 PROCEDURE — 36415 COLL VENOUS BLD VENIPUNCTURE: CPT

## 2021-04-10 PROCEDURE — 97535 SELF CARE MNGMENT TRAINING: CPT

## 2021-04-10 PROCEDURE — 99232 SBSQ HOSP IP/OBS MODERATE 35: CPT | Performed by: INTERNAL MEDICINE

## 2021-04-10 PROCEDURE — 97116 GAIT TRAINING THERAPY: CPT

## 2021-04-10 PROCEDURE — 6370000000 HC RX 637 (ALT 250 FOR IP): Performed by: PHYSICAL MEDICINE & REHABILITATION

## 2021-04-10 PROCEDURE — 80048 BASIC METABOLIC PNL TOTAL CA: CPT

## 2021-04-10 PROCEDURE — 2500000003 HC RX 250 WO HCPCS: Performed by: INTERNAL MEDICINE

## 2021-04-10 PROCEDURE — 6370000000 HC RX 637 (ALT 250 FOR IP): Performed by: STUDENT IN AN ORGANIZED HEALTH CARE EDUCATION/TRAINING PROGRAM

## 2021-04-10 PROCEDURE — 99232 SBSQ HOSP IP/OBS MODERATE 35: CPT | Performed by: PHYSICAL MEDICINE & REHABILITATION

## 2021-04-10 RX ORDER — SULFAMETHOXAZOLE AND TRIMETHOPRIM 800; 160 MG/1; MG/1
0.5 TABLET ORAL EVERY 12 HOURS SCHEDULED
Status: DISCONTINUED | OUTPATIENT
Start: 2021-04-10 | End: 2021-04-10

## 2021-04-10 RX ORDER — DEXTROSE, SODIUM CHLORIDE, AND POTASSIUM CHLORIDE 5; .45; .15 G/100ML; G/100ML; G/100ML
INJECTION INTRAVENOUS CONTINUOUS
Status: DISCONTINUED | OUTPATIENT
Start: 2021-04-10 | End: 2021-04-12

## 2021-04-10 RX ORDER — ONDANSETRON 4 MG/1
4 TABLET, FILM COATED ORAL EVERY 8 HOURS PRN
Status: DISCONTINUED | OUTPATIENT
Start: 2021-04-10 | End: 2021-04-27 | Stop reason: HOSPADM

## 2021-04-10 RX ORDER — CEPHALEXIN 500 MG/1
500 CAPSULE ORAL EVERY 8 HOURS SCHEDULED
Status: COMPLETED | OUTPATIENT
Start: 2021-04-10 | End: 2021-04-17

## 2021-04-10 RX ADMIN — FERROUS SULFATE TAB 325 MG (65 MG ELEMENTAL FE) 325 MG: 325 (65 FE) TAB at 07:47

## 2021-04-10 RX ADMIN — PANTOPRAZOLE SODIUM 40 MG: 40 TABLET, DELAYED RELEASE ORAL at 06:33

## 2021-04-10 RX ADMIN — LEVOTHYROXINE SODIUM 112 MCG: 112 TABLET ORAL at 07:47

## 2021-04-10 RX ADMIN — CEPHALEXIN 500 MG: 500 CAPSULE ORAL at 21:10

## 2021-04-10 RX ADMIN — ATORVASTATIN CALCIUM 40 MG: 40 TABLET, FILM COATED ORAL at 21:10

## 2021-04-10 RX ADMIN — METOPROLOL TARTRATE 12.5 MG: 25 TABLET, FILM COATED ORAL at 07:46

## 2021-04-10 RX ADMIN — POTASSIUM CHLORIDE, DEXTROSE MONOHYDRATE AND SODIUM CHLORIDE: 150; 5; 450 INJECTION, SOLUTION INTRAVENOUS at 18:02

## 2021-04-10 RX ADMIN — AMLODIPINE BESYLATE 5 MG: 5 TABLET ORAL at 07:46

## 2021-04-10 RX ADMIN — METOPROLOL TARTRATE 12.5 MG: 25 TABLET, FILM COATED ORAL at 21:10

## 2021-04-10 RX ADMIN — OXYBUTYNIN CHLORIDE 5 MG: 5 TABLET, EXTENDED RELEASE ORAL at 07:53

## 2021-04-10 RX ADMIN — ENOXAPARIN SODIUM 40 MG: 40 INJECTION SUBCUTANEOUS at 07:47

## 2021-04-10 RX ADMIN — CLOPIDOGREL BISULFATE 75 MG: 75 TABLET ORAL at 07:46

## 2021-04-10 RX ADMIN — ASPIRIN 81 MG: 81 TABLET, COATED ORAL at 07:47

## 2021-04-10 RX ADMIN — CEPHALEXIN 250 MG: 250 CAPSULE ORAL at 06:34

## 2021-04-10 RX ADMIN — FERROUS SULFATE TAB 325 MG (65 MG ELEMENTAL FE) 325 MG: 325 (65 FE) TAB at 21:10

## 2021-04-10 RX ADMIN — CEPHALEXIN 500 MG: 500 CAPSULE ORAL at 14:20

## 2021-04-10 ASSESSMENT — PAIN SCALES - GENERAL: PAINLEVEL_OUTOF10: 5

## 2021-04-10 ASSESSMENT — PAIN DESCRIPTION - PAIN TYPE: TYPE: ACUTE PAIN

## 2021-04-10 ASSESSMENT — PAIN DESCRIPTION - DESCRIPTORS: DESCRIPTORS: CONSTANT

## 2021-04-10 NOTE — CONSULTS
[facility]    CONSULTATION / HISTORY AND PHYSICAL EXAMINATION            Date:   4/9/2021  Patient name:  Bindu Hutchison  Date of admission:  4/7/2021 12:21 PM  MRN:   [id number]  Account:  [de-identified]  YOB: 1932  PCP:    VERNELL Rivera CNP  Room:   5205/8715-38  Code Status:    Full Code    Physician Requesting Consult: Anna Dejesus MD    Reason for Consult:  medical management    Chief Complaint:     No chief complaint on file. Ischemic CVA    History Obtained From:     Patient medical record nursing staff    History of Present Illness:     49-year-old elderly lady was admitted to [place] [address] earlier this month with the acute left-sided weakness after she finished her physical therapy at home she has chronic left leg weakness post multiple surgeries and has chronic shoulder issues and difficulty overhead abduction  Patient received a TPA      Past Medical History:     Past Medical History:   Diagnosis Date    Anemia     Hypertension     Hypothyroidism     Osteoarthritis     Other long term (current) drug therapy     Renal insufficiency         Past Surgical History:     No past surgical history on file. Medications Prior to Admission:     Prior to Admission medications    Medication Sig Start Date End Date Taking?  Authorizing Provider   atorvastatin (LIPITOR) 20 MG tablet Take 1 tablet by mouth nightly 3/1/21  Yes VERNELL Suresh CNP   levothyroxine (SYNTHROID) 112 MCG tablet Take 1 tablet by mouth Daily 3/1/21  Yes VERNELL Suresh CNP   oxybutynin (DITROPAN-XL) 5 MG extended release tablet Take 1 tablet by mouth daily 1/5/21  Yes VERNELL Suresh CNP   acetaminophen (TYLENOL) 325 MG tablet Take 650 mg by mouth Take 2 tablets at bedtime for sleeping comfort PRN   Yes Historical Provider, MD   amLODIPine (NORVASC) 5 MG tablet Take 1 tablet by mouth daily 3/1/21   VERNELL Suresh CNP   Ferrous Sulfate 324 MG TBEC Take 1 tablet by mouth 2 times daily 3/1/21   VERNELL Crawford CNP   omeprazole (PRILOSEC) 20 MG delayed release capsule Take 1 capsule by mouth daily 3/1/21   VERNELL Crawford CNP   sodium bicarbonate 650 MG tablet Take 1 tablet by mouth 4 times daily Two tabs BID 3/1/21   VERNELL Crawford CNP   metoprolol tartrate (LOPRESSOR) 25 MG tablet Take 0.5 tablets by mouth 2 times daily Take 1/2 tablet BID 3/1/21   VERNELL Crawford CNP   docusate sodium (COLACE) 100 MG capsule Take 1 capsule by mouth every other day 2/24/21   VERNELL Crawford CNP   cephALEXin (KEFLEX) 250 MG capsule TAKE ONE CAPSULE BY MOUTH ONCE A DAY. 2/1/21   Daphne VERNELL Chavarria CNP   fluorouracil (EFUDEX) 5 % cream Apply topically for 4 weeks . 6/29/20   Historical Provider, MD   Calcium Carb-Cholecalciferol (CALCIUM + D3) 600-200 MG-UNIT TABS tablet Take 1 tablet by mouth Daily with lunch    Historical Provider, MD   Multiple Vitamins-Minerals (THERAPEUTIC MULTIVITAMIN-MINERALS) tablet Take 1 tablet by mouth daily    Historical Provider, MD   magnesium oxide (MAG-OX) 400 MG tablet Take 400 mg by mouth daily    Historical Provider, MD   Apoaequorin (PREVAGEN) 10 MG CAPS Take by mouth daily    Historical Provider, MD   GLUCOSA-CHONDR-NA CHONDR-MSM PO Take by mouth    Historical Provider, MD        Allergies:     Ciprofloxacin, Hydrocodone, Macrobid [nitrofurantoin], and Ciprofloxacin    Social History:     Tobacco:    reports that she has never smoked. She has never used smokeless tobacco.  Alcohol:      reports no history of alcohol use. Drug Use:  reports no history of drug use. Family History:     No family history on file. Review of Systems:     Positive and Negative as described in HPI.     CONSTITUTIONAL:  negative for fevers, chills, sweats, fatigue, weight loss  HEENT:  negative for vision, hearing changes, runny nose, throat pain  RESPIRATORY:  negative for shortness of breath, cough, congestion, wheezing. CARDIOVASCULAR:  negative for chest pain, palpitations. GASTROINTESTINAL:  negative for nausea, vomiting, diarrhea, constipation, change in bowel habits, abdominal pain   GENITOURINARY:  negative for difficulty of urination, burning with urination, frequency   INTEGUMENT:  negative for rash, skin lesions, easy bruising   HEMATOLOGIC/LYMPHATIC:  negative for swelling/edema   ALLERGIC/IMMUNOLOGIC:  negative for urticaria , itching  ENDOCRINE:  negative increase in drinking, increase in urination, hot or cold intolerance  MUSCULOSKELETAL:  negative joint pains, muscle aches, swelling of joints  NEUROLOGICAL: Positive for left upper and lower extremity weakness extremities      Physical Exam:     BP (!) 128/57   Pulse 92   Temp 99.7 °F (37.6 °C) (Oral)   Resp 18   Ht 5' 7\" (1.702 m)   Wt 172 lb (78 kg)   SpO2 97%   BMI 26.94 kg/m²   Temp (24hrs), Av.3 °F (37.4 °C), Min:98.8 °F (37.1 °C), Max:99.7 °F (37.6 °C)    Recent Labs     21  0727   POCGLU 98       Intake/Output Summary (Last 24 hours) at 2021 2308  Last data filed at 2021 0557  Gross per 24 hour   Intake 240 ml   Output    Net 240 ml       General Appearance:  alert, well appearing, and in no acute distress  Mental status: oriented to person, place, and time with normal affect  Head:  normocephalic, atraumatic. Eye: no icterus, redness, pupils equal and reactive, extraocular eye movements intact, conjunctiva clear  Ear: normal external ear, no discharge, hearing intact  Nose:  no drainage noted  Mouth: mucous membranes moist  Neck: supple, no carotid bruits, thyroid not palpable  Lungs: Bilateral equal air entry, clear to ausculation, no wheezing, rales or rhonchi, normal effort  Cardiovascular: normal rate, regular rhythm, no murmur, gallop, rub.   Abdomen: Soft, nontender, nondistended, normal bowel sounds, no hepatomegaly or splenomegaly  Neurologic: Left upper and left lower extremity weakness  Skin: No gross lesions, rashes, bruising or bleeding on exposed skin area  Extremities:  peripheral pulses palpable, no pedal edema or calf pain with palpation      Investigations:      Laboratory Testing:  Recent Results (from the past 24 hour(s))   CBC with DIFF    Collection Time: 04/09/21  7:16 AM   Result Value Ref Range    WBC 16.8 (H) 3.5 - 11.0 k/uL    RBC 3.48 (L) 4.0 - 5.2 m/uL    Hemoglobin 10.3 (L) 12.0 - 16.0 g/dL    Hematocrit 32.3 (L) 36 - 46 %    MCV 93.0 80 - 100 fL    MCH 29.6 26 - 34 pg    MCHC 31.8 31 - 37 g/dL    RDW 14.8 11.5 - 14.9 %    Platelets 843 061 - 342 k/uL    MPV 9.2 6.0 - 12.0 fL    NRBC Automated NOT REPORTED per 100 WBC    Differential Type NOT REPORTED     Immature Granulocytes NOT REPORTED 0 %    Absolute Immature Granulocyte NOT REPORTED 0.00 - 0.30 k/uL    WBC Morphology NOT REPORTED     RBC Morphology NOT REPORTED     Platelet Estimate NOT REPORTED     Seg Neutrophils 84 (H) 36 - 66 %    Lymphocytes 6 (L) 24 - 44 %    Monocytes 4 1 - 7 %    Eosinophils % 0 0 - 4 %    Basophils 0 0 - 2 %    Bands 6 0 - 10 %    Segs Absolute 14.11 (H) 1.3 - 9.1 k/uL    Absolute Lymph # 1.01 1.0 - 4.8 k/uL    Absolute Mono # 0.67 0.1 - 1.3 k/uL    Absolute Eos # 0.00 0.0 - 0.4 k/uL    Basophils Absolute 0.00 0.0 - 0.2 k/uL    Absolute Bands # 1.01 (H) 0.0 - 1.0 k/uL    Morphology Normal            Consultations:   IP CONSULT TO SOCIAL WORK  IP CONSULT TO INTERNAL MEDICINE  Assessment :      Primary Problem  <principal problem not specified>    Active Hospital Problems    Diagnosis Date Noted    [facility] [I63.9] 04/07/2021       Plan:     Acute right ischemic infarct of the rhonda status post thrombolytics  Left hemiparesis for 3-4 out of 5  Aspirin Plavix and Lipitor  Hypertension beta-blocker and Norvasc control  Hypothyroidism on Synthroid follow with TSH in 6 weeks  Mild protein calorie malnutrition  SYEDA Baseline creatinine is 0.87 increased to 1.47 avoid NSAIDs hydration oral recheck  Mild hyponatremia sodium 133 will follow with cortisol and TSH  E coli UTI  On keflex started  Awaiting sensitivity      Edith Gonzalez MD  4/9/2021  11:08 PM    Copy sent to Dr. Bay Benites, APRN - CNP    Please note that this chart was generated using voice recognition Dragon dictation software. Although every effort was made to ensure the accuracy of this automated transcription, some errors in transcription may have occurred.

## 2021-04-10 NOTE — CONSULTS
(NORVASC) 5 MG tablet Take 1 tablet by mouth daily 3/1/21   VERNELL Kiser CNP   Ferrous Sulfate 324 MG TBEC Take 1 tablet by mouth 2 times daily 3/1/21   VERNELL Kiser CNP   omeprazole (PRILOSEC) 20 MG delayed release capsule Take 1 capsule by mouth daily 3/1/21   VERNELL Kiser CNP   sodium bicarbonate 650 MG tablet Take 1 tablet by mouth 4 times daily Two tabs BID 3/1/21   VERNELL Kiser CNP   metoprolol tartrate (LOPRESSOR) 25 MG tablet Take 0.5 tablets by mouth 2 times daily Take 1/2 tablet BID 3/1/21   VERNELL Kiser CNP   docusate sodium (COLACE) 100 MG capsule Take 1 capsule by mouth every other day 2/24/21   VERNELL Kiser CNP   cephALEXin (KEFLEX) 250 MG capsule TAKE ONE CAPSULE BY MOUTH ONCE A DAY. 2/1/21   [facility], APRN - CNP   fluorouracil (EFUDEX) 5 % cream Apply topically for 4 weeks . 6/29/20   Historical Provider, MD   Calcium Carb-Cholecalciferol (CALCIUM + D3) 600-200 MG-UNIT TABS tablet Take 1 tablet by mouth Daily with lunch    Historical Provider, MD   Multiple Vitamins-Minerals (THERAPEUTIC MULTIVITAMIN-MINERALS) tablet Take 1 tablet by mouth daily    Historical Provider, MD   magnesium oxide (MAG-OX) 400 MG tablet Take 400 mg by mouth daily    Historical Provider, MD   Apoaequorin (PREVAGEN) 10 MG CAPS Take by mouth daily    Historical Provider, MD   GLUCOSA-CHONDR-NA CHONDR-MSM PO Take by mouth    Historical Provider, MD        Allergies:     Ciprofloxacin, Hydrocodone, Macrobid [nitrofurantoin], and Ciprofloxacin    Social History:     Tobacco:    reports that she has never smoked. She has never used smokeless tobacco.  Alcohol:      reports no history of alcohol use. Drug Use:  reports no history of drug use. Family History:     No family history on file. Review of Systems:     Positive and Negative as described in HPI.     CONSTITUTIONAL:  negative for fevers, chills, sweats, fatigue, weight loss  HEENT:  negative for vision, hearing changes, runny nose, throat pain  RESPIRATORY:  negative for shortness of breath, cough, congestion, wheezing. CARDIOVASCULAR:  negative for chest pain, palpitations. GASTROINTESTINAL:  negative for nausea, vomiting, diarrhea, constipation, change in bowel habits, abdominal pain   GENITOURINARY:  negative for difficulty of urination, burning with urination, frequency   INTEGUMENT:  negative for rash, skin lesions, easy bruising   HEMATOLOGIC/LYMPHATIC:  negative for swelling/edema   ALLERGIC/IMMUNOLOGIC:  negative for urticaria , itching  ENDOCRINE:  negative increase in drinking, increase in urination, hot or cold intolerance  MUSCULOSKELETAL:  negative joint pains, muscle aches, swelling of joints  NEUROLOGICAL: Positive for left upper and lower extremity weakness extremities      Physical Exam:     /66   Pulse 91   Temp 99.2 °F (37.3 °C) (Oral)   Resp 18   Ht 5' 7\" (1.702 m)   Wt 172 lb (78 kg)   SpO2 98%   BMI 26.94 kg/m²   Temp (24hrs), Av.5 °F (37.5 °C), Min:99.2 °F (37.3 °C), Max:99.7 °F (37.6 °C)    Recent Labs     21  0727   POCGLU 98     No intake or output data in the 24 hours ending 04/10/21 1557    General Appearance:  alert, well appearing, and in no acute distress  Mental status: oriented to person, place, and time with normal affect  Head:  normocephalic, atraumatic. Eye: no icterus, redness, pupils equal and reactive, extraocular eye movements intact, conjunctiva clear  Ear: normal external ear, no discharge, hearing intact  Nose:  no drainage noted  Mouth: mucous membranes moist  Neck: supple, no carotid bruits, thyroid not palpable  Lungs: Bilateral equal air entry, clear to ausculation, no wheezing, rales or rhonchi, normal effort  Cardiovascular: normal rate, regular rhythm, no murmur, gallop, rub.   Abdomen: Soft, nontender, nondistended, normal bowel sounds, no hepatomegaly or splenomegaly  Neurologic: Left upper and left lower extremity weakness  Skin: No gross lesions, rashes, bruising or bleeding on exposed skin area  Extremities:  peripheral pulses palpable, no pedal edema or calf pain with palpation      Investigations:      Laboratory Testing:  No results found for this or any previous visit (from the past 24 hour(s)). Consultations:   IP CONSULT TO SOCIAL WORK  IP CONSULT TO INTERNAL MEDICINE  Assessment :      Primary Problem  <principal problem not specified>    Active Hospital Problems    Diagnosis Date Noted    Nausea [R11.0] 04/10/2021    Malaise and fatigue [R53.81, R53.83] 04/10/2021    Acute CVA (cerebrovascular accident) ([facility] [address] [I63.9] 04/07/2021    Acute left hemiparesis ([facility] [address] [G81.94]     Gastroesophageal reflux disease without esophagitis [K21.9] 02/24/2021    HTN (hypertension) [I10] 10/21/2020    Hypothyroidism [E03.9] 10/21/2020       Plan:     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 Check labs . Shari Ferraro MD  4/10/2021  3:57 PM    Copy sent to Dr. Blair Latham, APRN - CNP    Please note that this chart was generated using voice recognition Dragon dictation software. Although every effort was made to ensure the accuracy of this automated transcription, some errors in transcription may have occurred.

## 2021-04-10 NOTE — PROGRESS NOTES
Physical Therapy  [address]  Acute Rehabilitation Physical Therapy Progress Note    Date: 4/10/21  Patient Name: Ulysses Moynahan       Room: [facility]/3490-75  MRN: [id number]   Account: [de-identified]   : 1932  (80 y.o.) Gender: female     Referring Practitioner: Liberty Zamudio MD  Diagnosis: Acute CVA  Past Medical History:  has a past medical history of Anemia, Hypertension, Hypothyroidism, Osteoarthritis, Other long term (current) drug therapy, and Renal insufficiency. Past Surgical History:   has no past surgical history on file. Additional Pertinent Hx: Per ARU preadmission assessment:80year-old female admitted with acute left hemiparesis causing a fall of her chair. Noted acute left-sided weakness worse than previous date. .  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 Sueellen Payment She was life flighted to [place]. Patient on baby aspirin at home. Patient given TPA during TPA became hysterical repeat CT showed no change completed TPA.  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 Pt admitted to rehab unit on 21. Restrictions/Precautions  Restrictions/Precautions: Fall Risk;Modified Diet;Swallowing - Thickened Liquids; General Precautions  Required Braces or Orthoses?: No  Implants present? : (L TKA)    Subjective: pt reports nausea  AM  Comments: increased lethray at completion AM treatment and continues into PM treatment , nursing reports possibly due to antibiotic with no food and UTI    Vital Signs  Patient Currently in Pain: Denies        Bed Mobility:        Transfers:  Sit to Stand:  Moderate Assistance;2 Person Assistance  Stand to sit: Minimal Assistance      Ambulation 1  Surface: level tile  Device: (upright walker )  Assistance: 2 Person assistance;Minimal assistance(for safety)  Quality of Gait: increased trunk flexion , UE weight bearing   Gait Deviations: Slow Zuly;Decreased step height;Decreased step length  Distance: 110 ft  Comments: upright walker wheels locked for straight path, pt instructed in maintaing straight path and increased trunk extension        Stairs/Curb  Stairs?: No      Propulsion 1  Propulsion: Manual  Level: Level Tile  Method: RUE;RLE;LLE(complaints left shoulder pain)  Level of Assistance: Minimal assistance(left side for straight path)  Description/ Details: slow pace, gets easily distracted, fatigues easily  Distance: 30 ft      FIMS:   BALANCE Posture: Fair  Sitting - Static: Fair;+  Sitting - Dynamic: Fair  Standing - Static: Fair;-  Standing - Dynamic: Poor;+    EXERCISES    Other exercises?: Yes  Other exercises 1: seated bilateral LE 2# lt green x 15  Other exercises 2: standing in parallel bars right foot tap 4 inch(minimal assist left posterior hip )  Other exercises 3: UBE 3 minutes right UE forward (pt reports left shoulder pain )  Other exercises 4: supine LE bilateral x 10 A/AROM           Activity Tolerance: Patient limited by fatigue, Patient limited by endurance, Other(nausea )  PT Equipment Recommendations  Equipment Needed: No  Other: continue to assess  Other Comments  Comments: discussed left shoulder pain with OT    Patient Education  New Education Provided:    Learner:patient  Method: explanation       Outcome: needs reinforcement     Current Treatment Recommendations: Strengthening, Neuromuscular Re-education, Home Exercise Program, Safety Education & Training, Patient/Caregiver Education & Training, Equipment Evaluation, Education, & procurement, Functional Mobility Training, Balance Training, Endurance Training, Transfer Training, Gait Training, ROM, Stair training    Conditions Requiring Skilled Therapeutic Intervention  Prognosis: Good  Decision Making: Medium Complexity  REQUIRES PT FOLLOW UP: Yes  Discharge Recommendations: Patient would benefit from continued therapy after discharge;Home with assist PRN;Home with Home health PT    Goals  Short term goals  Time Frame for Short term goals:  8 to 9 days  Short term goal 1: Pt to perform bed mobility at SBA with rail  Short term goal 2: Pt to demonstrate transfers at min A  Short term goal 3: Pt to amb 50 to 80 ft with appropriate device, min/mod A   Short term goal 4: Pt to improve L LE strength by 1/3 MMG to improve fucntion. Short term goal 5: Pt able to go up and down 5 steps with 2 UE support, mod A   Long term goals  Time Frame for Long term goals : By LOS  Long term goal 1: Pt able to perform bed mobility mod-I  Long term goal 2: Pt able to perform transfers from varies surfaces at mod-I /supervsion level. Long term goal 3: Pt able to ambulate with appropriate device distance of 80 ft, mod-I/supervsion level. Long term goal 4: Pt able to perform step curb with assisitive device at min A  Long term goal 5: Pt able to go up and down 5 steps with 2 rails at min A to improve LE strength. Long term goal 6: Improve PASS score to atleast 25//36 improve overall fucntion  Long term goal 7: Improve Tinetti balance score to 20 or more/28 to reduce fall risk. Long term goal 8: Pt able to propel w/c on level surfaces 150 ft, with set up/supervsion.     Electronically signed by Deborah Jaime PTA on 4/10/21 at 3:35 PM EDT     04/10/21 1123 04/10/21 1534   PT Individual Minutes   Time In 0831 1405   Time Out [id number]   AZMERRY [id number]

## 2021-04-10 NOTE — PROGRESS NOTES
[facility]    ACUTE REHABILITATION OCCUPATIONAL THERAPY  DAILY NOTE    Date: 4/10/21  Patient Name: Bindu Hutchison      Room: 3260/-53    MRN: [id number]   : 1932  (80 y.o.)  Gender: female   Referring Practitioner: Anna Dejesus MD  Diagnosis: Acute CVA  Additional Pertinent Hx:  Per ARU preadmission assessment:80year-old female admitted with acute left hemiparesis causing a fall of her chair. Noted acute left-sided weakness worse than previous date. .  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 Patient on baby aspirin at home. Patient given TPA during TPA became hysterical repeat CT showed no change completed TPA. 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 Pt admitted to rehab unit on 21. Restrictions  Restrictions/Precautions: Fall Risk, Modified Diet, Swallowing - Thickened Liquids, General Precautions  Implants present? : (L TKA)  Required Braces or Orthoses?: No    Subjective  Subjective: \"I just don't feel good\" Pt stated x 7 in PM. Pt initially agreeable to engaging in oral care in PM, however unable to keep eyes open or participate meaningfully due to lethargy and nausea. LEWIS Casas notified and present to assess patient at end of session. Comments: PM vitals after patient repeatedly stated \"I just don't feel good\" - 96% oxygen saturation, 83 HR, 126/57 BP  Patient Currently in Pain: Yes  Pain Level: 5  Pain Location: Abdomen  Restrictions/Precautions: Fall Risk;Modified Diet;Swallowing - Thickened Liquids; General Precautions  Overall Orientation Status: Impaired  Orientation Level: Oriented to person;Disoriented to place; Disoriented to time;Disoriented to situation     Pain Assessment  Pain Assessment: 0-10  Pain Level: 5  Pain Type: Acute pain  Pain Location: Abdomen  Pain Descriptors: Constant(\"like it's gonna come out of me\")  Pain Frequency: Continuous    Objective  Cognition  Overall Cognitive Status: Impaired  Arousal/Alertness: Delayed responses to stimuli  Attention Span: Attends with cues to redirect  Memory: Decreased short term memory;Decreased recall of recent events  Following Commands: Follows multistep commands with repetition  Safety Judgement: Decreased awareness of need for assistance  Awareness of Errors: Assistance required to identify errors made  Insights: Decreased awareness of deficits  Sequencing and Organization: Assistance required to implement solutions;Assistance required to generate solutions;Assistance required to identify errors made  Perception  Overall Perceptual Status: Impaired  Initiation: Cues to initiate tasks  Motor Planning: Cues to use objects appropriately  Perseveration: Perseverates during ADLs  Balance  Sitting Balance: Stand by assistance  Standing Balance: Moderate assistance  Bed mobility  Supine to Sit: Maximum assistance(in PM)  Sit to Supine: Dependent/Total;2 Person assistance  Comment: significant fatigue and lethargy noted in PM  Transfers  Sit to stand: Moderate assistance  Stand to sit: Moderate assistance  Transfer Comments: with Moderate verbal cues for hand placement  Standing Balance  Time: 1-2 minutes x 5  Activity: toileting tasks, lower body bathing/dressing  Comment: BUE support on sink or grab bar  Functional Mobility  Functional - Mobility Device: Wheelchair  Activity: To/from bathroom  Assist Level: Maximum assistance  Toilet Transfers  Toilet - Technique: Stand pivot; To right; To left  Equipment Used: Grab bars  Toilet Transfer: Maximum assistance  Toilet Transfers Comments: x 1 this date                             ADL  Feeding: (pt declined breakfast and lunch this date)  Grooming: (declined in PM due to illness/fatigue)  UE Bathing: Stand by Mobility Training, Endurance Training, Neuromuscular Re-education, Wheelchair Mobility Training, Cognitive Reorientation, Equipment Evaluation, Education, & procurement, Home Management Training, Cognitive/Perceptual Training  Patient Goals   Patient goals : To return home with family support  Short term goals  Time Frame for Short term goals: 7 to 10 days  Short term goal 1: Pt will perform upper body bathing/dressing with stand by assist.  Short term goal 2: Pt will perform lower body bathing/dressing and toileting tasks with Moderate assist and Good safety. Short term goal 3: Pt will perform functional functional transfers and mobility during self-care with Moderate assist, least restrictive mobility device, and Good safety. Short term goal 4: Pt will for 4+ minutes with 1-2 UE support and Minimal assist while engaging in functional activity of choice. Short term goal 5: Pt will actively participate in 30+ minutes of therapeutic exercise/functional activities to promote increased independence with self-care and mobility. Short term goal 6: Pt will verbalize/demonstrate Good understanding of assistive equipment/durable medical equipment/modified techniques for increased independence with self-care and mobility. Long term goals  Time Frame for Long term goals : By discharge  Long term goal 1: Pt will perform bathing during shower with stand by assist and Good safety. Long term goal 2: Pt will perform dressing and toileting tasks with modified independence and Good safety. Long term goal 3: Pt will stand for 8+ minutes with 1-2 UE support, modified independence, no loss of balance while engaging in functional activity of choice. Long term goal 4: Pt will perform functional transfers and mobility with modified independence, least restrictive mobility device, and Good safety.   Long term goal 5: Pt will verbalize/demonstrate Good understanding of Fall Prevention Strategies for increased independence with self-care and mobility.   Long term goals 6: 9 hole peg and box and blocks to be assessed for LUE as appropriate     04/10/21 0932 04/10/21 1302 04/10/21 1313   OT Individual Minutes   Time In 0932 1302  --    Time Out 1031 1312  --    Minutes 59 10  --    Minute Variance   Variance  --   --  16   Reason  --   --  Illness  (\"I just don't feel good\")   Time Code Minutes    Timed Code Treatment Minutes 59 Minutes 10 Minutes  --       04/10/21 1555   OT Individual Minutes   Time In 2339   Time Out 1600   Minutes 5   Minute Variance   Variance 5   Reason  --    Time Code Minutes    Timed Code Treatment Minutes 5 Minutes     Electronically signed by Lyndon Costa OT on 4/10/21 at 3:12 PM EDT

## 2021-04-10 NOTE — PROGRESS NOTES
Physical Medicine & Rehabilitation  Progress Note      Subjective:      80year-old patient with ischemic CVA with L nondominant hemiparesis. Patient is drowsy today with c/o nausea. ROS:  Denies fevers, chills, sweats. No chest pain, palpitations, lightheadedness. Denies coughing, wheezing or shortness of breath. Denies abdominal pain, nausea, diarrhea or constipation. No new areas of joint pain. Denies new areas of numbness or weakness. Denies new anxiety or depression issues. No new skin problems. Rehabilitation:   Progressing in therapies. PT:  Restrictions/Precautions: Fall Risk, Modified Diet, Swallowing - Thickened Liquids, General Precautions  Implants present? : (L TKA)   Transfers  Sit to Stand: Moderate Assistance, 2 Person Assistance  Stand to sit: Minimal Assistance  Bed to Chair: Moderate assistance, 2 Person Assistance(upright walker)  Stand Pivot Transfers: Maximum Assistance  Squat Pivot Transfers: Moderate Assistance, 2 Person Assistance  Comment: Transfers with RW and upright walker. Ambulation 1  Surface: level tile  Device: (upright walker)  Other Apparatus: Wheelchair follow  Assistance: 2 Person assistance, Minimal assistance(2nd person min A for safety)  Quality of Gait: Unsteady steps, needs cues for safety, pt easily distracted and needs re-direction to focus on task. Pt very talkative. WBOS noted. Gait Deviations: Slow Zuly, Decreased step height, Decreased step length  Distance: 100 ft x 2, with ~3 min rest break inbetween attempts. 2 standing rest breaks each way. Comments: Mod A to manage rolling walker , forward trunk lean, decreased step length R LE, increased fatigue. Transfers  Sit to Stand: Moderate Assistance, 2 Person Assistance  Stand to sit: Minimal Assistance  Bed to Chair: Moderate assistance, 2 Person Assistance(upright walker)  Stand Pivot Transfers: Maximum Assistance  Squat Pivot Transfers:  Moderate Assistance, 2 Person Assistance  Comment: Transfers with RW and upright walker. Ambulation  Ambulation?: Yes  More Ambulation?: No  Ambulation 1  Surface: level tile  Device: (upright walker)  Other Apparatus: Wheelchair follow  Assistance: 2 Person assistance, Minimal assistance(2nd person min A for safety)  Quality of Gait: Unsteady steps, needs cues for safety, pt easily distracted and needs re-direction to focus on task. Pt very talkative. WBOS noted. Gait Deviations: Slow Zuly, Decreased step height, Decreased step length  Distance: 100 ft x 2, with ~3 min rest break inbetween attempts. 2 standing rest breaks each way. Comments: Mod A to manage rolling walker , forward trunk lean, decreased step length R LE, increased fatigue. Surface: level tile  Ambulation 1  Surface: level tile  Device: (upright walker)  Other Apparatus: Wheelchair follow  Assistance: 2 Person assistance, Minimal assistance(2nd person min A for safety)  Quality of Gait: Unsteady steps, needs cues for safety, pt easily distracted and needs re-direction to focus on task. Pt very talkative. WBOS noted. Gait Deviations: Slow Zuly, Decreased step height, Decreased step length  Distance: 100 ft x 2, with ~3 min rest break inbetween attempts. 2 standing rest breaks each way. Comments: Mod A to manage rolling walker , forward trunk lean, decreased step length R LE, increased fatigue. OT:  ADL  Feeding: Stand by assistance(SBA of daughter to encourage initiation)  Grooming: Stand by assistance, Verbal cueing(VC for spitting for safety; seated at sink in w/c)  UE Bathing: Stand by assistance(seated on toilet)  LE Bathing: Maximum assistance(assist B feet/lower legs,buttocks/perineal; sarstedy standin)  UE Dressing:  Moderate assistance(assist to pull down over trunk and adjust)  LE Dressing: Maximum assistance(assist B ARSH, socks, shoes; assist thread BLE into pull-up; CGA thread BLE into pants, pt dons over hips while stabilizing in sarstedy)  Toileting: Maximum assistance(assist personal hygiene with BM and voiding; Pt dons over hips, assist required to lower; Ax1 clothing management)  Additional Comments: SHARIF facilitated Pt engagement in self-care routine this date. Use of sarstedy for standing tasks due to fatigue/need for multiple transfers. Pt requested extended time to sit on toilet for bowel movement. While seated on toilet, Pt agreeable to engage in bathing and dressing tasks for therapeutic intervention. Please see above for details. During toilet. Pt required Moderate verbal cues for initiation and attention to task as well as increased time to complete all tasks. Continue OT POC. Balance  Sitting Balance: Stand by assistance  Standing Balance: Moderate assistance   Standing Balance  Time: 1-2 minutes x 5  Activity: toileting tasks, lower body bathing/dressing  Comment: BUE support primarily on grab bar of sarastedy; 3 incidences of alternating UE support as well as one 2 unsupported (~30sec each) while compensating with knees into DME for stability, 2 LOB, VC for RUE use to correct, Jonel required to recover  Functional Mobility  Functional - Mobility Device: Wheelchair  Activity: To/from bathroom  Assist Level: Maximum assistance  Functional Mobility Comments: deferred due to fatigue; use of saratedy this date for transfers     Bed mobility  Rolling to Left: Minimal assistance  Rolling to Right: Moderate assistance  Supine to Sit: Minimal assistance  Sit to Supine: Unable to assess  Scooting: Minimal assistance  Comment: Unable to assess this date. Patient in recliner start and end of sessions. Transfers  Stand Step Transfers: Minimal assistance(With RW, 2 steps forward and back x3)  Stand Pivot Transfers: 2 Person assistance, Maximum assistance(Max x 1 to right; Max x 2 to left)  Sit to stand:  Moderate assistance  Stand to sit: Moderate assistance  Transfer Comments: Moderate verbal and tactile cues for breathing, posture attention to L side and and unlabored. CV:  Regular rate rhythm. No murmurs or gallops. GI:  Abdomen soft. Nontender. Non-distended. BS + and equal.    NEUROLOGICAL: Drowsy but arouses easily and responds appropriately to questions/follows commands. . Sensation intact to light touch. MSK:  Functional ROM RUE and RLE. Impaired AROM LUE and LLE due to weakness. Motor testing 4+/5 key muscles RUE and RLEs. Strength 3/5 key muscles LUE and LLE. SKIN: Warm dry and intact. Good turgor. EXTREMITIES:  No calf tenderness to palpation. No edema BLEs. Willetta Smiles PSYCH: Mood WNL. Appropriately interactive. Affect WNL. Diagnostics:     CBC:   Recent Labs     04/08/21  1211 04/09/21  0716   WBC 14.3* 16.8*   RBC 3.70* 3.48*   HGB 11.2* 10.3*   HCT 34.6* 32.3*   MCV 93.4 93.0   RDW 14.3 14.8    208     BMP:   Recent Labs     04/08/21  1211   *   K 4.2      CO2 21   BUN 27*   CREATININE 1.47*   GLUCOSE 153*     BNP: No results for input(s): BNP in the last 72 hours. PT/INR: No results for input(s): PROTIME, INR in the last 72 hours. APTT: No results for input(s): APTT in the last 72 hours. CARDIAC ENZYMES:   No results for input(s): CKMB, CKMBINDEX, TROPONINT in the last 72 hours. Invalid input(s): CKTOTAL;3  FASTING LIPID PANEL:  Lab Results   Component Value Date    CHOL 121 04/02/2021    HDL 55 04/02/2021    TRIG 61 04/02/2021     LIVER PROFILE: No results for input(s): AST, ALT, ALB, BILIDIR, BILITOT, ALKPHOS in the last 72 hours. Component Collected Lab   Specimen Description 04/08/2021 10:31  [facility]   . URINE    Special Requests 04/08/2021 10:31  [facility]   NOT REPORTED    Culture Abnormal  04/08/2021 10:31 PM Deandre[facility] 40 >[id number] CFU/ML    Testing Performed By    [place] Ambriz Name Director Address Valid Date Range   [address] [address] 08/30/17 0801-Present   [address] [place] [address] [id number]. [facility] [address] 02/09/21 0716-Present   Susceptibility    Escherichia coli (1)    Antibiotic Interpretation BEBA Status    amikacin   Final     NOT REPORTED    ampicillin Sensitive  Final     8   SUSCEPTIBLE   ampicillin-sulbactam   Final     NOT REPORTED    aztreonam Sensitive  Final     <=1   SUSCEPTIBLE   ceFAZolin Sensitive  Final     <=4   SUSCEPTIBLE   ceFAZolin Sensitive Cefazolin sensitivity results can be used to predict the effectiveness of oral cephalosporins (eg.  Cephalexin) in uncomplicated Urinary Tract Infections due to E. coli, K. pneumoniae, and P. mirabilis Final    cefepime   Final     NOT REPORTED    cefTRIAXone Sensitive  Final     <=1   SUSCEPTIBLE   ciprofloxacin Sensitive  Final     <=0.25   SUSCEPTIBLE   ertapenem   Final     NOT REPORTED    Confirmatory Extended Spectrum Beta-Lactamase Negative NEGATIVE Final    gentamicin Sensitive  Final     <=1   SUSCEPTIBLE   meropenem   Final     NOT REPORTED    nitrofurantoin Sensitive  Final     <=16   SUSCEPTIBLE   tigecycline   Final     NOT REPORTED    tobramycin Sensitive  Final     <=1   SUSCEPTIBLE   trimethoprim-sulfamethoxazole Sensitive  Final     <=20   SUSCEPTIBLE   piperacillin-tazobactam Sensitive  Final     <=4   SUSCEPTIBLE         Current Medications:   Current Facility-Administered Medications: cephALEXin (KEFLEX) capsule 250 mg, 250 mg, Oral, 4 times per day  0.9 % sodium chloride infusion, 25 mL, Intravenous, PRN  enoxaparin (LOVENOX) injection 40 mg, 40 mg, Subcutaneous, Daily  sodium chloride flush 0.9 % injection 10 mL, 10 mL, Intravenous, 2 times per day  aspirin EC tablet 81 mg, 81 mg, Oral, Daily **OR** [DISCONTINUED] aspirin suppository 300 mg, 300 mg, Rectal, Daily  amLODIPine (NORVASC) tablet 5 mg, 5 mg, Oral, Daily  atorvastatin (LIPITOR) tablet 40 mg, 40 mg, Oral, Nightly  clopidogrel (PLAVIX) tablet 75 mg, 75 mg, Oral, Daily  ferrous sulfate (IRON 325) tablet 325 mg, 325 mg, Oral, BID  levothyroxine (SYNTHROID) tablet 112 mcg, 112 mcg, Oral, Daily  melatonin tablet 6 mg, 6 mg, Oral, Nightly PRN  metoprolol tartrate (LOPRESSOR) tablet 12.5 mg, 12.5 mg, Oral, BID  oxybutynin (DITROPAN-XL) extended release tablet 5 mg, 5 mg, Oral, Daily  pantoprazole (PROTONIX) tablet 40 mg, 40 mg, Oral, QAM AC  acetaminophen (TYLENOL) tablet 650 mg, 650 mg, Oral, Q4H PRN  polyethylene glycol (GLYCOLAX) packet 17 g, 17 g, Oral, Daily  bisacodyl (DULCOLAX) suppository 10 mg, 10 mg, Rectal, Daily PRN  magnesium hydroxide (MILK OF MAGNESIA) 400 MG/5ML suspension 30 mL, 30 mL, Oral, Daily PRN      Impression/Plan:   Impaired ADLs, gait, and mobility due to:      1. 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 On 3 weeks ASA, Plavix. 2. Dysphagia: on thickened liquids. SLP treating and including vital stim. Will eval for [employer]. 3. Malaise/nausea: added prn zofran. IM started IV fluid and is monitoring labs. 4. HTN: on amlodipine, atorvastatin, metoprolol tartrate. Follow up Dr. Sreekanth Alfred in [place] [address] in 4 weeks for stress test.   5. OA/L rotator cuff injury: stable. Will monitor  6. CKD: stable. Will monitor BMP  7. Hypothyroidism: on levothyroxine  8. Anemia: on ferrous sulfate  9. Hx chronic cystitis: on oxybutynin ER. Frequent incontinence is normal baseline for patient. Urine culture positive for E Coli. Reviewed sensitivities with pharmacist - will complete Keflex course. 10. GERD: on pantoprazole  11. Bowel Management: Miralax daily, senokot prn, milk of magnesia prn, dulcolax prn. 12. DVT Prophylaxis:  low molecular weight heparin, SCD's while in bed and ARSH's   13.  Internal medicine for medical management    Electronically signed by Laith Stack MD on 4/10/2021 at 10:54 AM      This note is created with the assistance of a speech recognition program.  While intending to generate a document that actually reflects the content of the visit, the document can still have some errors including those of syntax and sound a like substitutions which may escape proof reading. In such instances, actual meaning can be extrapolated by contextual diversion.

## 2021-04-10 NOTE — PROGRESS NOTES
Speech Language Pathology  Speech Language Pathology  [facility]    Cognitive Treatment Note    Date: 4/10/2021  Patients Name: Truman Aparicio  MRN: [id number]  Diagnosis:   Patient Active Problem List   Diagnosis Code    Anemia D64.9    Arthritis M19.90    HTN (hypertension) I10    Hypothyroidism E03.9    Renal insufficiency N28.9    Other long term (current) drug therapy Z79.899    Anemia due to stage 3 chronic kidney disease N18.30, D63.1    Chronic UTI N39.0    Mixed hyperlipidemia E78.2    Gastroesophageal reflux disease without esophagitis K21.9    S/P revision of total knee, 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 nausea and lethargy      Subjective: [x] Alert [x] Cooperative     [] Confused     [] Agitated    [x] Lethargic      Objective/Assessment:  Attention: Pt. Falling asleep throughout, cues to remain alert. Cognition:   Orientation log administered- 23/30 (disoriented to name of hospital, year, and etiology/event). Memory:  Paragraph recall (2-3 sentences)- 50% accuracy jenny, increasing to 66% accuracy c cues. Verbal expression:   Confrontation naming, objects around room- 90% accuracy jenny, 100% c cues. Naming object function- 100% accuracy jenny. Naming given description, answering \"what\" questions- 70% accuracy jenny, 100% c cues. Pt. demonstrating expressive aphasia during conversational speech and with structured tasks. Semantic paraphasias noted.  Education provided re: circumlocution strategy to assist in word retrieval.  Pt. Verbalized understanding, required cues to facilitate. ST to continue to address. Dysphagia: NMES not completed this date d/t Pt. Nausea. Pt. unable to take PO trials or complete swallowing exercises at this time, holding emesis basin throughout entire session. Other: No family present. Pt. Stating, \"My stomach is turning and turning and I just don't feel well at all\". RN aware. Plan:  [x] Continue ST services    [] Discharge from ST:      Discharge recommendations: [] Inpatient Rehab   [] [place]   [] Outpatient Therapy  [] Follow up at trauma clinic   [] Other:       Treatment completed by:  Shawna Terrazas M.A., CCC-SLP

## 2021-04-11 LAB
ANION GAP SERPL CALCULATED.3IONS-SCNC: 10 MMOL/L (ref 9–17)
ANION GAP SERPL CALCULATED.3IONS-SCNC: 14 MMOL/L (ref 9–17)
BUN BLDV-MCNC: 25 MG/DL (ref 8–23)
BUN BLDV-MCNC: 26 MG/DL (ref 8–23)
BUN/CREAT BLD: ABNORMAL (ref 9–20)
BUN/CREAT BLD: ABNORMAL (ref 9–20)
CALCIUM SERPL-MCNC: 8.6 MG/DL (ref 8.6–10.4)
CALCIUM SERPL-MCNC: 8.9 MG/DL (ref 8.6–10.4)
CHLORIDE BLD-SCNC: 103 MMOL/L (ref 98–107)
CHLORIDE BLD-SCNC: 104 MMOL/L (ref 98–107)
CO2: 17 MMOL/L (ref 20–31)
CO2: 21 MMOL/L (ref 20–31)
CREAT SERPL-MCNC: 1.23 MG/DL (ref 0.5–0.9)
CREAT SERPL-MCNC: 1.38 MG/DL (ref 0.5–0.9)
GFR AFRICAN AMERICAN: 44 ML/MIN
GFR AFRICAN AMERICAN: 50 ML/MIN
GFR NON-AFRICAN AMERICAN: 36 ML/MIN
GFR NON-AFRICAN AMERICAN: 41 ML/MIN
GFR SERPL CREATININE-BSD FRML MDRD: ABNORMAL ML/MIN/{1.73_M2}
GLUCOSE BLD-MCNC: 124 MG/DL (ref 70–99)
GLUCOSE BLD-MCNC: 131 MG/DL (ref 70–99)
HCT VFR BLD CALC: 29.9 % (ref 36–46)
HEMOGLOBIN: 9.9 G/DL (ref 12–16)
MCH RBC QN AUTO: 30.8 PG (ref 26–34)
MCHC RBC AUTO-ENTMCNC: 33.3 G/DL (ref 31–37)
MCV RBC AUTO: 92.5 FL (ref 80–100)
NRBC AUTOMATED: ABNORMAL PER 100 WBC
PDW BLD-RTO: 13.9 % (ref 11.5–14.9)
PLATELET # BLD: 164 K/UL (ref 150–450)
PMV BLD AUTO: 9.8 FL (ref 6–12)
POTASSIUM SERPL-SCNC: 4.1 MMOL/L (ref 3.7–5.3)
POTASSIUM SERPL-SCNC: 4.2 MMOL/L (ref 3.7–5.3)
RBC # BLD: 3.23 M/UL (ref 4–5.2)
SODIUM BLD-SCNC: 134 MMOL/L (ref 135–144)
SODIUM BLD-SCNC: 135 MMOL/L (ref 135–144)
WBC # BLD: 4.1 K/UL (ref 3.5–11)

## 2021-04-11 PROCEDURE — 36415 COLL VENOUS BLD VENIPUNCTURE: CPT

## 2021-04-11 PROCEDURE — 1180000000 HC REHAB R&B

## 2021-04-11 PROCEDURE — 6370000000 HC RX 637 (ALT 250 FOR IP): Performed by: STUDENT IN AN ORGANIZED HEALTH CARE EDUCATION/TRAINING PROGRAM

## 2021-04-11 PROCEDURE — 99231 SBSQ HOSP IP/OBS SF/LOW 25: CPT | Performed by: PHYSICAL MEDICINE & REHABILITATION

## 2021-04-11 PROCEDURE — 97530 THERAPEUTIC ACTIVITIES: CPT

## 2021-04-11 PROCEDURE — 97116 GAIT TRAINING THERAPY: CPT

## 2021-04-11 PROCEDURE — 99232 SBSQ HOSP IP/OBS MODERATE 35: CPT | Performed by: INTERNAL MEDICINE

## 2021-04-11 PROCEDURE — 80048 BASIC METABOLIC PNL TOTAL CA: CPT

## 2021-04-11 PROCEDURE — 97112 NEUROMUSCULAR REEDUCATION: CPT

## 2021-04-11 PROCEDURE — 6370000000 HC RX 637 (ALT 250 FOR IP): Performed by: PHYSICAL MEDICINE & REHABILITATION

## 2021-04-11 PROCEDURE — 97535 SELF CARE MNGMENT TRAINING: CPT

## 2021-04-11 PROCEDURE — 85027 COMPLETE CBC AUTOMATED: CPT

## 2021-04-11 PROCEDURE — 2500000003 HC RX 250 WO HCPCS: Performed by: INTERNAL MEDICINE

## 2021-04-11 PROCEDURE — 97110 THERAPEUTIC EXERCISES: CPT

## 2021-04-11 PROCEDURE — 6360000002 HC RX W HCPCS: Performed by: STUDENT IN AN ORGANIZED HEALTH CARE EDUCATION/TRAINING PROGRAM

## 2021-04-11 RX ADMIN — LEVOTHYROXINE SODIUM 112 MCG: 112 TABLET ORAL at 10:54

## 2021-04-11 RX ADMIN — FERROUS SULFATE TAB 325 MG (65 MG ELEMENTAL FE) 325 MG: 325 (65 FE) TAB at 20:34

## 2021-04-11 RX ADMIN — CEPHALEXIN 500 MG: 500 CAPSULE ORAL at 11:03

## 2021-04-11 RX ADMIN — CEPHALEXIN 500 MG: 500 CAPSULE ORAL at 22:04

## 2021-04-11 RX ADMIN — CEPHALEXIN 500 MG: 500 CAPSULE ORAL at 17:01

## 2021-04-11 RX ADMIN — CLOPIDOGREL BISULFATE 75 MG: 75 TABLET ORAL at 10:32

## 2021-04-11 RX ADMIN — ENOXAPARIN SODIUM 40 MG: 40 INJECTION SUBCUTANEOUS at 10:54

## 2021-04-11 RX ADMIN — AMLODIPINE BESYLATE 5 MG: 5 TABLET ORAL at 10:49

## 2021-04-11 RX ADMIN — ATORVASTATIN CALCIUM 40 MG: 40 TABLET, FILM COATED ORAL at 20:34

## 2021-04-11 RX ADMIN — Medication 6 MG: at 20:34

## 2021-04-11 RX ADMIN — POLYETHYLENE GLYCOL 3350 17 G: 17 POWDER, FOR SOLUTION ORAL at 11:03

## 2021-04-11 RX ADMIN — FERROUS SULFATE TAB 325 MG (65 MG ELEMENTAL FE) 325 MG: 325 (65 FE) TAB at 10:31

## 2021-04-11 RX ADMIN — POTASSIUM CHLORIDE, DEXTROSE MONOHYDRATE AND SODIUM CHLORIDE: 150; 5; 450 INJECTION, SOLUTION INTRAVENOUS at 20:27

## 2021-04-11 RX ADMIN — METOPROLOL TARTRATE 12.5 MG: 25 TABLET, FILM COATED ORAL at 20:34

## 2021-04-11 RX ADMIN — METOPROLOL TARTRATE 12.5 MG: 25 TABLET, FILM COATED ORAL at 10:49

## 2021-04-11 RX ADMIN — ASPIRIN 81 MG: 81 TABLET, COATED ORAL at 10:31

## 2021-04-11 RX ADMIN — OXYBUTYNIN CHLORIDE 5 MG: 5 TABLET, EXTENDED RELEASE ORAL at 10:31

## 2021-04-11 NOTE — CONSULTS
(NORVASC) 5 MG tablet Take 1 tablet by mouth daily 3/1/21   VERNELL Ruiz CNP   Ferrous Sulfate 324 MG TBEC Take 1 tablet by mouth 2 times daily 3/1/21   VERNELL Ruiz CNP   omeprazole (PRILOSEC) 20 MG delayed release capsule Take 1 capsule by mouth daily 3/1/21   VERNELL Ruiz CNP   sodium bicarbonate 650 MG tablet Take 1 tablet by mouth 4 times daily Two tabs BID 3/1/21   VERNELL Ruiz CNP   metoprolol tartrate (LOPRESSOR) 25 MG tablet Take 0.5 tablets by mouth 2 times daily Take 1/2 tablet BID 3/1/21   VERNELL Ruiz CNP   docusate sodium (COLACE) 100 MG capsule Take 1 capsule by mouth every other day 2/24/21   VERNELL Ruiz CNP   cephALEXin (KEFLEX) 250 MG capsule TAKE ONE CAPSULE BY MOUTH ONCE A DAY. 2/1/21   VERNELL Combs CNP   fluorouracil (EFUDEX) 5 % cream Apply topically for 4 weeks . 6/29/20   Historical Provider, MD   Calcium Carb-Cholecalciferol (CALCIUM + D3) 600-200 MG-UNIT TABS tablet Take 1 tablet by mouth Daily with lunch    Historical Provider, MD   Multiple Vitamins-Minerals (THERAPEUTIC MULTIVITAMIN-MINERALS) tablet Take 1 tablet by mouth daily    Historical Provider, MD   magnesium oxide (MAG-OX) 400 MG tablet Take 400 mg by mouth daily    Historical Provider, MD   Apoaequorin (PREVAGEN) 10 MG CAPS Take by mouth daily    Historical Provider, MD   GLUCOSA-CHONDR-NA CHONDR-MSM PO Take by mouth    Historical Provider, MD        Allergies:     Ciprofloxacin, Hydrocodone, Macrobid [nitrofurantoin], and Ciprofloxacin    Social History:     Tobacco:    reports that she has never smoked. She has never used smokeless tobacco.  Alcohol:      reports no history of alcohol use. Drug Use:  reports no history of drug use. Family History:     No family history on file. Review of Systems:     Positive and Negative as described in HPI.     CONSTITUTIONAL:  negative for fevers, chills, sweats, fatigue, weight loss  HEENT:  negative for vision, hearing changes, runny nose, throat pain  RESPIRATORY:  negative for shortness of breath, cough, congestion, wheezing. CARDIOVASCULAR:  negative for chest pain, palpitations. GASTROINTESTINAL:  negative for nausea, vomiting, diarrhea, constipation, change in bowel habits, abdominal pain   GENITOURINARY:  negative for difficulty of urination, burning with urination, frequency   INTEGUMENT:  negative for rash, skin lesions, easy bruising   HEMATOLOGIC/LYMPHATIC:  negative for swelling/edema   ALLERGIC/IMMUNOLOGIC:  negative for urticaria , itching  ENDOCRINE:  negative increase in drinking, increase in urination, hot or cold intolerance  MUSCULOSKELETAL:  negative joint pains, muscle aches, swelling of joints  NEUROLOGICAL: Positive for left upper and lower extremity weakness extremities      Physical Exam:     BP (!) 125/55   Pulse 87   Temp 97.3 °F (36.3 °C) (Oral)   Resp 18   Ht 5' 7\" (1.702 m)   Wt 172 lb (78 kg)   SpO2 99%   BMI 26.94 kg/m²   Temp (24hrs), Av °F (37.2 °C), Min:97.3 °F (36.3 °C), Max:100.4 °F (38 °C)    No results for input(s): POCGLU in the last 72 hours. Intake/Output Summary (Last 24 hours) at 2021 1702  Last data filed at 2021 0800  Gross per 24 hour   Intake    Output 650 ml   Net -650 ml       General Appearance:  alert, well appearing, and in no acute distress  Mental status: oriented to person, place, and time with normal affect  Head:  normocephalic, atraumatic. Eye: no icterus, redness, pupils equal and reactive, extraocular eye movements intact, conjunctiva clear  Ear: normal external ear, no discharge, hearing intact  Nose:  no drainage noted  Mouth: mucous membranes moist  Neck: supple, no carotid bruits, thyroid not palpable  Lungs: Bilateral equal air entry, clear to ausculation, no wheezing, rales or rhonchi, normal effort  Cardiovascular: normal rate, regular rhythm, no murmur, gallop, rub.   Abdomen: Soft, nontender, nondistended, normal bowel :      Primary Problem  <principal problem not specified>    Active Hospital Problems    Diagnosis Date Noted    Nausea [R11.0] 04/10/2021    Malaise and fatigue [R53.81, R53.83] 04/10/2021    Acute CVA (cerebrovascular accident) ([facility] [I63.9] 04/07/2021    Acute left hemiparesis ([facility] [G81.94]     Gastroesophageal reflux disease without esophagitis [K21.9] 02/24/2021    HTN (hypertension) [I10] 10/21/2020    Hypothyroidism [E03.9] 10/21/2020       Plan:     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 Check labs . 4/11/21    · Renal fximproving with hydeation . Had prerenal syeda . · Continue iv  1. BMP:   Recent Labs     04/10/21  1842 04/11/21  0751   * 135   K 3.7 4.2   CO2 18* 21   BUN 34* 26*   CREATININE 1.49* 1.38*   LABGLOM 33* 36*   GLUCOSE 233* 124*              Radha Hu MD  4/11/2021  5:02 PM    Copy sent to Dr. Sindi Olmedo, APRN - CNP    Please note that this chart was generated using voice recognition Dragon dictation software. Although every effort was made to ensure the accuracy of this automated transcription, some errors in transcription may have occurred.

## 2021-04-11 NOTE — PLAN OF CARE
Problem: Skin Integrity:  Goal: Will show no infection signs and symptoms  Description: Will show no infection signs and symptoms  Outcome: Ongoing  Goal: Absence of new skin breakdown  Description: Absence of new skin breakdown  Outcome: Ongoing     Problem: Confusion - Acute:  Goal: Absence of continued neurological deterioration signs and symptoms  Description: Absence of continued neurological deterioration signs and symptoms  Outcome: Ongoing  Goal: Mental status will be restored to baseline  Description: Mental status will be restored to baseline  Outcome: Ongoing     Problem: Discharge Planning:  Goal: Ability to perform activities of daily living will improve  Description: Ability to perform activities of daily living will improve  Outcome: Ongoing  Goal: Participates in care planning  Description: Participates in care planning  Outcome: Ongoing     Problem: Injury - Risk of, Physical Injury:  Goal: Absence of physical injury  Description: Absence of physical injury  Outcome: Ongoing  Goal: Will remain free from falls  Description: Will remain free from falls  Outcome: Ongoing     Problem: Mood - Altered:  Goal: Mood stable  Description: Mood stable  Outcome: Ongoing  Goal: Absence of abusive behavior  Description: Absence of abusive behavior  Outcome: Ongoing  Goal: Verbalizations of feeling emotionally comfortable while being cared for will increase  Description: Verbalizations of feeling emotionally comfortable while being cared for will increase  Outcome: Ongoing     Problem: Psychomotor Activity - Altered:  Goal: Absence of psychomotor disturbance signs and symptoms  Description: Absence of psychomotor disturbance signs and symptoms  Outcome: Ongoing     Problem: Sensory Perception - Impaired:  Goal: Demonstrations of improved sensory functioning will increase  Description: Demonstrations of improved sensory functioning will increase  Outcome: Ongoing  Goal: Decrease in sensory misperception frequency Description: Decrease in sensory misperception frequency  Outcome: Ongoing  Goal: Able to refrain from responding to false sensory perceptions  Description: Able to refrain from responding to false sensory perceptions  Outcome: Ongoing  Goal: Demonstrates accurate environmental perceptions  Description: Demonstrates accurate environmental perceptions  Outcome: Ongoing  Goal: Able to distinguish between reality-based and nonreality-based thinking  Description: Able to distinguish between reality-based and nonreality-based thinking  Outcome: Ongoing  Goal: Able to interrupt nonreality-based thinking  Description: Able to interrupt nonreality-based thinking  Outcome: Ongoing     Problem: Sleep Pattern Disturbance:  Goal: Appears well-rested  Description: Appears well-rested  Outcome: Ongoing     Problem: Neurological  Goal: Maximum potential motor/sensory/cognitive function  Outcome: Ongoing     Problem: Nutrition  Goal: Optimal nutrition therapy  Description: Nutrition Problem #1: Inadequate oral intake  Intervention: Food and/or Nutrient Delivery: Continue Current Diet, Start Oral Nutrition Supplement  Nutritional Goals: intake more than 75%     Outcome: Ongoing     Problem: Neurological  Goal: Maximum potential motor/sensory/cognitive function  Outcome: Ongoing     Problem: Pain:  Goal: Pain level will decrease  Description: Pain level will decrease  Outcome: Ongoing  Goal: Control of acute pain  Description: Control of acute pain  Outcome: Ongoing  Goal: Control of chronic pain  Description: Control of chronic pain  Outcome: Ongoing

## 2021-04-11 NOTE — PROGRESS NOTES
[address] [facility] [place]    ACUTE REHABILITATION OCCUPATIONAL THERAPY  DAILY NOTE    Date: 21  Patient Name: Jenelle Goss      Room: 6581/1663-66    MRN: [id number]   : 1932  (80 y.o.)  Gender: female   Referring Practitioner: Cesar Murillo MD  Diagnosis: Acute CVA  Additional Pertinent Hx:  Per ARU preadmission assessment:80year-old female admitted with acute left hemiparesis causing a fall of her chair. Noted acute left-sided weakness worse than previous date. .  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 Garrett Hussein She was life flighted to [facility]. Patient on baby aspirin at home. Patient given TPA during TPA became hysterical repeat CT showed no change completed TPA. 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 Pt admitted to rehab unit on 21. Restrictions  Restrictions/Precautions: Fall Risk, Modified Diet, Swallowing - Thickened Liquids, General Precautions  Implants present? : (L TKA)  Required Braces or Orthoses?: No  Equipment Used: Bed, Wheelchair(rolling walker)    Subjective  Subjective: \"I just got to get ahold of them\" Pt is agreeable to trialing toileting tasks this date due to her improved safety with dynamic standing balance. Patient Currently in Pain: Denies  Restrictions/Precautions: Fall Risk;Modified Diet;Swallowing - Thickened Liquids; General Precautions  Overall Orientation Status: Impaired  Orientation Level: Oriented to person;Disoriented to place; Disoriented to time;Disoriented to situation     Pain Assessment  Pain Assessment: 0-10  Pain Level: 5  Pain Type: Acute pain  Pain Location: Abdomen  Pain Descriptors: Constant(\"like it's gonna come out of me\")  Pain Frequency: Continuous    Objective  Cognition  Overall Cognitive Status: Impaired Arousal/Alertness: Delayed responses to stimuli  Attention Span: Attends with cues to redirect  Memory: Decreased short term memory;Decreased recall of recent events  Following Commands: Follows multistep commands with repetition  Safety Judgement: Decreased awareness of need for assistance  Awareness of Errors: Assistance required to identify errors made  Insights: Decreased awareness of deficits  Sequencing and Organization: Assistance required to implement solutions;Assistance required to generate solutions;Assistance required to identify errors made  Perception  Overall Perceptual Status: Impaired  Initiation: Cues to initiate tasks  Motor Planning: Cues to use objects appropriately  Perseveration: Perseverates during ADLs  Balance  Sitting Balance: Stand by assistance  Standing Balance: Moderate assistance  Bed mobility  Supine to Sit: Moderate assistance(in PM)  Transfers  Stand Pivot Transfers: Moderate assistance  Sit to stand: Minimal assistance(progressing to CGA)  Stand to sit: Minimal assistance(progressing to CGA)  Transfer Comments: with Moderate verbal cues for hand placement  Standing Balance  Time: 1-2 minutes x 5  Activity: toileting tasks, lower body bathing/dressing  Comment: BUE support on sink or grab bar  Functional Mobility  Functional - Mobility Device: Wheelchair  Activity: To/from bathroom  Assist Level: Minimal assistance  Functional Mobility Comments: use of BLE for self-propulsion  Toilet Transfers  Toilet - Technique: Stand pivot; To right; To left  Equipment Used: Grab bars  Toilet Transfer: Moderate assistance  Toilet Transfers Comments: x 1 this date with improved technique and attention to task  Shower Transfers  Shower - Transfer From: Wheelchair  Shower - Transfer Type: To and From  Shower - Transfer To: Transfer tub bench  Shower - Technique: Stand pivot; To right; To left  Shower Transfers:  Moderate assistance  Shower Transfers Comments: with Fair safety awareness ADL  Feeding: Setup(per patient report)  Grooming: Minimal assistance(assist PRN for denture management)  UE Bathing: Stand by assistance(seated on tub transfer bench in shower)  LE Bathing: Moderate assistance(assist buttocks/perineal area; CGA standing in shower)  UE Dressing: Stand by assistance(increased time to complete)  LE Dressing: Moderate assistance(assist ARSH hose, shoes, clothing management; SBA thread BLE)  Toileting: Moderate assistance(CGA clothing management in PM; assist for hygiene)  Additional Comments: OT facilitated Pt engagement in showering routine this date. Pt demonstrates improved attention to task and activity tolerance. During shower, Pt successfully bathed her upper body with increased time to complete as well as her bilateral thighs and lower legs. Pt declined to wash her feet, using the water to run over the feet. Pt stood with CGA and BUE support via grab bars. Pt required assist to bathe perineal area and buttocks. During dressing tasks, patient successfully donned bra and over head shirt with SBA and increased time to complete. This date, Pt threaded BLE into pull-up and pants with SBA for the first time during rehab admission. Pt bent forward at trunk to thread BLE. Pt required assist to don B ARSH hose as well as shoes. While standing at grab bar in AM, Pt required assist for clothing managment, however attempted to participate. During PM session, OT facilitated Pt engagement in toileting tasks and toilet transfer to maximize patient's independence with toileting. While utilizing toilet, Pt pulled her pants down with CGA and pulled her pants up with CGA. Pt required assist for personal hygiene after voiding and bowel movement. Pt successfully pulled her pants up with CGA. After toileting, OT encouraged repetition of clothing management tasks while standing at grab bar with CGA. Pt successfully pulled her pants up/down over hips x 3 with CGA.  OT provided Minimal verbal cues throughout clothing management task for attention to task and technique. Continue OT POC. Assessment  Performance deficits / Impairments: Decreased functional mobility ; Decreased strength;Decreased endurance;Decreased balance;Decreased high-level IADLs;Decreased posture;Decreased ADL status; Decreased ROM; Decreased safe awareness;Decreased cognition;Decreased fine motor control;Decreased coordination  Prognosis: Good  Discharge Recommendations: Home with assist PRN;Home with Home health OT  Activity Tolerance: Patient limited by fatigue  Safety Devices in place: Yes  Type of devices: All fall risk precautions in place;Call light within reach;Gait belt;Patient at risk for falls;Nurse notified; Left in chair          Patient Education:  Patient Goals   Patient goals : To return home with family support  Learner:patient  Method: demonstration and explanation       Outcome: needs reinforcement and asked questions   OT Education  OT Education: Plan of Care;OT Role;Orientation; ADL Adaptive Strategies;Transfer Training  Patient Education: safety during functional transferes, OT POC, progress toward POC, clothing management task purpose    Plan  Plan  Times per week: 5-7  Times per day: Twice a day  Current Treatment Recommendations: Strengthening, ROM, Safety Education & Training, Positioning, Balance Training, Patient/Caregiver Education & Training, Self-Care / ADL, Functional Mobility Training, Endurance Training, Neuromuscular Re-education, Wheelchair Mobility Training, Cognitive Reorientation, Equipment Evaluation, Education, & procurement, Home Management Training, Cognitive/Perceptual Training  Patient Goals   Patient goals :  To return home with family support  Short term goals  Time Frame for Short term goals: 7 to 10 days  Short term goal 1: Pt will perform upper body bathing/dressing with stand by assist.  Short term goal 2: Pt will perform lower body bathing/dressing and toileting tasks with Moderate assist and Good safety. Short term goal 3: Pt will perform functional functional transfers and mobility during self-care with Moderate assist, least restrictive mobility device, and Good safety. Short term goal 4: Pt will for 4+ minutes with 1-2 UE support and Minimal assist while engaging in functional activity of choice. Short term goal 5: Pt will actively participate in 30+ minutes of therapeutic exercise/functional activities to promote increased independence with self-care and mobility. Short term goal 6: Pt will verbalize/demonstrate Good understanding of assistive equipment/durable medical equipment/modified techniques for increased independence with self-care and mobility. Long term goals  Time Frame for Long term goals : By discharge  Long term goal 1: Pt will perform bathing during shower with stand by assist and Good safety. Long term goal 2: Pt will perform dressing and toileting tasks with modified independence and Good safety. Long term goal 3: Pt will stand for 8+ minutes with 1-2 UE support, modified independence, no loss of balance while engaging in functional activity of choice. Long term goal 4: Pt will perform functional transfers and mobility with modified independence, least restrictive mobility device, and Good safety. Long term goal 5: Pt will verbalize/demonstrate Good understanding of Fall Prevention Strategies for increased independence with self-care and mobility.   Long term goals 6: 9 hole peg and box and blocks to be assessed for LUE as appropriate     04/11/21 0931 04/11/21 1232   OT Individual Minutes   Time In [address] [place] [address]   Time Out [id number]   Minutes 62 29   Time Code Minutes    Timed Code Treatment Minutes 62 Minutes 29 Minutes       Electronically signed by Belle Apodaca OT on 4/11/21 at 3:11 PM EDT

## 2021-04-11 NOTE — PROGRESS NOTES
Physical Therapy  [address] [facility]   Acute Rehabilitation Physical Therapy Progress Note    Date: 21  Patient Name: Truman Aparicio       Room: 7666/1486-68  MRN: [id number]   Account: [de-identified]   : 1932  (80 y.o.) Gender: female     Referring Practitioner: Cuba Sanchez MD  Diagnosis: Acute CVA  Past Medical History:  has a past medical history of Anemia, Hypertension, Hypothyroidism, Osteoarthritis, Other long term (current) drug therapy, and Renal insufficiency. Past Surgical History:   has no past surgical history on file. Additional Pertinent Hx: Per ARU preadmission assessment:80year-old female admitted with acute left hemiparesis causing a fall of her chair. Noted acute left-sided weakness worse than previous date. .  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 Ricci Bally She was life flighted to [facility]. Patient on baby aspirin at home. Patient given TPA during TPA became hysterical repeat CT showed no change completed TPA.  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 Pt admitted to rehab unit on 21. Restrictions/Precautions  Restrictions/Precautions: Fall Risk;Modified Diet;Swallowing - Thickened Liquids; General Precautions  Required Braces or Orthoses?: No  Implants present? : (L TKA)    Subjective: no complaints nausea   Comments: decreased lethrgey this AM    Vital Signs  Patient Currently in Pain: Denies     Bed Mobility:   Bed Mobility  Supine to Sit: Minimal assistance(head of bed elevated )  Scooting: Minimal assistance  Comment: increased time to initiate and complete all functional transfers , pt demonstrates bilateral LE tremors upon sitting supine to sit AM  Bed mobility  Scooting: Minimal assistance    Transfers:  Sit to Stand: Moderate Assistance;Minimal Assistance(3 count)  Stand to sit: Minimal Assistance        Squat Pivot Transfers: Maximum Assistance(to left , no AD , pt sequences correctly after pre instructi)        Ambulation 1  Surface: level tile  Device: (upright walker )  Other Apparatus: Wheelchair follow(IV)  Assistance: Minimal assistance;Contact guard assistance  Quality of Gait: increased trunk flexion , UE weight bearing   Gait Deviations: Slow Zuly;Decreased step height;Decreased step length  Distance: 70 ft and 100 ft  Comments: 1 standing restbreak, distance limited by fatigue with left LE buckel, upright walker wheels locked for straight path, pt instructed in maintaing straight path and increased trunk extension        Stairs/Curb  Stairs?: No      Propulsion 1  Propulsion: Manual  Level: Level Tile  Method: RUE;RLE;LLE(complaints left shoulder pain)  Level of Assistance: Stand by assistance  Description/ Details: slow pace, gets easily distracted, fatigues easily  Distance: 30 ft     FIMS:      BALANCE Posture: Fair  Sitting - Static: Fair;+  Sitting - Dynamic: Fair  Standing - Static: Fair;-  Standing - Dynamic: Poor;+    EXERCISES    Other exercises?: Yes  Other exercises 1: seated bilateral LE 2# lt green x 15(reciprical pattern)  Other exercises 2: standing at RW 30 seconds (moderate assist posteriorlead/trunk flexion )  Other exercises 3: standing bilateral support parallel bars (visual and tactile input posterior hips )  Other exercises 4: advanced gait training in parallel bars (minimal assist anterior weight shift posterior hips )  Other exercises 5: pre stair training right foot tap 4 inch(minimal /tactile cues left posterior hip)           Activity Tolerance: Patient limited by fatigue, Patient limited by endurance, Other(nausea )  PT Equipment Recommendations  Equipment Needed: No  Other: continue to assess       Patient Education  New Education Provided:    Learner:patient  Method: explanation Outcome: needs reinforcement     Current Treatment Recommendations: Strengthening, Neuromuscular Re-education, Home Exercise Program, Safety Education & Training, Patient/Caregiver Education & Training, Equipment Evaluation, Education, & procurement, Functional Mobility Training, Balance Training, Endurance Training, Transfer Training, Gait Training, ROM, Stair training    Conditions Requiring Skilled Therapeutic Intervention  Prognosis: Good  Decision Making: Medium Complexity  REQUIRES PT FOLLOW UP: Yes  Discharge Recommendations: Patient would benefit from continued therapy after discharge;Home with assist PRN;Home with Home health PT    Goals  Short term goals  Time Frame for Short term goals:  8 to 9 days  Short term goal 1: Pt to perform bed mobility at SBA with rail  Short term goal 2: Pt to demonstrate transfers at min A  Short term goal 3: Pt to amb 50 to 80 ft with appropriate device, min/mod A   Short term goal 4: Pt to improve L LE strength by 1/3 MMG to improve fucntion. Short term goal 5: Pt able to go up and down 5 steps with 2 UE support, mod A   Long term goals  Time Frame for Long term goals : By LOS  Long term goal 1: Pt able to perform bed mobility mod-I  Long term goal 2: Pt able to perform transfers from varies surfaces at mod-I /supervsion level. Long term goal 3: Pt able to ambulate with appropriate device distance of 80 ft, mod-I/supervsion level. Long term goal 4: Pt able to perform step curb with assisitive device at min A  Long term goal 5: Pt able to go up and down 5 steps with 2 rails at min A to improve LE strength. Long term goal 6: Improve PASS score to atleast 25//36 improve overall fucntion  Long term goal 7: Improve Tinetti balance score to 20 or more/28 to reduce fall risk. Long term goal 8: Pt able to propel w/c on level surfaces 150 ft, with set up/supervsion.     Electronically signed by Bella Montgomery PTA on 4/11/21 at 3:20 PM EDT     04/11/21 1131 04/11/21 0299   PT Individual Minutes   [facility]   Time Out [id number]   [id number]

## 2021-04-11 NOTE — PROGRESS NOTES
Physical Medicine & Rehabilitation  Progress Note      Subjective:      80year-old patient with ischemic CVA with L nondominant hemiparesis. Patient is doing better today. No more fatigue or nausea. No new issues with sleep overnight, appetite, bowels, or bladder. ROS:  Denies fevers, chills, sweats. No chest pain, palpitations, lightheadedness. Denies coughing, wheezing or shortness of breath. Denies abdominal pain, nausea, diarrhea or constipation. No new areas of joint pain. Denies new areas of numbness or weakness. Denies new anxiety or depression issues. No new skin problems. Rehabilitation:   Progressing in therapies. PT:  Restrictions/Precautions: Fall Risk, Modified Diet, Swallowing - Thickened Liquids, General Precautions  Implants present? : (L TKA)   Transfers  Sit to Stand: Moderate Assistance, 2 Person Assistance  Stand to sit: Minimal Assistance  Bed to Chair: Moderate assistance, 2 Person Assistance(upright walker)  Stand Pivot Transfers: Maximum Assistance  Squat Pivot Transfers: Moderate Assistance, 2 Person Assistance  Comment: Transfers with RW and upright walker. Ambulation 1  Surface: level tile  Device: (upright walker )  Other Apparatus: Wheelchair follow  Assistance: 2 Person assistance, Minimal assistance(for safety)  Quality of Gait: increased trunk flexion , UE weight bearing   Gait Deviations: Slow Zuly, Decreased step height, Decreased step length  Distance: 110 ft  Comments: upright walker wheels locked for straight path, pt instructed in maintaing straight path and increased trunk extension    Transfers  Sit to Stand: Moderate Assistance, 2 Person Assistance  Stand to sit: Minimal Assistance  Bed to Chair: Moderate assistance, 2 Person Assistance(upright walker)  Stand Pivot Transfers: Maximum Assistance  Squat Pivot Transfers: Moderate Assistance, 2 Person Assistance  Comment: Transfers with RW and upright walker.   Ambulation  Ambulation?: Yes  More Ambulation?: No  Ambulation 1  Surface: level tile  Device: (upright walker )  Other Apparatus: Wheelchair follow  Assistance: 2 Person assistance, Minimal assistance(for safety)  Quality of Gait: increased trunk flexion , UE weight bearing   Gait Deviations: Slow Zuly, Decreased step height, Decreased step length  Distance: 110 ft  Comments: upright walker wheels locked for straight path, pt instructed in maintaing straight path and increased trunk extension    Surface: level tile  Ambulation 1  Surface: level tile  Device: (upright walker )  Other Apparatus: Wheelchair follow  Assistance: 2 Person assistance, Minimal assistance(for safety)  Quality of Gait: increased trunk flexion , UE weight bearing   Gait Deviations: Slow Zuly, Decreased step height, Decreased step length  Distance: 110 ft  Comments: upright walker wheels locked for straight path, pt instructed in maintaing straight path and increased trunk extension    OT:  ADL  Feeding: (pt declined breakfast and lunch this date)  Grooming: (declined in PM due to illness/fatigue)  UE Bathing: Stand by assistance(Minimal verbal cues for attention to task)  LE Bathing: Maximum assistance(assist bilateral feet, perineal area/buttocks; Min A stand)  UE Dressing: Stand by assistance(increased time to complete)  LE Dressing: Dependent/Total(assist all tasks this date due to abdominal pain with bending forward)  Toileting: Dependent/Total  Additional Comments: OT facilitated Pt engagement in self-care routine this date at sink. Pt requires Min/Mod verbal cues for initiation, attention to task, and participation due to patient's reports of nausea, letheragy, and abdominal pain. Balance  Sitting Balance: Stand by assistance  Standing Balance:  Moderate assistance   Standing Balance  Time: 1-2 minutes x 5  Activity: toileting tasks, lower body bathing/dressing  Comment: BUE support on sink or grab bar  Functional Mobility  Functional - Mobility Device: Wheelchair Activity: To/from bathroom  Assist Level: Maximum assistance  Functional Mobility Comments: deferred due to fatigue; use of saratedy this date for transfers     Bed mobility  Rolling to Left: Minimal assistance  Rolling to Right: Moderate assistance  Supine to Sit: Maximum assistance(in PM)  Sit to Supine: Dependent/Total, 2 Person assistance  Scooting: Minimal assistance  Comment: significant fatigue and lethargy noted in PM  Transfers  Stand Step Transfers: Minimal assistance(With RW, 2 steps forward and back x3)  Stand Pivot Transfers: 2 Person assistance, Maximum assistance(Max x 1 to right; Max x 2 to left)  Sit to stand: Moderate assistance  Stand to sit: Moderate assistance  Transfer Comments: with Moderate verbal cues for hand placement   Toilet Transfers  Toilet - Technique: Stand pivot, To right, To left  Equipment Used: Grab bars  Toilet Transfer: Maximum assistance  Toilet Transfers Comments: x 1 this date             SPEECH:    Objective:  /62   Pulse 95   Temp 99.4 °F (37.4 °C) (Oral)   Resp 18   Ht 5' 7\" (1.702 m)   Wt 172 lb (78 kg)   SpO2 96%   BMI 26.94 kg/m²       GEN: Well developed, well nourished, in NAD  HEENT:  NCAT. PERRL. EOMI. Mucous membranes pink and moist.   PULM:  Clear to ausculation. No rales or rhonchi. Respirations WNL and unlabored. CV:  Regular rate rhythm. No murmurs or gallops. GI:  Abdomen soft. Nontender. Non-distended. BS + and equal.    NEUROLOGICAL: Drowsy but arouses easily and responds appropriately to questions/follows commands. . Sensation intact to light touch. MSK:  Functional ROM RUE and RLE. Impaired AROM LUE and LLE due to weakness. Motor testing 4+/5 key muscles RUE and RLEs. Strength 3/5 key muscles LUE and LLE. SKIN: Warm dry and intact. Good turgor. EXTREMITIES:  No calf tenderness to palpation. No edema BLEs. [employer] PSYCH: Mood WNL. Appropriately interactive. Affect WNL.      Diagnostics:     CBC:   Recent Labs     04/08/21  1211 04/09/21 1773 04/11/21  0751   WBC 14.3* 16.8* 4.1   RBC 3.70* 3.48* 3.23*   HGB 11.2* 10.3* 9.9*   HCT 34.6* 32.3* 29.9*   MCV 93.4 93.0 92.5   RDW 14.3 14.8 13.9    208 164     BMP:   Recent Labs     04/08/21  1211 04/10/21  1842 04/11/21  0751   * 133* 135   K 4.2 3.7 4.2    101 104   CO2 21 18* 21   BUN 27* 34* 26*   CREATININE 1.47* 1.49* 1.38*   GLUCOSE 153* 233* 124*     BNP: No results for input(s): BNP in the last 72 hours. PT/INR: No results for input(s): PROTIME, INR in the last 72 hours. APTT: No results for input(s): APTT in the last 72 hours. CARDIAC ENZYMES:   No results for input(s): CKMB, CKMBINDEX, TROPONINT in the last 72 hours. Invalid input(s): CKTOTAL;3  FASTING LIPID PANEL:  Lab Results   Component Value Date    CHOL 121 04/02/2021    HDL 55 04/02/2021    TRIG 61 04/02/2021     LIVER PROFILE: No results for input(s): AST, ALT, ALB, BILIDIR, BILITOT, ALKPHOS in the last 72 hours.      Current Medications:   Current Facility-Administered Medications: cephALEXin (KEFLEX) capsule 500 mg, 500 mg, Oral, 3 times per day  ondansetron (ZOFRAN) tablet 4 mg, 4 mg, Oral, Q8H PRN  dextrose 5 % and 0.45 % NaCl with KCl 20 mEq infusion, , Intravenous, Continuous  0.9 % sodium chloride infusion, 25 mL, Intravenous, PRN  enoxaparin (LOVENOX) injection 40 mg, 40 mg, Subcutaneous, Daily  sodium chloride flush 0.9 % injection 10 mL, 10 mL, Intravenous, 2 times per day  aspirin EC tablet 81 mg, 81 mg, Oral, Daily **OR** [DISCONTINUED] aspirin suppository 300 mg, 300 mg, Rectal, Daily  amLODIPine (NORVASC) tablet 5 mg, 5 mg, Oral, Daily  atorvastatin (LIPITOR) tablet 40 mg, 40 mg, Oral, Nightly  clopidogrel (PLAVIX) tablet 75 mg, 75 mg, Oral, Daily  ferrous sulfate (IRON 325) tablet 325 mg, 325 mg, Oral, BID  levothyroxine (SYNTHROID) tablet 112 mcg, 112 mcg, Oral, Daily  melatonin tablet 6 mg, 6 mg, Oral, Nightly PRN  metoprolol tartrate (LOPRESSOR) tablet 12.5 mg, 12.5 mg, Oral, BID  oxybutynin (DITROPAN-XL) extended release tablet 5 mg, 5 mg, Oral, Daily  pantoprazole (PROTONIX) tablet 40 mg, 40 mg, Oral, QAM AC  acetaminophen (TYLENOL) tablet 650 mg, 650 mg, Oral, Q4H PRN  polyethylene glycol (GLYCOLAX) packet 17 g, 17 g, Oral, Daily  bisacodyl (DULCOLAX) suppository 10 mg, 10 mg, Rectal, Daily PRN  magnesium hydroxide (MILK OF MAGNESIA) 400 MG/5ML suspension 30 mL, 30 mL, Oral, Daily PRN      Impression/Plan:   Impaired ADLs, gait, and mobility due to:      1. 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 On 3 weeks ASA, Plavix. 2. Dysphagia: on thickened liquids. SLP treating and including vital stim. Will eval for [employer]. 3. Malaise/nausea: added prn zofran. IM started IV fluid and is monitoring labs. Creatinine improved 4/11.   4. HTN: on amlodipine, atorvastatin, metoprolol tartrate. Follow up Dr. Jessica Diallo in [facility]40 in 4 weeks for stress test.   5. OA/L rotator cuff injury: stable. Will monitor  6. CKD: stable. Will monitor BMP  7. Hypothyroidism: on levothyroxine  8. Anemia: on ferrous sulfate - dilutional anemia noted today   9. Hx chronic cystitis: on oxybutynin ER. Frequent incontinence is normal baseline for patient. Urine culture positive for E Coli. Reviewed sensitivities with pharmacist - will complete Keflex course - dose renally adjusted. Leukocytosis resolved 4/11. 10. GERD: on pantoprazole  11. Bowel Management: Miralax daily, senokot prn, milk of magnesia prn, dulcolax prn. 12. DVT Prophylaxis:  low molecular weight heparin, SCD's while in bed and ARSH's   13.  Internal medicine for medical management    Electronically signed by Aimee Groves MD on 4/11/2021 at 10:35 AM      This note is created with the assistance of a speech recognition program.  While intending to generate a document that actually reflects the content of the visit, the document can still have some errors including those of syntax and sound a like substitutions which may escape proof reading. In such instances, actual meaning can be extrapolated by contextual diversion.

## 2021-04-12 LAB
ANION GAP SERPL CALCULATED.3IONS-SCNC: 10 MMOL/L (ref 9–17)
BUN BLDV-MCNC: 21 MG/DL (ref 8–23)
BUN/CREAT BLD: ABNORMAL (ref 9–20)
CALCIUM SERPL-MCNC: 8.4 MG/DL (ref 8.6–10.4)
CHLORIDE BLD-SCNC: 106 MMOL/L (ref 98–107)
CO2: 18 MMOL/L (ref 20–31)
CREAT SERPL-MCNC: 1.03 MG/DL (ref 0.5–0.9)
GFR AFRICAN AMERICAN: >60 ML/MIN
GFR NON-AFRICAN AMERICAN: 50 ML/MIN
GFR SERPL CREATININE-BSD FRML MDRD: ABNORMAL ML/MIN/{1.73_M2}
GFR SERPL CREATININE-BSD FRML MDRD: ABNORMAL ML/MIN/{1.73_M2}
GLUCOSE BLD-MCNC: 115 MG/DL (ref 70–99)
POTASSIUM SERPL-SCNC: 4.6 MMOL/L (ref 3.7–5.3)
SODIUM BLD-SCNC: 134 MMOL/L (ref 135–144)

## 2021-04-12 PROCEDURE — 1180000000 HC REHAB R&B

## 2021-04-12 PROCEDURE — 99232 SBSQ HOSP IP/OBS MODERATE 35: CPT | Performed by: PHYSICAL MEDICINE & REHABILITATION

## 2021-04-12 PROCEDURE — 97110 THERAPEUTIC EXERCISES: CPT

## 2021-04-12 PROCEDURE — 6370000000 HC RX 637 (ALT 250 FOR IP): Performed by: PHYSICAL MEDICINE & REHABILITATION

## 2021-04-12 PROCEDURE — 97116 GAIT TRAINING THERAPY: CPT

## 2021-04-12 PROCEDURE — 36415 COLL VENOUS BLD VENIPUNCTURE: CPT

## 2021-04-12 PROCEDURE — 99231 SBSQ HOSP IP/OBS SF/LOW 25: CPT | Performed by: INTERNAL MEDICINE

## 2021-04-12 PROCEDURE — 6360000002 HC RX W HCPCS: Performed by: STUDENT IN AN ORGANIZED HEALTH CARE EDUCATION/TRAINING PROGRAM

## 2021-04-12 PROCEDURE — 97530 THERAPEUTIC ACTIVITIES: CPT

## 2021-04-12 PROCEDURE — 80048 BASIC METABOLIC PNL TOTAL CA: CPT

## 2021-04-12 PROCEDURE — 97535 SELF CARE MNGMENT TRAINING: CPT

## 2021-04-12 PROCEDURE — 6370000000 HC RX 637 (ALT 250 FOR IP): Performed by: STUDENT IN AN ORGANIZED HEALTH CARE EDUCATION/TRAINING PROGRAM

## 2021-04-12 PROCEDURE — 92526 ORAL FUNCTION THERAPY: CPT

## 2021-04-12 PROCEDURE — 2500000003 HC RX 250 WO HCPCS: Performed by: INTERNAL MEDICINE

## 2021-04-12 RX ADMIN — FERROUS SULFATE TAB 325 MG (65 MG ELEMENTAL FE) 325 MG: 325 (65 FE) TAB at 21:20

## 2021-04-12 RX ADMIN — ASPIRIN 81 MG: 81 TABLET, COATED ORAL at 11:34

## 2021-04-12 RX ADMIN — PANTOPRAZOLE SODIUM 40 MG: 40 TABLET, DELAYED RELEASE ORAL at 05:32

## 2021-04-12 RX ADMIN — CEPHALEXIN 500 MG: 500 CAPSULE ORAL at 21:20

## 2021-04-12 RX ADMIN — CEPHALEXIN 500 MG: 500 CAPSULE ORAL at 05:32

## 2021-04-12 RX ADMIN — Medication 6 MG: at 21:20

## 2021-04-12 RX ADMIN — FERROUS SULFATE TAB 325 MG (65 MG ELEMENTAL FE) 325 MG: 325 (65 FE) TAB at 11:35

## 2021-04-12 RX ADMIN — CLOPIDOGREL BISULFATE 75 MG: 75 TABLET ORAL at 11:34

## 2021-04-12 RX ADMIN — ENOXAPARIN SODIUM 40 MG: 40 INJECTION SUBCUTANEOUS at 11:34

## 2021-04-12 RX ADMIN — AMLODIPINE BESYLATE 5 MG: 5 TABLET ORAL at 11:35

## 2021-04-12 RX ADMIN — METOPROLOL TARTRATE 12.5 MG: 25 TABLET, FILM COATED ORAL at 21:20

## 2021-04-12 RX ADMIN — METOPROLOL TARTRATE 12.5 MG: 25 TABLET, FILM COATED ORAL at 11:35

## 2021-04-12 RX ADMIN — ATORVASTATIN CALCIUM 40 MG: 40 TABLET, FILM COATED ORAL at 21:20

## 2021-04-12 RX ADMIN — POTASSIUM CHLORIDE, DEXTROSE MONOHYDRATE AND SODIUM CHLORIDE: 150; 5; 450 INJECTION, SOLUTION INTRAVENOUS at 05:32

## 2021-04-12 RX ADMIN — CEPHALEXIN 500 MG: 500 CAPSULE ORAL at 15:14

## 2021-04-12 RX ADMIN — OXYBUTYNIN CHLORIDE 5 MG: 5 TABLET, EXTENDED RELEASE ORAL at 11:34

## 2021-04-12 ASSESSMENT — PAIN SCALES - GENERAL: PAINLEVEL_OUTOF10: 0

## 2021-04-12 NOTE — PATIENT CARE CONFERENCE
[facility]: [facility]   ACUTE REHABILITATION  TEAM CONFERENCE NOTE  Date: 21  Patient Name: Gaurang Plata       Room: 7910/8379-76  MRN: [id number]       : 1932  (80 y.o.)     Gender: female       Acute CVA (cerebrovascular accident) [facility]) [I63.9]  Diagnosis: Acute CVA     NURSING  Bladder  Incontinent  Bowel   Incontinent  Intervention    Both Bowel & Bladder Program     Wounds/Incisions/Ulcers: Coccyx and silvia area reddened  Medication Education Program: Patient currently unable to manage medications and family being educated  Pain: no pain concerns to address    Fall Risk:  Falling star program initiated    PHYSICAL THERAPY  Bed mobility  Rolling to Left: Minimal assistance  Rolling to Right: Moderate assistance  Supine to Sit: Moderate assistance  Sit to Supine: Minimal assistance  Scooting: Minimal assistance  Comment: increased time to initiate and complete all functional transfers , pt demonstrates bilateral LE tremors upon sitting supine to sit AM    Transfers:  Sit to Stand:  Moderate Assistance  Stand to sit: Minimal Assistance  Bed to Chair: Moderate assistance  Squat Pivot Transfers: Maximum Assistance(to left , no AD , pt sequences correctly after pre instructi)    Ambulation 1  Surface: level tile  Device: (upright walker )  Other Apparatus: Wheelchair follow(IV)  Assistance: Minimal assistance;Contact guard assistance  Quality of Gait: increased trunk flexion , UE weight bearing   Gait Deviations: Slow Zuly;Decreased step height;Decreased step length  Distance: 100 feet x 4  Comments: 1 standing restbreak, distance limited by fatigue with left LE buckel, upright walker wheels locked for straight path, pt instructed in maintaing straight path and increased trunk extension  Ambulation 2  Surface - 2: level tile  Device 2: Rolling Walker  Other Apparatus 2: Wheelchair follow  Assistance 2: Moderate assistance  Quality of Gait 2: flexed posture  Gait Deviations: Slow Zuly Distance: 30 feet x 2; 100 feet  Comments: needs intermittent cueing to stand tall and stay within LEN of walker    # Steps : 5  Rails: Bilateral  Curbs: 6\"  Assistance: 2 Person assistance;Minimal assistance  Comment: tolerated step to gait pattern     Equipment Needed: No  Other: continue to assess    Pt reports her left knee was unstable weak prior to surgery. Reports has arthritis in her R knee too. Reports she was not able to stand too long due to \"bad\" knees prior to her stroke. Pt required modAx2 to perform sit to stand with RW, unable to perform ambulation due to weakness and decreased balance. Pt is a high fall risk due to level of assistance required and balance deficits. Recommending continued skilled physical therapy to address balance deficits to return pt to prior level of independence. Goals  Time Frame for Short term goals:  8 to 9 days  Short term goal 1: Pt to perform bed mobility at SBA with rail  Short term goal 2: Pt to demonstrate transfers at min A  Short term goal 3: Pt to amb 50 to 80 ft with appropriate device, min/mod A   Short term goal 4: Pt to improve L LE strength by 1/3 MMG to improve fucntion.   Short term goal 5: Pt able to go up and down 5 steps with 2 UE support, mod A     OCCUPATIONAL THERAPY  SELF CARE      Eating            Setup   Oral Hygiene            None (declines this date)   Shower/Bathe Self             UE Bathing: None (declines - typically SBA)  LE Bathing: None (declines - Max A)   Upper Body Dressing            Setup(don/doff [place] shirt, seated)   Lower Body Dressing            Putting On/Taking Off Footwear             Moderate assistance(intermittent A clothing on hip/TEDs/shoes, shoehorn utilized)   Toilet Transfer               Did not occur during OT session   [address]            None(no need at this time)    Bed mobility  Sit to Supine: Minimal assistance(LLE into bed)  Scooting: Minimal assistance        Balance  Sitting Balance: Supervision(seated in w/c)  Standing Balance: Minimal assistance(min/CGA x1)  Standing Balance  Time: 1-2 min x3  Activity: functional transfer, lower body dressing tasks  Comment: 1-2 UE support req, cuing for safety in standing    Equipment Recommendations  Equipment Needed: (TBD)  Assessment: Pt requires MAX A/DEP for most ADL tasks with need for physical assistance and instruction. Pt would require 24 hour care at this time d/t physical and safety needs. Poor insight to modifed diet. PM treatment deferred due to emesis/fatigue. Short term goals  Time Frame for Short term goals: 7 to 10 days  Short term goal 1: Pt will perform upper body bathing/dressing with stand by assist.  Short term goal 2: Pt will perform lower body bathing/dressing and toileting tasks with Moderate assist and Good safety. Short term goal 3: Pt will perform functional functional transfers and mobility during self-care with Moderate assist, least restrictive mobility device, and Good safety. Short term goal 4: Pt will for 4+ minutes with 1-2 UE support and Minimal assist while engaging in functional activity of choice. Short term goal 5: Pt will actively participate in 30+ minutes of therapeutic exercise/functional activities to promote increased independence with self-care and mobility. Short term goal 6: Pt will verbalize/demonstrate Good understanding of assistive equipment/durable medical equipment/modified techniques for increased independence with self-care and mobility. SPEECH THERAPY  Receiving vital stim, on minced and moist, honey thick liquids. Min A comprehension and expression, mod A problem solving, max A memory. Short Term Goal: diet at least restrictive consistency. Supervision level comprehension and expression, min A problem solving, mod A memory    NUTRITION  Weight: 172 lb (78 kg) / Body mass index is 26.94 kg/m². Diet Rx: Minced and Moist, moderately thick liquids (downgraded 4/12). Magic Cup x 2 daily.  PO intake variable; average 51-75%. Please see nutrition note for details. SOCIAL WORK ASSESSMENT  Assessment:Pt plans to return home and is agreeable for home health services if necessary. List of choices was left in her room. Pt lives alone. Her son Augie Arriaga lives 3 miles away and her daughter Chyna Mckee lives in [facility]. Pt has 3 \"girls\" who stay with her during the day to help and stated they were not family \"just friends\"  Pre-Admission Status:  Lives With: Alone  Type of Home: House  Home Layout: Able to Live on Main level with bedroom/bathroom, Two level, Performs ADL's on one level  Home Access: Ramped entrance  Bathroom Shower/Tub: Walk-in shower, Shower chair with back, Doors  Bathroom Toilet: Handicap height  Bathroom Equipment: Grab bars in shower, Grab bars around toilet, Hand-held shower, 3-in-1 commode, Shower chair(bedside commode next to bed)  Bathroom Accessibility: [facility]: [employer], 4 wheeled walker, Lift chair, Rolling walker, Alert [employer], Reacher, Sock aid  Receives Help From: Family, Personal care attendant, Friend(s)  ADL Assistance: Needs assistance(SBA showering; modified IND dressing/toileting)  Bath: Stand by assistance  Dressing: Modified independent  Grooming: Modified independent   Feeding: Modified independent   Toileting: Needs assistance(assist for personal hygiene with bowel movement)  Homemaking Assistance: Needs assistance(Pt's friends and family complete all IADL tasks.)  Homemaking Responsibilities: Yes  Meal Prep Responsibility: Secondary(light meal prep, microwave meals)  Ambulation Assistance: Independent(using grab bars or RW or 4WW.)  Transfer Assistance: Independent  Active : No  Patient's  Info: Family  Mode of Transportation: Car  Occupation: Retired  Leisure & Hobbies: put together puzzles, reading  IADL Comments: Sleeps in a regular flat bed. Patient's neighbor/friend Lorena Faith checks on her at night. Mae Martinez and Rae help during the day time.  Pt is surrounded by family, friends, and helpful neighbors. Patient's son installed grab bars/rails in each room for her. Patient's support system provide assist for all laundry, meals, dishes, shopping and cleaning. Additional Comments: Patient's daughter present during OT evaluation to provide insight into prior level of function. Patient's son Bradford Lewis lives 3 miles away and comes every mronring to see the patient. Patient's daughter Mateo Stern comes every afternon. One of patient's friends checks in morning, lunch time, evening and patient's neighbor checks in at night. There are times patient is home alone, however frequent checks. Family Education: Family Education initiated and Ongoing    Percentage Risk for Readmission: Moderate 19% - 27%   Readmission Risk              Risk of Unplanned Readmission:        19       %    Critical Items: None       Problem / Barrier Intervention / Plan  Results   dysphagia NMES, oral motor exercises    Impaired comprehension and expression Language retraining exercises    Impaired cognition Cognitive retraining exercises    Impaired functional mobility 2* CVA Progress bed mobility, transfers, ambulation with appropriate AD, family training, safety training, weight bearing activities    High Fall risk Static/dynamic balance activities, varied supports, reaching activities    Altered ADL status related to CVA   Patient and family training in modified care techniques and use of devices to support safe care performance            Total Self Care Score    Total Mobility Score  Admission Score:  15      Admission Score:  18  Goal:  40/42         Goal:  60/90   `  Discharge Plan   Estimated Discharge Date: 4/27/2021  Home evaluation needed?  No home evaluation need indicated for patient at this time  Overnight or Day Pass: No  Factors facilitating achievement of predicted outcomes: Family support, Cooperative and Pleasant  Barriers to the achievement of predicted outcomes: Depression, Decreased endurance, Upper extremity weakness, Lower extremity weakness, Incontinence of bladder, Skin Care, Medical complications and Medication managment    Functional Goals at discharge:  Predicted Outcome: Home with familyPATIENT'S LEVEL OF ASSISTANCE: 24 hour Supervision  and Stand By Assistance   Discharge therapy goals:  PT: Long term goals  Time Frame for Long term goals : By LOS  Long term goal 1: Pt able to perform bed mobility mod-I  Long term goal 2: Pt able to perform transfers from varies surfaces at mod-I /supervsion level. Long term goal 3: Pt able to ambulate with appropriate device distance of 80 ft, mod-I/supervsion level. Long term goal 4: Pt able to perform step curb with assisitive device at min A  Long term goal 5: Pt able to go up and down 5 steps with 2 rails at min A to improve LE strength. Long term goal 6: Improve PASS score to atleast 25//36 improve overall fucntion  Long term goal 7: Improve Tinetti balance score to 20 or more/28 to reduce fall risk. Long term goal 8: Pt able to propel w/c on level surfaces 150 ft, with set up/supervsion. OT:Long term goals  Time Frame for Long term goals : By discharge  Long term goal 1: Pt will perform bathing during shower with stand by assist and Good safety. Long term goal 2: Pt will perform dressing and toileting tasks with modified independence and Good safety. Long term goal 3: Pt will stand for 8+ minutes with 1-2 UE support, modified independence, no loss of balance while engaging in functional activity of choice. Long term goal 4: Pt will perform functional transfers and mobility with modified independence, least restrictive mobility device, and Good safety. Long term goal 5: Pt will verbalize/demonstrate Good understanding of Fall Prevention Strategies for increased independence with self-care and mobility.   Long term goals 6: 9 hole peg and box and blocks to be assessed for LUE as appropriate  ST:  Diet at least restrictive

## 2021-04-12 NOTE — PROGRESS NOTES
Delayed responses to stimuli  Attention Span: Attends with cues to redirect  Memory: Decreased short term memory;Decreased recall of recent events  Following Commands: Follows multistep commands with repetition  Safety Judgement: Decreased awareness of need for assistance  Awareness of Errors: Assistance required to identify errors made  Insights: Decreased awareness of deficits  Sequencing and Organization: Assistance required to implement solutions;Assistance required to generate solutions;Assistance required to identify errors made  Perception  Overall Perceptual Status: Impaired  Initiation: Cues to initiate tasks  Balance  Sitting Balance: Supervision(seated in w/c)  Standing Balance: Minimal assistance(min/CGA x1)  Bed mobility  Sit to Supine: Minimal assistance(LLE into bed)  Scooting: Minimal assistance  Transfers  Stand Step Transfers: Minimal assistance;Contact guard assistance(R/W)  Stand Pivot Transfers: Minimal assistance;Contact guard assistance  Sit to stand: Minimal assistance;Contact guard assistance(varied)  Stand to sit: Minimal assistance;Contact guard assistance(varied)  Transfer Comments: cuign for proper tech  Standing Balance  Time: 1-2 min x3  Activity: functional transfer, lower body dressing tasks  Comment: 1-2 UE support req, cuing for safety in standing  Functional Mobility  Functional - Mobility Device: Rolling Walker  Activity: Other(transfers in room)  Assist Level: Minimal assistance(min/CGA)  Functional Mobility Comments: cuing for safety and proper tech     Type of ROM/Therapeutic Exercise  Type of ROM/Therapeutic Exercise: AROM; Free weights(1#, BUE's, 10 reps, assist to support LUE)  Comment: pt engaged in BUE ex's to support mobility/transfers and overall endurance, rest breaks req as needed, assist to LUE and reaching full ROM  Exercises  Scapular Protraction: 1# BUE'S  Scapular Retraction: 1# BUE'S  Shoulder Flexion: 1# LEON WATT  Shoulder Extension: 1# LEON WATT Horizontal ABduction: 1# RUE, AAROM LUE  Horizontal ADduction: 1# RUE, AAROM LUE  Elbow Flexion: BUE'S, 1#  Elbow Extension: BUE's, 1#     Additional Activities: seated task to string beads following pattern to addres bimanual coordination and engagement with LUE, cuing req to use LUE during task to promote/increase functional return, intermittent cuing req for correct bead placement, increased time req, noted weakness in LUE for bead stabilization and pulling string through                 ADL  Feeding: Setup  Grooming: None(declines this date)  UE Bathing: None(declines)  LE Bathing: None(declines)  UE Dressing: Setup(don/doff [place] shirt, seated)  LE Dressing: Moderate assistance(intermittent A clothing on hip/TEDs/shoes, shoehorn utilized)  Toileting: None(no need at this time)  Additional Comments: pt fatigued, agreeable to dressing but declines bathing this date, seated in w/c, pt able to don pants with intermittent threading over feet, min A in standing to pull pants over hips with A for clothing on L hip, A for TEDs, shoe setup utilizing shoe horn with increased time for completion, seated to don/doff shirt, setup with increased time req          Assessment  Performance deficits / Impairments: Decreased functional mobility ; Decreased strength;Decreased endurance;Decreased balance;Decreased high-level IADLs;Decreased posture;Decreased ADL status; Decreased ROM; Decreased safe awareness;Decreased cognition;Decreased fine motor control;Decreased coordination  Prognosis: Good  Discharge Recommendations: Home with assist PRN;Home with Home health OT  Activity Tolerance: Patient limited by fatigue;Patient Tolerated treatment well  Safety Devices in place: Yes  Type of devices: Left in chair;Call light within reach;Nurse notified(family present in PM session)  Equipment Recommendations  Equipment Needed: (TBD)       Patient Education: transfer safety, ADL tasks, OT POC, L hand coordination/strength   Patient Goals Patient goals : To return home with family support  Learner:patient  Method: demonstration and explanation       Outcome: needs reinforcement        Plan  Plan  Times per week: 5-7  Times per day: Twice a day  Current Treatment Recommendations: Strengthening, ROM, Safety Education & Training, Positioning, Balance Training, Patient/Caregiver Education & Training, Self-Care / ADL, Functional Mobility Training, Endurance Training, Neuromuscular Re-education, Wheelchair Mobility Training, Cognitive Reorientation, Equipment Evaluation, Education, & procurement, Home Management Training, Cognitive/Perceptual Training  Patient Goals   Patient goals : To return home with family support  Short term goals  Time Frame for Short term goals: 7 to 10 days  Short term goal 1: Pt will perform upper body bathing/dressing with stand by assist.  Short term goal 2: Pt will perform lower body bathing/dressing and toileting tasks with Moderate assist and Good safety. Short term goal 3: Pt will perform functional functional transfers and mobility during self-care with Moderate assist, least restrictive mobility device, and Good safety. Short term goal 4: Pt will for 4+ minutes with 1-2 UE support and Minimal assist while engaging in functional activity of choice. Short term goal 5: Pt will actively participate in 30+ minutes of therapeutic exercise/functional activities to promote increased independence with self-care and mobility. Short term goal 6: Pt will verbalize/demonstrate Good understanding of assistive equipment/durable medical equipment/modified techniques for increased independence with self-care and mobility. Long term goals  Time Frame for Long term goals : By discharge  Long term goal 1: Pt will perform bathing during shower with stand by assist and Good safety. Long term goal 2: Pt will perform dressing and toileting tasks with modified independence and Good safety.   Long term goal 3: Pt will stand for 8+ minutes with 1-2 UE support, modified independence, no loss of balance while engaging in functional activity of choice. Long term goal 4: Pt will perform functional transfers and mobility with modified independence, least restrictive mobility device, and Good safety. Long term goal 5: Pt will verbalize/demonstrate Good understanding of Fall Prevention Strategies for increased independence with self-care and mobility.   Long term goals 6: 9 hole peg and box and blocks to be assessed for LUE as appropriate        04/12/21 1032 04/12/21 1406   OT Individual Minutes   Time In 1032 1406   Time Out 1132 1439   Minutes 60 33     Electronically signed by AYESHA Barry on 4/12/21 at 3:54 PM EDT

## 2021-04-12 NOTE — PLAN OF CARE
Problem: Skin Integrity:  Goal: Absence of new skin breakdown  Description: Absence of new skin breakdown  4/12/2021 0522 by Petros Wolfe RN  Outcome: Met This Shift   Skin assessment completed this shift. Nutrition and Hydration status assessed with adequate intake. Lalito Score as charted. Chair cushion in use for when pt is up to chair. Bilateral heels remain elevated on pillows throughout the shift. Patient tolerates repositioning by staff at least every 2 hours. Patient verbalizes understanding of pressure ulcer prevention measures. Skin integrity maintained. No new skin breakdown noted. Skin to high risk pressure areas including coccyx and heels are clear. Odalis / Incontinence care provided as needed throughout the shift. Aloe Vesta Moisture Barrier ointment applied to buttocks as a preventative measure. Problem: Confusion - Acute:  Goal: Mental status will be restored to baseline  Description: Mental status will be restored to baseline  4/12/2021 0522 by Petros Wolfe RN  Outcome: Met This Shift  Pt remains A&O X4 so far throughout the shift. Will continue to monitor for changes. Problem: Injury - Risk of, Physical Injury:  Goal: Will remain free from falls  Description: Will remain free from falls  4/12/2021 0522 by Petros Wolfe RN  Outcome: Met This Shift  No falls or injuries sustained at this time. No attempts to get out of bed without nursing assistance. Call light within reach and pt. uses appropriately for assistance. Siderails up x 2. Nonskid footwear remains on. Bed in low and locked position. Hourly nursing rounds made. Pt. Alert and oriented, aware of limitations, and exhibits good safety judgement. Pt. uses assistive devices appropriately. Pt. understands individual fall risk factors. Pt. reminded to use call light with each nurse/patient interaction. Bed alarm / Personal alarm remains engaged throughout the shift as a precaution.         Problem: Pain:  Goal: Pain level will decrease  Description: Pain level will decrease  4/12/2021 0522 by Gorge Hitchcock RN  Outcome: Met This Shift  Pain assessment completed so far this shift. Pt. able to rest without the use of pain medication. Patient denies any pain so far this shift. Will continue to monitor.

## 2021-04-12 NOTE — PROGRESS NOTES
Speech Language Pathology  Speech Language Pathology  [facility]    Dysphagia/Cognitive Treatment Note    Date: 4/12/2021  Patients Name: Edy Osborn  MRN: [id number]  Diagnosis:   Patient Active Problem List   Diagnosis Code    Anemia D64.9    Arthritis M19.90    HTN (hypertension) I10    Hypothyroidism E03.9    Renal insufficiency N28.9    Other long term (current) drug therapy Z79.899    Anemia due to stage 3 chronic kidney disease N18.30, D63.1    Chronic UTI N39.0    Mixed hyperlipidemia E78.2    Gastroesophageal reflux disease without esophagitis K21.9    S/P revision of total knee, 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 denies    Cognitive Treatment    Treatment time: 9720-2803     Subjective: [x] Alert [x] Cooperative     [] Confused     [] Agitated    [x] Lethargic      Objective/Assessment:  Attention: Pt. Alert, very tangential.  Frequently required redirection back to task. Cognition:   n/a    Verbal expression:   Mild word finding difficulties noted throughout in conversation. Dysphagia: Vital stim completed for 51 minutes at 7.0 mA. Pt. Completed Briseyda maneuver x2, simple tongue base strengthening exercises x5, 10 reps each. Pt. Eating lunch and took soft and bite sized meat with magic cup x5, bites magic cup x6. Pt. Refused trials of moderately thick liquid. Pt. Demonstrating significantly prolonged mastication of soft and bite sized diet, with occasional L sided pocketing, oral residue present.   ST updated RN re: recommendations for diet downgrade of minced and moist, moderately thick liquids   Pt. Reports continued nausea c moderately thick liquids, ST provided ongoing educ. And reminders of diet rationale and aspiration as pt. Asking if water could be thinned out with ice. Per pt., \"I didn't know that! \"    Other: Pt. Ryan Nascimento present, educ. Re: NMES, diet recommendations, exercises. Aquilino Must verbalized understanding and participated in encouraging pt. To complete exercises and to be compliant c diet. RN also updated re: pt. Not a good candidate for [employer] Protocol at this time d/t reduced cognition and need for consistent diet to boost her compliance. Plan:  [x] Continue ST services    [] Discharge from ST:      Discharge recommendations: [] Inpatient Rehab   [] [place]   [] Outpatient Therapy  [] Follow up at trauma clinic   [] Other:       Treatment completed by: Fay Fernando A.CCC/SLP

## 2021-04-12 NOTE — CONSULTS
[facility]    CONSULTATION / HISTORY AND PHYSICAL EXAMINATION            Date:   4/12/2021  Patient name:  Melissa Hickey  Date of admission:  4/7/2021 12:21 PM  MRN:   [id number]  Account:  [de-identified]  YOB: 1932  PCP:    VERNELL Joyce CNP  Room:   [id number]  Code Status:    Full Code    Physician Requesting Consult: Pawan Barrett MD    Reason for Consult:  medical management    Chief Complaint:     No chief complaint on file. Ischemic CVA    History Obtained From:     Patient medical record nursing staff    History of Present Illness:     29-year-old elderly lady was admitted to  5 earlier this month with the acute left-sided weakness after she finished her physical therapy at home she has chronic left leg weakness post multiple surgeries and has chronic shoulder issues and difficulty overhead abduction  Patient received a TPA  4/10/21  Nausea , poor intake , malaise   Will start d5 half saline with k . Check labs . 4/12/21    · kathryn resolves    Results for Marc Marin (MRN [id number]) as of 4/12/2021 14:38   Ref.  Range 4/10/2021 18:42 4/11/2021 07:51 4/11/2021 19:16 4/12/2021 06:32   Sodium Latest Ref Range: 135 - 144 mmol/L 133 (L) 135 134 (L) 134 (L)   Potassium Latest Ref Range: 3.7 - 5.3 mmol/L 3.7 4.2 4.1 4.6   Chloride Latest Ref Range: 98 - 107 mmol/L 101 104 103 106   CO2 Latest Ref Range: 20 - 31 mmol/L 18 (L) 21 17 (L) 18 (L)   BUN Latest Ref Range: 8 - 23 mg/dL 34 (H) 26 (H) 25 (H) 21   Creatinine Latest Ref Range: 0.50 - 0.90 mg/dL 1.49 (H) 1.38 (H) 1.23 (H) 1.03 (H)   Bun/Cre Ratio Latest Ref Range: 9 - 20  NOT REPORTED NOT REPORTED NOT REPORTED NOT REPORTED   Anion Gap Latest Ref Range: 9 - 17 mmol/L 14 10 14 10   GFR Non- Latest Ref Range: >60 mL/min 33 (L) 36 (L) 41 (L) 50 (L)   GFR  Latest Ref Range: >60 mL/min 40 (L) 44 (L) 50 (L) >60   Glucose Latest Ref Range: 70 - 99 mg/dL 233 (H) 124 (H) 131 (H) 115 (H)   Calcium Latest Ref Range: 8.6 - 10.4 mg/dL 8.5 (L) 8.6 8.9 8.4 (L)   GFR Comment Unknown Pend Pend Pend Pend   GFR Staging Unknown NOT REPORTED NOT REPORTED NOT REPORTED NOT REPORTED 1. Past Medical History:     Past Medical History:   Diagnosis Date    Anemia     Hypertension     Hypothyroidism     Osteoarthritis     Other long term (current) drug therapy     Renal insufficiency         Past Surgical History:     No past surgical history on file. Medications Prior to Admission:     Prior to Admission medications    Medication Sig Start Date End Date Taking?  Authorizing Provider   atorvastatin (LIPITOR) 20 MG tablet Take 1 tablet by mouth nightly 3/1/21  Yes VERNELL Ramirez CNP   levothyroxine (SYNTHROID) 112 MCG tablet Take 1 tablet by mouth Daily 3/1/21  Yes VERNELL Ramirez CNP   oxybutynin (DITROPAN-XL) 5 MG extended release tablet Take 1 tablet by mouth daily 1/5/21  Yes VERNELL Ramirez CNP   acetaminophen (TYLENOL) 325 MG tablet Take 650 mg by mouth Take 2 tablets at bedtime for sleeping comfort PRN   Yes Historical Provider, MD   amLODIPine (NORVASC) 5 MG tablet Take 1 tablet by mouth daily 3/1/21   VERNELL Ramirez CNP   Ferrous Sulfate 324 MG TBEC Take 1 tablet by mouth 2 times daily 3/1/21   VERNELL Ramirez - CNP   omeprazole (PRILOSEC) 20 MG delayed release capsule Take 1 capsule by mouth daily 3/1/21   VERNELL Ramirez CNP   sodium bicarbonate 650 MG tablet Take 1 tablet by mouth 4 times daily Two tabs BID 3/1/21   VERNELL Ramirez - MALCOLM   metoprolol tartrate (LOPRESSOR) 25 MG tablet Take 0.5 tablets by mouth 2 times daily Take 1/2 tablet BID 3/1/21   VERNELL Ramirez CNP   docusate sodium (COLACE) 100 MG capsule Take 1 capsule by mouth every other day 2/24/21   VERNELL Ramirez CNP   cephALEXin (KEFLEX) 250 MG capsule TAKE ONE CAPSULE BY MOUTH ONCE A DAY. 2/1/21   Linsey Hutchison extremities      Physical Exam:     BP (!) 140/63   Pulse 72   Temp 98.9 °F (37.2 °C) (Oral)   Resp 16   Ht 5' 7\" (1.702 m)   Wt 172 lb (78 kg)   SpO2 96%   BMI 26.94 kg/m²   Temp (24hrs), Av.5 °F (36.9 °C), Min:98.1 °F (36.7 °C), Max:98.9 °F (37.2 °C)    No results for input(s): POCGLU in the last 72 hours. Intake/Output Summary (Last 24 hours) at 2021 1442  Last data filed at 2021 0532  Gross per 24 hour   Intake 2897 ml   Output 700 ml   Net 2197 ml       General Appearance:  alert, well appearing, and in no acute distress  Mental status: oriented to person, place, and time with normal affect  Head:  normocephalic, atraumatic. Eye: no icterus, redness, pupils equal and reactive, extraocular eye movements intact, conjunctiva clear  Ear: normal external ear, no discharge, hearing intact  Nose:  no drainage noted  Mouth: mucous membranes moist  Neck: supple, no carotid bruits, thyroid not palpable  Lungs: Bilateral equal air entry, clear to ausculation, no wheezing, rales or rhonchi, normal effort  Cardiovascular: normal rate, regular rhythm, no murmur, gallop, rub.   Abdomen: Soft, nontender, nondistended, normal bowel sounds, no hepatomegaly or splenomegaly  Neurologic: Left upper and left lower extremity weakness  Skin: No gross lesions, rashes, bruising or bleeding on exposed skin area  Extremities:  peripheral pulses palpable, no pedal edema or calf pain with palpation      Investigations:      Laboratory Testing:  Recent Results (from the past 24 hour(s))   BASIC METABOLIC PANEL    Collection Time: 21  7:16 PM   Result Value Ref Range    Glucose 131 (H) 70 - 99 mg/dL    BUN 25 (H) 8 - 23 mg/dL    CREATININE 1.23 (H) 0.50 - 0.90 mg/dL    Bun/Cre Ratio NOT REPORTED 9 - 20    Calcium 8.9 8.6 - 10.4 mg/dL    Sodium 134 (L) 135 - 144 mmol/L    Potassium 4.1 3.7 - 5.3 mmol/L    Chloride 103 98 - 107 mmol/L    CO2 17 (L) 20 - 31 mmol/L    Anion Gap 14 9 - 17 mmol/L    GFR Non-African American 41 (L) >60 mL/min    GFR African American 50 (L) >60 mL/min    GFR Comment          GFR Staging NOT REPORTED    BASIC METABOLIC PANEL    Collection Time: 04/12/21  6:32 AM   Result Value Ref Range    Glucose 115 (H) 70 - 99 mg/dL    BUN 21 8 - 23 mg/dL    CREATININE 1.03 (H) 0.50 - 0.90 mg/dL    Bun/Cre Ratio NOT REPORTED 9 - 20    Calcium 8.4 (L) 8.6 - 10.4 mg/dL    Sodium 134 (L) 135 - 144 mmol/L    Potassium 4.6 3.7 - 5.3 mmol/L    Chloride 106 98 - 107 mmol/L    CO2 18 (L) 20 - 31 mmol/L    Anion Gap 10 9 - 17 mmol/L    GFR Non-African American 50 (L) >60 mL/min    GFR African American >60 >60 mL/min    GFR Comment          GFR Staging NOT REPORTED            Consultations:   IP CONSULT TO SOCIAL WORK  IP CONSULT TO INTERNAL MEDICINE  Assessment :      Primary Problem  <principal problem not specified>    Active Hospital Problems    Diagnosis Date Noted    Nausea [R11.0] 04/10/2021    Malaise and fatigue [R53.81, R53.83] 04/10/2021    Acute CVA (cerebrovascular accident) ([facility] [I63.9] 04/07/2021    Acute left hemiparesis (HCC) [G81.94]     Gastroesophageal reflux disease without esophagitis [K21.9] 02/24/2021    HTN (hypertension) [I10] 10/21/2020    Hypothyroidism [E03.9] 10/21/2020       Plan:     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 Check labs . 4/12/21     stop iv and labs   · Renal fximproving with hydeation . Had prerenal syeda .   Results for Jaymie Trey (MRN [id number]) as of 4/12/2021 14:38   Ref. Range 4/10/2021 18:42 4/11/2021 07:51 4/11/2021 19:16 4/12/2021 06:32   Sodium Latest Ref Range: 135 - 144 mmol/L 133 (L) 135 134 (L) 134 (L)   Potassium Latest Ref Range: 3.7 - 5.3 mmol/L 3.7 4.2 4.1 4.6   Chloride Latest Ref Range: 98 - 107 mmol/L 101 104 103 106   CO2 Latest Ref Range: 20 - 31 mmol/L 18 (L) 21 17 (L) 18 (L)   BUN Latest Ref Range: 8 - 23 mg/dL 34 (H) 26 (H) 25 (H) 21   Creatinine Latest Ref Range: 0.50 - 0.90 mg/dL 1.49 (H) 1.38 (H) 1.23 (H) 1.03 (H)   Bun/Cre Ratio Latest Ref Range: 9 - 20  NOT REPORTED NOT REPORTED NOT REPORTED NOT REPORTED   Anion Gap Latest Ref Range: 9 - 17 mmol/L 14 10 14 10   GFR Non- Latest Ref Range: >60 mL/min 33 (L) 36 (L) 41 (L) 50 (L)   GFR  Latest Ref Range: >60 mL/min 40 (L) 44 (L) 50 (L) >60   Glucose Latest Ref Range: 70 - 99 mg/dL 233 (H) 124 (H) 131 (H) 115 (H)   Calcium Latest Ref Range: 8.6 - 10.4 mg/dL 8.5 (L) 8.6 8.9 8.4 (L)   GFR Comment Unknown Pend Pend Pend Pend   GFR Staging Unknown NOT REPORTED NOT REPORTED NOT REPORTED NOT REPORTED ·    2. BMP:   Recent Labs     04/11/21  1916 04/12/21  0632   * 134*   K 4.1 4.6   CO2 17* 18*   BUN 25* 21   CREATININE 1.23* 1.03*   LABGLOM 41* 50*   GLUCOSE 131* 115*              Tiffanie Lay MD  4/12/2021  2:42 PM    Copy sent to Dr. Mallory Barry, APRN - CNP    Please note that this chart was generated using voice recognition Dragon dictation software. Although every effort was made to ensure the accuracy of this automated transcription, some errors in transcription may have occurred.

## 2021-04-12 NOTE — CARE COORDINATION
Genesis Jose RN   Registered Nurse   Case Management   Progress Notes   Signed   Date of Service:  2021  4:34 PM         Related encounter: ED to Hosp-Admission (Discharged) from 2021 in 1 [facility] Inpatient Rehab Preadmission Assessment     Patient Name: Yo Umana        MRN:   [id number]    : 1932  (80 y.o.)  Gender: female      Admitted from:   []?[facility]  [x]? [facility] [address]   []? [facility] [address]   []? [facility]   []? Outside Admission - Location:                                 [x]? Initial         []? Updated     Date of Onset / Admission to the acute hospital:  21     Inpatient Rehabilitation Admitting Diagnosis:  CVA     Did patient have surgery? [x]? No  []? Yes:       Physicians: Sahil Chairez     Risk for clinical complications:  High     Co-morbidities:       1. Hypertension  2. Suspect concomitant left rotator cuff injury  3. Osteoarthritis  4. Chronic kidney disease  5. Hypothyroid  6. Anemia  7. History of chronic cystitis     Financial Information  Primary insurance:  [x]? Medicare     []? Medicare HMO      []? [employer]    []? Medicaid      []? Medicaid HMO       []? Workers Compensation        []? Personal Pay     Secondary Insurance:  []? Medicare     []? Medicare HMO      [x]? [employer]    []? Medicaid      []? Medicaid HMO        []? Workers Compensation      []? None     Precautions:   []? Cardiac Precautions:            []? Total hip precautions:           []? Weight Bearing status:  [x]? Safety Precautions/Concerns:  Fall Risk, General Precautions  [x]? Visually impaired:   Wears glasses at all times        []? Hard of Hearing:      Isolation Precautions:         []? Yes              [x]? No  If Yes:   []? Droplet  []? Contact           []? Airborne     []? VRE     []? MRSA        []? C-diff         []? TB             []? ESBL         []? MDRO          []? Other:                        Physiatrist:  [x]?      []? Dr. Elier Ramos  []? Dr. Simon Latif  []? Dr. Mary Aguilar     Patients Occupation: Retired     Reviewed Lab and Diagnostic reports from Current Admission: Yes     Patients Prior Functional  Level: Prior Function  Receives Help From: Family, Personal care attendant, Friend(s)  ADL Assistance: Needs assistance  Bath: Maximum assistance(Pt recieves help daily for bathing from friend that comes every morning.)  Dressing: Stand by assistance(Pt gets help from friend as needed, fluctuating between MOD IND and MIN A for dressing tasks.)  Grooming: Modified independent   Feeding: Modified independent   Toileting: Needs assistance(SBA)  Homemaking Assistance: Needs assistance(Pt's friends and family complete all IADL tasks.)  Ambulation Assistance: Independent(using grab bars or RW)  Transfer Assistance: Independent  Additional Comments: Pt's daughter is supportive and able to assist as needed. Pt's friends come daily to assist with bathing/dressing.     History of current illness:  71-year-old female admitted with acute left hemiparesis causing a fall of her chair.  Noted acute left-sided weakness worse than previous date. Abilio Farnsworth has chronic left lower extremity weakness from previous double knee replacements and left upper extremity weakness due to shoulder surgery per the daughter. Abilio Farnsworth was life flighted to [address] on baby aspirin at home.  Patient given TPA during TPA became hysterical repeat CT showed no change completed TPA     Neurologyfound to have right paramedian pontine acute ischemic infarct status post TPAon aspirin, Plavix for 3 weeks and long-term aspirin, Lipitor, echo and A1c pending, continue Lovenox     Current functional status for upper extremity ADLs:  UE Bathing: Setup, Increased time to complete, Minimal assistance(writer assisted w/ washing pt's back)  UE Dressing: Setup, Minimal assistance(don/doff gown, tie and button sleeve)     Current functional status for lower extremity ADLs:  LE Bathing: Setup, Maximum assistance, Increased time to complete(below knees)  LE Dressing: Setup, Maximum assistance     Current functional status for bed, chair, wheelchair transfers:  Transfers  Sit to Stand: Moderate Assistance, 2 Person Assistance, Minimal Assistance  Stand to sit: Moderate Assistance, 2 Person Assistance, Minimal Assistance  Comment: Pt performed STS transfer x3 throughout treatment. Pt required modAx2 to perform STS from EOB, demonstrating significant posterior lean requiring max verbal cueing to increase LEN and BLE progression. Pt required minAx2 to perform STS from EOB in [address] [place] [address] stedy for transfer to restroom. ModAx2 required from [facility] with max verbal cueing to maintain upright standing posture for pericare.     Current functional status for toilet transfers: Moderate Assistance     Current functional status for locomotion:  Ambulation  Ambulation?: No(Unable to attempt ambulation due to poor standing tolerance this date)  More Ambulation?: No  Ambulation 1  Surface: level tile  Device: Rolling Walker  Assistance: Moderate assistance  Gait Deviations: Slow Zuly, Decreased step height, Decreased step length  Distance: 3 ft toward [address]  Comments: cues for pt to initiate ambulation,modA for RW management.     Current functional status for comprehension: Minimal contact assistance     Current functional status for expression: Minimal contact assistance     Current functional status for social interaction: Close supervision     Current functional status for problem solving: Moderate assistance     Current functional status for memory: Maximal Assistance     Current Deficits R/T Impairment: Impaired Functional Mobility and Decreased ADLs     Required Therapy:   [x]? Physical Therapy  [x]? Occupational Therapy   [x]? Speech Therapy, as appropriate     Additional Services:  [x]?   []? Recreational Therapy, as appropriate  [x]? Nutrition  []? Dialysis  []?  Other:      Rehab

## 2021-04-12 NOTE — PROGRESS NOTES
Assistance(to left , no AD , pt sequences correctly after pre instructi)  Comment: pt instructed in hand placement 75% of trials   Ambulation  Ambulation?: Yes  More Ambulation?: No  Ambulation 1  Surface: level tile  Device: (upright walker )  Other Apparatus: Wheelchair follow(IV)  Assistance: Minimal assistance, Contact guard assistance  Quality of Gait: increased trunk flexion , UE weight bearing   Gait Deviations: Slow Zuly, Decreased step height, Decreased step length  Distance: 70 ft and 100 ft  Comments: 1 standing restbreak, distance limited by fatigue with left LE buckel, upright walker wheels locked for straight path, pt instructed in maintaing straight path and increased trunk extension    Surface: level tile  Ambulation 1  Surface: level tile  Device: (upright walker )  Other Apparatus: Wheelchair follow(IV)  Assistance: Minimal assistance, Contact guard assistance  Quality of Gait: increased trunk flexion , UE weight bearing   Gait Deviations: Slow Zuly, Decreased step height, Decreased step length  Distance: 70 ft and 100 ft  Comments: 1 standing restbreak, distance limited by fatigue with left LE buckel, upright walker wheels locked for straight path, pt instructed in maintaing straight path and increased trunk extension    OT:  ADL  Feeding: Setup(per patient report)  Grooming: Minimal assistance(assist PRN for denture management)  UE Bathing: Stand by assistance(seated on tub transfer bench in shower)  LE Bathing: Moderate assistance(assist buttocks/perineal area; CGA standing in shower)  UE Dressing: Stand by assistance(increased time to complete)  LE Dressing: Moderate assistance(assist ARSH hose, shoes, clothing management; SBA thread BLE)  Toileting: Moderate assistance(CGA clothing management in PM; assist for hygiene)  Additional Comments: OT facilitated Pt engagement in showering routine this date. Pt demonstrates improved attention to task and activity tolerance.  During shower, Pt successfully bathed her upper body with increased time to complete as well as her bilateral thighs and lower legs. Pt declined to wash her feet, using the water to run over the feet. Pt stood with CGA and BUE support via grab bars. Pt required assist to bathe perineal area and buttocks. During dressing tasks, patient successfully donned bra and over head shirt with SBA and increased time to complete. This date, Pt threaded BLE into pull-up and pants with SBA for the first time during rehab admission. Pt bent forward at trunk to thread BLE. Pt required assist to don B ARSH hose as well as shoes. While standing at grab bar in AM, Pt required assist for clothing managment, however attempted to participate. During PM session, OT facilitated Pt engagement in toileting tasks and toilet transfer to maximize patient's independence with toileting. While utilizing toilet, Pt pulled her pants down with CGA and pulled her pants up with CGA. Pt required assist for personal hygiene after voiding and bowel movement. Pt successfully pulled her pants up with CGA. After toileting, OT encouraged repetition of clothing management tasks while standing at grab bar with CGA. Pt successfully pulled her pants up/down over hips x 3 with CGA. OT provided Minimal verbal cues throughout clothing management task for attention to task and technique. Continue OT POC. Balance  Sitting Balance: Stand by assistance  Standing Balance:  Moderate assistance   Standing Balance  Time: 1-2 minutes x 5  Activity: toileting tasks, lower body bathing/dressing  Comment: BUE support on sink or grab bar  Functional Mobility  Functional - Mobility Device: Wheelchair  Activity: To/from bathroom  Assist Level: Minimal assistance  Functional Mobility Comments: use of BLE for self-propulsion     Bed mobility  Rolling to Left: Minimal assistance  Rolling to Right: Moderate assistance  Supine to Sit: Moderate assistance(in PM)  Sit to Supine: Dependent/Total, 2 Person assistance  Scooting: Minimal assistance  Comment: significant fatigue and lethargy noted in PM  Transfers  Stand Step Transfers: Minimal assistance(With RW, 2 steps forward and back x3)  Stand Pivot Transfers: Moderate assistance  Sit to stand: Minimal assistance(progressing to CGA)  Stand to sit: Minimal assistance(progressing to CGA)  Transfer Comments: with Moderate verbal cues for hand placement   Toilet Transfers  Toilet - Technique: Stand pivot, To right, To left  Equipment Used: Grab bars  Toilet Transfer: Moderate assistance  Toilet Transfers Comments: x 1 this date with improved technique and attention to task     Shower Transfers  Shower - Transfer From: Wheelchair  Shower - Transfer Type: To and From  Shower - Transfer To: Transfer tub bench  Shower - Technique: Stand pivot, To right, To left  Shower Transfers: Moderate assistance  Shower Transfers Comments: with Fair safety awareness  Wheelchair Bed Transfers  Wheelchair/Bed - Technique: Ambulating  Equipment Used: Bed, Wheelchair(rolling walker)  Level of Asssistance: 2 Person assistance, Moderate assistance  Wheelchair Transfers Comments: during PM session    SPEECH:  Subjective: [x]? Alert     [x]? Cooperative     []? Confused     []? Agitated    [x]? Lethargic        Objective/Assessment:  Attention: Pt. Alert, very tangential.  Frequently required redirection back to task.     Cognition:   n/a     Verbal expression:   Mild word finding difficulties noted throughout in conversation.         Dysphagia: Vital stim completed for 51 minutes at 7.0 mA. Pt. Completed Briseyda maneuver x2, simple tongue base strengthening exercises x5, 10 reps each. Pt. Eating lunch and took soft and bite sized meat with magic cup x5, bites magic cup x6. Pt. Refused trials of moderately thick liquid. Pt. Demonstrating significantly prolonged mastication of soft and bite sized diet, with occasional L sided pocketing, oral residue present.   ST updated RN re: recommendations for diet downgrade of minced and moist, moderately thick liquids   Pt. Reports continued nausea c moderately thick liquids, ST provided ongoing educ. And reminders of diet rationale and aspiration as pt. Asking if water could be thinned out with ice. Per pt., \"I didn't know that! \"     Other: Pt. Granddaughter, Jorje Frausto present, educ. Re: NMES, diet recommendations, exercises. Jorje Frausto verbalized understanding and participated in encouraging pt. To complete exercises and to be compliant c diet. RN also updated re: pt. Not a good candidate for "[employer]" Protocol at this time d/t reduced cognition and need for consistent diet to boost her compliance. Objective:  /70   Pulse 89   Temp 98.1 °F (36.7 °C) (Oral)   Resp 20   Ht 5' 7\" (1.702 m)   Wt 172 lb (78 kg)   SpO2 98%   BMI 26.94 kg/m²       GEN: Well developed, well nourished, in NAD  HEENT:  NCAT. PERRL. EOMI. Mucous membranes pink and moist.   PULM:  Clear to ausculation. No rales or rhonchi. Respirations WNL and unlabored. CV:  Regular rate rhythm. No murmurs or gallops. GI:  Abdomen soft. Nontender. Non-distended. BS + and equal.    NEUROLOGICAL: A&O x3.. Sensation intact to light touch. MSK:  Functional ROM RUE and RLE. Impaired AROM LUE and LLE due to weakness. Motor testing 4+/5 key muscles RUE and RLEs. Strength 3/5 key muscles LUE and LLE. SKIN: Warm dry and intact. Good turgor. EXTREMITIES:  No calf tenderness to palpation. No edema BLEs. Kwadwo Cumber PSYCH: Mood WNL. Appropriately interactive. Affect WNL. Diagnostics:     CBC:   Recent Labs     04/11/21  0751   WBC 4.1   RBC 3.23*   HGB 9.9*   HCT 29.9*   MCV 92.5   RDW 13.9        BMP:   Recent Labs     04/11/21  0751 04/11/21  1916 04/12/21  0632    134* 134*   K 4.2 4.1 4.6    103 106   CO2 21 17* 18*   BUN 26* 25* 21   CREATININE 1.38* 1.23* 1.03*   GLUCOSE 124* 131* 115*     BNP: No results for input(s): BNP in the last 72 hours. PT/INR: No results for input(s): PROTIME, INR in the last 72 hours. APTT: No results for input(s): APTT in the last 72 hours. CARDIAC ENZYMES:   No results for input(s): CKMB, CKMBINDEX, TROPONINT in the last 72 hours. Invalid input(s): CKTOTAL;3  FASTING LIPID PANEL:  Lab Results   Component Value Date    CHOL 121 04/02/2021    HDL 55 04/02/2021    TRIG 61 04/02/2021     LIVER PROFILE: No results for input(s): AST, ALT, ALB, BILIDIR, BILITOT, ALKPHOS in the last 72 hours.      Current Medications:   Current Facility-Administered Medications: cephALEXin (KEFLEX) capsule 500 mg, 500 mg, Oral, 3 times per day  ondansetron (ZOFRAN) tablet 4 mg, 4 mg, Oral, Q8H PRN  dextrose 5 % and 0.45 % NaCl with KCl 20 mEq infusion, , Intravenous, Continuous  0.9 % sodium chloride infusion, 25 mL, Intravenous, PRN  enoxaparin (LOVENOX) injection 40 mg, 40 mg, Subcutaneous, Daily  sodium chloride flush 0.9 % injection 10 mL, 10 mL, Intravenous, 2 times per day  aspirin EC tablet 81 mg, 81 mg, Oral, Daily **OR** [DISCONTINUED] aspirin suppository 300 mg, 300 mg, Rectal, Daily  amLODIPine (NORVASC) tablet 5 mg, 5 mg, Oral, Daily  atorvastatin (LIPITOR) tablet 40 mg, 40 mg, Oral, Nightly  clopidogrel (PLAVIX) tablet 75 mg, 75 mg, Oral, Daily  ferrous sulfate (IRON 325) tablet 325 mg, 325 mg, Oral, BID  levothyroxine (SYNTHROID) tablet 112 mcg, 112 mcg, Oral, Daily  melatonin tablet 6 mg, 6 mg, Oral, Nightly PRN  metoprolol tartrate (LOPRESSOR) tablet 12.5 mg, 12.5 mg, Oral, BID  oxybutynin (DITROPAN-XL) extended release tablet 5 mg, 5 mg, Oral, Daily  pantoprazole (PROTONIX) tablet 40 mg, 40 mg, Oral, QAM AC  acetaminophen (TYLENOL) tablet 650 mg, 650 mg, Oral, Q4H PRN  polyethylene glycol (GLYCOLAX) packet 17 g, 17 g, Oral, Daily  bisacodyl (DULCOLAX) suppository 10 mg, 10 mg, Rectal, Daily PRN  magnesium hydroxide (MILK OF MAGNESIA) 400 MG/5ML suspension 30 mL, 30 mL, Oral, Daily PRN      Impression/Plan:   Impaired ADLs, gait, and mobility due to:      1. 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 On 3 weeks ASA, Plavix with ASA to continue after 4/23. 2. Dysphagia: on thickened liquids. SLP treating and including vital stim. Will eval for [employer]. 3. Malaise/nausea: has prn zofran. IM treated with IV fluid and serial lab tests. IV infiltrated today. IM discontinuing IV fluids and BID labs for improved Na and creatinine. .   4. HTN: on amlodipine, atorvastatin, metoprolol tartrate. Follow up Dr. Violette Alvarado in [place] [address] in 4 weeks for stress test.   5. OA/L rotator cuff injury: stable. Will monitor  6. CKD: stable. Will monitor BMP  7. Hypothyroidism: on levothyroxine  8. Anemia: on ferrous sulfate - dilutional anemia noted today   9. Hx chronic cystitis: on oxybutynin ER. Frequent incontinence is normal baseline for patient. Urine culture positive for E Coli. Keflex  renally adjusted through 4/17. Leukocytosis resolved 4/11. 10. GERD: on pantoprazole  11. Bowel Management: Miralax daily, senokot prn, milk of magnesia prn, dulcolax prn. 12. DVT Prophylaxis:  low molecular weight heparin, SCD's while in bed and ARSH's   13. Internal medicine for medical management    Electronically signed by Leeann Hui MD on 4/12/2021 at 11:42 AM      This note is created with the assistance of a speech recognition program.  While intending to generate a document that actually reflects the content of the visit, the document can still have some errors including those of syntax and sound a like substitutions which may escape proof reading. In such instances, actual meaning can be extrapolated by contextual diversion.

## 2021-04-12 NOTE — PROGRESS NOTES
Physical Therapy  Facility/Department: [facility] ACUTE REHAB  Daily Treatment Note  NAME: Yo Umana  : 1932  MRN: [id number]    Date of Service: 2021    Discharge Recommendations:  Patient would benefit from continued therapy after discharge, Home with assist PRN, Home with Home health PT        Assessment      Activity Tolerance  Activity Tolerance: Patient limited by fatigue;Patient limited by endurance; Other     Patient Diagnosis(es): There were no encounter diagnoses. has a past medical history of Anemia, Hypertension, Hypothyroidism, Osteoarthritis, Other long term (current) drug therapy, and Renal insufficiency. has no past surgical history on file. Restrictions  Restrictions/Precautions  Restrictions/Precautions: Fall Risk, Modified Diet, Swallowing - Thickened Liquids, General Precautions  Required Braces or Orthoses?: No  Implants present? : (L TKA)  Subjective   General  Chart Reviewed: Yes  Additional Pertinent Hx: Per ARU preadmission assessment:80year-old female admitted with acute left hemiparesis causing a fall of her chair. Noted acute left-sided weakness worse than previous date. .  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 Shalini Aaron She was life flighted to [facility]. Patient on baby aspirin at home. Patient given TPA during TPA became hysterical repeat CT showed no change completed TPA.  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 Pt admitted to rehab unit on 21. Response To Previous Treatment: Patient with no complaints from previous session.   Subjective  Subjective: patient reporting her memory is not so good          Orientation     Cognition      Objective   Bed mobility  Rolling to Left: Minimal assistance  Rolling to Right: Moderate assistance  Supine to Sit: Moderate assistance  Sit to Supine: Minimal assistance  Scooting: Minimal assistance  Transfers  Sit to Stand:  Moderate Assistance  Stand to sit: Minimal Assistance  Bed to Chair: Moderate assistance  Stand Pivot Transfers: Maximum Assistance  Comment: one person assist  Ambulation  Ambulation?: Yes  Ambulation 1  Surface: level tile  Device: (upright walker )  Other Apparatus: Wheelchair follow(IV)  Assistance: Minimal assistance;Contact guard assistance  Quality of Gait: increased trunk flexion , UE weight bearing   Gait Deviations: Slow Zuyl;Decreased step height;Decreased step length  Distance: 100 feet x 4  Comments: 1 standing restbreak, distance limited by fatigue with left LE buckel, upright walker wheels locked for straight path, pt instructed in maintaing straight path and increased trunk extension  Ambulation 2  Surface - 2: level tile  Device 2: Rolling Walker  Other Apparatus 2: Wheelchair follow  Assistance 2: Moderate assistance  Quality of Gait 2: flexed posture  Gait Deviations: Slow Zuly  Distance: 30 feet x 2; 100 feet  Comments: needs intermittent cueing to stand tall and stay within LEN of walker  Stairs/Curb  Stairs?: Yes  Stairs  # Steps : 5  Rails: Bilateral  Curbs: 6\"  Assistance: 2 Person assistance;Minimal assistance  Comment: tolerated step to gait pattern  Propulsion 1  Propulsion: Manual  Level: Level Tile  Method: RUE;RLE;LLE(complaints left shoulder pain)  Level of Assistance: Stand by assistance  Description/ Details: slow pace, gets easily distracted, fatigues easily  Distance: 30 ft        Other exercises  Other exercises 1: seated bilateral LE 2# lt green x 15  Other exercises 2: standing at RW 30 seconds   Other exercises 3: standing bilateral support parallel bars                         G-Code     OutComes Score                                                     AM-PAC Score             Goals  Short term goals  Time Frame for Short term goals:  8 to 9 days  Short term goal 1: Pt to perform bed mobility at SBA with rail  Short term goal 2: Pt to demonstrate transfers at min A  Short term goal 3: Pt to amb 50 to 80 ft with appropriate device, min/mod A   Short term goal 4: Pt to improve L LE strength by 1/3 MMG to improve fucntion. Short term goal 5: Pt able to go up and down 5 steps with 2 UE support, mod A   Long term goals  Time Frame for Long term goals : By LOS  Long term goal 1: Pt able to perform bed mobility mod-I  Long term goal 2: Pt able to perform transfers from varies surfaces at mod-I /supervsion level. Long term goal 3: Pt able to ambulate with appropriate device distance of 80 ft, mod-I/supervsion level. Long term goal 4: Pt able to perform step curb with assisitive device at min A  Long term goal 5: Pt able to go up and down 5 steps with 2 rails at min A to improve LE strength. Long term goal 6: Improve PASS score to atleast 25//36 improve overall fucntion  Long term goal 7: Improve Tinetti balance score to 20 or more/28 to reduce fall risk. Long term goal 8: Pt able to propel w/c on level surfaces 150 ft, with set up/supervsion. Patient Goals   Patient goals : I want to move my Left side to walk better    Plan    Plan  Times per week: 1.5 hr/day, 5 to 7 days/week  Plan weeks: 6  Current Treatment Recommendations: Strengthening, Neuromuscular Re-education, Home Exercise Program, Safety Education & Training, Patient/Caregiver Education & Training, Equipment Evaluation, Education, & procurement, Functional Mobility Training, Balance Training, Endurance Training, Transfer Training, Gait Training, ROM, Stair training  Safety Devices  Type of devices:  All fall risk precautions in place, Patient at risk for falls, Gait belt  Restraints  Initially in place: No     Therapy Time     04/12/21 0922 04/12/21 1421   PT Individual Minutes   Time In 0920 1335   Time Out 1010 1405   Minutes 50 30   PT Concurrent Minutes   Time In 1010  --    Time Out 1022  --

## 2021-04-13 LAB — GLUCOSE BLD-MCNC: 120 MG/DL (ref 65–105)

## 2021-04-13 PROCEDURE — 99233 SBSQ HOSP IP/OBS HIGH 50: CPT | Performed by: PHYSICAL MEDICINE & REHABILITATION

## 2021-04-13 PROCEDURE — 82947 ASSAY GLUCOSE BLOOD QUANT: CPT

## 2021-04-13 PROCEDURE — 1180000000 HC REHAB R&B

## 2021-04-13 PROCEDURE — 97110 THERAPEUTIC EXERCISES: CPT

## 2021-04-13 PROCEDURE — 97535 SELF CARE MNGMENT TRAINING: CPT

## 2021-04-13 PROCEDURE — 6360000002 HC RX W HCPCS: Performed by: STUDENT IN AN ORGANIZED HEALTH CARE EDUCATION/TRAINING PROGRAM

## 2021-04-13 PROCEDURE — 97530 THERAPEUTIC ACTIVITIES: CPT

## 2021-04-13 PROCEDURE — 6370000000 HC RX 637 (ALT 250 FOR IP): Performed by: PHYSICAL MEDICINE & REHABILITATION

## 2021-04-13 PROCEDURE — 97129 THER IVNTJ 1ST 15 MIN: CPT

## 2021-04-13 PROCEDURE — 6370000000 HC RX 637 (ALT 250 FOR IP): Performed by: STUDENT IN AN ORGANIZED HEALTH CARE EDUCATION/TRAINING PROGRAM

## 2021-04-13 PROCEDURE — 99231 SBSQ HOSP IP/OBS SF/LOW 25: CPT | Performed by: INTERNAL MEDICINE

## 2021-04-13 PROCEDURE — 97116 GAIT TRAINING THERAPY: CPT

## 2021-04-13 PROCEDURE — 92526 ORAL FUNCTION THERAPY: CPT

## 2021-04-13 RX ORDER — LACTOBACILLUS RHAMNOSUS GG 10B CELL
1 CAPSULE ORAL
Status: DISCONTINUED | OUTPATIENT
Start: 2021-04-14 | End: 2021-04-27 | Stop reason: HOSPADM

## 2021-04-13 RX ADMIN — AMLODIPINE BESYLATE 5 MG: 5 TABLET ORAL at 10:24

## 2021-04-13 RX ADMIN — OXYBUTYNIN CHLORIDE 5 MG: 5 TABLET, EXTENDED RELEASE ORAL at 10:22

## 2021-04-13 RX ADMIN — CEPHALEXIN 500 MG: 500 CAPSULE ORAL at 20:55

## 2021-04-13 RX ADMIN — METOPROLOL TARTRATE 12.5 MG: 25 TABLET, FILM COATED ORAL at 10:21

## 2021-04-13 RX ADMIN — ENOXAPARIN SODIUM 40 MG: 40 INJECTION SUBCUTANEOUS at 10:21

## 2021-04-13 RX ADMIN — CEPHALEXIN 500 MG: 500 CAPSULE ORAL at 15:02

## 2021-04-13 RX ADMIN — FERROUS SULFATE TAB 325 MG (65 MG ELEMENTAL FE) 325 MG: 325 (65 FE) TAB at 10:24

## 2021-04-13 RX ADMIN — ASPIRIN 81 MG: 81 TABLET, COATED ORAL at 10:22

## 2021-04-13 RX ADMIN — CLOPIDOGREL BISULFATE 75 MG: 75 TABLET ORAL at 10:21

## 2021-04-13 RX ADMIN — FERROUS SULFATE TAB 325 MG (65 MG ELEMENTAL FE) 325 MG: 325 (65 FE) TAB at 20:55

## 2021-04-13 RX ADMIN — PANTOPRAZOLE SODIUM 40 MG: 40 TABLET, DELAYED RELEASE ORAL at 06:12

## 2021-04-13 RX ADMIN — LEVOTHYROXINE SODIUM 112 MCG: 112 TABLET ORAL at 06:12

## 2021-04-13 RX ADMIN — CEPHALEXIN 500 MG: 500 CAPSULE ORAL at 06:12

## 2021-04-13 RX ADMIN — ATORVASTATIN CALCIUM 40 MG: 40 TABLET, FILM COATED ORAL at 20:55

## 2021-04-13 ASSESSMENT — PAIN SCALES - GENERAL: PAINLEVEL_OUTOF10: 0

## 2021-04-13 NOTE — PLAN OF CARE
Problem: Skin Integrity:  Goal: Will show no infection signs and symptoms  Description: Will show no infection signs and symptoms  Outcome: Ongoing     Problem: Pain:  Goal: Pain level will decrease  Description: Pain level will decrease  Outcome: Ongoing

## 2021-04-13 NOTE — PROGRESS NOTES
Speech Language Pathology  Speech Language Pathology  [facility]    Dysphagia/Cognitive Treatment Note    Date: 4/13/2021  Patients Name: Charleen Hemphill  MRN: [id number]  Diagnosis:   Patient Active Problem List   Diagnosis Code    Anemia D64.9    Arthritis M19.90    HTN (hypertension) I10    Hypothyroidism E03.9    Renal insufficiency N28.9    Other long term (current) drug therapy Z79.899    Anemia due to stage 3 chronic kidney disease N18.30, D63.1    Chronic UTI N39.0    Mixed hyperlipidemia E78.2    Gastroesophageal reflux disease without esophagitis K21.9    S/P revision of total knee, 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 denies    Cognitive Treatment    Treatment time: 0093-7182     Subjective: [x] Alert [x] Cooperative     [] Confused     [] Agitated    [] Lethargic      Objective/Assessment:  Attention: Pt. Alert, very tangential.  Frequently required redirection back to task. Cognition:   Orientation- 57%, 86% c cues. Verbal expression:   Functional.     Dysphagia: Vital stim completed for 40 minutes at 3.5 mA. Pt. Completed Briseyda maneuver x5, simple tongue base strengthening exercises x5, 10 reps each. Pt. Completed oral motor exercises x3, 5 reps each with max cues. Other: Pt. Daughter present,  educ.  Re: ST goals, dysphagia tx, diet recommendations,  Vital Stim, cognition, 24 hour supervision upon d/c.      Plan:  [x] Continue ST services    [] Discharge from ST:      Discharge recommendations: []

## 2021-04-13 NOTE — PROGRESS NOTES
[address]   ACUTE REHABILITATION OCCUPATIONAL THERAPY  DAILY NOTE    Date: 21  Patient Name: Nikki Odell      Room: 0988/9607-30    MRN: [id number]   : 1932  (80 y.o.)  Gender: female   Referring Practitioner: Poornima Christopher MD  Diagnosis: Acute CVA  Additional Pertinent Hx:  Per ARU preadmission assessment:80year-old female admitted with acute left hemiparesis causing a fall of her chair. Noted acute left-sided weakness worse than previous date. .  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 Maxine Decent She was life flighted to [facility]. Patient on baby aspirin at home. Patient given TPA during TPA became hysterical repeat CT showed no change completed TPA. 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 Pt admitted to rehab unit on 21. Restrictions  Restrictions/Precautions: Fall Risk, Modified Diet, Swallowing - Thickened Liquids, General Precautions  Implants present? : (L TKA)  Required Braces or Orthoses?: No  Equipment Used: Bed, Wheelchair(rolling walker)    Subjective  Subjective: \"OH boy, this arm is tired! \"  Comments: pt reports in regards to LUE fatigue  Patient Currently in Pain: Denies  Restrictions/Precautions: Fall Risk;Modified Diet;Swallowing - Thickened Liquids; General Precautions  Overall Orientation Status: Impaired  Patient Observation  Observations: during AM session, post standing tasks, pt verbalized feeling \"dizzy and woozy\", pt demo'd multiple head bobs with eyes closed and delayed responses to this writer, vitals obtained with 107/75 BP, 67 HR and 100% on RA, NSG notified and entered room, NSG requests pt return to bed for assessment, Ax2 for transfers into bed due to urgency and pt not feeling well at this time, this writer assist NSG to get pt comfortable and exited room       Objective  Cognition  Overall Cognitive Status: Impaired  Arousal/Alertness: Delayed responses to stimuli  Attention Span: Attends with cues to redirect  Memory: Decreased short term memory;Decreased recall of recent events  Following Commands: Follows multistep commands with repetition  Safety Judgement: Decreased awareness of need for assistance  Awareness of Errors: Assistance required to identify errors made  Insights: Decreased awareness of deficits  Sequencing and Organization: Assistance required to implement solutions;Assistance required to generate solutions;Assistance required to identify errors made  Perception  Overall Perceptual Status: Impaired  Initiation: Cues to initiate tasks  Balance  Sitting Balance: Supervision  Standing Balance: Contact guard assistance(Ax1, unsteady at times with dynamic tasks)  Bed mobility  Rolling to Left: Contact guard assistance;Stand by assistance  Rolling to Right: Contact guard assistance;Stand by assistance  Supine to Sit: Contact guard assistance;Minimal assistance  Sit to Supine: Moderate assistance  Scooting: Contact guard assistance  Comment: bed rail used, increased time req  Transfers  Stand Step Transfers: Contact guard assistance  Stand Pivot Transfers: Contact guard assistance  Sit to stand: Minimal assistance  Stand to sit: Contact guard assistance  Transfer Comments: cuing for proper tech  Standing Balance  Time: AM: 1 min x3; PM: 1-2 min x2  Activity: AM: lower body dressing and functional transfer; PM: toilet transfer/toileting tasks  Comment: 1-2 UE support req, cuing for safety in standing, unsteady at times  Toilet Transfers  Toilet - Technique: Stand pivot; To right; To left  Equipment Used: Standard toilet(GB on R side)  Toilet Transfer: Minimal assistance  Toilet Transfers Comments: cuing for tech, unsteady           Additional Activities: AM: pt placed pegs in board using LUE to address FM skills and GM movements, increased time req for manipulation using R hand at times or board, LUE fatigues req rest, pt placed pegs using lateral pinch in L hand, once all placed pt removed with L hand using tip pinch to increase coordination and strength for improved FM tasks                 ADL  Feeding: Setup(increased time req)  Grooming: Stand by assistance(seated in w/c, washed hands/face)  UE Bathing: None(declines)  LE Bathing: None(declines)  UE Dressing: None(already had shirt on, declines to change)  LE Dressing: Minimal assistance(assist clothing over hips, CGA in standing, TEDs, shoe setup)  Toileting: Maximum assistance(assist clothing mgmt/brief and hygiene after BM)  Additional Comments: pt agreeable to don pants, assist for TEDs, shoe setup-pt introduced to foot funnel to maximize indep and increase ease to don, setup req with cuing for proper tech, pt able to thread pants with increased time, CGA req in standing, unsteady, assist pants over hips          Assessment  Performance deficits / Impairments: Decreased functional mobility ; Decreased strength;Decreased endurance;Decreased balance;Decreased high-level IADLs;Decreased posture;Decreased ADL status; Decreased ROM; Decreased safe awareness;Decreased cognition;Decreased fine motor control;Decreased coordination  Prognosis: Good  Discharge Recommendations: Home with assist PRN;Home with Home health OT  Activity Tolerance: Patient limited by fatigue;Patient Tolerated treatment well  Activity Tolerance: fatigued, rest breaks req, increased time req  Safety Devices in place: Yes  Type of devices: Left in chair;Nurse notified;Call light within reach  Equipment Recommendations  Equipment Needed: (TBD)       Patient Education: transfer safety, ADL tasks, LUE coordination/strengthening, AE  Patient Goals   Patient goals :  To return home with family support  Learner:family and patient  Method: demonstration and explanation       Outcome: needs reinforcement        Plan Plan  Times per week: 5-7  Times per day: Twice a day  Current Treatment Recommendations: Strengthening, ROM, Safety Education & Training, Positioning, Balance Training, Patient/Caregiver Education & Training, Self-Care / ADL, Functional Mobility Training, Endurance Training, Neuromuscular Re-education, Wheelchair Mobility Training, Cognitive Reorientation, Equipment Evaluation, Education, & procurement, Home Management Training, Cognitive/Perceptual Training  Patient Goals   Patient goals : To return home with family support  Short term goals  Time Frame for Short term goals: 7 to 10 days  Short term goal 1: Pt will perform upper body bathing/dressing with stand by assist.  Short term goal 2: Pt will perform lower body bathing/dressing and toileting tasks with Moderate assist and Good safety. Short term goal 3: Pt will perform functional functional transfers and mobility during self-care with Moderate assist, least restrictive mobility device, and Good safety. Short term goal 4: Pt will for 4+ minutes with 1-2 UE support and Minimal assist while engaging in functional activity of choice. Short term goal 5: Pt will actively participate in 30+ minutes of therapeutic exercise/functional activities to promote increased independence with self-care and mobility. Short term goal 6: Pt will verbalize/demonstrate Good understanding of assistive equipment/durable medical equipment/modified techniques for increased independence with self-care and mobility. Long term goals  Time Frame for Long term goals : By discharge  Long term goal 1: Pt will perform bathing during shower with stand by assist and Good safety. Long term goal 2: Pt will perform dressing and toileting tasks with modified independence and Good safety. Long term goal 3: Pt will stand for 8+ minutes with 1-2 UE support, modified independence, no loss of balance while engaging in functional activity of choice.   Long term goal 4: Pt will perform functional transfers and mobility with modified independence, least restrictive mobility device, and Good safety. Long term goal 5: Pt will verbalize/demonstrate Good understanding of Fall Prevention Strategies for increased independence with self-care and mobility.   Long term goals 6: 9 hole peg and box and blocks to be assessed for LUE as appropriate        04/13/21 1045 04/13/21 1409   OT Individual Minutes   Time In 1045 1409   Time Out 1134 1443   Minutes 49 34     Electronically signed by AYESHA Beyer Mt on 4/13/21 at 4:01 PM EDT

## 2021-04-13 NOTE — CONSULTS
[facility] Internal Medicine    CONSULTATION / HISTORY AND PHYSICAL EXAMINATION            Date:   4/13/2021  Patient name:  Yo Umana  Date of admission:  4/7/2021 12:21 PM  MRN:   [id number]  Account:  [de-identified]  YOB: 1932  PCP:    VERNELL Jacobs CNP  Room:   [facility]5107-  Code Status:    Full Code    Physician Requesting Consult: Krista Lindsay MD    Reason for Consult:  medical management    Chief Complaint:     No chief complaint on file. Ischemic CVA    History Obtained From:     Patient medical record nursing staff    History of Present Illness:     77-year-old elderly lady was admitted to [facility] earlier this month with the acute left-sided weakness after she finished her physical therapy at home she has chronic left leg weakness post multiple surgeries and has chronic shoulder issues and difficulty overhead abduction  Patient received a TPA  4/10/21  Nausea , poor intake , malaise   Will start d5 half saline with k . Check labs . 4/13/21    ·    Vitals:    04/12/21 1855 04/13/21 0730 04/13/21 1021 04/13/21 1045   BP: (!) 133/52 (!) 171/87 (!) 130/59 107/75   Pulse: 66 81 68 67   Resp: 18 20     Temp: 97.6 °F (36.4 °C) 98.8 °F (37.1 °C)     TempSrc: Oral Oral     SpO2: 99% 99%  100%   Weight:       Height:     1.     2.         4/12/21    · kathryn resolves    Results for Gary Little (MRN [id number]) as of 4/12/2021 14:38   Ref.  Range 4/10/2021 18:42 4/11/2021 07:51 4/11/2021 19:16 4/12/2021 06:32   Sodium Latest Ref Range: 135 - 144 mmol/L 133 (L) 135 134 (L) 134 (L)   Potassium Latest Ref Range: 3.7 - 5.3 mmol/L 3.7 4.2 4.1 4.6   Chloride Latest Ref Range: 98 - 107 mmol/L 101 104 103 106   CO2 Latest Ref Range: 20 - 31 mmol/L 18 (L) 21 17 (L) 18 (L)   BUN Latest Ref Range: 8 - 23 mg/dL 34 (H) 26 (H) 25 (H) 21   Creatinine Latest Ref Range: 0.50 - 0.90 mg/dL 1.49 (H) 1.38 (H) 1.23 (H) 1.03 (H)   Bun/Cre Ratio Latest Ref Range: 9 - 20 NOT REPORTED NOT REPORTED NOT REPORTED NOT REPORTED   Anion Gap Latest Ref Range: 9 - 17 mmol/L 14 10 14 10   GFR Non- Latest Ref Range: >60 mL/min 33 (L) 36 (L) 41 (L) 50 (L)   GFR  Latest Ref Range: >60 mL/min 40 (L) 44 (L) 50 (L) >60   Glucose Latest Ref Range: 70 - 99 mg/dL 233 (H) 124 (H) 131 (H) 115 (H)   Calcium Latest Ref Range: 8.6 - 10.4 mg/dL 8.5 (L) 8.6 8.9 8.4 (L)   GFR Comment Unknown Pend Pend Pend Pend   GFR Staging Unknown NOT REPORTED NOT REPORTED NOT REPORTED NOT REPORTED 3. Past Medical History:     Past Medical History:   Diagnosis Date    Anemia     Hypertension     Hypothyroidism     Osteoarthritis     Other long term (current) drug therapy     Renal insufficiency         Past Surgical History:     No past surgical history on file. Medications Prior to Admission:     Prior to Admission medications    Medication Sig Start Date End Date Taking?  Authorizing Provider   atorvastatin (LIPITOR) 20 MG tablet Take 1 tablet by mouth nightly 3/1/21  Yes Millicent Duverney, APRN - CNP   levothyroxine (SYNTHROID) 112 MCG tablet Take 1 tablet by mouth Daily 3/1/21  Yes Millicent Duverney, APRN - CNP   oxybutynin (DITROPAN-XL) 5 MG extended release tablet Take 1 tablet by mouth daily 1/5/21  Yes Millicent Duverney, APRN - CNP   acetaminophen (TYLENOL) 325 MG tablet Take 650 mg by mouth Take 2 tablets at bedtime for sleeping comfort PRN   Yes Historical Provider, MD   amLODIPine (NORVASC) 5 MG tablet Take 1 tablet by mouth daily 3/1/21   Millicent Duverney, APRN - CNP   Ferrous Sulfate 324 MG TBEC Take 1 tablet by mouth 2 times daily 3/1/21   Millicent Duverney, APRN - CNP   omeprazole (PRILOSEC) 20 MG delayed release capsule Take 1 capsule by mouth daily 3/1/21   Millicent Duverney, APRN - CNP   sodium bicarbonate 650 MG tablet Take 1 tablet by mouth 4 times daily Two tabs BID 3/1/21   Millicent Duverney, APRN - CNP   metoprolol tartrate (LOPRESSOR) 25 MG tablet Take 0.5 swelling/edema   ALLERGIC/IMMUNOLOGIC:  negative for urticaria , itching  ENDOCRINE:  negative increase in drinking, increase in urination, hot or cold intolerance  MUSCULOSKELETAL:  negative joint pains, muscle aches, swelling of joints  NEUROLOGICAL: Positive for left upper and lower extremity weakness extremities      Physical Exam:     /75   Pulse 67   Temp 98.8 °F (37.1 °C) (Oral)   Resp 20   Ht 5' 7\" (1.702 m)   Wt 172 lb (78 kg)   SpO2 100%   BMI 26.94 kg/m²   Temp (24hrs), Av.2 °F (36.8 °C), Min:97.6 °F (36.4 °C), Max:98.8 °F (37.1 °C)    Recent Labs     21  1114   POCGLU 120*       Intake/Output Summary (Last 24 hours) at 2021 1641  Last data filed at 2021 0616  Gross per 24 hour   Intake    Output 1300 ml   Net -1300 ml       General Appearance:  alert, well appearing, and in no acute distress  Mental status: oriented to person, place, and time with normal affect  Head:  normocephalic, atraumatic. Eye: no icterus, redness, pupils equal and reactive, extraocular eye movements intact, conjunctiva clear  Ear: normal external ear, no discharge, hearing intact  Nose:  no drainage noted  Mouth: mucous membranes moist  Neck: supple, no carotid bruits, thyroid not palpable  Lungs: Bilateral equal air entry, clear to ausculation, no wheezing, rales or rhonchi, normal effort  Cardiovascular: normal rate, regular rhythm, no murmur, gallop, rub.   Abdomen: Soft, nontender, nondistended, normal bowel sounds, no hepatomegaly or splenomegaly  Neurologic: Left upper and left lower extremity weakness  Skin: No gross lesions, rashes, bruising or bleeding on exposed skin area  Extremities:  peripheral pulses palpable, no pedal edema or calf pain with palpation      Investigations:      Laboratory Testing:  Recent Results (from the past 24 hour(s))   POC Glucose Fingerstick    Collection Time: 21 11:14 AM   Result Value Ref Range    POC Glucose 120 (H) 65 - 105 mg/dL Medications: Allergies:     Allergies   Allergen Reactions    Ciprofloxacin     Hydrocodone     Macrobid [Nitrofurantoin]     Ciprofloxacin Rash       Current Meds:   Scheduled Meds:    cephALEXin  500 mg Oral 3 times per day    enoxaparin  40 mg Subcutaneous Daily    aspirin  81 mg Oral Daily    amLODIPine  5 mg Oral Daily    atorvastatin  40 mg Oral Nightly    clopidogrel  75 mg Oral Daily    ferrous sulfate  325 mg Oral BID    levothyroxine  112 mcg Oral Daily    metoprolol tartrate  12.5 mg Oral BID    oxybutynin  5 mg Oral Daily    pantoprazole  40 mg Oral QAM AC    polyethylene glycol  17 g Oral Daily     Continuous Infusions:   PRN Meds: ondansetron, melatonin, acetaminophen, bisacodyl, magnesium hydroxide      Consultations:   IP CONSULT TO SOCIAL WORK  IP CONSULT TO INTERNAL MEDICINE  Assessment :      Primary Problem  <principal problem not specified>    Active Hospital Problems    Diagnosis Date Noted    Malaise and fatigue [R53.81, R53.83] 04/10/2021    Acute CVA (cerebrovascular accident) ([facility] [address] [I63.9] 04/07/2021    Acute left hemiparesis (HCC) [G81.94]     Gastroesophageal reflux disease without esophagitis [K21.9] 02/24/2021    HTN (hypertension) [I10] 10/21/2020    Hypothyroidism [E03.9] 10/21/2020       Plan:     Acute right ischemic infarct of the rhonda status post thrombolytics  Left hemiparesis for 3-4 out of 5  Aspirin Plavix and Lipitor  Hypertension beta-blocker and Norvasc control  Hypothyroidism on Synthroid follow with TSH in 6 weeks  Mild protein calorie malnutrition  SYEDA Baseline creatinine is 0.87 increased to 1.47 avoid NSAIDs hydration oral recheck      BP labile - on amlodipine , metoprolol  BP Readings from Last 3 Encounters:   04/13/21 107/75   04/07/21 139/68   03/12/21 135/76                 Dom Barraza MD  4/13/2021  4:41 PM    Copy sent to Dr. Mathew Taveras, APRN - CNP    Please note that this chart was generated using voice recognition Drgon dictation software. Although every effort was made to ensure the accuracy of this automated transcription, some errors in transcription may have occurred.

## 2021-04-13 NOTE — PLAN OF CARE
Problem: Skin Integrity:  Goal: Absence of new skin breakdown  Description: Absence of new skin breakdown  Outcome: Met This Shift   Skin assessment completed this shift. Nutrition and Hydration status assessed with adequate intake. Lalito Score as charted. Chair cushion in use for when pt is up to chair. Bilateral heels remain elevated on pillows throughout the shift. Patient tolerates repositioning by staff at least every 2 hours. Patient verbalizes understanding of pressure ulcer prevention measures. Skin integrity maintained. No new skin breakdown noted. Skin to high risk pressure areas including coccyx and heels are clear.     Odalis / Incontinence care provided as needed throughout the shift. Aloe Vesta Moisture Barrier ointment applied to buttocks as a preventative measure.        Problem: Confusion - Acute:  Goal: Mental status will be restored to baseline  Description: Mental status will be restored to baseline  Outcome: Met This Shift  Pt remains A&O X4 so far throughout the shift. Will continue to monitor for changes.        Problem: Injury - Risk of, Physical Injury:  Goal: Will remain free from falls  Description: Will remain free from falls  Outcome: Met This Shift  No falls or injuries sustained at this time. No attempts to get out of bed without nursing assistance. Call light within reach and pt. uses appropriately for assistance. Siderails up x 2. Nonskid footwear remains on. Bed in low and locked position. Hourly nursing rounds made. Pt. Alert and oriented, aware of limitations, and exhibits good safety judgement. Pt. uses assistive devices appropriately. Pt. understands individual fall risk factors.     Pt.  reminded to use call light with each nurse/patient interaction.     Bed alarm / Personal alarm remains engaged throughout the shift as a precaution.          Problem: Pain:  Goal: Pain level will decrease  Description: Pain level will decrease  Outcome: Met This Shift  Pain assessment completed so far this shift. Pt. able to rest without the use of pain medication. Patient denies any pain so far this shift.   Will continue to monitor.

## 2021-04-13 NOTE — PROGRESS NOTES
get myself awake\" \"pinch me to keep me awake\"; pm patient with complaint she is so tired but willingly participated and pleased therapist was aable to take her outside for therapy session. daughter assisted ambulation and transfers today. daughter reported patient is alone at night and has life alert. family support close by and daily support to assist patient to bed and help early am.  General Comment  Comments: patient talkative throughout therapy session; often repeating long term memories. Pain Screening  Patient Currently in Pain: Denies  Vital Signs  Patient Currently in Pain: Denies       Orientation     Cognition      Objective   Bed mobility  Bridging: Minimal assistance  Rolling to Left: Minimal assistance  Rolling to Right: Moderate assistance  Supine to Sit: Moderate assistance  Sit to Supine: Minimal assistance  Scooting: Minimal assistance  Transfers  Sit to Stand: Moderate Assistance(push posteriorly and tendency for feet to slide forward)  Stand to sit: Minimal Assistance(tends to plop in chair without cueing)  Bed to Chair: Moderate assistance  Stand Pivot Transfers: Maximum Assistance  Comment: one person assist  Ambulation  Ambulation?: Yes  Ambulation 1  Surface: level tile  Device: Rolling Walker  Other Apparatus: (IV)  Assistance: Minimal assistance;Contact guard assistance  Quality of Gait: increased trunk flexion , UE weight bearing heavily on walker, hips beyond LEN of walker  Gait Deviations: Slow Zuly;Decreased step height;Decreased step length  Distance: 60 feet x 3  Comments: needs constant cueing to maintain safety within LEN of walker and tall posture. Patient demonstrates outside LEN of walker with turns and presenting to sit to surfaces.   Ambulation 2  Surface - 2: uneven;outdoors  Device 2: Rolling Walker  Other Apparatus 2: Wheelchair follow  Assistance 2: 2 Person assistance;Minimal assistance(daughter assisting, 2 for safety with training)  Quality of Gait 2: as above  Gait Deviations: Slow Zuly;Decreased step length;Decreased step height  Distance: 40 feet x 2  Comments: needs intermittent cueing to stand tall and stay within LEN of walker  Stairs/Curb  Stairs?: Yes  Stairs  # Steps : 10  Rails: Bilateral  Curbs: 6\"  Assistance: Minimal assistance  Comment: tolerated step to gait pattern; one person assist. flex at hips but negotiated step height without concern  Propulsion 1  Propulsion: Manual  Level: Level Tile  Method: RUE;RLE;LLE(complaints left shoulder pain)  Level of Assistance: Stand by assistance  Description/ Details: slow pace, gets easily distracted, fatigues easily  Distance: 30 ft        Other exercises  Other exercises 1: seated bilateral LE 2# lt green x 15  Other exercises 2: standing BLE 20 reps  Other exercises 3: Supine BLe 20 reps                        G-Code     OutComes Score                                                     AM-PAC Score             Goals  Short term goals  Time Frame for Short term goals:  8 to 9 days  Short term goal 1: Pt to perform bed mobility at SBA with rail  Short term goal 2: Pt to demonstrate transfers at min A  Short term goal 3: Pt to amb 50 to 80 ft with appropriate device, min/mod A   Short term goal 4: Pt to improve L LE strength by 1/3 MMG to improve fucntion. Short term goal 5: Pt able to go up and down 5 steps with 2 UE support, mod A   Long term goals  Time Frame for Long term goals : By LOS  Long term goal 1: Pt able to perform bed mobility mod-I  Long term goal 2: Pt able to perform transfers from varies surfaces at mod-I /supervsion level. Long term goal 3: Pt able to ambulate with appropriate device distance of 80 ft, mod-I/supervsion level. Long term goal 4: Pt able to perform step curb with assisitive device at min A  Long term goal 5: Pt able to go up and down 5 steps with 2 rails at min A to improve LE strength.   Long term goal 6: Improve PASS score to atleast 25//36 improve overall fucntion  Long term goal 7: Improve Tinetti balance score to 20 or more/28 to reduce fall risk. Long term goal 8: Pt able to propel w/c on level surfaces 150 ft, with set up/supervsion. Patient Goals   Patient goals : I want to move my Left side to walk better    Plan    Plan  Times per week: 1.5 hr/day, 5 to 7 days/week  Plan weeks: 6  Current Treatment Recommendations: Strengthening, Neuromuscular Re-education, Home Exercise Program, Safety Education & Training, Patient/Caregiver Education & Training, Equipment Evaluation, Education, & procurement, Functional Mobility Training, Balance Training, Endurance Training, Transfer Training, Gait Training, ROM, Stair training  Safety Devices  Type of devices: All fall risk precautions in place, Patient at risk for falls, Gait belt  Restraints  Initially in place: No     Therapy Time     04/13/21 1027 04/13/21 1408   PT Individual Minutes   Time In 0845 1335   Time Out 0923 1406   Minutes 38 31   Minute Variance   Reason   (patient with am treatment stopped d/t soiled clothing from loose stools.  patient returned to room with patient care attending patient at bedside.)   (patient orthostatic hypotension with nursing today unable to tolerate therapy at this time.)     Desiree Zaidi, PTA

## 2021-04-13 NOTE — PROGRESS NOTES
Physical Medicine & Rehabilitation  Progress Note      Subjective:      80year-old patient with ischemic CVA with L nondominant hemiparesis. Patient is noting some easy fatigue. She had episode of \"wooziness\" mid morning which responded well to lying down and elevating her feet. She had no new focal neurologic signs on exam during the episode. Daughter later confirms that this happens periodically at home and is not a new issue. No new problems with sleep, appetite or bladder. Having some loose stools. ROS:  Denies fevers, chills, sweats. No chest pain, palpitations, lightheadedness. Denies coughing, wheezing or shortness of breath. Denies abdominal pain, nausea, diarrhea or constipation. No new areas of joint pain. Denies new areas of numbness or weakness. Denies new anxiety or depression issues. No new skin problems. Rehabilitation:   Progressing in therapies. PT:  Restrictions/Precautions: Fall Risk, Modified Diet, Swallowing - Thickened Liquids, General Precautions  Implants present? : (L TKA)   Transfers  Sit to Stand: Moderate Assistance  Stand to sit: Minimal Assistance  Bed to Chair: Moderate assistance  Stand Pivot Transfers: Maximum Assistance  Squat Pivot Transfers: Maximum Assistance(to left , no AD , pt sequences correctly after pre instructi)  Comment: one person assist  Ambulation 1  Surface: level tile  Device: (upright walker )  Other Apparatus: Wheelchair follow(IV)  Assistance: Minimal assistance, Contact guard assistance  Quality of Gait: increased trunk flexion , UE weight bearing   Gait Deviations: Slow Zuly, Decreased step height, Decreased step length  Distance: 100 feet x 4  Comments: 1 standing restbreak, distance limited by fatigue with left LE buckel, upright walker wheels locked for straight path, pt instructed in maintaing straight path and increased trunk extension    Transfers  Sit to Stand:  Moderate Assistance  Stand to sit: Minimal Assistance  Bed to Chair: Moderate assistance  Stand Pivot Transfers: Maximum Assistance  Squat Pivot Transfers: Maximum Assistance(to left , no AD , pt sequences correctly after pre instructi)  Comment: one person assist  Ambulation  Ambulation?: Yes  More Ambulation?: No  Ambulation 1  Surface: level tile  Device: (upright walker )  Other Apparatus: Wheelchair follow(IV)  Assistance: Minimal assistance, Contact guard assistance  Quality of Gait: increased trunk flexion , UE weight bearing   Gait Deviations: Slow Zuly, Decreased step height, Decreased step length  Distance: 100 feet x 4  Comments: 1 standing restbreak, distance limited by fatigue with left LE buckel, upright walker wheels locked for straight path, pt instructed in maintaing straight path and increased trunk extension    Surface: level tile  Ambulation 1  Surface: level tile  Device: (upright walker )  Other Apparatus: Wheelchair follow(IV)  Assistance: Minimal assistance, Contact guard assistance  Quality of Gait: increased trunk flexion , UE weight bearing   Gait Deviations: Slow Zuly, Decreased step height, Decreased step length  Distance: 100 feet x 4  Comments: 1 standing restbreak, distance limited by fatigue with left LE buckel, upright walker wheels locked for straight path, pt instructed in maintaing straight path and increased trunk extension    OT:  ADL  Feeding: Setup  Grooming: None(declines this date)  UE Bathing: None(declines)  LE Bathing: None(declines)  UE Dressing: Setup(don/doff [place] shirt, seated)  LE Dressing:  Moderate assistance(intermittent A clothing on hip/TEDs/shoes, shoehorn utilized)  Toileting: None(no need at this time)  Additional Comments: pt fatigued, agreeable to dressing but declines bathing this date, seated in w/c, pt able to don pants with intermittent threading over feet, min A in standing to pull pants over hips with A for clothing on L hip, A for TEDs, shoe setup utilizing shoe horn with increased time for completion, seated to don/doff shirt, setup with increased time req         Balance  Sitting Balance: Supervision(seated in w/c)  Standing Balance: Minimal assistance(min/CGA x1)   Standing Balance  Time: 1-2 min x3  Activity: functional transfer, lower body dressing tasks  Comment: 1-2 UE support req, cuing for safety in standing  Functional Mobility  Functional - Mobility Device: Rolling Walker  Activity: Other(transfers in room)  Assist Level: Minimal assistance(min/CGA)  Functional Mobility Comments: cuing for safety and proper tech     Bed mobility  Rolling to Left: Minimal assistance  Rolling to Right: Moderate assistance  Supine to Sit: Moderate assistance  Sit to Supine: Minimal assistance  Scooting: Minimal assistance  Comment: significant fatigue and lethargy noted in PM  Transfers  Stand Step Transfers: Minimal assistance, Contact guard assistance(R/W)  Stand Pivot Transfers: Minimal assistance, Contact guard assistance  Sit to stand: Minimal assistance, Contact guard assistance(varied)  Stand to sit: Minimal assistance, Contact guard assistance(varied)  Transfer Comments: cuign for proper tech   Toilet Transfers  Toilet - Technique: Stand pivot, To right, To left  Equipment Used: Grab bars  Toilet Transfer: Moderate assistance  Toilet Transfers Comments: x 1 this date with improved technique and attention to task     Shower Transfers  Shower - Transfer From: Wheelchair  Shower - Transfer Type: To and From  Shower - Transfer To: Transfer tub bench  Shower - Technique: Stand pivot, To right, To left  Shower Transfers: Moderate assistance  Shower Transfers Comments: with Fair safety awareness  Wheelchair Bed Transfers  Wheelchair/Bed - Technique: Ambulating  Equipment Used: Bed, Wheelchair(rolling walker)  Level of Asssistance: 2 Person assistance, Moderate assistance  Wheelchair Transfers Comments: during PM session    SPEECH:  Subjective: [x]? Alert     [x]? Cooperative     []? Confused     []? Agitated    []?  Lethargic       Objective/Assessment:  Attention: Pt. Alert, very tangential.  Frequently required redirection back to task.     Cognition:   Orientation- 57%, 86% c cues.     Verbal expression:   Functional.      Dysphagia: Vital stim completed for 40 minutes at 3.5 mA. Pt. Completed Briseyda maneuver x5, simple tongue base strengthening exercises x5, 10 reps each. Pt. Completed oral motor exercises x3, 5 reps each with max cues.       Other: Pt. Daughter present,  educ. Re: ST goals, dysphagia tx, diet recommendations,  Vital Stim, cognition, 24 hour supervision upon d/c.         Objective:  BP (!) 171/87   Pulse 81   Temp 98.8 °F (37.1 °C) (Oral)   Resp 20   Ht 5' 7\" (1.702 m)   Wt 172 lb (78 kg)   SpO2 99%   BMI 26.94 kg/m²       GEN: Well developed, well nourished, in NAD  HEENT:  NCAT. PERRL. EOMI. Mucous membranes pink and moist. Wearing dentures  PULM:  Clear to ausculation. No rales or rhonchi. Respirations WNL and unlabored. CV:  Regular rate rhythm. No murmurs or gallops. GI:  Abdomen soft. Nontender. Non-distended. BS + and equal.    NEUROLOGICAL: A&O x3.. Sensation intact to light touch. CNs intact with no new focal deficits. No pronator drift noted. Stable impaired L shoulder flexion. MSK:  Functional ROM RUE and RLE. Impaired AROM LUE and LLE due to weakness. Motor testing 4+/5 key muscles RUE and RLEs. Strength 3/5 key muscles LUE and LLE. SKIN: Warm dry and intact. Good turgor. EXTREMITIES:  No calf tenderness to palpation. No edema BLEs. Marnell Records PSYCH: Mood WNL. Appropriately interactive. Affect WNL. Intermittently tearful when she talks about her  .     Diagnostics:     CBC:   Recent Labs     21  0751   WBC 4.1   RBC 3.23*   HGB 9.9*   HCT 29.9*   MCV 92.5   RDW 13.9        BMP:   Recent Labs     21  0751 21  1916 21  0632    134* 134*   K 4.2 4.1 4.6    103 106   CO2 21 17* 18*   BUN 26* 25* 21   CREATININE 1.38* 1.23* 1.03*   GLUCOSE 124* 131* 115*     BNP: No results for input(s): BNP in the last 72 hours. PT/INR: No results for input(s): PROTIME, INR in the last 72 hours. APTT: No results for input(s): APTT in the last 72 hours. CARDIAC ENZYMES:   No results for input(s): CKMB, CKMBINDEX, TROPONINT in the last 72 hours. Invalid input(s): CKTOTAL;3  FASTING LIPID PANEL:  Lab Results   Component Value Date    CHOL 121 04/02/2021    HDL 55 04/02/2021    TRIG 61 04/02/2021     LIVER PROFILE: No results for input(s): AST, ALT, ALB, BILIDIR, BILITOT, ALKPHOS in the last 72 hours. Current Medications:   Current Facility-Administered Medications: cephALEXin (KEFLEX) capsule 500 mg, 500 mg, Oral, 3 times per day  ondansetron (ZOFRAN) tablet 4 mg, 4 mg, Oral, Q8H PRN  enoxaparin (LOVENOX) injection 40 mg, 40 mg, Subcutaneous, Daily  aspirin EC tablet 81 mg, 81 mg, Oral, Daily **OR** [DISCONTINUED] aspirin suppository 300 mg, 300 mg, Rectal, Daily  amLODIPine (NORVASC) tablet 5 mg, 5 mg, Oral, Daily  atorvastatin (LIPITOR) tablet 40 mg, 40 mg, Oral, Nightly  clopidogrel (PLAVIX) tablet 75 mg, 75 mg, Oral, Daily  ferrous sulfate (IRON 325) tablet 325 mg, 325 mg, Oral, BID  levothyroxine (SYNTHROID) tablet 112 mcg, 112 mcg, Oral, Daily  melatonin tablet 6 mg, 6 mg, Oral, Nightly PRN  metoprolol tartrate (LOPRESSOR) tablet 12.5 mg, 12.5 mg, Oral, BID  oxybutynin (DITROPAN-XL) extended release tablet 5 mg, 5 mg, Oral, Daily  pantoprazole (PROTONIX) tablet 40 mg, 40 mg, Oral, QAM AC  acetaminophen (TYLENOL) tablet 650 mg, 650 mg, Oral, Q4H PRN  polyethylene glycol (GLYCOLAX) packet 17 g, 17 g, Oral, Daily  bisacodyl (DULCOLAX) suppository 10 mg, 10 mg, Rectal, Daily PRN  magnesium hydroxide (MILK OF MAGNESIA) 400 MG/5ML suspension 30 mL, 30 mL, Oral, Daily PRN      Impression/Plan:   Impaired ADLs, gait, and mobility due to:      1.  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

## 2021-04-14 PROCEDURE — 6370000000 HC RX 637 (ALT 250 FOR IP): Performed by: STUDENT IN AN ORGANIZED HEALTH CARE EDUCATION/TRAINING PROGRAM

## 2021-04-14 PROCEDURE — 92526 ORAL FUNCTION THERAPY: CPT

## 2021-04-14 PROCEDURE — 6370000000 HC RX 637 (ALT 250 FOR IP): Performed by: PHYSICAL MEDICINE & REHABILITATION

## 2021-04-14 PROCEDURE — 1180000000 HC REHAB R&B

## 2021-04-14 PROCEDURE — 6360000002 HC RX W HCPCS: Performed by: STUDENT IN AN ORGANIZED HEALTH CARE EDUCATION/TRAINING PROGRAM

## 2021-04-14 PROCEDURE — 99232 SBSQ HOSP IP/OBS MODERATE 35: CPT | Performed by: PHYSICAL MEDICINE & REHABILITATION

## 2021-04-14 PROCEDURE — 97116 GAIT TRAINING THERAPY: CPT

## 2021-04-14 PROCEDURE — 97530 THERAPEUTIC ACTIVITIES: CPT

## 2021-04-14 PROCEDURE — 97129 THER IVNTJ 1ST 15 MIN: CPT

## 2021-04-14 PROCEDURE — 99231 SBSQ HOSP IP/OBS SF/LOW 25: CPT | Performed by: INTERNAL MEDICINE

## 2021-04-14 PROCEDURE — 97110 THERAPEUTIC EXERCISES: CPT

## 2021-04-14 PROCEDURE — 97535 SELF CARE MNGMENT TRAINING: CPT

## 2021-04-14 RX ADMIN — METOPROLOL TARTRATE 12.5 MG: 25 TABLET, FILM COATED ORAL at 09:25

## 2021-04-14 RX ADMIN — POLYETHYLENE GLYCOL 3350 17 G: 17 POWDER, FOR SOLUTION ORAL at 09:49

## 2021-04-14 RX ADMIN — FERROUS SULFATE TAB 325 MG (65 MG ELEMENTAL FE) 325 MG: 325 (65 FE) TAB at 21:51

## 2021-04-14 RX ADMIN — CEPHALEXIN 500 MG: 500 CAPSULE ORAL at 14:21

## 2021-04-14 RX ADMIN — PANTOPRAZOLE SODIUM 40 MG: 40 TABLET, DELAYED RELEASE ORAL at 05:38

## 2021-04-14 RX ADMIN — ENOXAPARIN SODIUM 40 MG: 40 INJECTION SUBCUTANEOUS at 09:48

## 2021-04-14 RX ADMIN — ASPIRIN 81 MG: 81 TABLET, COATED ORAL at 09:25

## 2021-04-14 RX ADMIN — LEVOTHYROXINE SODIUM 112 MCG: 112 TABLET ORAL at 05:38

## 2021-04-14 RX ADMIN — CEPHALEXIN 500 MG: 500 CAPSULE ORAL at 05:38

## 2021-04-14 RX ADMIN — Medication 1 CAPSULE: at 09:25

## 2021-04-14 RX ADMIN — METOPROLOL TARTRATE 12.5 MG: 25 TABLET, FILM COATED ORAL at 21:51

## 2021-04-14 RX ADMIN — FERROUS SULFATE TAB 325 MG (65 MG ELEMENTAL FE) 325 MG: 325 (65 FE) TAB at 09:25

## 2021-04-14 RX ADMIN — AMLODIPINE BESYLATE 5 MG: 5 TABLET ORAL at 09:26

## 2021-04-14 RX ADMIN — OXYBUTYNIN CHLORIDE 5 MG: 5 TABLET, EXTENDED RELEASE ORAL at 09:25

## 2021-04-14 RX ADMIN — ATORVASTATIN CALCIUM 40 MG: 40 TABLET, FILM COATED ORAL at 21:51

## 2021-04-14 RX ADMIN — CEPHALEXIN 500 MG: 500 CAPSULE ORAL at 21:51

## 2021-04-14 RX ADMIN — CLOPIDOGREL BISULFATE 75 MG: 75 TABLET ORAL at 09:25

## 2021-04-14 ASSESSMENT — PAIN DESCRIPTION - LOCATION: LOCATION: SHOULDER

## 2021-04-14 ASSESSMENT — PAIN DESCRIPTION - PAIN TYPE: TYPE: ACUTE PAIN

## 2021-04-14 ASSESSMENT — PAIN DESCRIPTION - ONSET: ONSET: ON-GOING

## 2021-04-14 ASSESSMENT — PAIN DESCRIPTION - DESCRIPTORS: DESCRIPTORS: SORE

## 2021-04-14 ASSESSMENT — PAIN - FUNCTIONAL ASSESSMENT: PAIN_FUNCTIONAL_ASSESSMENT: PREVENTS OR INTERFERES SOME ACTIVE ACTIVITIES AND ADLS

## 2021-04-14 ASSESSMENT — PAIN DESCRIPTION - PROGRESSION: CLINICAL_PROGRESSION: RAPIDLY WORSENING

## 2021-04-14 NOTE — PROGRESS NOTES
[address] [facility]    ACUTE REHABILITATION OCCUPATIONAL THERAPY  DAILY NOTE    Date: 21  Patient Name: Charleen Hemphill      Room: 4190/3813-61    MRN: [id number]   : 1932  (80 y.o.)  Gender: female   Referring Practitioner: Cameron Barahona MD  Diagnosis: Acute CVA  Additional Pertinent Hx:  Per ARU preadmission assessment:80year-old female admitted with acute left hemiparesis causing a fall of her chair. Noted acute left-sided weakness worse than previous date. .  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 Patient on baby aspirin at home. Patient given TPA during TPA became hysterical repeat CT showed no change completed TPA. 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 Pt admitted to rehab unit on 21. Restrictions  Restrictions/Precautions: Fall Risk, Modified Diet, Swallowing - Thickened Liquids, General Precautions  Implants present? : (L TKA)  Required Braces or Orthoses?: No  Equipment Used: Bed, Wheelchair(rolling walker)    Subjective  Subjective: \"Now get this in your ear right now\" Pt speaking to herself in mirror about hearing aid. Patient Currently in Pain: Denies  Restrictions/Precautions: Fall Risk;Modified Diet;Swallowing - Thickened Liquids; General Precautions  Overall Orientation Status: Impaired  Orientation Level: Oriented to person;Disoriented to place; Disoriented to time;Disoriented to situation     Pain Assessment  Pain Assessment: 0-10  Pain Level: 5  Pain Type: Acute pain  Pain Location: Abdomen  Pain Descriptors: Constant(\"like it's gonna come out of me\")  Pain Frequency: Continuous    Objective  Cognition  Overall Cognitive Status: Impaired  Arousal/Alertness: Delayed responses to stimuli  Attention Span: Attends with cues to redirect  Memory: Decreased short term memory;Decreased recall of recent events  Following Commands: Follows multistep commands with repetition  Safety Judgement: Decreased awareness of need for assistance  Awareness of Errors: Assistance required to identify errors made  Insights: Decreased awareness of deficits  Sequencing and Organization: Assistance required to implement solutions;Assistance required to generate solutions;Assistance required to identify errors made  Perception  Overall Perceptual Status: Impaired  Initiation: Cues to initiate tasks  Motor Planning: Cues to use objects appropriately  Perseveration: Perseverates during ADLs  Balance  Sitting Balance: Supervision  Standing Balance: Minimal assistance(Min/CGA this date, reports feeling \"woozy\")  Bed mobility  Comment: Pt seated in chair at start and end of session  Transfers  Sit to stand: Minimal assistance  Stand to sit: Contact guard assistance  Standing Balance  Time: 1-2 minutes x 5, 3-4 minutes x 1  Activity: toileting tasks, lower body bathing/dressing  Comment: 0-2 UE support PRN  Functional Mobility  Functional - Mobility Device: Rolling Walker  Activity: Other(transfers in room)  Assist Level: Minimal assistance  Functional Mobility Comments: Minimal verbal cues for safety, attention to task and technique  Toilet Transfers  Toilet - Technique: Stand pivot; To right; To left  Equipment Used: Raised toilet seat with rails(GB on right side)  Toilet Transfer: Minimal assistance  Toilet Transfers Comments: with assist for wheelchair management as well  Shower Transfers  Shower - Transfer From: Wheelchair  Shower - Transfer Type: To and From  Shower - Transfer To: Transfer tub bench  Shower - Technique: Stand pivot; To right; To left  Shower Transfers: Minimal assistance  Shower Transfers Comments: with Fair safety awareness                          Vision  Vision: Impaired  Vision Exceptions: Wears glasses at all times ADL  Feeding: Setup(pt able to open 1/4 containers; increased time req)  Grooming: Stand by assistance(seated in wheelchair with Min VCs for denture care)  UE Bathing: Stand by assistance(seated on tub transfer bench in shower)  LE Bathing: Moderate assistance(assist for buttocks/perineal area; used [address] for feet)  UE Dressing: Minimal assistance(assist for bra clasp only; Min VCs for orientation)  LE Dressing: Minimal assistance(Minimal assist standing balance. Please see below)  Toileting: Moderate assistance(Minimal assist standing balance; assist for BM)  Additional Comments: OT facilitated Pt engagement in showering routine including bathing, dressing, toileting, and grooming tasks. Pt performed clothing management during lower body dressing and toileting tasks with Minimal assist/CGA for standing balance due to mild unsteadiness noted with 0-1 UE support during functional lower body task. Pt requires increased time to complete all tasks this date and reports feeling \"woozy. \" RN and Dr. Willy Young aware. Dr. Willy Young rounding during AM session and suggested trialling abdominal binder in addition to ARSH hose. Abdominal binder obtained and donned during PT session in PM. Pt demonstrates Fair attention to task throughout AM session, however requires significantly increased time. Continue OT POC. Assessment  Performance deficits / Impairments: Decreased functional mobility ; Decreased strength;Decreased endurance;Decreased balance;Decreased high-level IADLs;Decreased posture;Decreased ADL status; Decreased ROM; Decreased safe awareness;Decreased cognition;Decreased fine motor control;Decreased coordination  Prognosis: Good  Discharge Recommendations: Home with assist PRN;Home with Home health OT  Activity Tolerance: Patient limited by fatigue;Patient Tolerated treatment well  Safety Devices in place: Yes  Type of devices: Left in chair;Nurse notified;Call light within reach;Gait belt;Patient at risk for falls Patient Education:  Patient Goals   Patient goals : To return home with family support  Learner:patient  Method: demonstration and explanation       Outcome: needs reinforcement and asked questions   OT Education  OT Education: Plan of Care;OT Role;Orientation; ADL Adaptive Strategies;Transfer Training  Patient Education: safety during functional transferes, OT POC, progress toward POC  Barriers to Learning: fatigue and impaired cognition    Plan  Plan  Times per week: 5-7  Times per day: Twice a day  Current Treatment Recommendations: Strengthening, ROM, Safety Education & Training, Positioning, Balance Training, Patient/Caregiver Education & Training, Self-Care / ADL, Functional Mobility Training, Endurance Training, Neuromuscular Re-education, Wheelchair Mobility Training, Cognitive Reorientation, Equipment Evaluation, Education, & procurement, Home Management Training, Cognitive/Perceptual Training  Patient Goals   Patient goals : To return home with family support  Short term goals  Time Frame for Short term goals: 7 to 10 days  Short term goal 1: Pt will perform upper body bathing/dressing with stand by assist.  Short term goal 2: Pt will perform lower body bathing/dressing and toileting tasks with Moderate assist and Good safety. Short term goal 3: Pt will perform functional functional transfers and mobility during self-care with Moderate assist, least restrictive mobility device, and Good safety. Short term goal 4: Pt will for 4+ minutes with 1-2 UE support and Minimal assist while engaging in functional activity of choice. Short term goal 5: Pt will actively participate in 30+ minutes of therapeutic exercise/functional activities to promote increased independence with self-care and mobility. Short term goal 6: Pt will verbalize/demonstrate Good understanding of assistive equipment/durable medical equipment/modified techniques for increased independence with self-care and mobility.   Long term goals Time Frame for Long term goals : By discharge  Long term goal 1: Pt will perform bathing during shower with stand by assist and Good safety. Long term goal 2: Pt will perform dressing and toileting tasks with modified independence and Good safety. Long term goal 3: Pt will stand for 8+ minutes with 1-2 UE support, modified independence, no loss of balance while engaging in functional activity of choice. Long term goal 4: Pt will perform functional transfers and mobility with modified independence, least restrictive mobility device, and Good safety. Long term goal 5: Pt will verbalize/demonstrate Good understanding of Fall Prevention Strategies for increased independence with self-care and mobility.   Long term goals 6: 9 hole peg and box and blocks to be assessed for LUE as appropriate     04/14/21 1015   OT Individual Minutes   Time In 1015   Time Out 1150   Minutes 95   Time Code Minutes    Timed Code Treatment Minutes 95 Minutes     Electronically signed by Baltazar Daniels OT on 4/14/21 at 2:55 PM EDT

## 2021-04-14 NOTE — PLAN OF CARE
Nutrition Problem #1: Inadequate oral intake  Intervention: Food and/or Nutrient Delivery: Continue Current Diet, Continue Oral Nutrition Supplement  Nutritional Goals: intake more than 75%

## 2021-04-14 NOTE — CONSULTS
[facility] Internal Medicine    CONSULTATION / HISTORY AND PHYSICAL EXAMINATION            Date:   4/14/2021  Patient name:  Samira Mendosa  Date of admission:  4/7/2021 12:21 PM  MRN:   [id number]  Account:  [de-identified]  YOB: 1932  PCP:    VERNELL Acosta CNP  Room:   4945/0629-76  Code Status:    Full Code    Physician Requesting Consult: Viona Mortimer, MD    Reason for Consult:  medical management    Chief Complaint:     No chief complaint on file. Ischemic CVA    History Obtained From:     Patient medical record nursing staff    History of Present Illness:     80-year-old elderly lady was admitted to [place] [address] earlier this month with the acute left-sided weakness after she finished her physical therapy at home she has chronic left leg weakness post multiple surgeries and has chronic shoulder issues and difficulty overhead abduction  Patient received a TPA    4/14/21    No new complaints/symptoms . Symptoms or ailment  course ;                                   are improving over time. · Continue present regimen     BP Readings from Last 3 Encounters:   04/14/21 127/65   04/07/21 139/68   03/12/21 135/76    1.     2.             Past Medicl History:     Past Medical History:   Diagnosis Date    Anemia     Hypertension     Hypothyroidism     Osteoarthritis     Other long term (current) drug therapy     Renal insufficiency         Past Surgical History:     No past surgical history on file. Medications Prior to Admission:     Prior to Admission medications    Medication Sig Start Date End Date Taking?  Authorizing Provider   atorvastatin (LIPITOR) 20 MG tablet Take 1 tablet by mouth nightly 3/1/21  Yes VERNELL Turner CNP   levothyroxine (SYNTHROID) 112 MCG tablet Take 1 tablet by mouth Daily 3/1/21  Yes VERNELL Turner CNP   oxybutynin (DITROPAN-XL) 5 MG extended release tablet Take 1 tablet by mouth daily 1/5/21  Yes Abel Miller VERNELL Martinez CNP   acetaminophen (TYLENOL) 325 MG tablet Take 650 mg by mouth Take 2 tablets at bedtime for sleeping comfort PRN   Yes Historical Provider, MD   amLODIPine (NORVASC) 5 MG tablet Take 1 tablet by mouth daily 3/1/21   VERNELL Rich CNP   Ferrous Sulfate 324 MG TBEC Take 1 tablet by mouth 2 times daily 3/1/21   VERNELL Rich CNP   omeprazole (PRILOSEC) 20 MG delayed release capsule Take 1 capsule by mouth daily 3/1/21   VERNELL Rich CNP   sodium bicarbonate 650 MG tablet Take 1 tablet by mouth 4 times daily Two tabs BID 3/1/21   VERNELL Rich CNP   metoprolol tartrate (LOPRESSOR) 25 MG tablet Take 0.5 tablets by mouth 2 times daily Take 1/2 tablet BID 3/1/21   VERNELL Rich CNP   docusate sodium (COLACE) 100 MG capsule Take 1 capsule by mouth every other day 2/24/21   VERNELL Rich CNP   cephALEXin (KEFLEX) 250 MG capsule TAKE ONE CAPSULE BY MOUTH ONCE A DAY. 2/1/21   VERNELL Mock CNP   fluorouracil (EFUDEX) 5 % cream Apply topically for 4 weeks . 6/29/20   Historical Provider, MD   Calcium Carb-Cholecalciferol (CALCIUM + D3) 600-200 MG-UNIT TABS tablet Take 1 tablet by mouth Daily with lunch    Historical Provider, MD   Multiple Vitamins-Minerals (THERAPEUTIC MULTIVITAMIN-MINERALS) tablet Take 1 tablet by mouth daily    Historical Provider, MD   magnesium oxide (MAG-OX) 400 MG tablet Take 400 mg by mouth daily    Historical Provider, MD   Apoaequorin (PREVAGEN) 10 MG CAPS Take by mouth daily    Historical Provider, MD   GLUCOSA-CHONDR-NA CHONDR-MSM PO Take by mouth    Historical Provider, MD        Allergies:     Ciprofloxacin, Hydrocodone, Macrobid [nitrofurantoin], and Ciprofloxacin    Social History:     Tobacco:    reports that she has never smoked. She has never used smokeless tobacco.  Alcohol:      reports no history of alcohol use. Drug Use:  reports no history of drug use.     Family History:     No family history on file. Review of Systems:     Positive and Negative as described in HPI. CONSTITUTIONAL:  negative for fevers, chills, sweats, fatigue, weight loss  HEENT:  negative for vision, hearing changes, runny nose, throat pain  RESPIRATORY:  negative for shortness of breath, cough, congestion, wheezing. CARDIOVASCULAR:  negative for chest pain, palpitations. GASTROINTESTINAL:  negative for nausea, vomiting, diarrhea, constipation, change in bowel habits, abdominal pain   GENITOURINARY:  negative for difficulty of urination, burning with urination, frequency   INTEGUMENT:  negative for rash, skin lesions, easy bruising   HEMATOLOGIC/LYMPHATIC:  negative for swelling/edema   ALLERGIC/IMMUNOLOGIC:  negative for urticaria , itching  ENDOCRINE:  negative increase in drinking, increase in urination, hot or cold intolerance  MUSCULOSKELETAL:  negative joint pains, muscle aches, swelling of joints  NEUROLOGICAL: Positive for left upper and lower extremity weakness extremities      Physical Exam:     /65   Pulse 64   Temp 98 °F (36.7 °C)   Resp 19   Ht 5' 7\" (1.702 m)   Wt 172 lb (78 kg)   SpO2 97%   BMI 26.94 kg/m²   Temp (24hrs), Av.2 °F (36.8 °C), Min:98 °F (36.7 °C), Max:98.3 °F (36.8 °C)    Recent Labs     21  1114   POCGLU 120*       Intake/Output Summary (Last 24 hours) at 2021 1303  Last data filed at 2021 0506  Gross per 24 hour   Intake    Output 100 ml   Net -100 ml       General Appearance:  alert, well appearing, and in no acute distress  Mental status: oriented to person, place, and time with normal affect  Head:  normocephalic, atraumatic.   Eye: no icterus, redness, pupils equal and reactive, extraocular eye movements intact, conjunctiva clear  Ear: normal external ear, no discharge, hearing intact  Nose:  no drainage noted  Mouth: mucous membranes moist  Neck: supple, no carotid bruits, thyroid not palpable  Lungs: Bilateral equal air entry, clear to ausculation, no Left hemiparesis for 3-4 out of 5  Aspirin Plavix and Lipitor  Hypertension beta-blocker and Norvasc control  Hypothyroidism on Synthroid follow with TSH in 6 weeks  Mild protein calorie malnutrition  SYEDA Baseline creatinine is 0.87 increased to 1.47 avoid NSAIDs hydration oral recheck      BP labile - on amlodipine , metoprolol  BP Readings from Last 3 Encounters:   04/14/21 127/65   04/07/21 139/68   03/12/21 135/76              4/14/2021    Patient tolerating rehabilitative therapies . Progressing fairly well . Continue present therapy . ·       BMP:   Recent Labs     04/11/21  1916 04/12/21  0632   * 134*   K 4.1 4.6   CO2 17* 18*   BUN 25* 21   CREATININE 1.23* 1.03*   LABGLOM 41* 50*   GLUCOSE 131* 115*          ·    3. Cali Mitchell MD  4/14/2021  1:03 PM    Copy sent to Dr. Ekaterina Bell, APRN - CNP    Please note that this chart was generated using voice recognition [address] dictation software. Although every effort was made to ensure the accuracy of this automated transcription, some errors in transcription may have occurred.

## 2021-04-14 NOTE — PROGRESS NOTES
Speech Language Pathology  Speech Language Pathology  [address] [place] [address]    Dysphagia/Cognitive Treatment Note    Date: 4/14/2021  Patients Name: Sreedhar Michel  MRN: [id number]  Diagnosis:   Patient Active Problem List   Diagnosis Code    Anemia D64.9    Arthritis M19.90    HTN (hypertension) I10    Hypothyroidism E03.9    Renal insufficiency N28.9    Other long term (current) drug therapy Z79.899    Anemia due to stage 3 chronic kidney disease N18.30, D63.1    Chronic UTI N39.0    Mixed hyperlipidemia E78.2    Gastroesophageal reflux disease without esophagitis K21.9    S/P revision of total knee, 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 Reports headache, unable to rate    Cognitive Treatment    Treatment time: 8401-2292    Subjective: [] Alert [x] Cooperative     [] Confused     [] Agitated    [x] Lethargic      Objective/Assessment:  Attention: Pt. Very lethargic, max cues and repetition needed to maintain pt. Attention. Cognition:   Orientation- 86%    Verbal expression:   Convergent categorization (concrete)- 60%, 80% c cues. Add item- 40%, 100% c cues. Dysphagia: Vital stim completed for 40 minutes at 3.5 mA. Pt. Completed  simple tongue base strengthening exercises x5, 5 reps each. Pt. Completed oral motor exercises x1, 5 reps each with max cues. Pt. Took trials of honey thick liquids via straw x30.      Other: Family not present     Plan:  [x] Continue ST services    [] Discharge from ST:      Discharge recommendations: [] Inpatient Rehab   [] [place]   [] Outpatient Therapy  [] Follow up at trauma clinic   [] Other:       Treatment completed by: Alessandro Barraza. JIMMIE/SLP

## 2021-04-14 NOTE — PLAN OF CARE
Problem: Skin Integrity:  Goal: Will show no infection signs and symptoms  Description: Will show no infection signs and symptoms  4/14/2021 0130 by Marie Spine  Outcome: Ongoing  4/13/2021 1932 by Zaid Hannon RN  Outcome: Ongoing  Goal: Absence of new skin breakdown  Description: Absence of new skin breakdown  Outcome: Ongoing     Problem: Confusion - Acute:  Goal: Absence of continued neurological deterioration signs and symptoms  Description: Absence of continued neurological deterioration signs and symptoms  Outcome: Ongoing  Goal: Mental status will be restored to baseline  Description: Mental status will be restored to baseline  Outcome: Ongoing     Problem: Discharge Planning:  Goal: Ability to perform activities of daily living will improve  Description: Ability to perform activities of daily living will improve  Outcome: Ongoing  Goal: Participates in care planning  Description: Participates in care planning  Outcome: Ongoing     Problem: Injury - Risk of, Physical Injury:  Goal: Absence of physical injury  Description: Absence of physical injury  Outcome: Ongoing  Goal: Will remain free from falls  Description: Will remain free from falls  Outcome: Ongoing     Problem: Mood - Altered:  Goal: Mood stable  Description: Mood stable  Outcome: Ongoing  Goal: Absence of abusive behavior  Description: Absence of abusive behavior  Outcome: Ongoing  Goal: Verbalizations of feeling emotionally comfortable while being cared for will increase  Description: Verbalizations of feeling emotionally comfortable while being cared for will increase  Outcome: Ongoing     Problem: Psychomotor Activity - Altered:  Goal: Absence of psychomotor disturbance signs and symptoms  Description: Absence of psychomotor disturbance signs and symptoms  Outcome: Ongoing     Problem: Sensory Perception - Impaired:  Goal: Demonstrations of improved sensory functioning will increase  Description: Demonstrations of improved sensory functioning will increase  Outcome: Ongoing  Goal: Decrease in sensory misperception frequency  Description: Decrease in sensory misperception frequency  Outcome: Ongoing  Goal: Able to refrain from responding to false sensory perceptions  Description: Able to refrain from responding to false sensory perceptions  Outcome: Ongoing  Goal: Demonstrates accurate environmental perceptions  Description: Demonstrates accurate environmental perceptions  Outcome: Ongoing  Goal: Able to distinguish between reality-based and nonreality-based thinking  Description: Able to distinguish between reality-based and nonreality-based thinking  Outcome: Ongoing  Goal: Able to interrupt nonreality-based thinking  Description: Able to interrupt nonreality-based thinking  Outcome: Ongoing     Problem: Sleep Pattern Disturbance:  Goal: Appears well-rested  Description: Appears well-rested  Outcome: Ongoing     Problem: Neurological  Goal: Maximum potential motor/sensory/cognitive function  Outcome: Ongoing     Problem: Nutrition  Goal: Optimal nutrition therapy  Description: Nutrition Problem #1: Inadequate oral intake  Intervention: Food and/or Nutrient Delivery: Continue Current Diet, Start Oral Nutrition Supplement  Nutritional Goals: intake more than 75%     Outcome: Ongoing     Problem: Neurological  Goal: Maximum potential motor/sensory/cognitive function  Outcome: Ongoing     Problem: Pain:  Goal: Pain level will decrease  Description: Pain level will decrease  4/14/2021 0130 by Venkatesh Saldana  Outcome: Ongoing  4/13/2021 1932 by Tonja Dotson RN  Outcome: Ongoing  Goal: Control of acute pain  Description: Control of acute pain  Outcome: Ongoing  Goal: Control of chronic pain  Description: Control of chronic pain  Outcome: Ongoing

## 2021-04-14 NOTE — PROGRESS NOTES
Physical Medicine & Rehabilitation  Progress Note      Subjective:      80year-old patient with ischemic CVA with L nondominant hemiparesis. Patient is feeling better today but still having occasional episodes of feeling \"woozy\". She reports these are similar to episodes she has at home and cause no lasting issues or associated symptoms. She denies any issues with sleep, appetite, bowel, or bladder. ROS:  Denies fevers, chills, sweats. No chest pain, palpitations, lightheadedness. Denies coughing, wheezing or shortness of breath. Denies abdominal pain, nausea, diarrhea or constipation. No new areas of joint pain. Denies new areas of numbness or weakness. Denies new anxiety or depression issues. No new skin problems. Rehabilitation:   Progressing in therapies. PT:  Restrictions/Precautions: Fall Risk, Modified Diet, Swallowing - Thickened Liquids, General Precautions  Implants present? : (L TKA)   Transfers  Sit to Stand: Moderate Assistance(push posteriorly and tendency for feet to slide forward)  Stand to sit: Minimal Assistance(tends to plop in chair without cueing)  Bed to Chair: Moderate assistance  Stand Pivot Transfers: Maximum Assistance  Squat Pivot Transfers: Maximum Assistance(to left , no AD , pt sequences correctly after pre instructi)  Comment: one person assist  Ambulation 1  Surface: level tile  Device: Rolling Walker  Other Apparatus: (IV)  Assistance: Minimal assistance, Contact guard assistance  Quality of Gait: increased trunk flexion , UE weight bearing heavily on walker, hips beyond LEN of walker  Gait Deviations: Slow Zuly, Decreased step height, Decreased step length  Distance: 60 feet x 3  Comments: needs constant cueing to maintain safety within LEN of walker and tall posture. Patient demonstrates outside LEN of walker with turns and presenting to sit to surfaces. Transfers  Sit to Stand:  Moderate Assistance(push posteriorly and tendency for feet to slide forward) increase ease to don, setup req with cuing for proper tech, pt able to thread pants with increased time, CGA req in standing, unsteady, assist pants over hips         Balance  Sitting Balance: Supervision  Standing Balance: Contact guard assistance(Ax1, unsteady at times with dynamic tasks)   Standing Balance  Time: AM: 1 min x3; PM: 1-2 min x2  Activity: AM: lower body dressing and functional transfer; PM: toilet transfer/toileting tasks  Comment: 1-2 UE support req, cuing for safety in standing, unsteady at times  Functional Mobility  Functional - Mobility Device: Rolling Walker  Activity: Other(transfers in room)  Assist Level: Minimal assistance(min/CGA)  Functional Mobility Comments: cuing for safety and proper tech     Bed mobility  Bridging: Minimal assistance  Rolling to Left: Minimal assistance  Rolling to Right: Moderate assistance  Supine to Sit: Moderate assistance  Sit to Supine: Minimal assistance  Scooting: Minimal assistance  Comment: bed rail used, increased time req  Transfers  Stand Step Transfers: Contact guard assistance  Stand Pivot Transfers: Contact guard assistance  Sit to stand: Minimal assistance  Stand to sit: Contact guard assistance  Transfer Comments: cuing for proper tech   Toilet Transfers  Toilet - Technique: Stand pivot, To right, To left  Equipment Used: Standard toilet(GB on R side)  Toilet Transfer: Minimal assistance  Toilet Transfers Comments: cuing for tech, unsteady     Shower Transfers  Shower - Transfer From: Wheelchair  Shower - Transfer Type: To and From  Shower - Transfer To: Transfer tub bench  Shower - Technique: Stand pivot, To right, To left  Shower Transfers:  Moderate assistance  Shower Transfers Comments: with Fair safety awareness  Wheelchair Bed Transfers  Wheelchair/Bed - Technique: Ambulating  Equipment Used: Bed, Wheelchair(rolling walker)  Level of Asssistance: 2 Person assistance, Moderate assistance  Wheelchair Transfers Comments: during PM session SPEECH:  Subjective: []? Alert     [x]? Cooperative     []? Confused     []? Agitated    [x]? Lethargic        Objective/Assessment:  Attention: Pt. Very lethargic, max cues and repetition needed to maintain pt. Attention.     Cognition:   Orientation- 86%     Verbal expression:   Convergent categorization (concrete)- 60%, 80% c cues. Add item- 40%, 100% c cues.       Dysphagia: Vital stim completed for 40 minutes at 3.5 mA. Pt. Completed  simple tongue base strengthening exercises x5, 5 reps each. Pt. Completed oral motor exercises x1, 5 reps each with max cues. Pt. Took trials of honey thick liquids via straw x30.      Other: Family not present     Objective:  /65   Pulse 64   Temp 98 °F (36.7 °C)   Resp 19   Ht 5' 7\" (1.702 m)   Wt 172 lb (78 kg)   SpO2 97%   BMI 26.94 kg/m²       GEN: Well developed, well nourished, in NAD  HEENT:  NCAT. PERRL. EOMI. Mucous membranes pink and moist. Wearing dentures  PULM:  Clear to ausculation. No rales or rhonchi. Respirations WNL and unlabored. CV:  Bradycardic rate regular rhythm. No murmurs or gallops. GI:  Abdomen soft. Nontender. Non-distended. BS + and equal.    NEUROLOGICAL: A&O x3.. Sensation intact to light touch. CNs intact with no new focal deficits. No pronator drift noted. Stable impaired L shoulder flexion. MSK:  Functional ROM RUE and RLE. Impaired AROM LUE and LLE due to weakness. Motor testing 4+/5 key muscles RUE and RLEs. Strength 4/5 key muscles LUE and LLE. SKIN: Warm dry and intact. Good turgor. EXTREMITIES:  No calf tenderness to palpation. No edema BLEs. [place] PSYCH: Mood WNL. Appropriately interactive. Affect WNL. Diagnostics:     CBC:   No results for input(s): WBC, RBC, HGB, HCT, MCV, RDW, PLT in the last 72 hours.   BMP:   Recent Labs     04/11/21  1916 04/12/21  0632   * 134*   K 4.1 4.6    106   CO2 17* 18*   BUN 25* 21   CREATININE 1.23* 1.03*   GLUCOSE 131* 115*     BNP: No results for input(s): BNP in the last 72 hours. PT/INR: No results for input(s): PROTIME, INR in the last 72 hours. APTT: No results for input(s): APTT in the last 72 hours. CARDIAC ENZYMES:   No results for input(s): CKMB, CKMBINDEX, TROPONINT in the last 72 hours. Invalid input(s): CKTOTAL;3  FASTING LIPID PANEL:  Lab Results   Component Value Date    CHOL 121 04/02/2021    HDL 55 04/02/2021    TRIG 61 04/02/2021     LIVER PROFILE: No results for input(s): AST, ALT, ALB, BILIDIR, BILITOT, ALKPHOS in the last 72 hours. Current Medications:   Current Facility-Administered Medications: lactobacillus (CULTURELLE) capsule 1 capsule, 1 capsule, Oral, Daily with breakfast  cephALEXin (KEFLEX) capsule 500 mg, 500 mg, Oral, 3 times per day  ondansetron (ZOFRAN) tablet 4 mg, 4 mg, Oral, Q8H PRN  enoxaparin (LOVENOX) injection 40 mg, 40 mg, Subcutaneous, Daily  aspirin EC tablet 81 mg, 81 mg, Oral, Daily **OR** [DISCONTINUED] aspirin suppository 300 mg, 300 mg, Rectal, Daily  amLODIPine (NORVASC) tablet 5 mg, 5 mg, Oral, Daily  atorvastatin (LIPITOR) tablet 40 mg, 40 mg, Oral, Nightly  clopidogrel (PLAVIX) tablet 75 mg, 75 mg, Oral, Daily  ferrous sulfate (IRON 325) tablet 325 mg, 325 mg, Oral, BID  levothyroxine (SYNTHROID) tablet 112 mcg, 112 mcg, Oral, Daily  melatonin tablet 6 mg, 6 mg, Oral, Nightly PRN  metoprolol tartrate (LOPRESSOR) tablet 12.5 mg, 12.5 mg, Oral, BID  oxybutynin (DITROPAN-XL) extended release tablet 5 mg, 5 mg, Oral, Daily  pantoprazole (PROTONIX) tablet 40 mg, 40 mg, Oral, QAM AC  acetaminophen (TYLENOL) tablet 650 mg, 650 mg, Oral, Q4H PRN  polyethylene glycol (GLYCOLAX) packet 17 g, 17 g, Oral, Daily  bisacodyl (DULCOLAX) suppository 10 mg, 10 mg, Rectal, Daily PRN  magnesium hydroxide (MILK OF MAGNESIA) 400 MG/5ML suspension 30 mL, 30 mL, Oral, Daily PRN      Impression/Plan:   Impaired ADLs, gait, and mobility due to:      1.  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

## 2021-04-14 NOTE — PROGRESS NOTES
Physical Therapy  Facility/Department: [facility] ACUTE REHAB  Daily Treatment Note  NAME: Taty Lai  : 1932  MRN: [id number]    Date of Service: 2021    Discharge Recommendations:  Patient would benefit from continued therapy after discharge, Home with assist PRN, Home with Home health PT        Assessment      PT Education: Functional Mobility Training;General Safety;Gait Training; Adaptive Device Training  Activity Tolerance  Activity Tolerance: Patient limited by endurance; Patient limited by fatigue     Patient Diagnosis(es): There were no encounter diagnoses. has a past medical history of Anemia, Hypertension, Hypothyroidism, Osteoarthritis, Other long term (current) drug therapy, and Renal insufficiency. has no past surgical history on file. Restrictions  Restrictions/Precautions  Restrictions/Precautions: Fall Risk, Modified Diet, Swallowing - Thickened Liquids, General Precautions  Required Braces or Orthoses?: No  Implants present? : (L TKA)  Subjective   General  Chart Reviewed: Yes  Response To Previous Treatment: Patient with no complaints from previous session. Family / Caregiver Present: No  Subjective  Subjective: patient constant reporting dizziness with activities today. Decreased dizziness with sitting rest breaks. patient reporting increasing left shoulder pain with activities with repetition. General Comment  Comments: patient talkative throughout therapy session; often repeating stories of long term memories .   Pain Screening  Patient Currently in Pain: Denies  Pain Assessment  Pain Assessment: Faces  Beltran-Baker Pain Rating: Hurts even more  Pain Type: Acute pain  Pain Location: Shoulder  Pain Orientation: Left  Pain Descriptors: Sore  Pain Frequency: Intermittent  Pain Onset: On-going  Clinical Progression: Rapidly worsening  Functional Pain Assessment: Prevents or interferes some active activities and ADLs  Non-Pharmaceutical Pain Intervention(s): Repositioned  Vital Signs Patient Currently in Pain: Denies       Orientation     Cognition      Objective   Bed mobility  Bridging: Minimal assistance  Rolling to Left: Minimal assistance  Rolling to Right: Moderate assistance  Supine to Sit: Moderate assistance  Sit to Supine: Minimal assistance  Scooting: Minimal assistance  Transfers  Sit to Stand: Moderate Assistance(push posteriorly and tendency for feet to slide forward)  Stand to sit: Minimal Assistance(tends to plop in chair without cueing)  Bed to Chair: Moderate assistance  Stand Pivot Transfers: Maximum Assistance  Comment: one person assist  Ambulation  Ambulation?: Yes  Ambulation 1  Surface: level tile  Device: Rolling Walker  Other Apparatus: (IV)  Assistance: Minimal assistance;Contact guard assistance  Quality of Gait: increased trunk flexion , UE weight bearing heavily on walker, hips beyond LEN of walker  Gait Deviations: Slow Zuly;Decreased step height;Decreased step length  Distance: 60 feet x 3; 80 feet x 1 w/  wheelchair follow  Comments: needs constant cueing to maintain safety within LEN of walker and tall posture. Patient demonstrates outside LEN of walker with turns and presenting to sit to surfaces. Stairs/Curb  Stairs?: Yes  Stairs  # Steps : 10  Rails: Bilateral  Curbs: 6\"  Assistance: Minimal assistance(intermittent use of 2nd assist after 8 steps preformed to finish 2 steps safely.  Noted hesitation step difficulty advancing.)  Comment: tolerated step to gait pattern; one person assist. flex at hips but negotiated step height without concern  Propulsion 1  Propulsion: Manual  Level: Level Tile  Method: RUE;RLE;LLE(complaints left shoulder pain)  Level of Assistance: Stand by assistance  Description/ Details: slow pace, gets easily distracted, fatigues easily  Distance: 30 ft        Other exercises  Other exercises 1: seated bilateral LE 2# lt green x 15  Other exercises 2: standing BLE 20 reps  Other exercises 3: Supine BLe 20 reps  Other exercises 4: Nustep 10 minutes L3                        G-Code     OutComes Score                                                     AM-PAC Score             Goals  Short term goals  Time Frame for Short term goals:  8 to 9 days  Short term goal 1: Pt to perform bed mobility at SBA with rail  Short term goal 2: Pt to demonstrate transfers at min A  Short term goal 3: Pt to amb 50 to 80 ft with appropriate device, min/mod A   Short term goal 4: Pt to improve L LE strength by 1/3 MMG to improve fucntion. Short term goal 5: Pt able to go up and down 5 steps with 2 UE support, mod A   Long term goals  Time Frame for Long term goals : By LOS  Long term goal 1: Pt able to perform bed mobility mod-I  Long term goal 2: Pt able to perform transfers from varies surfaces at mod-I /supervsion level. Long term goal 3: Pt able to ambulate with appropriate device distance of 80 ft, mod-I/supervsion level. Long term goal 4: Pt able to perform step curb with assisitive device at min A  Long term goal 5: Pt able to go up and down 5 steps with 2 rails at min A to improve LE strength. Long term goal 6: Improve PASS score to atleast 25//36 improve overall fucntion  Long term goal 7: Improve Tinetti balance score to 20 or more/28 to reduce fall risk. Long term goal 8: Pt able to propel w/c on level surfaces 150 ft, with set up/supervsion. Patient Goals   Patient goals : I want to move my Left side to walk better    Plan    Plan  Times per week: 1.5 hr/day, 5 to 7 days/week  Plan weeks: 6  Current Treatment Recommendations: Strengthening, Neuromuscular Re-education, Home Exercise Program, Safety Education & Training, Patient/Caregiver Education & Training, Equipment Evaluation, Education, & procurement, Functional Mobility Training, Balance Training, Endurance Training, Transfer Training, Gait Training, ROM, Stair training  Safety Devices  Type of devices:  All fall risk precautions in place, Patient at risk for falls, Gait belt  Restraints Initially in place: No     Therapy Time     04/14/21 0924 04/14/21 1357   PT Individual Minutes   Time In 0808 1324   Time Out 0910 1410   Minutes 62 1200 [facility] Yi Welch PTA

## 2021-04-14 NOTE — PROGRESS NOTES
Comprehensive Nutrition Assessment    Type and Reason for Visit:  Reassess    Nutrition Recommendations/Plan: Continue diet and supplements as ordered. Nutrition Assessment:  Pt [facility] with sx of CVA. [facility] now [facility] to [facility]. Nursing document intake greater than 50%. Speech path note reviewed. Observed pt with lunch tray. She states she is receiving too much food. Malnutrition Assessment:  Malnutrition Status: At risk for malnutrition (Comment)    Context:  Acute Illness     Findings of the 6 clinical characteristics of malnutrition:  Energy Intake:  Mild decrease in energy intake (Comment)  Weight Loss:  No significant weight loss     Body Fat Loss:  No significant body fat loss     Muscle Mass Loss:  No significant muscle mass loss    Fluid Accumulation:  1 - Mild Extremities   Strength:  Not Performed    Estimated Daily Nutrient Needs:  Energy (kcal): Finney x 1.2= 1500 kcal; Weight Used for Energy Requirements:  Admission     Protein (g):  1.3g/kg= 80 g; Weight Used for Protein Requirements:  Ideal          Nutrition Related Findings:  mild edema BLE, Labs (4/8) Na/K 133/4.2, Glu 153, Meds: Reviewed, BM 4/4      Wounds:  None       Current Nutrition Therapies:    Dietary Nutrition Supplements: Frozen Oral Supplement  DIET GENERAL; Dysphagia Minced and Moist; Moderately Thick (Honey)    Anthropometric Measures:  · Height: 5' 7\" (170.2 cm)  · Current Body Weight: 172 lb (78 kg)   · Admission Body Weight: 172 lb (78 kg)    · Usual Body Weight: (163-170 #)     · Ideal Body Weight: 135 lbs; % Ideal Body Weight     · BMI: 26.9  · BMI Categories: Overweight (BMI 25.0-29. 9)       Nutrition Diagnosis:   · Inadequate oral intake related to (poor appetite) as evidenced by intake 26-50%, intake 51-75%      Nutrition Interventions:   Food and/or Nutrient Delivery:  Continue Current Diet, Continue Oral Nutrition Supplement  Nutrition

## 2021-04-15 LAB
ANION GAP SERPL CALCULATED.3IONS-SCNC: 11 MMOL/L (ref 9–17)
BUN BLDV-MCNC: 22 MG/DL (ref 8–23)
BUN/CREAT BLD: ABNORMAL (ref 9–20)
CALCIUM SERPL-MCNC: 8.8 MG/DL (ref 8.6–10.4)
CHLORIDE BLD-SCNC: 105 MMOL/L (ref 98–107)
CO2: 23 MMOL/L (ref 20–31)
CREAT SERPL-MCNC: 1.07 MG/DL (ref 0.5–0.9)
GFR AFRICAN AMERICAN: 59 ML/MIN
GFR NON-AFRICAN AMERICAN: 48 ML/MIN
GFR SERPL CREATININE-BSD FRML MDRD: ABNORMAL ML/MIN/{1.73_M2}
GFR SERPL CREATININE-BSD FRML MDRD: ABNORMAL ML/MIN/{1.73_M2}
GLUCOSE BLD-MCNC: 103 MG/DL (ref 70–99)
HCT VFR BLD CALC: 34 % (ref 36–46)
HEMOGLOBIN: 11.3 G/DL (ref 12–16)
MCH RBC QN AUTO: 30.3 PG (ref 26–34)
MCHC RBC AUTO-ENTMCNC: 33.2 G/DL (ref 31–37)
MCV RBC AUTO: 91.2 FL (ref 80–100)
NRBC AUTOMATED: ABNORMAL PER 100 WBC
PDW BLD-RTO: 14.4 % (ref 11.5–14.9)
PLATELET # BLD: 284 K/UL (ref 150–450)
PMV BLD AUTO: 9 FL (ref 6–12)
POTASSIUM SERPL-SCNC: 4.6 MMOL/L (ref 3.7–5.3)
RBC # BLD: 3.73 M/UL (ref 4–5.2)
SODIUM BLD-SCNC: 139 MMOL/L (ref 135–144)
WBC # BLD: 6.5 K/UL (ref 3.5–11)

## 2021-04-15 PROCEDURE — 85027 COMPLETE CBC AUTOMATED: CPT

## 2021-04-15 PROCEDURE — 92526 ORAL FUNCTION THERAPY: CPT

## 2021-04-15 PROCEDURE — 36415 COLL VENOUS BLD VENIPUNCTURE: CPT

## 2021-04-15 PROCEDURE — 97116 GAIT TRAINING THERAPY: CPT

## 2021-04-15 PROCEDURE — 6360000002 HC RX W HCPCS: Performed by: STUDENT IN AN ORGANIZED HEALTH CARE EDUCATION/TRAINING PROGRAM

## 2021-04-15 PROCEDURE — 97110 THERAPEUTIC EXERCISES: CPT

## 2021-04-15 PROCEDURE — 1180000000 HC REHAB R&B

## 2021-04-15 PROCEDURE — 6370000000 HC RX 637 (ALT 250 FOR IP): Performed by: PHYSICAL MEDICINE & REHABILITATION

## 2021-04-15 PROCEDURE — 6370000000 HC RX 637 (ALT 250 FOR IP): Performed by: STUDENT IN AN ORGANIZED HEALTH CARE EDUCATION/TRAINING PROGRAM

## 2021-04-15 PROCEDURE — 97530 THERAPEUTIC ACTIVITIES: CPT

## 2021-04-15 PROCEDURE — 80048 BASIC METABOLIC PNL TOTAL CA: CPT

## 2021-04-15 PROCEDURE — 99231 SBSQ HOSP IP/OBS SF/LOW 25: CPT | Performed by: PHYSICAL MEDICINE & REHABILITATION

## 2021-04-15 PROCEDURE — 97535 SELF CARE MNGMENT TRAINING: CPT

## 2021-04-15 PROCEDURE — 99231 SBSQ HOSP IP/OBS SF/LOW 25: CPT | Performed by: INTERNAL MEDICINE

## 2021-04-15 RX ADMIN — METOPROLOL TARTRATE 12.5 MG: 25 TABLET, FILM COATED ORAL at 19:56

## 2021-04-15 RX ADMIN — POLYETHYLENE GLYCOL 3350 17 G: 17 POWDER, FOR SOLUTION ORAL at 07:59

## 2021-04-15 RX ADMIN — LEVOTHYROXINE SODIUM 112 MCG: 112 TABLET ORAL at 06:09

## 2021-04-15 RX ADMIN — OXYBUTYNIN CHLORIDE 5 MG: 5 TABLET, EXTENDED RELEASE ORAL at 08:08

## 2021-04-15 RX ADMIN — CEPHALEXIN 500 MG: 500 CAPSULE ORAL at 06:09

## 2021-04-15 RX ADMIN — FERROUS SULFATE TAB 325 MG (65 MG ELEMENTAL FE) 325 MG: 325 (65 FE) TAB at 19:56

## 2021-04-15 RX ADMIN — Medication 6 MG: at 22:34

## 2021-04-15 RX ADMIN — AMLODIPINE BESYLATE 5 MG: 5 TABLET ORAL at 07:59

## 2021-04-15 RX ADMIN — METOPROLOL TARTRATE 12.5 MG: 25 TABLET, FILM COATED ORAL at 07:59

## 2021-04-15 RX ADMIN — PANTOPRAZOLE SODIUM 40 MG: 40 TABLET, DELAYED RELEASE ORAL at 06:09

## 2021-04-15 RX ADMIN — CLOPIDOGREL BISULFATE 75 MG: 75 TABLET ORAL at 07:59

## 2021-04-15 RX ADMIN — ENOXAPARIN SODIUM 40 MG: 40 INJECTION SUBCUTANEOUS at 07:58

## 2021-04-15 RX ADMIN — Medication 1 CAPSULE: at 07:59

## 2021-04-15 RX ADMIN — ATORVASTATIN CALCIUM 40 MG: 40 TABLET, FILM COATED ORAL at 19:56

## 2021-04-15 RX ADMIN — CEPHALEXIN 500 MG: 500 CAPSULE ORAL at 13:15

## 2021-04-15 RX ADMIN — ASPIRIN 81 MG: 81 TABLET, COATED ORAL at 07:59

## 2021-04-15 RX ADMIN — CEPHALEXIN 500 MG: 500 CAPSULE ORAL at 22:34

## 2021-04-15 ASSESSMENT — PAIN DESCRIPTION - LOCATION: LOCATION: ARM;SHOULDER

## 2021-04-15 ASSESSMENT — PAIN DESCRIPTION - ORIENTATION: ORIENTATION: LEFT

## 2021-04-15 ASSESSMENT — PAIN DESCRIPTION - DESCRIPTORS: DESCRIPTORS: SORE

## 2021-04-15 NOTE — CONSULTS
[facility]    CONSULTATION / HISTORY AND PHYSICAL EXAMINATION            Date:   4/15/2021  Patient name:  Lizabeth Lambert  Date of admission:  4/7/2021 12:21 PM  MRN:   [id number]  Account:  [de-identified]  YOB: 1932  PCP:    VERNELL Chaparro CNP  Room:   3566/3047-57  Code Status:    Full Code    Physician Requesting Consult: Harman Andujar MD    Reason for Consult:  medical management    Chief Complaint:     No chief complaint on file. Ischemic CVA    History Obtained From:     Patient medical record nursing staff    History of Present Illness:     60-year-old elderly lady was admitted to [facility] earlier this month with the acute left-sided weakness after she finished her physical therapy at home she has chronic left leg weakness post multiple surgeries and has chronic shoulder issues and difficulty overhead abduction  Patient received a TPA    4/14/21    No new complaints/symptoms . Symptoms or ailment  course ;                                   are improving over time. · Continue present regimen     BP Readings from Last 3 Encounters:   04/15/21 116/60   04/07/21 139/68   03/12/21 135/76    1.     2.             Past Medicl History:     Past Medical History:   Diagnosis Date    Anemia     Hypertension     Hypothyroidism     Osteoarthritis     Other long term (current) drug therapy     Renal insufficiency         Past Surgical History:     No past surgical history on file. Medications Prior to Admission:     Prior to Admission medications    Medication Sig Start Date End Date Taking?  Authorizing Provider   atorvastatin (LIPITOR) 20 MG tablet Take 1 tablet by mouth nightly 3/1/21  Yes VERNELL Miguel CNP   levothyroxine (SYNTHROID) 112 MCG tablet Take 1 tablet by mouth Daily 3/1/21  Yes VERNELL Miguel CNP   oxybutynin (DITROPAN-XL) 5 MG extended release tablet Take 1 tablet by mouth daily 1/5/21  Yes William Richardson ausculation, no wheezing, rales or rhonchi, normal effort  Cardiovascular: normal rate, regular rhythm, no murmur, gallop, rub. Abdomen: Soft, nontender, nondistended, normal bowel sounds, no hepatomegaly or splenomegaly  Neurologic: Left upper and left lower extremity weakness  Skin: No gross lesions, rashes, bruising or bleeding on exposed skin area  Extremities:  peripheral pulses palpable, no pedal edema or calf pain with palpation      Investigations:      Laboratory Testing:  Recent Results (from the past 24 hour(s))   Basic Metabolic Panel w/ Reflex to MG    Collection Time: 04/15/21  6:58 AM   Result Value Ref Range    Glucose 103 (H) 70 - 99 mg/dL    BUN 22 8 - 23 mg/dL    CREATININE 1.07 (H) 0.50 - 0.90 mg/dL    Bun/Cre Ratio NOT REPORTED 9 - 20    Calcium 8.8 8.6 - 10.4 mg/dL    Sodium 139 135 - 144 mmol/L    Potassium 4.6 3.7 - 5.3 mmol/L    Chloride 105 98 - 107 mmol/L    CO2 23 20 - 31 mmol/L    Anion Gap 11 9 - 17 mmol/L    GFR Non-African American 48 (L) >60 mL/min    GFR  59 (L) >60 mL/min    GFR Comment          GFR Staging NOT REPORTED    CBC    Collection Time: 04/15/21  6:58 AM   Result Value Ref Range    WBC 6.5 3.5 - 11.0 k/uL    RBC 3.73 (L) 4.0 - 5.2 m/uL    Hemoglobin 11.3 (L) 12.0 - 16.0 g/dL    Hematocrit 34.0 (L) 36 - 46 %    MCV 91.2 80 - 100 fL    MCH 30.3 26 - 34 pg    MCHC 33.2 31 - 37 g/dL    RDW 14.4 11.5 - 14.9 %    Platelets 339 906 - 957 k/uL    MPV 9.0 6.0 - 12.0 fL    NRBC Automated NOT REPORTED per 100 WBC         Medications: Allergies:     Allergies   Allergen Reactions    Ciprofloxacin     Hydrocodone     Macrobid [Nitrofurantoin]     Ciprofloxacin Rash       Current Meds:   Scheduled Meds:    lactobacillus  1 capsule Oral Daily with breakfast    cephALEXin  500 mg Oral 3 times per day    enoxaparin  40 mg Subcutaneous Daily    aspirin  81 mg Oral Daily    amLODIPine  5 mg Oral Daily    atorvastatin  40 mg Oral Nightly    clopidogrel  75 mg Oral Daily    ferrous sulfate  325 mg Oral BID    levothyroxine  112 mcg Oral Daily    metoprolol tartrate  12.5 mg Oral BID    oxybutynin  5 mg Oral Daily    pantoprazole  40 mg Oral QAM AC    polyethylene glycol  17 g Oral Daily     Continuous Infusions:   PRN Meds: ondansetron, melatonin, acetaminophen, bisacodyl, magnesium hydroxide      Consultations:   IP CONSULT TO SOCIAL WORK  IP CONSULT TO INTERNAL MEDICINE  Assessment :      Primary Problem  <principal problem not specified>    Active Hospital Problems    Diagnosis Date Noted    Malaise and fatigue [R53.81, R53.83] 04/10/2021    Acute CVA (cerebrovascular accident) ([facility] [address] [I63.9] 04/07/2021    Acute left hemiparesis ([facility] [address] [G81.94]     Gastroesophageal reflux disease without esophagitis [K21.9] 02/24/2021    HTN (hypertension) [I10] 10/21/2020    Hypothyroidism [E03.9] 10/21/2020       Plan:     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 Progressing fairly well . Continue present therapy . ·       BMP:   Recent Labs     04/15/21  0658      K 4.6   CO2 23   BUN 22   CREATININE 1.07*   LABGLOM 48*   GLUCOSE 103*          ·    3. Cali Mitchell MD  4/15/2021  4:39 PM    Copy sent to Dr. Ekaterina Bell, APRN - CNP    Please note that this chart was generated using voice recognition [address] dictation software.   Although every effort was made to ensure the accuracy of this automated transcription, some

## 2021-04-15 NOTE — PROGRESS NOTES
discussed alternatives to ice for water. PCT had given pt. Cup of thickened water with ice in.     ST removed ice from water. ST put pt. Honey thick water cup in container full of ice to keep cold. Plan:  [x] Continue ST services    [] Discharge from ST:      Discharge recommendations: [] Inpatient Rehab   [] [place]   [] Outpatient Therapy  [] Follow up at trauma clinic   [] Other:       Treatment completed by: Leonela Stockton A.CCC/SLP

## 2021-04-15 NOTE — PROGRESS NOTES
[address] [facility]    ACUTE REHABILITATION OCCUPATIONAL THERAPY  DAILY NOTE    Date: 4/15/21  Patient Name: Anette Brambila      Room: 6253/5343-60    MRN: [id number]   : 1932  (80 y.o.)  Gender: female   Referring Practitioner: Virginie Aguilera MD  Diagnosis: Acute CVA  Additional Pertinent Hx:  Per ARU preadmission assessment:80year-old female admitted with acute left hemiparesis causing a fall of her chair. Noted acute left-sided weakness worse than previous date. .  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 Willa Burleson She was life flighted to [facility]. Patient on baby aspirin at home. Patient given TPA during TPA became hysterical repeat CT showed no change completed TPA. 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 Pt admitted to rehab unit on 21. Restrictions  Restrictions/Precautions: Fall Risk, Modified Diet, Swallowing - Thickened Liquids, General Precautions  Implants present? : (L TKA)  Required Braces or Orthoses?: No  Equipment Used: Bed, Wheelchair(rolling walker)    Subjective  Subjective: \"I'm so tired\"   Comments: heavy fatigue this date but willing to participate and attempt tasks  Patient Currently in Pain: Yes  Pain Level: 8(reports pain in PM session)  Pain Location: Arm; Shoulder  Pain Orientation: Left  Restrictions/Precautions: Fall Risk;Modified Diet;Swallowing - Thickened Liquids; General Precautions     Patient Observation  Observations: pt reports dizziness with movements, increased time req for tasks this date  Pain Assessment  Pain Assessment: 0-10  Pain Level: 8(reports pain in PM session)  Pain Type: Acute pain  Pain Location: Arm, Shoulder  Pain Orientation: Left  Pain Descriptors: Sore    Objective  Cognition  Overall Cognitive Status: Impaired  Arousal/Alertness: Delayed responses to stimuli  Attention Span: Attends with cues to redirect  Memory: Decreased short term memory;Decreased recall of recent events  Following Commands:  Follows multistep commands with repetition  Safety Judgement: Decreased awareness of need for assistance  Awareness of Errors: Assistance required to identify errors made  Insights: Decreased awareness of deficits  Sequencing and Organization: Assistance required to implement solutions;Assistance required to generate solutions;Assistance required to identify errors made  Perception  Overall Perceptual Status: Impaired  Initiation: Cues to initiate tasks  Balance  Sitting Balance: Stand by assistance(sitting EOB due to dizziness)  Standing Balance: Contact guard assistance(unsteady at times, cuing for safety)  Bed mobility  Rolling to Left: Minimal assistance  Rolling to Right: Minimal assistance  Supine to Sit: Moderate assistance  Scooting: Minimal assistance  Transfers  Stand Step Transfers: Minimal assistance(R/W, min/CGA)  Sit to stand: Minimal assistance  Stand to sit: Contact guard assistance  Transfer Comments: cuing for proper tech  Standing Balance  Time: AM: 1-2 min x2  Activity: AM: lower body dressing, functional transfers  Comment: 0-2 UE support PRN, unsteady  Functional Mobility  Functional - Mobility Device: Rolling Walker  Activity: Other(in room)  Assist Level: Minimal assistance(min/CGA)  Functional Mobility Comments: VCs for safety and proper tech     Type of ROM/Therapeutic Exercise  Type of ROM/Therapeutic Exercise: Free weights(RUE, 10 reps, 1#)  Comment: pt engaged in RUE ex's to support mobility/transfers and overall endurance, rest breaks req as needed, no ex's performed on LUE due to increased pain this date  Exercises  Scapular Protraction: x  Scapular Retraction: x  Shoulder Flexion: x  Shoulder Extension: x  Horizontal ABduction: x  Horizontal ADduction: x  Elbow Flexion: x  Elbow Extension: x

## 2021-04-15 NOTE — CARE COORDINATION
Spoke with daughter Juan Stokes ([phone]) to discuss discharge plans. Explored options; [facility] or [place] [address] as she does not believe the family will be able to provide 24 hr support. Notified of team meeting of Tuesday and will discuss needs at that time.  Daughter understanding and will find out which home health pt had in the past.

## 2021-04-15 NOTE — PROGRESS NOTES
Physical Therapy  Facility/Department: [facility] ACUTE REHAB  Daily Treatment Note  NAME: Ashleigh Coats  : 1932  MRN: [id number]    Date of Service: 4/15/2021    Discharge Recommendations:  Patient would benefit from continued therapy after discharge, Home with assist PRN, Home with Home health PT        Assessment      PT Education: Transfer Training;General Safety; Functional Mobility Training;Family Education  Activity Tolerance  Activity Tolerance: Patient limited by endurance; Patient limited by fatigue     Patient Diagnosis(es): There were no encounter diagnoses. has a past medical history of Anemia, Hypertension, Hypothyroidism, Osteoarthritis, Other long term (current) drug therapy, and Renal insufficiency. has no past surgical history on file. Restrictions  Restrictions/Precautions  Restrictions/Precautions: Fall Risk, Modified Diet, Swallowing - Thickened Liquids, General Precautions  Required Braces or Orthoses?: No  Implants present? : (L TKA)  Subjective   General  Chart Reviewed: Yes  Response To Previous Treatment: Patient with no complaints from previous session. Family / Caregiver Present: Yes(only pm with daughter Tristin Dwyer pm) per daughter Tristin Dwyer she is addressing patient fatigue level and prior level of care. Patient is tolerating POC at this time and per daughter family very supportive in home and discussed possible need for a rest break soon with a day off planned. Tristin Dwyer and patient in agreeance for planned day off from therapy sessions on 2021  Subjective  Subjective: patient reporting mild difficulty with dizziness initially getting up this morning with nursing staff. patient present to therapy via wheelchair. no complaints reported. General Comment  Comments: patient talkative throughout therapy session; often repeating stories of long term memories .   Pain Screening  Patient Currently in Pain: Denies  Vital Signs  Patient Currently in Pain: Denies       Orientation     Cognition Objective   Bed mobility  Bridging: Minimal assistance  Rolling to Left: Minimal assistance  Rolling to Right: Moderate assistance  Supine to Sit: Moderate assistance  Sit to Supine: Minimal assistance  Scooting: Minimal assistance  Comment: daughter present and assisting mobility. daughter asking about leg  and possible benefit for patient use. Therapist utilized patient gait belt with loop around foot with assistance to demonstrate self assisting techniques with belt support. patient unable to demonstrate independence with intial use but demonstrated correct use with cueing. daughter reports she is going to purchase a leg  for her mom. Transfers  Sit to Stand: Moderate Assistance;Minimal Assistance(posterior lean noted with back on heels. faisal/cueing for technique improved ability to preform task.)  Stand to sit: Minimal Assistance(decreased flexion at hip and knees patient tendencies to plop in chair with feet sliding forward.)  Bed to Chair: Moderate assistance(stooped posture with tendency to \"park\" walker away from support of chair)  Comment: Needs tactile/verbal cueing to preform task consistent and with decreased need for assistance dependency. patient utilizes lift chair and elevated surfaces in the home. patient cued for hand placement, trunk and hip postioning in chair and nose over toes rocking forward. Ambulation 1  Surface: level tile  Device: Rolling Walker  Assistance: Minimal assistance;Contact guard assistance  Quality of Gait: increased trunk flexion , UE weight bearing heavily on walker, hips beyond LEN of walker  Gait Deviations: Slow Zuly;Decreased step height;Decreased step length  Distance: 72 feet x 2;65 feet  Comments: needs constant cueing to maintain safety within LEN of walker and tall posture. Patient demonstrates outside LEN of walker with turns and presenting to sit to surfaces.   Ambulation 2  Surface - 2: carpet  Device 2: Rolling Walker  Assistance 2: Minimal assistance  Gait Deviations: Slow Zuly;Decreased step length;Decreased step height  Distance: 25 feet x 2  Comments: needs intermittent cueing to stand tall and stay within LEN of walker  Stairs  # Steps : 4  Stairs Height: 6\"  Rails: Bilateral  Assistance: Minimal assistance  Comment: tolerated step to gait pattern; one person assist. flex at hips but negotiated step height without concern        Other exercises  Other exercises 1: seated bilateral LE 2# lt green x 15  Other exercises 2: standing BLE 20 reps  Other exercises 3: Sit to stands x 5 reps  Other exercises 4: Nustep 10 minutes L3                        G-Code     OutComes Score                                                     AM-PAC Score             Goals  Short term goals  Time Frame for Short term goals:  8 to 9 days  Short term goal 1: Pt to perform bed mobility at SBA with rail  Short term goal 2: Pt to demonstrate transfers at min A  Short term goal 3: Pt to amb 50 to 80 ft with appropriate device, min/mod A   Short term goal 4: Pt to improve L LE strength by 1/3 MMG to improve fucntion. Short term goal 5: Pt able to go up and down 5 steps with 2 UE support, mod A   Long term goals  Time Frame for Long term goals : By LOS  Long term goal 1: Pt able to perform bed mobility mod-I  Long term goal 2: Pt able to perform transfers from varies surfaces at mod-I /supervsion level. Long term goal 3: Pt able to ambulate with appropriate device distance of 80 ft, mod-I/supervsion level. Long term goal 4: Pt able to perform step curb with assisitive device at min A  Long term goal 5: Pt able to go up and down 5 steps with 2 rails at min A to improve LE strength. Long term goal 6: Improve PASS score to atleast 25//36 improve overall fucntion  Long term goal 7: Improve Tinetti balance score to 20 or more/28 to reduce fall risk. Long term goal 8: Pt able to propel w/c on level surfaces 150 ft, with set up/supervsion.   Patient Goals   Patient goals : I want to move my Left side to walk better    Plan    Plan  Times per week: 1.5 hr/day, 5 to 7 days/week  Plan weeks: 6  Current Treatment Recommendations: Strengthening, Neuromuscular Re-education, Home Exercise Program, Safety Education & Training, Patient/Caregiver Education & Training, Equipment Evaluation, Education, & procurement, Functional Mobility Training, Balance Training, Endurance Training, Transfer Training, Gait Training, ROM, Stair training  Safety Devices  Type of devices:  All fall risk precautions in place, Patient at risk for falls, Gait belt  Restraints  Initially in place: No     Therapy Time     04/15/21 0919 04/15/21 1356   PT Individual Minutes   Time In 0853 1330   Time Out 0949 1357   Minutes 64 211 [facility] ARCADIO Welch, [facility]

## 2021-04-15 NOTE — PROGRESS NOTES
req)  Grooming: Stand by assistance(seated in wheelchair with Min VCs for denture care)  UE Bathing: Stand by assistance(seated on tub transfer bench in shower)  LE Bathing: Moderate assistance(assist for buttocks/perineal area; used [address] for feet)  UE Dressing: Minimal assistance(assist for bra clasp only; Min VCs for orientation)  LE Dressing: Minimal assistance(Minimal assist standing balance. Please see below)  Toileting: Moderate assistance(Minimal assist standing balance; assist for BM)  Additional Comments: OT facilitated Pt engagement in showering routine including bathing, dressing, toileting, and grooming tasks. Pt performed clothing management during lower body dressing and toileting tasks with Minimal assist/CGA for standing balance due to mild unsteadiness noted with 0-1 UE support during functional lower body task. Pt requires increased time to complete all tasks this date and reports feeling \"woozy. \" RN and Dr. Andreas Meigs aware. Dr. Andreas Meigs rounding during AM session and suggested trialling abdominal binder in addition to ARSH hose. Abdominal binder obtained and donned during PT session in PM. Pt demonstrates Fair attention to task throughout AM session, however requires significantly increased time. Continue OT POC.          Balance  Sitting Balance: Supervision  Standing Balance: Minimal assistance(Min/CGA this date, reports feeling \"woozy\")   Standing Balance  Time: 1-2 minutes x 5, 3-4 minutes x 1  Activity: toileting tasks, lower body bathing/dressing  Comment: 0-2 UE support PRN  Functional Mobility  Functional - Mobility Device: Rolling Walker  Activity: Other(transfers in room)  Assist Level: Minimal assistance  Functional Mobility Comments: Minimal verbal cues for safety, attention to task and technique     Bed mobility  Bridging: Minimal assistance  Rolling to Left: Minimal assistance  Rolling to Right: Moderate assistance  Supine to Sit: Moderate assistance  Sit to Supine: Minimal assistance Scooting: Minimal assistance  Comment: Pt seated in chair at start and end of session  Transfers  Stand Step Transfers: Contact guard assistance  Stand Pivot Transfers: Contact guard assistance  Sit to stand: Minimal assistance  Stand to sit: Contact guard assistance  Transfer Comments: cuing for proper tech   Toilet Transfers  Toilet - Technique: Stand pivot, To right, To left  Equipment Used: Raised toilet seat with rails(GB on right side)  Toilet Transfer: Minimal assistance  Toilet Transfers Comments: with assist for wheelchair management as well     Shower Transfers  Shower - Transfer From: Wheelchair  Shower - Transfer Type: To and From  Shower - Transfer To: Transfer tub bench  Shower - Technique: Stand pivot, To right, To left  Shower Transfers: Minimal assistance  Shower Transfers Comments: with Fair safety awareness  Wheelchair Bed Transfers  Wheelchair/Bed - Technique: Ambulating  Equipment Used: Bed, Wheelchair(rolling walker)  Level of Asssistance: 2 Person assistance, Moderate assistance  Wheelchair Transfers Comments: during PM session    SPEECH:  (short session d/t RN care)     Subjective: [x]? Alert     [x]? Cooperative     []? Confused     []? Agitated    []? Lethargic        Objective/Assessment:  Attention: Max cues and repetition needed to maintain pt. Attention, dtr assisting to attempt to redirect.     Cognition:   n/a     Verbal expression:   General expression functional in conversation.       Dysphagia: Vital stim completed for 35 minutes at 4.0 mA. Pt. Completed  simple tongue base strengthening exercises x5, 10 reps each. Pt. Completed oral motor exercises x1, 10 reps each with max cues and elan maneuver x5. MAX cues needed on all tasks. Pt. Took trials of honey thick liquids via straw x12 and bites applesauce with medication from RN x3.      Other: Pt. Daughter present, ST discussed alternatives to ice for water. PCT had given pt.  Cup of thickened water with ice in.     ST removed ice from water. ST put pt. Honey thick water cup in container full of ice to keep cold. Objective:  BP (!) 144/80   Pulse 73   Temp 97.7 °F (36.5 °C) (Oral)   Resp 18   Ht 5' 7\" (1.702 m)   Wt 172 lb (78 kg)   SpO2 100%   BMI 26.94 kg/m²       GEN: Well developed, well nourished, in NAD  HEENT:  NCAT. PERRL. EOMI. Mucous membranes pink and moist. Wearing dentures  PULM:  Clear to ausculation. No rales or rhonchi. Respirations WNL and unlabored. CV:  Bradycardic rate regular rhythm. No murmurs or gallops. GI:  Abdomen soft. Nontender. Non-distended. BS + and equal.    NEUROLOGICAL: A&O x3.. Sensation intact to light touch. MSK:  Functional ROM RUE and RLE. Impaired AROM LUE and LLE due to weakness. Motor testing 4+/5 key muscles RUE and RLEs. Strength 4/5 key muscles LUE and LLE. SKIN: Warm dry and intact. Good turgor. EXTREMITIES:  No calf tenderness to palpation. No edema BLEs. Maxine Decent PSYCH: Mood WNL. Appropriately interactive. Affect WNL. Diagnostics:     CBC:   Recent Labs     04/15/21  0658   WBC 6.5   RBC 3.73*   HGB 11.3*   HCT 34.0*   MCV 91.2   RDW 14.4        BMP:   Recent Labs     04/15/21  0658      K 4.6      CO2 23   BUN 22   CREATININE 1.07*   GLUCOSE 103*     BNP: No results for input(s): BNP in the last 72 hours. PT/INR: No results for input(s): PROTIME, INR in the last 72 hours. APTT: No results for input(s): APTT in the last 72 hours. CARDIAC ENZYMES:   No results for input(s): CKMB, CKMBINDEX, TROPONINT in the last 72 hours. Invalid input(s): CKTOTAL;3  FASTING LIPID PANEL:  Lab Results   Component Value Date    CHOL 121 04/02/2021    HDL 55 04/02/2021    TRIG 61 04/02/2021     LIVER PROFILE: No results for input(s): AST, ALT, ALB, BILIDIR, BILITOT, ALKPHOS in the last 72 hours.      Current Medications:   Current Facility-Administered Medications: lactobacillus (CULTURELLE) capsule 1 capsule, 1 capsule, Oral, Daily with breakfast cephALEXin (KEFLEX) capsule 500 mg, 500 mg, Oral, 3 times per day  ondansetron (ZOFRAN) tablet 4 mg, 4 mg, Oral, Q8H PRN  enoxaparin (LOVENOX) injection 40 mg, 40 mg, Subcutaneous, Daily  aspirin EC tablet 81 mg, 81 mg, Oral, Daily **OR** [DISCONTINUED] aspirin suppository 300 mg, 300 mg, Rectal, Daily  amLODIPine (NORVASC) tablet 5 mg, 5 mg, Oral, Daily  atorvastatin (LIPITOR) tablet 40 mg, 40 mg, Oral, Nightly  clopidogrel (PLAVIX) tablet 75 mg, 75 mg, Oral, Daily  ferrous sulfate (IRON 325) tablet 325 mg, 325 mg, Oral, BID  levothyroxine (SYNTHROID) tablet 112 mcg, 112 mcg, Oral, Daily  melatonin tablet 6 mg, 6 mg, Oral, Nightly PRN  metoprolol tartrate (LOPRESSOR) tablet 12.5 mg, 12.5 mg, Oral, BID  oxybutynin (DITROPAN-XL) extended release tablet 5 mg, 5 mg, Oral, Daily  pantoprazole (PROTONIX) tablet 40 mg, 40 mg, Oral, QAM AC  acetaminophen (TYLENOL) tablet 650 mg, 650 mg, Oral, Q4H PRN  polyethylene glycol (GLYCOLAX) packet 17 g, 17 g, Oral, Daily  bisacodyl (DULCOLAX) suppository 10 mg, 10 mg, Rectal, Daily PRN  magnesium hydroxide (MILK OF MAGNESIA) 400 MG/5ML suspension 30 mL, 30 mL, Oral, Daily PRN      Impression/Plan:   Impaired ADLs, gait, and mobility due to:      1. 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 On 3 weeks ASA, Plavix with ASA to continue after 4/23. 2. Dysphagia: on thickened liquids. SLP treating and including vital stim. Will monitor to determine if she is a candidate for [employer] but she has a lot of food residue in her mouth after each meal.   3. Dehydration/hyponatremia:Resolved.  has prn zofran. IM treated with IV fluid until 4/12 and serial lab tests -  improved Na and creatinine. 4. HTN: on amlodipine, atorvastatin, metoprolol tartrate. Follow up Dr. Modesta Cobos in [place] [id number] in 4 weeks for stress test. Ensure she wears ARSH hose and abdominal binder when out of bed.   5. OA/L rotator cuff injury: stable. Will monitor  6. CKD: stable. Will monitor BMP  7. Hypothyroidism: on levothyroxine  8. Anemia: on ferrous sulfate. Monitoring Hb  9. Hx chronic cystitis: on oxybutynin ER. Frequent incontinence is normal baseline for patient. Urine culture positive for E Coli. Keflex  renally adjusted through 4/17. Leukocytosis resolved 4/11. 10. GERD: on pantoprazole  11. Bowel Management: Miralax daily, senokot prn, milk of magnesia prn, dulcolax prn. On probiotic for loose stools which are improved. 12. DVT Prophylaxis:  low molecular weight heparin, SCD's while in bed and ARSH's   13. Internal medicine for medical management    Electronically signed by Ayala Browne MD on 4/15/2021 at 10:51 AM      This note is created with the assistance of a speech recognition program.  While intending to generate a document that actually reflects the content of the visit, the document can still have some errors including those of syntax and sound a like substitutions which may escape proof reading. In such instances, actual meaning can be extrapolated by contextual diversion.

## 2021-04-16 PROCEDURE — 97129 THER IVNTJ 1ST 15 MIN: CPT

## 2021-04-16 PROCEDURE — 97530 THERAPEUTIC ACTIVITIES: CPT

## 2021-04-16 PROCEDURE — 97116 GAIT TRAINING THERAPY: CPT

## 2021-04-16 PROCEDURE — 6360000002 HC RX W HCPCS: Performed by: STUDENT IN AN ORGANIZED HEALTH CARE EDUCATION/TRAINING PROGRAM

## 2021-04-16 PROCEDURE — 97110 THERAPEUTIC EXERCISES: CPT

## 2021-04-16 PROCEDURE — 99231 SBSQ HOSP IP/OBS SF/LOW 25: CPT | Performed by: INTERNAL MEDICINE

## 2021-04-16 PROCEDURE — 6370000000 HC RX 637 (ALT 250 FOR IP): Performed by: PHYSICAL MEDICINE & REHABILITATION

## 2021-04-16 PROCEDURE — 6370000000 HC RX 637 (ALT 250 FOR IP): Performed by: STUDENT IN AN ORGANIZED HEALTH CARE EDUCATION/TRAINING PROGRAM

## 2021-04-16 PROCEDURE — 92526 ORAL FUNCTION THERAPY: CPT

## 2021-04-16 PROCEDURE — 1180000000 HC REHAB R&B

## 2021-04-16 PROCEDURE — 99232 SBSQ HOSP IP/OBS MODERATE 35: CPT | Performed by: PHYSICAL MEDICINE & REHABILITATION

## 2021-04-16 RX ADMIN — CLOPIDOGREL BISULFATE 75 MG: 75 TABLET ORAL at 08:03

## 2021-04-16 RX ADMIN — AMLODIPINE BESYLATE 5 MG: 5 TABLET ORAL at 08:03

## 2021-04-16 RX ADMIN — METOPROLOL TARTRATE 12.5 MG: 25 TABLET, FILM COATED ORAL at 08:03

## 2021-04-16 RX ADMIN — ENOXAPARIN SODIUM 40 MG: 40 INJECTION SUBCUTANEOUS at 08:02

## 2021-04-16 RX ADMIN — ATORVASTATIN CALCIUM 40 MG: 40 TABLET, FILM COATED ORAL at 20:29

## 2021-04-16 RX ADMIN — CEPHALEXIN 500 MG: 500 CAPSULE ORAL at 06:03

## 2021-04-16 RX ADMIN — Medication 1 CAPSULE: at 08:03

## 2021-04-16 RX ADMIN — CEPHALEXIN 500 MG: 500 CAPSULE ORAL at 13:56

## 2021-04-16 RX ADMIN — METOPROLOL TARTRATE 12.5 MG: 25 TABLET, FILM COATED ORAL at 20:29

## 2021-04-16 RX ADMIN — PANTOPRAZOLE SODIUM 40 MG: 40 TABLET, DELAYED RELEASE ORAL at 06:03

## 2021-04-16 RX ADMIN — Medication 6 MG: at 20:29

## 2021-04-16 RX ADMIN — FERROUS SULFATE TAB 325 MG (65 MG ELEMENTAL FE) 325 MG: 325 (65 FE) TAB at 20:29

## 2021-04-16 RX ADMIN — CEPHALEXIN 500 MG: 500 CAPSULE ORAL at 20:30

## 2021-04-16 RX ADMIN — ASPIRIN 81 MG: 81 TABLET, COATED ORAL at 08:03

## 2021-04-16 RX ADMIN — FERROUS SULFATE TAB 325 MG (65 MG ELEMENTAL FE) 325 MG: 325 (65 FE) TAB at 08:03

## 2021-04-16 RX ADMIN — LEVOTHYROXINE SODIUM 112 MCG: 112 TABLET ORAL at 06:03

## 2021-04-16 RX ADMIN — OXYBUTYNIN CHLORIDE 5 MG: 5 TABLET, EXTENDED RELEASE ORAL at 08:56

## 2021-04-16 NOTE — PROGRESS NOTES
[address] [facility]    ACUTE REHABILITATION OCCUPATIONAL THERAPY  DAILY NOTE    Date: 21  Patient Name: Sherri Lyons      Room: 1387/6967-42    MRN: [id number]   : 1932  (80 y.o.)  Gender: female   Referring Practitioner: Carl Arellano MD  Diagnosis: Acute CVA  Additional Pertinent Hx:  Per ARU preadmission assessment:80year-old female admitted with acute left hemiparesis causing a fall of her chair. Noted acute left-sided weakness worse than previous date. .  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 Rachel Hortoned She was life flighted to [facility]. Patient on baby aspirin at home. Patient given TPA during TPA became hysterical repeat CT showed no change completed TPA. 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 Pt admitted to rehab unit on 21. Restrictions  Restrictions/Precautions: Fall Risk, Modified Diet, Swallowing - Thickened Liquids, General Precautions  Implants present? : (L TKA)  Required Braces or Orthoses?: No  Equipment Used: Bed, Wheelchair(rolling walker)    Subjective  Subjective:  \" I could just sit here and go to sleep\"   Comments: Pt reports fatigue this AM   Patient Currently in Pain: Denies  Restrictions/Precautions: Fall Risk;Modified Diet;Swallowing - Thickened Liquids; General Precautions  Overall Orientation Status: Impaired  Orientation Level: Oriented to person;Disoriented to place; Disoriented to time;Disoriented to situation  Patient Observation  Observations: Pt reports pain in LUE, writer discussed with pt and daughter. RN aware of patients pain stating he will inform the Dr. Memo Jones  Cognition  Overall Cognitive Status: Impaired  Arousal/Alertness: Delayed responses to stimuli  Attention Span: Attends with cues to redirect  Memory: Wrist Flexion: x  Wrist Extension: x      ADL  Toileting: Contact guard assistance(completed on raised toilet seat )  Additional Comments: Pt refuses any ADL activities this date           Assessment  Performance deficits / Impairments: Decreased functional mobility ; Decreased strength;Decreased endurance;Decreased balance;Decreased high-level IADLs;Decreased posture;Decreased ADL status; Decreased ROM; Decreased safe awareness;Decreased cognition;Decreased fine motor control;Decreased coordination  Assessment: Pt engaged in balloon tapping activity, attempting to use 1# wrist weigths but pt complaining wieght was to much at this time. Prognosis: Good  Discharge Recommendations: Home with assist PRN;Home with Home health OT  Activity Tolerance: Patient limited by fatigue;Patient limited by pain  Safety Devices in place: Yes  Type of devices: Nurse notified; Left in chair;Call light within reach          Patient Education:  Patient Goals   Patient goals : To return home with family support  Learner:patient  Method: demonstration and explanation       Outcome: acknowledged understanding         Plan  Plan  Times per week: 5-7  Times per day: Twice a day  Current Treatment Recommendations: Strengthening, ROM, Safety Education & Training, Positioning, Balance Training, Patient/Caregiver Education & Training, Self-Care / ADL, Functional Mobility Training, Endurance Training, Neuromuscular Re-education, Wheelchair Mobility Training, Cognitive Reorientation, Equipment Evaluation, Education, & procurement, Home Management Training, Cognitive/Perceptual Training  Patient Goals   Patient goals : To return home with family support  Short term goals  Time Frame for Short term goals: 7 to 10 days  Short term goal 1: Pt will perform upper body bathing/dressing with stand by assist.  Short term goal 2: Pt will perform lower body bathing/dressing and toileting tasks with Moderate assist and Good safety.   Short term goal 3: Pt will perform functional functional transfers and mobility during self-care with Moderate assist, least restrictive mobility device, and Good safety. Short term goal 4: Pt will for 4+ minutes with 1-2 UE support and Minimal assist while engaging in functional activity of choice. Short term goal 5: Pt will actively participate in 30+ minutes of therapeutic exercise/functional activities to promote increased independence with self-care and mobility. Short term goal 6: Pt will verbalize/demonstrate Good understanding of assistive equipment/durable medical equipment/modified techniques for increased independence with self-care and mobility. Long term goals  Time Frame for Long term goals : By discharge  Long term goal 1: Pt will perform bathing during shower with stand by assist and Good safety. Long term goal 2: Pt will perform dressing and toileting tasks with modified independence and Good safety. Long term goal 3: Pt will stand for 8+ minutes with 1-2 UE support, modified independence, no loss of balance while engaging in functional activity of choice. Long term goal 4: Pt will perform functional transfers and mobility with modified independence, least restrictive mobility device, and Good safety. Long term goal 5: Pt will verbalize/demonstrate Good understanding of Fall Prevention Strategies for increased independence with self-care and mobility.   Long term goals 6: 9 hole peg and box and blocks to be assessed for LUE as appropriate        04/16/21 1018 04/16/21 1500   OT Individual Minutes   Time In 1018 1343   Time Out 1121 1422   Minutes 63 39     Electronically signed by SHARIF Chester on 4/16/21 at 3:03 PM EDT

## 2021-04-16 NOTE — PLAN OF CARE
Problem: Skin Integrity:  Goal: Will show no infection signs and symptoms  Description: Will show no infection signs and symptoms  Outcome: Ongoing  Goal: Absence of new skin breakdown  Description: Absence of new skin breakdown  Outcome: Ongoing  Note: Assess the overall condition of the skin. Check on bony prominences such as the sacrum, trochanters, scapulae, elbows, heels, inner and outer malleolus, inner and outer knees, back of head). Reinforce the importance of turning, mobility, and ambulation. Problem: Confusion - Acute:  Goal: Absence of continued neurological deterioration signs and symptoms  Description: Absence of continued neurological deterioration signs and symptoms  Outcome: Ongoing  Goal: Mental status will be restored to baseline  Description: Mental status will be restored to baseline  Outcome: Ongoing     Problem: Discharge Planning:  Goal: Ability to perform activities of daily living will improve  Description: Ability to perform activities of daily living will improve  Outcome: Ongoing  Goal: Participates in care planning  Description: Participates in care planning  Outcome: Ongoing     Problem: Injury - Risk of, Physical Injury:  Goal: Absence of physical injury  Description: Absence of physical injury  Outcome: Ongoing  Goal: Will remain free from falls  Description: Will remain free from falls  Outcome: Ongoing  Note: Patient remains free of falls and injuries throughout shift. Bed remains in the lowest position, wheels locked, call light and bedside table are within reach.       Problem: Mood - Altered:  Goal: Mood stable  Description: Mood stable  Outcome: Ongoing  Goal: Absence of abusive behavior  Description: Absence of abusive behavior  Outcome: Ongoing  Goal: Verbalizations of feeling emotionally comfortable while being cared for will increase  Description: Verbalizations of feeling emotionally comfortable while being cared for will increase  Outcome: Ongoing     Problem: Psychomotor Activity - Altered:  Goal: Absence of psychomotor disturbance signs and symptoms  Description: Absence of psychomotor disturbance signs and symptoms  Outcome: Ongoing     Problem: Sensory Perception - Impaired:  Goal: Demonstrations of improved sensory functioning will increase  Description: Demonstrations of improved sensory functioning will increase  Outcome: Ongoing  Goal: Decrease in sensory misperception frequency  Description: Decrease in sensory misperception frequency  Outcome: Ongoing  Goal: Able to refrain from responding to false sensory perceptions  Description: Able to refrain from responding to false sensory perceptions  Outcome: Ongoing  Goal: Demonstrates accurate environmental perceptions  Description: Demonstrates accurate environmental perceptions  Outcome: Ongoing  Goal: Able to distinguish between reality-based and nonreality-based thinking  Description: Able to distinguish between reality-based and nonreality-based thinking  Outcome: Ongoing  Goal: Able to interrupt nonreality-based thinking  Description: Able to interrupt nonreality-based thinking  Outcome: Ongoing     Problem: Sleep Pattern Disturbance:  Goal: Appears well-rested  Description: Appears well-rested  Outcome: Ongoing     Problem: Neurological  Goal: Maximum potential motor/sensory/cognitive function  Outcome: Ongoing     Problem: Nutrition  Goal: Optimal nutrition therapy  Description: Nutrition Problem #1: Inadequate oral intake  Intervention: Food and/or Nutrient Delivery: Continue Current Diet, Start Oral Nutrition Supplement  Nutritional Goals: intake more than 75%     Outcome: Ongoing     Problem: Neurological  Goal: Maximum potential motor/sensory/cognitive function  Outcome: Ongoing     Problem: Pain:  Goal: Pain level will decrease  Description: Pain level will decrease  Outcome: Ongoing  Goal: Control of acute pain  Description: Control of acute pain  Outcome: Ongoing  Goal: Control of chronic pain Description: Control of chronic pain  Outcome: Ongoing

## 2021-04-16 NOTE — CONSULTS
[facility] Internal Medicine    CONSULTATION / HISTORY AND PHYSICAL EXAMINATION            Date:   4/16/2021  Patient name:  Sade Ware  Date of admission:  4/7/2021 12:21 PM  MRN:   [id number]  Account:  [de-identified]  YOB: 1932  PCP:    VERNELL Mejia CNP  Room:   [facility]7234-  Code Status:    Full Code    Physician Requesting Consult: Susanna Anne MD    Reason for Consult:  medical management    Chief Complaint:     No chief complaint on file. Ischemic CVA    History Obtained From:     Patient medical record nursing staff    History of Present Illness:     77-year-old elderly lady was admitted to [facility] earlier this month with the acute left-sided weakness after she finished her physical therapy at home she has chronic left leg weakness post multiple surgeries and has chronic shoulder issues and difficulty overhead abduction  Patient received a TPA    4/16/2021    No new complaints/symptoms . Symptoms or ailment  course ;                                   are improving over time. · Continue present regimen     BP Readings from Last 3 Encounters:   04/16/21 (!) 148/65   04/07/21 139/68   03/12/21 135/76    1.     2.             Past Medicl History:     Past Medical History:   Diagnosis Date    Anemia     Hypertension     Hypothyroidism     Osteoarthritis     Other long term (current) drug therapy     Renal insufficiency         Past Surgical History:     No past surgical history on file. Medications Prior to Admission:     Prior to Admission medications    Medication Sig Start Date End Date Taking?  Authorizing Provider   atorvastatin (LIPITOR) 20 MG tablet Take 1 tablet by mouth nightly 3/1/21  Yes Jelena Fails, APRN - CNP   levothyroxine (SYNTHROID) 112 MCG tablet Take 1 tablet by mouth Daily 3/1/21  Yes Jelena Fails, APRN - CNP   oxybutynin (DITROPAN-XL) 5 MG extended release tablet Take 1 tablet by mouth daily 1/5/21  Yes VERNELL Ga CNP   acetaminophen (TYLENOL) 325 MG tablet Take 650 mg by mouth Take 2 tablets at bedtime for sleeping comfort PRN   Yes Historical Provider, MD   amLODIPine (NORVASC) 5 MG tablet Take 1 tablet by mouth daily 3/1/21   VERNELL Ga CNP   Ferrous Sulfate 324 MG TBEC Take 1 tablet by mouth 2 times daily 3/1/21   VERNELL Ga CNP   omeprazole (PRILOSEC) 20 MG delayed release capsule Take 1 capsule by mouth daily 3/1/21   VERNELL Ga CNP   sodium bicarbonate 650 MG tablet Take 1 tablet by mouth 4 times daily Two tabs BID 3/1/21   VERNELL Ga CNP   metoprolol tartrate (LOPRESSOR) 25 MG tablet Take 0.5 tablets by mouth 2 times daily Take 1/2 tablet BID 3/1/21   VERNELL Ga CNP   docusate sodium (COLACE) 100 MG capsule Take 1 capsule by mouth every other day 2/24/21   VERNELL Ga CNP   cephALEXin (KEFLEX) 250 MG capsule TAKE ONE CAPSULE BY MOUTH ONCE A DAY. 2/1/21   VERNELL Hicks CNP   fluorouracil (EFUDEX) 5 % cream Apply topically for 4 weeks . 6/29/20   Historical Provider, MD   Calcium Carb-Cholecalciferol (CALCIUM + D3) 600-200 MG-UNIT TABS tablet Take 1 tablet by mouth Daily with lunch    Historical Provider, MD   Multiple Vitamins-Minerals (THERAPEUTIC MULTIVITAMIN-MINERALS) tablet Take 1 tablet by mouth daily    Historical Provider, MD   magnesium oxide (MAG-OX) 400 MG tablet Take 400 mg by mouth daily    Historical Provider, MD   Apoaequorin (PREVAGEN) 10 MG CAPS Take by mouth daily    Historical Provider, MD   GLUCOSA-CHONDR-NA CHONDR-MSM PO Take by mouth    Historical Provider, MD        Allergies:     Ciprofloxacin, Hydrocodone, Macrobid [nitrofurantoin], and Ciprofloxacin    Social History:     Tobacco:    reports that she has never smoked. She has never used smokeless tobacco.  Alcohol:      reports no history of alcohol use. Drug Use:  reports no history of drug use.     Family History:     No family history on file. Review of Systems:     Positive and Negative as described in HPI. CONSTITUTIONAL:  negative for fevers, chills, sweats, fatigue, weight loss  HEENT:  negative for vision, hearing changes, runny nose, throat pain  RESPIRATORY:  negative for shortness of breath, cough, congestion, wheezing. CARDIOVASCULAR:  negative for chest pain, palpitations. GASTROINTESTINAL:  negative for nausea, vomiting, diarrhea, constipation, change in bowel habits, abdominal pain   GENITOURINARY:  negative for difficulty of urination, burning with urination, frequency   INTEGUMENT:  negative for rash, skin lesions, easy bruising   HEMATOLOGIC/LYMPHATIC:  negative for swelling/edema   ALLERGIC/IMMUNOLOGIC:  negative for urticaria , itching  ENDOCRINE:  negative increase in drinking, increase in urination, hot or cold intolerance  MUSCULOSKELETAL:  negative joint pains, muscle aches, swelling of joints  NEUROLOGICAL: Positive for left upper and lower extremity weakness extremities      Physical Exam:     BP (!) 148/65   Pulse 75   Temp 97.9 °F (36.6 °C) (Oral)   Resp 19   Ht 5' 7\" (1.702 m)   Wt 172 lb (78 kg)   SpO2 99%   BMI 26.94 kg/m²   Temp (24hrs), Av.9 °F (36.6 °C), Min:97.8 °F (36.6 °C), Max:97.9 °F (36.6 °C)    No results for input(s): POCGLU in the last 72 hours. Intake/Output Summary (Last 24 hours) at 2021 1323  Last data filed at 2021 0606  Gross per 24 hour   Intake    Output 900 ml   Net -900 ml       General Appearance:  alert, well appearing, and in no acute distress  Mental status: oriented to person, place, and time with normal affect  Head:  normocephalic, atraumatic.   Eye: no icterus, redness, pupils equal and reactive, extraocular eye movements intact, conjunctiva clear  Ear: normal external ear, no discharge, hearing intact  Nose:  no drainage noted  Mouth: mucous membranes moist  Neck: supple, no carotid bruits, thyroid not palpable  Lungs: Bilateral equal air entry, clear to ausculation, no wheezing, rales or rhonchi, normal effort  Cardiovascular: normal rate, regular rhythm, no murmur, gallop, rub. Abdomen: Soft, nontender, nondistended, normal bowel sounds, no hepatomegaly or splenomegaly  Neurologic: Left upper and left lower extremity weakness  Skin: No gross lesions, rashes, bruising or bleeding on exposed skin area  Extremities:  peripheral pulses palpable, no pedal edema or calf pain with palpation      Investigations:      Laboratory Testing:  No results found for this or any previous visit (from the past 24 hour(s)). Medications: Allergies:     Allergies   Allergen Reactions    Ciprofloxacin     Hydrocodone     Macrobid [Nitrofurantoin]     Ciprofloxacin Rash       Current Meds:   Scheduled Meds:    lactobacillus  1 capsule Oral Daily with breakfast    cephALEXin  500 mg Oral 3 times per day    enoxaparin  40 mg Subcutaneous Daily    aspirin  81 mg Oral Daily    amLODIPine  5 mg Oral Daily    atorvastatin  40 mg Oral Nightly    clopidogrel  75 mg Oral Daily    ferrous sulfate  325 mg Oral BID    levothyroxine  112 mcg Oral Daily    metoprolol tartrate  12.5 mg Oral BID    oxybutynin  5 mg Oral Daily    pantoprazole  40 mg Oral QAM AC    polyethylene glycol  17 g Oral Daily     Continuous Infusions:   PRN Meds: ondansetron, melatonin, acetaminophen, bisacodyl, magnesium hydroxide      Consultations:   IP CONSULT TO SOCIAL WORK  IP CONSULT TO INTERNAL MEDICINE  Assessment :      Primary Problem  <principal problem not specified>    Active Hospital Problems    Diagnosis Date Noted    Malaise and fatigue [R53.81, R53.83] 04/10/2021    Acute CVA (cerebrovascular accident) ([facility] [address] [I63.9] 04/07/2021    Acute left hemiparesis (HCC) [G81.94]     Gastroesophageal reflux disease without esophagitis [K21.9] 02/24/2021    HTN (hypertension) [I10] 10/21/2020    Hypothyroidism [E03.9] 10/21/2020       Plan:     Acute right ischemic infarct of the [place]onda

## 2021-04-16 NOTE — PROGRESS NOTES
via straw x4. Pt. Initially took sips of water on tray table x3, demonstrating immediate cough following. ST checked water inside of cup and it was thin consistency. This was removed from room and replaced c honey thick water. Safe care entered. When pt. Daughter arrived to room she stated they were told by pt. Doctor in past that pt. Has low sodium and he would rather her drink Gatorade than water. Pt. Daughter reported she would rather have pt. Have gatorade at bedside than water. Other:    ST put pt. Honey thick water cup in container full of ice to keep cold. Plan:  [x] Continue ST services    [] Discharge from ST:      Discharge recommendations: [] Inpatient Rehab   [] [place]   [] Outpatient Therapy  [] Follow up at trauma clinic   [] Other:       Treatment completed by: Juan Jose Smith A.CCC/SLP

## 2021-04-16 NOTE — PROGRESS NOTES
Physical Medicine & Rehabilitation  Progress Note      Subjective:      80year-old patient with ischemic CVA with L nondominant hemiparesis. Patient is c/o fatigue again today. No new issues with pain, insomnia, appetite, or bowel. Bladder incontinence is frequent but at baseline for patient. ROS:  Denies fevers, chills, sweats. No chest pain, palpitations, lightheadedness. Denies coughing, wheezing or shortness of breath. Denies abdominal pain, nausea, diarrhea or constipation. No new areas of joint pain. Denies new areas of numbness or weakness. Denies new anxiety or depression issues. No new skin problems. Rehabilitation:   Progressing in therapies. PT:  Restrictions/Precautions: Fall Risk, Modified Diet, Swallowing - Thickened Liquids, General Precautions  Implants present? : (L TKA)   Transfers  Sit to Stand: Moderate Assistance, Minimal Assistance(posterior lean noted with back on heels. faisal/cueing for technique improved ability to preform task.)  Stand to sit: Minimal Assistance(decreased flexion at hip and knees patient tendencies to plop in chair with feet sliding forward.)  Bed to Chair: Moderate assistance(stooped posture with tendency to \"park\" walker away from support of chair)  Stand Pivot Transfers: Maximum Assistance  Squat Pivot Transfers: Maximum Assistance(to left , no AD , pt sequences correctly after pre instructi)  Comment: level of assistance depends on surface height and surface with and without arm rests. Needs tactile/verbal cueing to preform task consistent and with decreased need for assistance dependency. patient utilizes lift chair and elevated surfaces in the home. patient cued for hand placement, trunk and hip postioning in chair and nose over toes rocking forward. Ambulation 1  Surface: level tile  Device: Rolling Walker  Other Apparatus:  (IV)  Assistance: Contact guard assistance  Quality of Gait: increased trunk flexion , UE weight bearing heavily on walker, hips beyond LEN of walker  Gait Deviations: Slow Zuly  Distance: 72 feet x 2, 50 feet  Comments: needs constant cueing to maintain safety within LEN of walker and tall posture. Patient demonstrates outside LEN of walker with turns and presenting to sit to surfaces. Transfers  Sit to Stand: Moderate Assistance, Minimal Assistance(posterior lean noted with back on heels. faisal/cueing for technique improved ability to preform task.)  Stand to sit: Minimal Assistance(decreased flexion at hip and knees patient tendencies to plop in chair with feet sliding forward.)  Bed to Chair: Moderate assistance(stooped posture with tendency to \"park\" walker away from support of chair)  Stand Pivot Transfers: Maximum Assistance  Squat Pivot Transfers: Maximum Assistance(to left , no AD , pt sequences correctly after pre instructi)  Comment: level of assistance depends on surface height and surface with and without arm rests. Needs tactile/verbal cueing to preform task consistent and with decreased need for assistance dependency. patient utilizes lift chair and elevated surfaces in the home. patient cued for hand placement, trunk and hip postioning in chair and nose over toes rocking forward. Ambulation  Ambulation?: Yes  More Ambulation?: No  Ambulation 1  Surface: level tile  Device: Rolling Walker  Other Apparatus: (IV)  Assistance: Contact guard assistance  Quality of Gait: increased trunk flexion , UE weight bearing heavily on walker, hips beyond LEN of walker  Gait Deviations: Slow Zuly  Distance: 72 feet x 2, 50 feet  Comments: needs constant cueing to maintain safety within LEN of walker and tall posture. Patient demonstrates outside LEN of walker with turns and presenting to sit to surfaces. Surface: level tile  Ambulation 1  Surface: level tile  Device: Rolling Walker  Other Apparatus:  (IV)  Assistance: Contact guard assistance  Quality of Gait: increased trunk flexion , UE weight bearing heavily on walker, hips beyond LEN of walker  Gait Deviations: Slow Zuly  Distance: 72 feet x 2, 50 feet  Comments: needs constant cueing to maintain safety within LEN of walker and tall posture. Patient demonstrates outside LEN of walker with turns and presenting to sit to surfaces. OT:  ADL  Feeding: Setup(assist to open items, increased time req)  Grooming: Stand by assistance(seated in wheelchair with Min VCs for denture care)  UE Bathing: Stand by assistance(seated on tub transfer bench in shower)  LE Bathing: Moderate assistance(assist for buttocks/perineal area; used [address] for feet)  UE Dressing: Minimal assistance(assist for bra clasp only; Min VCs for orientation)  LE Dressing: Minimal assistance(assist threading pants over feet, CGA standing, foot funnel )  Toileting:  Moderate assistance(Minimal assist standing balance; assist for BM)  Additional Comments: Pt refuses any ADL activities this date          Balance  Sitting Balance: Stand by assistance(seated on toilet )  Standing Balance: Contact guard assistance   Standing Balance  Time: 1-2 min x2   Activity: Transfers, functional mobility   Comment: 0-2 UE support PRN, unsteady  Functional Mobility  Functional - Mobility Device: Rolling Walker  Activity: To/from bathroom  Assist Level: Contact guard assistance  Functional Mobility Comments: VCs for safety and proper tech     Bed mobility  Bridging: Minimal assistance  Rolling to Left: Minimal assistance  Rolling to Right: Minimal assistance  Supine to Sit: Stand by assistance(with leg  used left leg exiting right side of bed)  Sit to Supine: Minimal assistance, Contact guard assistance(leg )  Scooting: Contact guard assistance, Stand by assistance  Comment: training with leg  used with good understanding demonstrated with cueing  Transfers  Stand Step Transfers: Minimal assistance(R/W, min/CGA)  Stand Pivot Transfers: Contact guard assistance  Sit to stand: Minimal assistance  Stand to sit: Contact guard assistance  Transfer Comments: cuing for proper tech   Toilet Transfers  Toilet - Technique: Stand pivot, To right, To left  Equipment Used: Raised toilet seat with rails(GB on right side)  Toilet Transfer: Minimal assistance  Toilet Transfers Comments: with assist for wheelchair management as well     Shower Transfers  Shower - Transfer From: Wheelchair  Shower - Transfer Type: To and From  Shower - Transfer To: Transfer tub bench  Shower - Technique: Stand pivot, To right, To left  Shower Transfers: Minimal assistance  Shower Transfers Comments: with Fair safety awareness  Wheelchair Bed Transfers  Wheelchair/Bed - Technique: Ambulating  Equipment Used: Bed, Wheelchair(rolling walker)  Level of Asssistance: 2 Person assistance, Moderate assistance  Wheelchair Transfers Comments: during PM session    SPEECH:  Subjective: []? Alert     [x]? Cooperative     []? Confused     []? Agitated    [x]? Lethargic        Objective/Assessment:  Attention: Max cues and repetition needed to maintain pt. On task behavior as pt. Very lethargic, dtr assisting to attempt to redirect as she entered room at end of session.     Cognition:   Differences/similarities between 2 objects- 50%     Verbal expression:   General expression functional in conversation.       Dysphagia: Vital stim completed for 45 minutes at 3.0 mA. Pt. Completed  simple tongue base strengthening exercises x5, 10 reps each. Pt. Completed oral motor exercises x1, 10 reps each with max cues and elan maneuver x4 and effortful swallow x3. MAX cues needed on all tasks. Pt. Took trials of honey thick liquids via straw x4. Pt. Initially took sips of water on tray table x3, demonstrating immediate cough following. ST checked water inside of cup and it was thin consistency. This was removed from room and replaced c honey thick water. Safe care entered. When pt. Daughter arrived to room she stated they were told by pt. Doctor in past that pt.  Has low sodium and he would rather her drink Gatorade than water. Pt. Daughter reported she would rather have pt. Have gatorade at bedside than water.       Other:    ST put pt. Honey thick water cup in container full of ice to keep cold. Objective:  BP (!) 148/65   Pulse 75   Temp 97.9 °F (36.6 °C) (Oral)   Resp 19   Ht 5' 7\" (1.702 m)   Wt 172 lb (78 kg)   SpO2 99%   BMI 26.94 kg/m²       GEN: Well developed, well nourished, in NAD  HEENT:  NCAT. PERRL. EOMI. Mucous membranes pink and moist. Wearing dentures  PULM:  Clear to ausculation. No rales or rhonchi. Respirations WNL and unlabored. CV:  RRR. No murmurs or gallops. GI:  Abdomen soft. Nontender. Non-distended. BS + and equal.  Abdominal binder in place. NEUROLOGICAL: A&O x3.. Sensation intact to light touch. MSK:  Functional ROM RUE and RLE. Impaired AROM LUE and LLE due to weakness. Motor testing 4+/5 key muscles RUE and RLEs. Strength 4/5 key muscles LUE and LLE. SKIN: Warm dry and intact. Good turgor. EXTREMITIES:  No calf tenderness to palpation. No edema BLEs. .  ARSH stockings in place  PSYCH: Mood WNL. Appropriately interactive. Affect WNL. Diagnostics:     CBC:   Recent Labs     04/15/21  0658   WBC 6.5   RBC 3.73*   HGB 11.3*   HCT 34.0*   MCV 91.2   RDW 14.4        BMP:   Recent Labs     04/15/21  0658      K 4.6      CO2 23   BUN 22   CREATININE 1.07*   GLUCOSE 103*     BNP: No results for input(s): BNP in the last 72 hours. PT/INR: No results for input(s): PROTIME, INR in the last 72 hours. APTT: No results for input(s): APTT in the last 72 hours. CARDIAC ENZYMES:   No results for input(s): CKMB, CKMBINDEX, TROPONINT in the last 72 hours. Invalid input(s): CKTOTAL;3  FASTING LIPID PANEL:  Lab Results   Component Value Date    CHOL 121 04/02/2021    HDL 55 04/02/2021    TRIG 61 04/02/2021     LIVER PROFILE: No results for input(s): AST, ALT, ALB, BILIDIR, BILITOT, ALKPHOS in the last 72 hours.      Current Medications:   Current Facility-Administered Medications: lactobacillus (CULTURELLE) capsule 1 capsule, 1 capsule, Oral, Daily with breakfast  cephALEXin (KEFLEX) capsule 500 mg, 500 mg, Oral, 3 times per day  ondansetron (ZOFRAN) tablet 4 mg, 4 mg, Oral, Q8H PRN  enoxaparin (LOVENOX) injection 40 mg, 40 mg, Subcutaneous, Daily  aspirin EC tablet 81 mg, 81 mg, Oral, Daily **OR** [DISCONTINUED] aspirin suppository 300 mg, 300 mg, Rectal, Daily  amLODIPine (NORVASC) tablet 5 mg, 5 mg, Oral, Daily  atorvastatin (LIPITOR) tablet 40 mg, 40 mg, Oral, Nightly  clopidogrel (PLAVIX) tablet 75 mg, 75 mg, Oral, Daily  ferrous sulfate (IRON 325) tablet 325 mg, 325 mg, Oral, BID  levothyroxine (SYNTHROID) tablet 112 mcg, 112 mcg, Oral, Daily  melatonin tablet 6 mg, 6 mg, Oral, Nightly PRN  metoprolol tartrate (LOPRESSOR) tablet 12.5 mg, 12.5 mg, Oral, BID  oxybutynin (DITROPAN-XL) extended release tablet 5 mg, 5 mg, Oral, Daily  pantoprazole (PROTONIX) tablet 40 mg, 40 mg, Oral, QAM AC  acetaminophen (TYLENOL) tablet 650 mg, 650 mg, Oral, Q4H PRN  polyethylene glycol (GLYCOLAX) packet 17 g, 17 g, Oral, Daily  bisacodyl (DULCOLAX) suppository 10 mg, 10 mg, Rectal, Daily PRN  magnesium hydroxide (MILK OF MAGNESIA) 400 MG/5ML suspension 30 mL, 30 mL, Oral, Daily PRN      Impression/Plan:   Impaired ADLs, gait, and mobility due to:      1. 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 On 3 weeks ASA, Plavix with ASA to continue after 4/23. May benefit from 1 hour nap after lunch for persistent daytime fatigue. 2. Dysphagia: on thickened liquids. SLP treating and including vital stim. Will continue to monitor to determine if she is a candidate for [employer] but she has a lot of food residue in her mouth after each meal.   3. Dehydration/hyponatremia:Resolved.  has prn zofran.  IM treated with IV fluid until 4/12 and serial lab tests -  improved Na and creatinine. 4. HTN: on amlodipine, atorvastatin, metoprolol tartrate. Follow up Dr. Modesta Cobos in [facility] in 4 weeks for stress test.   5. Orthostasis: Ensure she wears ARSH hose and abdominal binder when out of bed. Encourage hydration. 6. OA/L rotator cuff injury: stable. Will monitor  7. CKD: stable. Will monitor BMP  8. Hypothyroidism: on levothyroxine  9. Anemia: on ferrous sulfate. Monitoring Hb  10. Hx chronic cystitis: on oxybutynin ER. Frequent incontinence is normal baseline for patient. Urine culture positive for E Coli. Keflex  renally adjusted through 4/17. Leukocytosis resolved 4/11. 11. GERD: on pantoprazole  12. Bowel Management: Miralax daily, senokot prn, milk of magnesia prn, dulcolax prn. On probiotic for loose stools which are improved. 13. DVT Prophylaxis:  low molecular weight heparin, SCD's while in bed and ARSH's   14. Internal medicine for medical management    Electronically signed by Hayley Segura MD on 4/16/2021 at 11:26 AM      This note is created with the assistance of a speech recognition program.  While intending to generate a document that actually reflects the content of the visit, the document can still have some errors including those of syntax and sound a like substitutions which may escape proof reading. In such instances, actual meaning can be extrapolated by contextual diversion.

## 2021-04-16 NOTE — PROGRESS NOTES
Physical Therapy  Facility/Department: [facility]  Daily Treatment Note  NAME: Isiah Blue  : 1932  MRN: [id number]    Date of Service: 2021    Discharge Recommendations:  Patient would benefit from continued therapy after discharge, Home with assist PRN, Home with Home health PT        Assessment      PT Education: Adaptive Device Training;Functional Mobility Training  Patient Education: leg  training with bed mobility today. needs further training to improved independence  Activity Tolerance  Activity Tolerance: Patient limited by endurance; Patient limited by fatigue     Patient Diagnosis(es): There were no encounter diagnoses. has a past medical history of Anemia, Hypertension, Hypothyroidism, Osteoarthritis, Other long term (current) drug therapy, and Renal insufficiency. has no past surgical history on file. Restrictions  Restrictions/Precautions  Restrictions/Precautions: Fall Risk, Modified Diet, Swallowing - Thickened Liquids, General Precautions  Required Braces or Orthoses?: No  Implants present? : (L TKA)  Subjective   General  Chart Reviewed: Yes  Family / Caregiver Present: No  Subjective  Subjective: \"tired\"  Pain Screening  Patient Currently in Pain: Denies  Vital Signs  Patient Currently in Pain: Denies       Orientation     Cognition      Objective   Bed mobility  Bridging: Minimal assistance  Rolling to Left: Minimal assistance  Rolling to Right: Minimal assistance  Supine to Sit: Stand by assistance(with leg  used left leg exiting right side of bed)  Sit to Supine: Minimal assistance;Contact guard assistance(leg )  Scooting: Contact guard assistance;Stand by assistance  Comment: training with leg  used with good understanding demonstrated with cueing  Transfers  Sit to Stand: Moderate Assistance;Minimal Assistance(posterior lean noted with back on heels.  faisal/cueing for technique improved ability to preform task.)  Stand to sit: Minimal Assistance(decreased flexion at hip and knees patient tendencies to plop in chair with feet sliding forward.)  Bed to Chair: Moderate assistance(stooped posture with tendency to \"park\" walker away from support of chair)  Comment: level of assistance depends on surface height and surface with and without arm rests. Needs tactile/verbal cueing to preform task consistent and with decreased need for assistance dependency. patient utilizes lift chair and elevated surfaces in the home. patient cued for hand placement, trunk and hip postioning in chair and nose over toes rocking forward. Ambulation 1  Surface: level tile  Device: Rolling Walker  Assistance: Contact guard assistance  Quality of Gait: increased trunk flexion , UE weight bearing heavily on walker, hips beyond LEN of walker  Gait Deviations: Slow Zuly  Distance: 72 feet x 2, 50 feet  Comments: needs constant cueing to maintain safety within LEN of walker and tall posture. Patient demonstrates outside LEN of walker with turns and presenting to sit to surfaces.   Ambulation 2  Surface - 2: carpet  Device 2: Rolling Walker  Assistance 2: Minimal assistance  Quality of Gait 2: flexed at hips forward posturing  Gait Deviations: Slow Zuly  Distance: 25 feet x 2  Comments: needs intermittent cueing to stand tall and stay within LEN of walker  Stairs  # Steps : 4  Stairs Height: 6\"  Rails: Bilateral  Assistance: Contact guard assistance  Comment: tolerated step to gait pattern; one person assist. trunk forward flexed at hips but negotiated step height without concern        Other exercises  Other exercises?: Yes  Other exercises 1: seated bilateral LE 2.5# lt green x 20  Other exercises 2: standing BLE 10 reps  Other exercises 3: Sit to stands x 5 reps  Other exercises 4: Nustep 10 minutes L3  Other exercises 5: bed mobility with leg  training preformed 3 times  Other exercises 6: Supine BLE 2.5# with slider pad 20 reps SAQ's, heel slides, hip ABD/ADD; Other exercises 7: Bridging 5 reps                        G-Code     OutComes Score                                                     AM-PAC Score             Goals  Short term goals  Time Frame for Short term goals:  8 to 9 days  Short term goal 1: Pt to perform bed mobility at SBA with rail  Short term goal 2: Pt to demonstrate transfers at min A  Short term goal 3: Pt to amb 50 to 80 ft with appropriate device, min/mod A   Short term goal 4: Pt to improve L LE strength by 1/3 MMG to improve fucntion. Short term goal 5: Pt able to go up and down 5 steps with 2 UE support, mod A   Long term goals  Time Frame for Long term goals : By LOS  Long term goal 1: Pt able to perform bed mobility mod-I  Long term goal 2: Pt able to perform transfers from varies surfaces at mod-I /supervsion level. Long term goal 3: Pt able to ambulate with appropriate device distance of 80 ft, mod-I/supervsion level. Long term goal 4: Pt able to perform step curb with assisitive device at min A  Long term goal 5: Pt able to go up and down 5 steps with 2 rails at min A to improve LE strength. Long term goal 6: Improve PASS score to atleast 25//36 improve overall fucntion  Long term goal 7: Improve Tinetti balance score to 20 or more/28 to reduce fall risk. Long term goal 8: Pt able to propel w/c on level surfaces 150 ft, with set up/supervsion. Patient Goals   Patient goals : I want to move my Left side to walk better    Plan    Plan  Times per week: 1.5 hr/day, 5 to 7 days/week  Plan weeks: 6  Current Treatment Recommendations: Strengthening, Neuromuscular Re-education, Home Exercise Program, Safety Education & Training, Patient/Caregiver Education & Training, Equipment Evaluation, Education, & procurement, Functional Mobility Training, Balance Training, Endurance Training, Transfer Training, Gait Training, ROM, Stair training  Safety Devices  Type of devices:  All fall risk precautions in place, Patient at risk for falls, Gait belt Restraints  Initially in place: No     Therapy Time   Individual Concurrent Group Co-treatment   Time In 0859         Time Out 1018         Minutes [address], PTA

## 2021-04-17 PROCEDURE — 97116 GAIT TRAINING THERAPY: CPT

## 2021-04-17 PROCEDURE — 6370000000 HC RX 637 (ALT 250 FOR IP): Performed by: PHYSICAL MEDICINE & REHABILITATION

## 2021-04-17 PROCEDURE — 6360000002 HC RX W HCPCS: Performed by: STUDENT IN AN ORGANIZED HEALTH CARE EDUCATION/TRAINING PROGRAM

## 2021-04-17 PROCEDURE — 92526 ORAL FUNCTION THERAPY: CPT

## 2021-04-17 PROCEDURE — 6370000000 HC RX 637 (ALT 250 FOR IP): Performed by: STUDENT IN AN ORGANIZED HEALTH CARE EDUCATION/TRAINING PROGRAM

## 2021-04-17 PROCEDURE — 99231 SBSQ HOSP IP/OBS SF/LOW 25: CPT | Performed by: STUDENT IN AN ORGANIZED HEALTH CARE EDUCATION/TRAINING PROGRAM

## 2021-04-17 PROCEDURE — 97110 THERAPEUTIC EXERCISES: CPT

## 2021-04-17 PROCEDURE — 97530 THERAPEUTIC ACTIVITIES: CPT

## 2021-04-17 PROCEDURE — 1180000000 HC REHAB R&B

## 2021-04-17 RX ADMIN — AMLODIPINE BESYLATE 5 MG: 5 TABLET ORAL at 07:52

## 2021-04-17 RX ADMIN — METOPROLOL TARTRATE 12.5 MG: 25 TABLET, FILM COATED ORAL at 20:01

## 2021-04-17 RX ADMIN — ENOXAPARIN SODIUM 40 MG: 40 INJECTION SUBCUTANEOUS at 07:55

## 2021-04-17 RX ADMIN — ACETAMINOPHEN 650 MG: 325 TABLET ORAL at 14:39

## 2021-04-17 RX ADMIN — ATORVASTATIN CALCIUM 40 MG: 40 TABLET, FILM COATED ORAL at 20:01

## 2021-04-17 RX ADMIN — Medication 6 MG: at 20:01

## 2021-04-17 RX ADMIN — PANTOPRAZOLE SODIUM 40 MG: 40 TABLET, DELAYED RELEASE ORAL at 05:44

## 2021-04-17 RX ADMIN — ACETAMINOPHEN 650 MG: 325 TABLET ORAL at 20:01

## 2021-04-17 RX ADMIN — CLOPIDOGREL BISULFATE 75 MG: 75 TABLET ORAL at 07:52

## 2021-04-17 RX ADMIN — LEVOTHYROXINE SODIUM 112 MCG: 112 TABLET ORAL at 05:44

## 2021-04-17 RX ADMIN — ASPIRIN 81 MG: 81 TABLET, COATED ORAL at 07:50

## 2021-04-17 RX ADMIN — FERROUS SULFATE TAB 325 MG (65 MG ELEMENTAL FE) 325 MG: 325 (65 FE) TAB at 07:52

## 2021-04-17 RX ADMIN — CEPHALEXIN 500 MG: 500 CAPSULE ORAL at 05:44

## 2021-04-17 RX ADMIN — FERROUS SULFATE TAB 325 MG (65 MG ELEMENTAL FE) 325 MG: 325 (65 FE) TAB at 20:01

## 2021-04-17 RX ADMIN — METOPROLOL TARTRATE 12.5 MG: 25 TABLET, FILM COATED ORAL at 07:50

## 2021-04-17 RX ADMIN — Medication 1 CAPSULE: at 07:50

## 2021-04-17 RX ADMIN — OXYBUTYNIN CHLORIDE 5 MG: 5 TABLET, EXTENDED RELEASE ORAL at 08:04

## 2021-04-17 ASSESSMENT — PAIN SCALES - GENERAL
PAINLEVEL_OUTOF10: 8
PAINLEVEL_OUTOF10: 5
PAINLEVEL_OUTOF10: 2
PAINLEVEL_OUTOF10: 0

## 2021-04-17 NOTE — PROGRESS NOTES
Physical Therapy  [address]  Acute Rehabilitation Physical Therapy Progress Note    Date: 21  Patient Name: Anette Brambila       Room: 6921/4910-50  MRN: [id number]   Account: [de-identified]   : 1932  (80 y.o.) Gender: female     Referring Practitioner: Virginie Aguilera MD  Diagnosis: Acute CVA  Past Medical History:  has a past medical history of Anemia, Hypertension, Hypothyroidism, Osteoarthritis, Other long term (current) drug therapy, and Renal insufficiency. Past Surgical History:   has no past surgical history on file. Additional Pertinent Hx: Per ARU preadmission assessment:80year-old female admitted with acute left hemiparesis causing a fall of her chair. Noted acute left-sided weakness worse than previous date. .  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 Willa Burleson She was life flighted to [facility]. Patient on baby aspirin at home. Patient given TPA during TPA became hysterical repeat CT showed no change completed TPA.  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 Pt admitted to rehab unit on 21. Restrictions/Precautions  Restrictions/Precautions: Fall Risk;Modified Diet;Swallowing - Thickened Liquids; General Precautions  Required Braces or Orthoses?: No  Implants present? : (L TKA)       Comments: abdominal binder donned for preventative dizziness     Vital Signs  Patient Currently in Pain: Denies    Bed Mobility:        Transfers:  Sit to Stand: Contact guard assistance;Minimal Assistance(increased time to extend hips to advance UEs to RW)  Stand to sit: Contact guard assistance(instruction for controlled descent )      Ambulation 1  Surface: level tile  Device: Rolling Walker  Assistance: Contact guard assistance  Quality of Gait: increased trunk flexion , UE weight bearing heavily on walker, hips beyond LEN of walker  Gait Deviations: Slow Zuly  Distance: 100 ft x 2  Comments: needs constant cueing to maintain safety within LEN of walker and tall posture. Patient demonstrates outside LEN of walker with turns and presenting to sit to surfaces.         Stairs/Curb  Stairs?: Yes  Stairs  # Steps : 4  Stairs Height: 6\"  Rails: Bilateral  Assistance: Contact guard assistance  Comment: step to right ascending , pt instructed in foot placement and increased hip extension, pt reports fatigue at 4 steps evident by increased flexion       Propulsion 1  Propulsion: Manual  Level: Level Tile  Method: RUE;LUE;RLE;LLE(alternates bilaeral UE or LE)  Level of Assistance: Stand by assistance  Description/ Details: straight path , short rest breaks due to fatigue   Distance: 60 ft        FIMS:   BALANCE Posture: Fair  Sitting - Static: Fair;+  Sitting - Dynamic: Fair  Standing - Static: Fair;-  Standing - Dynamic: Poor;+  Comments: RW standing     EXERCISES    Other exercises?: Yes  Other exercises 1: seated bilateral LE 2.5# lt green x 20  Other exercises 2: standing 1UE support reaching unilateral within LEN 1.30 minute bouts  (tactile cue left hip to increase and sustain hip extension )  Other exercises 3: nustep L4 10 minutes (bilateral Le and right UE )           Activity Tolerance: Patient limited by endurance, Patient limited by fatigue  PT Equipment Recommendations  Equipment Needed: No  Other: continue to assess  Other Comments  Comments: pt educated risks of ambulation without assist AM and PM     Patient Education  New Education Provided:    Learner:patient  Method: explanation       Outcome: needs reinforcement     Current Treatment Recommendations: Strengthening, Neuromuscular Re-education, Home Exercise Program, Safety Education & Training, Patient/Caregiver Education & Training, Equipment Evaluation, Education, & procurement, Functional Mobility Training, Balance Training, Endurance Training, Transfer Training, Gait Training, ROM, Stair training    Conditions Requiring Skilled Therapeutic Intervention  Body structures, Functions, Activity limitations: Decreased functional mobility ; Decreased balance;Decreased strength;Decreased endurance  Prognosis: Good  Decision Making: Medium Complexity  REQUIRES PT FOLLOW UP: Yes  Discharge Recommendations: Patient would benefit from continued therapy after discharge;Home with assist PRN;Home with Home health PT    Goals  Short term goals  Time Frame for Short term goals:  8 to 9 days  Short term goal 1: Pt to perform bed mobility at SBA with rail  Short term goal 2: Pt to demonstrate transfers at min A  Short term goal 3: Pt to amb 50 to 80 ft with appropriate device, min/mod A   Short term goal 4: Pt to improve L LE strength by 1/3 MMG to improve fucntion. Short term goal 5: Pt able to go up and down 5 steps with 2 UE support, mod A   Long term goals  Time Frame for Long term goals : By LOS  Long term goal 1: Pt able to perform bed mobility mod-I  Long term goal 2: Pt able to perform transfers from varies surfaces at mod-I /supervsion level. Long term goal 3: Pt able to ambulate with appropriate device distance of 80 ft, mod-I/supervsion level. Long term goal 4: Pt able to perform step curb with assisitive device at min A  Long term goal 5: Pt able to go up and down 5 steps with 2 rails at min A to improve LE strength. Long term goal 6: Improve PASS score to atleast 25//36 improve overall fucntion  Long term goal 7: Improve Tinetti balance score to 20 or more/28 to reduce fall risk. Long term goal 8: Pt able to propel w/c on level surfaces 150 ft, with set up/supervsion.     Electronically signed by Jo Ann Jones PTA on 4/17/21 at 1:55 PM EDT     04/17/21 0929 04/17/21 1354   PT Individual Minutes   Time In [id number]   Time Out 1288 [id number]   TXGIDGY [id number]

## 2021-04-17 NOTE — PLAN OF CARE
Problem: Skin Integrity:  Goal: Will show no infection signs and symptoms  Description: Will show no infection signs and symptoms  Outcome: Ongoing  Goal: Absence of new skin breakdown  Description: Absence of new skin breakdown  4/17/2021 1502 by Lance Barnett RN  Outcome: Ongoing  4/17/2021 0439 by Nichelle Peraza RN  Outcome: Ongoing     Problem: Confusion - Acute:  Goal: Absence of continued neurological deterioration signs and symptoms  Description: Absence of continued neurological deterioration signs and symptoms  4/17/2021 1502 by Lance Barnett RN  Outcome: Ongoing  4/17/2021 0439 by Nichelle Peraza RN  Outcome: Ongoing  Goal: Mental status will be restored to baseline  Description: Mental status will be restored to baseline  Outcome: Ongoing     Problem: Discharge Planning:  Goal: Ability to perform activities of daily living will improve  Description: Ability to perform activities of daily living will improve  Outcome: Ongoing  Goal: Participates in care planning  Description: Participates in care planning  4/17/2021 1502 by Lance Barnett RN  Outcome: Ongoing  4/17/2021 0439 by Nichelle Peraza RN  Outcome: Ongoing     Problem: Injury - Risk of, Physical Injury:  Goal: Absence of physical injury  Description: Absence of physical injury  Outcome: Ongoing  Goal: Will remain free from falls  Description: Will remain free from falls  4/17/2021 1502 by Lance Barnett RN  Outcome: Ongoing  4/17/2021 0439 by Nichelle Peraza RN  Outcome: Ongoing     Problem: Mood - Altered:  Goal: Mood stable  Description: Mood stable  Outcome: Ongoing  Goal: Absence of abusive behavior  Description: Absence of abusive behavior  Outcome: Ongoing  Goal: Verbalizations of feeling emotionally comfortable while being cared for will increase  Description: Verbalizations of feeling emotionally comfortable while being cared for will increase  4/17/2021 1502 by Lance Barnett RN  Outcome: Ongoing  4/17/2021 8908 by Shyann Isbell RN  Outcome: Ongoing     Problem: Psychomotor Activity - Altered:  Goal: Absence of psychomotor disturbance signs and symptoms  Description: Absence of psychomotor disturbance signs and symptoms  Outcome: Ongoing     Problem: Sensory Perception - Impaired:  Goal: Demonstrations of improved sensory functioning will increase  Description: Demonstrations of improved sensory functioning will increase  Outcome: Ongoing  Goal: Decrease in sensory misperception frequency  Description: Decrease in sensory misperception frequency  Outcome: Ongoing  Goal: Able to refrain from responding to false sensory perceptions  Description: Able to refrain from responding to false sensory perceptions  Outcome: Ongoing  Goal: Demonstrates accurate environmental perceptions  Description: Demonstrates accurate environmental perceptions  4/17/2021 1502 by Diann Mejía RN  Outcome: Ongoing  4/17/2021 0439 by Shyann Isbell RN  Outcome: Ongoing  Goal: Able to distinguish between reality-based and nonreality-based thinking  Description: Able to distinguish between reality-based and nonreality-based thinking  Outcome: Ongoing  Goal: Able to interrupt nonreality-based thinking  Description: Able to interrupt nonreality-based thinking  Outcome: Ongoing     Problem: Sleep Pattern Disturbance:  Goal: Appears well-rested  Description: Appears well-rested  4/17/2021 1502 by Diann Mejía RN  Outcome: Ongoing  4/17/2021 0439 by Shyann Isbell RN  Outcome: Ongoing     Problem: Neurological  Goal: Maximum potential motor/sensory/cognitive function  Outcome: Ongoing     Problem: Nutrition  Goal: Optimal nutrition therapy  Description: Nutrition Problem #1: Inadequate oral intake  Intervention: Food and/or Nutrient Delivery: Continue Current Diet, Start Oral Nutrition Supplement  Nutritional Goals: intake more than 75%     4/17/2021 1502 by Diann Mejía RN  Outcome: Ongoing  4/17/2021 0439 by Shyann Isbell

## 2021-04-17 NOTE — PLAN OF CARE
Problem: Skin Integrity:  Goal: Absence of new skin breakdown  Description: Absence of new skin breakdown  Outcome: Ongoing  Skin assessment completed this shift. Nutrition and Hydration status assessed with adequate intake. Lalito Score as charted. Pressure Relief Overlay remains intact and inflated to patient's bed throughout the shift. Bilateral heels remain elevated on pillows throughout the shift. Patient tolerates repositioning by staff at least every 2 hours. Patient able to reposition self for comfort and to prevent breakdown. Patient verbalizes understanding of pressure ulcer prevention measures. Skin integrity maintained. No new skin breakdown noted. Skin to high risk pressure areas including coccyx and heels are clear. Odalis / Incontinence care provided as needed throughout the shift. Aloe Vesta Moisture Barrier ointment applied to buttocks as a preventative measure. Problem: Confusion - Acute:  Goal: Absence of continued neurological deterioration signs and symptoms  Description: Absence of continued neurological deterioration signs and symptoms  Outcome: Ongoing    Problem: Discharge Planning:  Goal: Participates in care planning  Description: Participates in care planning  Outcome: Ongoing     Problem: Injury - Risk of, Physical Injury:  Goal: Will remain free from falls  Description: Will remain free from falls  Outcome: Ongoing  No falls or injury this shift. Bed is maintained at the lowest level, brakes engaged, call light and assistive devices in reach. Room is clutter free, adequate lighting for safe transfers and ambulation. Assistance provided for transfers and toileting.      Problem: Mood - Altered:  Goal: Verbalizations of feeling emotionally comfortable while being cared for will increase  Description: Verbalizations of feeling emotionally comfortable while being cared for will increase  Outcome: Ongoing    Problem: Sensory Perception - Impaired:  Goal: Demonstrates accurate environmental perceptions  Description: Demonstrates accurate environmental perceptions  Outcome: Ongoing    Problem: Sleep Pattern Disturbance:  Goal: Appears well-rested  Description: Appears well-rested  Outcome: Ongoing    Problem: Nutrition  Goal: Optimal nutrition therapy  Description: Nutrition Problem #1: Inadequate oral intake  Intervention: Food and/or Nutrient Delivery: Continue Current Diet, Start Oral Nutrition Supplement  Nutritional Goals: intake more than 75%   Outcome: Ongoing    Problem: Pain:  Goal: Pain level will decrease  Description: Pain level will decrease  Outcome: Ongoing  Patient assessed for pain, medicated per orders. Patient reports an acceptable level of discomfort with the current medications. Patient was repositioned as needed for comfort and  skin integrity.

## 2021-04-17 NOTE — PROGRESS NOTES
[address] [facility]    ACUTE REHABILITATION OCCUPATIONAL THERAPY  DAILY NOTE    Date: 21  Patient Name: Tonia Zaragoza      Room: 6509/0484-93    MRN: [id number]   : 1932  (80 y.o.)  Gender: female   Referring Practitioner: Kee Cline MD  Diagnosis: Acute CVA  Additional Pertinent Hx:  Per ARU preadmission assessment:80year-old female admitted with acute left hemiparesis causing a fall of her chair. Noted acute left-sided weakness worse than previous date. .  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 Eladio Lamas She was life flighted to [facility]. Patient on baby aspirin at home. Patient given TPA during TPA became hysterical repeat CT showed no change completed TPA. 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 Pt admitted to rehab unit on 21. Restrictions  Restrictions/Precautions: Fall Risk, Modified Diet, Swallowing - Thickened Liquids, General Precautions  Implants present? : (L TKA)  Required Braces or Orthoses?: No  Equipment Used: Bed, Wheelchair(rolling walker)    Subjective  Subjective: \"OH. Eladio Lamas I'm just so tired today! \" and \"this arm is so sore\"  Comments: pt declined morning OT due to fatigue but agreeable to PM session with encouragement  Patient Currently in Pain: Yes  Pain Level: 8  Pain Location: Arm  Pain Orientation: Left  Restrictions/Precautions: Fall Risk;Modified Diet;Swallowing - Thickened Liquids; General Precautions     Patient Observation  Observations: pt not using LUE this date due to pain, encouragement req to attempt during tasks, fair-poor return noted, NSG notified and provided pain meds   Pain Assessment  Pain Assessment: 0-10  Pain Level: 8  Pain Type: Acute pain  Pain Location: Arm  Pain Orientation: Left  Pain Descriptors: Sore Objective  Cognition  Overall Cognitive Status: Impaired  Arousal/Alertness: Delayed responses to stimuli  Attention Span: Attends with cues to redirect  Memory: Decreased short term memory;Decreased recall of recent events  Following Commands: Follows multistep commands with repetition  Safety Judgement: Decreased awareness of need for assistance  Awareness of Errors: Assistance required to identify errors made  Insights: Decreased awareness of deficits  Sequencing and Organization: Assistance required to implement solutions;Assistance required to generate solutions;Assistance required to identify errors made  Perception  Overall Perceptual Status: Impaired  Initiation: Cues to initiate tasks  Balance  Sitting Balance: Supervision  Standing Balance: Minimal assistance  Transfers  Stand Step Transfers: Minimal assistance(no device, increased time req)  Sit to stand: Moderate assistance  Stand to sit: Minimal assistance;Contact guard assistance  Transfer Comments: cuing for proper tech  Standing Balance  Time: PM: 1 min x2  Activity: PM: functional transfers  Comment: no LOB noted but unsteady, cuing for posture with flexing forward, fair return noted              Additional Activities: PM: pt participated in sequencing cards, 3 sets, to address problem solving and task sequencing, mod cuing req for proper sequencing, increased time req for putting cards into order                 ADL  Additional Comments: declines all ADL tasks this date          Assessment  Performance deficits / Impairments: Decreased functional mobility ; Decreased strength;Decreased endurance;Decreased balance;Decreased high-level IADLs;Decreased posture;Decreased ADL status; Decreased ROM; Decreased safe awareness;Decreased cognition;Decreased fine motor control;Decreased coordination  Prognosis: Good  Discharge Recommendations: Home with assist PRN;Home with Home health OT  Activity Tolerance: Patient limited by pain; Patient limited by fatigue Activity Tolerance: fatigued, rest breaks req, increased time req  Safety Devices in place: Yes  Type of devices: Nurse notified; Left in chair;Patient at risk for falls; Chair alarm in place;Call light within reach  Equipment Recommendations  Equipment Needed: (TBD)       Patient Education: transfer safety, activity promotion, task sequencing, therapy participation   Patient Goals   Patient goals : To return home with family support  Learner:patient  Method: demonstration and explanation       Outcome: needs reinforcement        Plan  Plan  Times per week: 5-7  Times per day: Twice a day  Current Treatment Recommendations: Strengthening, ROM, Safety Education & Training, Positioning, Balance Training, Patient/Caregiver Education & Training, Self-Care / ADL, Functional Mobility Training, Endurance Training, Neuromuscular Re-education, Wheelchair Mobility Training, Cognitive Reorientation, Equipment Evaluation, Education, & procurement, Home Management Training, Cognitive/Perceptual Training  Patient Goals   Patient goals : To return home with family support  Short term goals  Time Frame for Short term goals: 7 to 10 days  Short term goal 1: Pt will perform upper body bathing/dressing with stand by assist.  Short term goal 2: Pt will perform lower body bathing/dressing and toileting tasks with Moderate assist and Good safety. Short term goal 3: Pt will perform functional functional transfers and mobility during self-care with Moderate assist, least restrictive mobility device, and Good safety. Short term goal 4: Pt will for 4+ minutes with 1-2 UE support and Minimal assist while engaging in functional activity of choice. Short term goal 5: Pt will actively participate in 30+ minutes of therapeutic exercise/functional activities to promote increased independence with self-care and mobility.   Short term goal 6: Pt will verbalize/demonstrate Good understanding of assistive equipment/durable medical equipment/modified techniques for increased independence with self-care and mobility. Long term goals  Time Frame for Long term goals : By discharge  Long term goal 1: Pt will perform bathing during shower with stand by assist and Good safety. Long term goal 2: Pt will perform dressing and toileting tasks with modified independence and Good safety. Long term goal 3: Pt will stand for 8+ minutes with 1-2 UE support, modified independence, no loss of balance while engaging in functional activity of choice. Long term goal 4: Pt will perform functional transfers and mobility with modified independence, least restrictive mobility device, and Good safety. Long term goal 5: Pt will verbalize/demonstrate Good understanding of Fall Prevention Strategies for increased independence with self-care and mobility.   Long term goals 6: 9 hole peg and box and blocks to be assessed for LUE as appropriate        04/17/21 1408   OT Individual Minutes   Time In 1408   Time Out 1448   Minutes 40   Minute Variance   Variance 50   Reason Refusal  (due to fatigue and LUE pain)     Electronically signed by AYESHA Whiting on 4/17/21 at 3:57 PM EDT

## 2021-04-17 NOTE — PROGRESS NOTES
Physical Medicine & Rehabilitation  Progress Note      Subjective:      Sherri Lyons is a 80 y.o. female with ischemic CVA with left nondominant hemiparesis. She reports doing okay today. She notes feeling \"lazy\"/fatigued and refused OT this morning. However, she did participate in OT in the afternoon as well as PT. She denies any pain. She states that her left upper and lower limb strength is improving. She denies any other acute concerns. ROS:  Denies fevers, chills, sweats. No chest pain, palpitations, lightheadedness. Denies coughing, wheezing or shortness of breath. Denies abdominal pain, nausea, diarrhea or constipation. No new areas of joint pain. Denies new areas of numbness or weakness. Denies new anxiety or depression issues. No new skin problems. Rehabilitation:   Progressing in therapies. PT:  Restrictions/Precautions: Fall Risk, Modified Diet, Swallowing - Thickened Liquids, General Precautions  Implants present? : (L TKA)   Transfers  Sit to Stand: Contact guard assistance, Minimal Assistance(increased time to extend hips to advance UEs to RW)  Stand to sit: Contact guard assistance(instruction for controlled descent )  Bed to Chair: Moderate assistance(stooped posture with tendency to \"park\" walker away from support of chair)  Stand Pivot Transfers: Contact guard assistance(instruction for RW managment and LE proximal position to sit)  Squat Pivot Transfers: Maximum Assistance(to left , no AD , pt sequences correctly after pre instructi)  Comment: moderate assist sit to stand from recliner  Ambulation 1  Surface: level tile  Device: Rolling Walker  Other Apparatus: (IV)  Assistance: Contact guard assistance  Quality of Gait: increased trunk flexion , UE weight bearing heavily on walker, hips beyond LEN of walker  Gait Deviations: Slow Zuly  Distance: 100 ft x 2  Comments: needs constant cueing to maintain safety within LEN of walker and tall posture.  Patient demonstrates outside LEN of walker with turns and presenting to sit to surfaces. Transfers  Sit to Stand: Contact guard assistance, Minimal Assistance(increased time to extend hips to advance UEs to RW)  Stand to sit: Contact guard assistance(instruction for controlled descent )  Bed to Chair: Moderate assistance(stooped posture with tendency to \"park\" walker away from support of chair)  Stand Pivot Transfers: Contact guard assistance(instruction for RW managment and LE proximal position to sit)  Squat Pivot Transfers: Maximum Assistance(to left , no AD , pt sequences correctly after pre instructi)  Comment: moderate assist sit to stand from recliner  Ambulation  Ambulation?: Yes  More Ambulation?: No  Ambulation 1  Surface: level tile  Device: Rolling Walker  Other Apparatus: (IV)  Assistance: Contact guard assistance  Quality of Gait: increased trunk flexion , UE weight bearing heavily on walker, hips beyond LEN of walker  Gait Deviations: Slow Zuly  Distance: 100 ft x 2  Comments: needs constant cueing to maintain safety within LEN of walker and tall posture. Patient demonstrates outside LEN of walker with turns and presenting to sit to surfaces. Surface: level tile  Ambulation 1  Surface: level tile  Device: Rolling Walker  Other Apparatus: (IV)  Assistance: Contact guard assistance  Quality of Gait: increased trunk flexion , UE weight bearing heavily on walker, hips beyond LEN of walker  Gait Deviations: Slow Zuly  Distance: 100 ft x 2  Comments: needs constant cueing to maintain safety within LEN of walker and tall posture. Patient demonstrates outside LEN of walker with turns and presenting to sit to surfaces. OT:  ADL  Feeding: Setup(assist to open items, increased time req)  Grooming: Stand by assistance(seated in wheelchair with Min VCs for denture care)  UE Bathing: Stand by assistance(seated on tub transfer bench in shower)  LE Bathing:  Moderate assistance(assist for buttocks/perineal area; used [facility] for feet)  UE Dressing: Minimal assistance(assist for bra clasp only; Min VCs for orientation)  LE Dressing: Minimal assistance(assist threading pants over feet, CGA standing, foot funnel )  Toileting: Contact guard assistance(completed on raised toilet seat )  Additional Comments: declines all ADL tasks this date         Balance  Sitting Balance: Supervision  Standing Balance: Minimal assistance   Standing Balance  Time: PM: 1 min x2  Activity: PM: functional transfers  Comment: no LOB noted but unsteady, cuing for posture with flexing forward, fair return noted  Functional Mobility  Functional - Mobility Device: Rolling Walker  Activity: To/from bathroom  Assist Level: Contact guard assistance  Functional Mobility Comments: VCs for safety and proper tech     Bed mobility  Bridging: Minimal assistance  Rolling to Left: Minimal assistance  Rolling to Right: Minimal assistance  Supine to Sit: Stand by assistance(with leg  used left leg exiting right side of bed)  Sit to Supine: Minimal assistance, Contact guard assistance(leg )  Scooting: Contact guard assistance, Stand by assistance  Comment: Pt remained in bedside chair this date   Transfers  Stand Step Transfers: Minimal assistance(no device, increased time req)  Stand Pivot Transfers: Contact guard assistance  Sit to stand: Moderate assistance  Stand to sit: Minimal assistance, Contact guard assistance  Transfer Comments: cuing for proper tech   Toilet Transfers  Toilet - Technique: Ambulating  Equipment Used: Raised toilet seat with rails  Toilet Transfer: Contact guard assistance  Toilet Transfers Comments: Pt completed transfer to toilet with RW      Shower Transfers  Shower - Transfer From: Wheelchair  Shower - Transfer Type: To and From  Shower - Transfer To:  Transfer tub bench  Shower - Technique: Stand pivot, To right, To left  Shower Transfers: Minimal assistance  Shower Transfers Comments: with Fair safety awareness  Wheelchair Bed Transfers Wheelchair/Bed - Technique: Ambulating  Equipment Used: Bed, Wheelchair(rolling walker)  Level of Asssistance: 2 Person assistance, Moderate assistance  Wheelchair Transfers Comments: during PM session    SPEECH:  Subjective: []? Alert     [x]? Cooperative     []? Confused     []? Agitated    []? Lethargic        Objective/Assessment:  Attention: Sustained during meal.  Max cues and repetition needed to maintain pt. on task for swallowing exercises.      Cognition: n/a     Verbal expression: General expression functional in conversation.       Dysphagia: Vital stim completed for 40 minutes at 3.0 mA while Pt. eating lunch. Pt. took trials of honey thick liquids via straw and standard cup edge x14, puree solid x26, and minced and moist solid x11. No overt s/s of aspiration observed with consistencies. Min amount lingual residue noted with solids. Pt. Cued to facilitate liquid wash to clear.       Following meal, swallowing exercises completed with VitalStim at 3.0 mA. Pt. completed simple tongue base strengthening exercises x2, 10 reps each.   Pt. Completed oral motor exercises x1, 10 reps and effortful swallow x10. Pt. Stating, \"Oh golly why can't I swallow\" during effortful swallow exercise. Education provided re: dysphagia following CVA and rationale for ST to address dysphagia. Pt. Verbalized understanding stating, \"Oh ok, I'll keep working on it\". MAX cues needed on all tasks.        Other: No family present.      Current Medications:   Current Facility-Administered Medications: lactobacillus (CULTURELLE) capsule 1 capsule, 1 capsule, Oral, Daily with breakfast  ondansetron (ZOFRAN) tablet 4 mg, 4 mg, Oral, Q8H PRN  enoxaparin (LOVENOX) injection 40 mg, 40 mg, Subcutaneous, Daily  aspirin EC tablet 81 mg, 81 mg, Oral, Daily **OR** [DISCONTINUED] aspirin suppository 300 mg, 300 mg, Rectal, Daily  amLODIPine (NORVASC) tablet 5 mg, 5 mg, Oral, Daily  atorvastatin (LIPITOR) tablet 40 mg, 40 mg, Oral, Nightly clopidogrel (PLAVIX) tablet 75 mg, 75 mg, Oral, Daily  ferrous sulfate (IRON 325) tablet 325 mg, 325 mg, Oral, BID  levothyroxine (SYNTHROID) tablet 112 mcg, 112 mcg, Oral, Daily  melatonin tablet 6 mg, 6 mg, Oral, Nightly PRN  metoprolol tartrate (LOPRESSOR) tablet 12.5 mg, 12.5 mg, Oral, BID  oxybutynin (DITROPAN-XL) extended release tablet 5 mg, 5 mg, Oral, Daily  pantoprazole (PROTONIX) tablet 40 mg, 40 mg, Oral, QAM AC  acetaminophen (TYLENOL) tablet 650 mg, 650 mg, Oral, Q4H PRN  polyethylene glycol (GLYCOLAX) packet 17 g, 17 g, Oral, Daily  bisacodyl (DULCOLAX) suppository 10 mg, 10 mg, Rectal, Daily PRN  magnesium hydroxide (MILK OF MAGNESIA) 400 MG/5ML suspension 30 mL, 30 mL, Oral, Daily PRN      Objective:  BP (!) 150/92   Pulse 84   Temp 97.6 °F (36.4 °C) (Oral)   Resp 18   Ht 5' 7\" (1.702 m)   Wt 172 lb (78 kg)   SpO2 100%   BMI 26.94 kg/m²       GEN: Well developed, well nourished, no acute distress  HEENT: NCAT. EOM grossly intact. Hearing grossly intact. Mucous membranes pink and moist.  RESP: Normal breath sounds with no wheezing, rales, or rhonchi. Respirations WNL and unlabored. CV: Regular rate and rhythm. No murmurs, rubs, or gallops. ABD: Soft, non-distended, BS+ and equal.  NEURO: Alert. Speech fluent. Sensation to light touch intact. MSK: Decreased AROM in the left shoulder. Muscle tone and bulk are normal bilaterally. Strength 4+/5 in bilateral lower limbs. Able to lift the left upper limb partially against gravity. LIMBS: No edema in bilateral lower limbs. SKIN: Warm and dry with good turgor. PSYCH: Mood WNL. Affect WNL. Appropriately interactive. Diagnostics:     CBC:   Recent Labs     04/15/21  0658   WBC 6.5   RBC 3.73*   HGB 11.3*   HCT 34.0*   MCV 91.2   RDW 14.4        BMP:   Recent Labs     04/15/21  0658      K 4.6      CO2 23   BUN 22   CREATININE 1.07*   GLUCOSE 103*     BNP: No results for input(s): BNP in the last 72 hours.   PT/INR: No results for input(s): PROTIME, INR in the last 72 hours. APTT: No results for input(s): APTT in the last 72 hours. CARDIAC ENZYMES: No results for input(s): CKMB, CKMBINDEX, TROPONINT in the last 72 hours. Invalid input(s): CKTOTAL;3  FASTING LIPID PANEL:  Lab Results   Component Value Date    CHOL 121 04/02/2021    HDL 55 04/02/2021    TRIG 61 04/02/2021     LIVER PROFILE: No results for input(s): AST, ALT, ALB, BILIDIR, BILITOT, ALKPHOS in the last 72 hours. Impression/Plan:   Impaired ADLs, gait, and mobility due to:    1. 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 On 3 weeks ASA, Plavix with ASA to continue after 4/23. On atorvastatin. May benefit from 1 hour nap after lunch for persistent daytime fatigue. 2. Dysphagia: On minced and moist solids and moderately-thick liquids. SLP treating and including vital stim. Will continue to monitor to determine if she is a candidate for [employer] but she has a lot of food residue in her mouth after each meal.   3. Dehydration/hyponatremia:  Resolved. IM treated with IV fluid until 4/12 and serial lab tests - improved Na and creatinine. 4. HTN: On amlodipine, metoprolol tartrate. Follow up Dr. Concepción Gomez in [facility] in 4 weeks for stress test.   5. Orthostasis: Ensure she wears ARSH hose and abdominal binder when out of bed. Encourage hydration. 6. OA/L rotator cuff injury:  Stable. Will monitor  7. CKD:  Stable. Will monitor BMP  8. Hypothyroidism: On levothyroxine  9. Anemia: On ferrous sulfate. Monitoring Hb  10. Hx chronic cystitis:  On oxybutynin ER. Frequent incontinence is normal baseline for patient. Urine culture positive for E Coli. Keflex renally adjusted through 4/17. Leukocytosis resolved 4/11. 11. GERD:  On pantoprazole  12. Bowel Management: Miralax daily, senokot prn, milk of magnesia prn, dulcolax prn. On probiotic for loose stools which are improved. 13. DVT Prophylaxis:  low molecular weight heparin, SCD's while in bed and ARSH's   14.  Internal medicine for medical management      Electronically signed by Dionte Howell MD on 4/17/2021 at 5:09 PM

## 2021-04-17 NOTE — PROGRESS NOTES
Speech Language Pathology  Speech Language Pathology  [facility]    Dysphagia/Cognitive Treatment Note    Date: 4/17/2021  Patients Name: Charleen Hemphill  MRN: [id number]  Diagnosis:   Patient Active Problem List   Diagnosis Code    Anemia D64.9    Arthritis M19.90    HTN (hypertension) I10    Hypothyroidism E03.9    Renal insufficiency N28.9    Other long term (current) drug therapy Z79.899    Anemia due to stage 3 chronic kidney disease N18.30, D63.1    Chronic UTI N39.0    Mixed hyperlipidemia E78.2    Gastroesophageal reflux disease without esophagitis K21.9    S/P revision of total knee, 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    Muscular weakness M62.81    Other instability, left knee M25.362    Primary osteoarthritis of both hips M16.0    Primary osteoarthritis of right knee M17.11    Cerebrovascular accident (CVA) ([address] I63.9    Received intravenous tissue plasminogen activator (tPA) in emergency department Z92.82    Acute left hemiparesis ([address] G81.94    Chest pain in adult R07.9    Acute CVA (cerebrovascular accident) ([address] I63.9    Nausea R11.0    Malaise and fatigue R53.81, R53.83       Pain: none reported    Cognitive Treatment    Treatment time: 3446-9371    Subjective: [] Alert [x] Cooperative     [] Confused     [] Agitated    [] Lethargic      Objective/Assessment:  Attention: Sustained during meal.  Max cues and repetition needed to maintain pt. on task for swallowing exercises. Cognition: n/a    Verbal expression: General expression functional in conversation. Dysphagia: Vital stim completed for 40 minutes at 3.0 mA while Pt. eating lunch. Pt. took trials of honey thick liquids via straw and standard cup edge x14, puree solid x26, and minced and moist solid x11. No overt s/s of aspiration observed with consistencies. Min amount lingual residue noted with solids. Pt. Cued to facilitate liquid wash to clear.       Following meal, swallowing exercises completed with VitalStim at 3.0

## 2021-04-18 PROCEDURE — 99231 SBSQ HOSP IP/OBS SF/LOW 25: CPT | Performed by: INTERNAL MEDICINE

## 2021-04-18 PROCEDURE — 6370000000 HC RX 637 (ALT 250 FOR IP): Performed by: STUDENT IN AN ORGANIZED HEALTH CARE EDUCATION/TRAINING PROGRAM

## 2021-04-18 PROCEDURE — 6360000002 HC RX W HCPCS: Performed by: STUDENT IN AN ORGANIZED HEALTH CARE EDUCATION/TRAINING PROGRAM

## 2021-04-18 PROCEDURE — 6370000000 HC RX 637 (ALT 250 FOR IP): Performed by: PHYSICAL MEDICINE & REHABILITATION

## 2021-04-18 PROCEDURE — 99231 SBSQ HOSP IP/OBS SF/LOW 25: CPT | Performed by: STUDENT IN AN ORGANIZED HEALTH CARE EDUCATION/TRAINING PROGRAM

## 2021-04-18 PROCEDURE — 1180000000 HC REHAB R&B

## 2021-04-18 RX ADMIN — OXYBUTYNIN CHLORIDE 5 MG: 5 TABLET, EXTENDED RELEASE ORAL at 09:04

## 2021-04-18 RX ADMIN — FERROUS SULFATE TAB 325 MG (65 MG ELEMENTAL FE) 325 MG: 325 (65 FE) TAB at 20:24

## 2021-04-18 RX ADMIN — AMLODIPINE BESYLATE 5 MG: 5 TABLET ORAL at 09:03

## 2021-04-18 RX ADMIN — PANTOPRAZOLE SODIUM 40 MG: 40 TABLET, DELAYED RELEASE ORAL at 06:06

## 2021-04-18 RX ADMIN — METOPROLOL TARTRATE 12.5 MG: 25 TABLET, FILM COATED ORAL at 09:05

## 2021-04-18 RX ADMIN — FERROUS SULFATE TAB 325 MG (65 MG ELEMENTAL FE) 325 MG: 325 (65 FE) TAB at 09:03

## 2021-04-18 RX ADMIN — Medication 6 MG: at 20:24

## 2021-04-18 RX ADMIN — Medication 1 CAPSULE: at 09:03

## 2021-04-18 RX ADMIN — ATORVASTATIN CALCIUM 40 MG: 40 TABLET, FILM COATED ORAL at 20:24

## 2021-04-18 RX ADMIN — ENOXAPARIN SODIUM 40 MG: 40 INJECTION SUBCUTANEOUS at 09:03

## 2021-04-18 RX ADMIN — CLOPIDOGREL BISULFATE 75 MG: 75 TABLET ORAL at 09:03

## 2021-04-18 RX ADMIN — ASPIRIN 81 MG: 81 TABLET, COATED ORAL at 09:03

## 2021-04-18 RX ADMIN — METOPROLOL TARTRATE 12.5 MG: 25 TABLET, FILM COATED ORAL at 20:25

## 2021-04-18 RX ADMIN — LEVOTHYROXINE SODIUM 112 MCG: 112 TABLET ORAL at 06:06

## 2021-04-18 RX ADMIN — POLYETHYLENE GLYCOL 3350 17 G: 17 POWDER, FOR SOLUTION ORAL at 09:03

## 2021-04-18 ASSESSMENT — PAIN SCALES - GENERAL: PAINLEVEL_OUTOF10: 0

## 2021-04-18 NOTE — PLAN OF CARE
Problem: Skin Integrity:  Goal: Will show no infection signs and symptoms  Description: Will show no infection signs and symptoms  Outcome: Ongoing  Goal: Absence of new skin breakdown  Description: Absence of new skin breakdown  Outcome: Ongoing     Problem: Confusion - Acute:  Goal: Absence of continued neurological deterioration signs and symptoms  Description: Absence of continued neurological deterioration signs and symptoms  Outcome: Ongoing  Goal: Mental status will be restored to baseline  Description: Mental status will be restored to baseline  Outcome: Ongoing     Problem: Discharge Planning:  Goal: Ability to perform activities of daily living will improve  Description: Ability to perform activities of daily living will improve  Outcome: Ongoing  Goal: Participates in care planning  Description: Participates in care planning  Outcome: Ongoing     Problem: Injury - Risk of, Physical Injury:  Goal: Absence of physical injury  Description: Absence of physical injury  Outcome: Ongoing     Problem: Mood - Altered:  Goal: Mood stable  Description: Mood stable  Outcome: Ongoing  Goal: Absence of abusive behavior  Description: Absence of abusive behavior  Outcome: Ongoing  Goal: Verbalizations of feeling emotionally comfortable while being cared for will increase  Description: Verbalizations of feeling emotionally comfortable while being cared for will increase  Outcome: Ongoing     Problem: Psychomotor Activity - Altered:  Goal: Absence of psychomotor disturbance signs and symptoms  Description: Absence of psychomotor disturbance signs and symptoms  Outcome: Ongoing     Problem: Sensory Perception - Impaired:  Goal: Demonstrations of improved sensory functioning will increase  Description: Demonstrations of improved sensory functioning will increase  Outcome: Ongoing  Goal: Decrease in sensory misperception frequency  Description: Decrease in sensory misperception frequency  Outcome: Ongoing  Goal: Able to refrain from responding to false sensory perceptions  Description: Able to refrain from responding to false sensory perceptions  Outcome: Ongoing  Goal: Demonstrates accurate environmental perceptions  Description: Demonstrates accurate environmental perceptions  Outcome: Ongoing  Goal: Able to distinguish between reality-based and nonreality-based thinking  Description: Able to distinguish between reality-based and nonreality-based thinking  Outcome: Ongoing  Goal: Able to interrupt nonreality-based thinking  Description: Able to interrupt nonreality-based thinking  Outcome: Ongoing     Problem: Sleep Pattern Disturbance:  Goal: Appears well-rested  Description: Appears well-rested  Outcome: Ongoing     Problem: Neurological  Goal: Maximum potential motor/sensory/cognitive function  Outcome: Ongoing     Problem: Nutrition  Goal: Optimal nutrition therapy  Description: Nutrition Problem #1: Inadequate oral intake  Intervention: Food and/or Nutrient Delivery: Continue Current Diet, Start Oral Nutrition Supplement  Nutritional Goals: intake more than 75%     Outcome: Ongoing     Problem: Neurological  Goal: Maximum potential motor/sensory/cognitive function  Outcome: Ongoing     Problem: Pain:  Goal: Pain level will decrease  Description: Pain level will decrease  Outcome: Ongoing  Goal: Control of acute pain  Description: Control of acute pain  Outcome: Ongoing  Goal: Control of chronic pain  Description: Control of chronic pain  Outcome: Ongoing

## 2021-04-18 NOTE — PROGRESS NOTES
Physical Medicine & Rehabilitation  Progress Note      Subjective:      Ulysses Moynahan is a 80 y.o. female with ischemic CVA with left nondominant hemiparesis. She reports doing fine today. She states that she is just \"sitting and relaxing\" today, since she was not scheduled for any therapies. She notes mild dizziness with sitting and standing, and orthostatic vital signs were positive with both blood pressure and heart rate. Encourage PO fluid intake. She denies any pain and any other acute concerns. ROS:  Denies fevers, chills, sweats.  +dizziness; No chest pain, palpitations  Denies coughing, wheezing or shortness of breath. Denies abdominal pain, nausea, diarrhea or constipation. No new areas of joint pain. Denies new areas of numbness or weakness. Denies new anxiety or depression issues. No new skin problems. Rehabilitation:   Progressing in therapies. PT:  Restrictions/Precautions: Fall Risk, Modified Diet, Swallowing - Thickened Liquids, General Precautions  Implants present? : (L TKA)   Transfers  Sit to Stand: Contact guard assistance, Minimal Assistance(increased time to extend hips to advance UEs to RW)  Stand to sit: Contact guard assistance(instruction for controlled descent )  Bed to Chair: Moderate assistance(stooped posture with tendency to \"park\" walker away from support of chair)  Stand Pivot Transfers: Contact guard assistance(instruction for RW managment and LE proximal position to sit)  Squat Pivot Transfers: Maximum Assistance(to left , no AD , pt sequences correctly after pre instructi)  Comment: moderate assist sit to stand from recliner  Ambulation 1  Surface: level tile  Device: Rolling Walker  Other Apparatus:  (IV)  Assistance: Contact guard assistance  Quality of Gait: increased trunk flexion , UE weight bearing heavily on walker, hips beyond LEN of walker  Gait Deviations: Slow Zuly  Distance: 100 ft x 2  Comments: needs constant cueing to maintain safety within assistance(assist for buttocks/perineal area; used [address] for feet)  UE Dressing: Minimal assistance(assist for bra clasp only; Min VCs for orientation)  LE Dressing: Minimal assistance(assist threading pants over feet, CGA standing, foot funnel )  Toileting: Contact guard assistance(completed on raised toilet seat )  Additional Comments: declines all ADL tasks this date         Balance  Sitting Balance: Supervision  Standing Balance: Minimal assistance   Standing Balance  Time: PM: 1 min x2  Activity: PM: functional transfers  Comment: no LOB noted but unsteady, cuing for posture with flexing forward, fair return noted  Functional Mobility  Functional - Mobility Device: Rolling Walker  Activity: To/from bathroom  Assist Level: Contact guard assistance  Functional Mobility Comments: VCs for safety and proper tech     Bed mobility  Bridging: Minimal assistance  Rolling to Left: Minimal assistance  Rolling to Right: Minimal assistance  Supine to Sit: Stand by assistance(with leg  used left leg exiting right side of bed)  Sit to Supine: Minimal assistance, Contact guard assistance(leg )  Scooting: Contact guard assistance, Stand by assistance  Comment: Pt remained in bedside chair this date   Transfers  Stand Step Transfers: Minimal assistance(no device, increased time req)  Stand Pivot Transfers: Contact guard assistance  Sit to stand: Moderate assistance  Stand to sit: Minimal assistance, Contact guard assistance  Transfer Comments: cuing for proper tech   Toilet Transfers  Toilet - Technique: Ambulating  Equipment Used: Raised toilet seat with rails  Toilet Transfer: Contact guard assistance  Toilet Transfers Comments: Pt completed transfer to toilet with RW      Shower Transfers  Shower - Transfer From: Wheelchair  Shower - Transfer Type: To and From  Shower - Transfer To:  Transfer tub bench  Shower - Technique: Stand pivot, To right, To left  Shower Transfers: Minimal assistance  Shower Transfers Comments: with Fair safety awareness  Wheelchair Bed Transfers  Wheelchair/Bed - Technique: Ambulating  Equipment Used: Bed, Wheelchair(rolling walker)  Level of Asssistance: 2 Person assistance, Moderate assistance  Wheelchair Transfers Comments: during PM session    SPEECH:      Current Medications:   Current Facility-Administered Medications: lactobacillus (CULTURELLE) capsule 1 capsule, 1 capsule, Oral, Daily with breakfast  ondansetron (ZOFRAN) tablet 4 mg, 4 mg, Oral, Q8H PRN  enoxaparin (LOVENOX) injection 40 mg, 40 mg, Subcutaneous, Daily  aspirin EC tablet 81 mg, 81 mg, Oral, Daily **OR** [DISCONTINUED] aspirin suppository 300 mg, 300 mg, Rectal, Daily  amLODIPine (NORVASC) tablet 5 mg, 5 mg, Oral, Daily  atorvastatin (LIPITOR) tablet 40 mg, 40 mg, Oral, Nightly  clopidogrel (PLAVIX) tablet 75 mg, 75 mg, Oral, Daily  ferrous sulfate (IRON 325) tablet 325 mg, 325 mg, Oral, BID  levothyroxine (SYNTHROID) tablet 112 mcg, 112 mcg, Oral, Daily  melatonin tablet 6 mg, 6 mg, Oral, Nightly PRN  metoprolol tartrate (LOPRESSOR) tablet 12.5 mg, 12.5 mg, Oral, BID  oxybutynin (DITROPAN-XL) extended release tablet 5 mg, 5 mg, Oral, Daily  pantoprazole (PROTONIX) tablet 40 mg, 40 mg, Oral, QAM AC  acetaminophen (TYLENOL) tablet 650 mg, 650 mg, Oral, Q4H PRN  polyethylene glycol (GLYCOLAX) packet 17 g, 17 g, Oral, Daily  bisacodyl (DULCOLAX) suppository 10 mg, 10 mg, Rectal, Daily PRN  magnesium hydroxide (MILK OF MAGNESIA) 400 MG/5ML suspension 30 mL, 30 mL, Oral, Daily PRN      Objective:  BP (!) 150/76   Pulse 82   Temp 98.3 °F (36.8 °C) (Oral)   Resp 20   Ht 5' 7\" (1.702 m)   Wt 172 lb (78 kg)   SpO2 100%   BMI 26.94 kg/m²       GEN: Well developed, well nourished, no acute distress  HEENT: NCAT. EOM grossly intact. Hearing grossly intact. Mucous membranes pink and moist.  RESP: Normal breath sounds with no wheezing, rales, or rhonchi. Respirations WNL and unlabored. CV: Regular rate and rhythm.  No murmurs, rubs, or gallops. ABD: Soft, non-distended, BS+ and equal.  NEURO: Alert. Speech fluent. Sensation to light touch intact. MSK: Decreased AROM in the left shoulder (chronic per patient), PROM better and without pain. Muscle tone and bulk are normal bilaterally. Strength 4+/5 in bilateral upper limbs. Strength 4+/5 with bilateral ankle dorsiflexion and plantarflexion. LIMBS: No edema in bilateral lower limbs. SKIN: Warm and dry with good turgor. PSYCH: Mood WNL. Affect WNL. Appropriately interactive. Diagnostics:     CBC:   No results for input(s): WBC, RBC, HGB, HCT, MCV, RDW, PLT in the last 72 hours. BMP:   No results for input(s): NA, K, CL, CO2, PHOS, BUN, CREATININE, GLUCOSE in the last 72 hours. Invalid input(s): CA  BNP: No results for input(s): BNP in the last 72 hours. PT/INR: No results for input(s): PROTIME, INR in the last 72 hours. APTT: No results for input(s): APTT in the last 72 hours. CARDIAC ENZYMES: No results for input(s): CKMB, CKMBINDEX, TROPONINT in the last 72 hours. Invalid input(s): CKTOTAL;3  FASTING LIPID PANEL:  Lab Results   Component Value Date    CHOL 121 04/02/2021    HDL 55 04/02/2021    TRIG 61 04/02/2021     LIVER PROFILE: No results for input(s): AST, ALT, ALB, BILIDIR, BILITOT, ALKPHOS in the last 72 hours. Impression/Plan:   Impaired ADLs, gait, and mobility due to:    1. 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 On 3 weeks ASA, Plavix with ASA to continue after 4/23. On atorvastatin. May benefit from 1 hour nap after lunch for persistent daytime fatigue. 2. Dysphagia: On minced and moist solids and moderately-thick liquids. SLP treating and including vital stim. Will continue to monitor to determine if she is a candidate for [employer] but she has a lot of food residue in her mouth after each meal.   3. Dehydration/hyponatremia:  Resolved.   IM treated with IV fluid until 4/12 and serial lab tests - improved Na and creatinine. 4. HTN: On amlodipine, metoprolol tartrate. Follow up Dr. Maty Marques in [facility] in 4 weeks for stress test.   5. Orthostasis: Ensure she wears ARSH hose and abdominal binder when out of bed. Encourage hydration. 6. OA/L rotator cuff injury:  Stable. Will monitor  7. CKD:  Stable. Will monitor BMP  8. Hypothyroidism: On levothyroxine  9. Anemia: On ferrous sulfate. Monitoring Hb  10. Hx chronic cystitis:  On oxybutynin ER. Frequent incontinence is normal baseline for patient. Urine culture positive for E Coli. Keflex renally adjusted through 4/17. Leukocytosis resolved 4/11. 11. GERD:  On pantoprazole  12. Bowel Management: Miralax daily, senokot prn, milk of magnesia prn, dulcolax prn. On probiotic for loose stools which are improved. 13. DVT Prophylaxis:  low molecular weight heparin, SCD's while in bed and ARSH's   14.  Internal medicine for medical management      Electronically signed by Dionte Howell MD on 4/18/2021 at 3:08 PM

## 2021-04-18 NOTE — CONSULTS
[facility] Internal Medicine    CONSULTATION / HISTORY AND PHYSICAL EXAMINATION            Date:   4/18/2021  Patient name:  Santo Luis  Date of admission:  4/7/2021 12:21 PM  MRN:   [id number]  Account:  [de-identified]  YOB: 1932  PCP:    VERNELL Moore CNP  Room:   8378/8953-96  Code Status:    Full Code    Physician Requesting Consult: Misa Parks MD    Reason for Consult:  medical management    Chief Complaint:     No chief complaint on file. Ischemic CVA    History Obtained From:     Patient medical record nursing staff    History of Present Illness:     66-year-old elderly lady was admitted to [facility] earlier this month with the acute left-sided weakness after she finished her physical therapy at home she has chronic left leg weakness post multiple surgeries and has chronic shoulder issues and difficulty overhead abduction  Patient received a TPA    4/18/2021    No new complaints/symptoms . Symptoms or ailment  course ;                                   are improving over time. · Continue present regimen     BP Readings from Last 3 Encounters:   04/18/21 (!) 150/76   04/07/21 139/68   03/12/21 135/76    1.     2.             Past Medicl History:     Past Medical History:   Diagnosis Date    Anemia     Hypertension     Hypothyroidism     Osteoarthritis     Other long term (current) drug therapy     Renal insufficiency         Past Surgical History:     No past surgical history on file. Medications Prior to Admission:     Prior to Admission medications    Medication Sig Start Date End Date Taking?  Authorizing Provider   atorvastatin (LIPITOR) 20 MG tablet Take 1 tablet by mouth nightly 3/1/21  Yes VERNELL Nunez CNP   levothyroxine (SYNTHROID) 112 MCG tablet Take 1 tablet by mouth Daily 3/1/21  Yes VERNELL Nunez CNP   oxybutynin (DITROPAN-XL) 5 MG extended release tablet Take 1 tablet by mouth daily 1/5/21  Yes Denisel VERNELL Ann CNP   acetaminophen (TYLENOL) 325 MG tablet Take 650 mg by mouth Take 2 tablets at bedtime for sleeping comfort PRN   Yes Historical Provider, MD   amLODIPine (NORVASC) 5 MG tablet Take 1 tablet by mouth daily 3/1/21   Opal VERNELL Ann CNP   Ferrous Sulfate 324 MG TBEC Take 1 tablet by mouth 2 times daily 3/1/21   Denisel VERNELL Ann CNP   omeprazole (PRILOSEC) 20 MG delayed release capsule Take 1 capsule by mouth daily 3/1/21   Opal LakesVERNELL CNP   sodium bicarbonate 650 MG tablet Take 1 tablet by mouth 4 times daily Two tabs BID 3/1/21   Opal LakesVERNELL CNP   metoprolol tartrate (LOPRESSOR) 25 MG tablet Take 0.5 tablets by mouth 2 times daily Take 1/2 tablet BID 3/1/21   Opal LakesVERNELL CNP   docusate sodium (COLACE) 100 MG capsule Take 1 capsule by mouth every other day 2/24/21   Opal LakesVERNELL CNP   cephALEXin (KEFLEX) 250 MG capsule TAKE ONE CAPSULE BY MOUTH ONCE A DAY. 2/1/21   VERNELL Bashir CNP   fluorouracil (EFUDEX) 5 % cream Apply topically for 4 weeks . 6/29/20   Historical Provider, MD   Calcium Carb-Cholecalciferol (CALCIUM + D3) 600-200 MG-UNIT TABS tablet Take 1 tablet by mouth Daily with lunch    Historical Provider, MD   Multiple Vitamins-Minerals (THERAPEUTIC MULTIVITAMIN-MINERALS) tablet Take 1 tablet by mouth daily    Historical Provider, MD   magnesium oxide (MAG-OX) 400 MG tablet Take 400 mg by mouth daily    Historical Provider, MD   Apoaequorin (PREVAGEN) 10 MG CAPS Take by mouth daily    Historical Provider, MD   GLUCOSA-CHONDR-NA CHONDR-MSM PO Take by mouth    Historical Provider, MD        Allergies:     Ciprofloxacin, Hydrocodone, Macrobid [nitrofurantoin], and Ciprofloxacin    Social History:     Tobacco:    reports that she has never smoked. She has never used smokeless tobacco.  Alcohol:      reports no history of alcohol use. Drug Use:  reports no history of drug use.     Family History:     No family history on file. Review of Systems:     Positive and Negative as described in HPI. CONSTITUTIONAL:  negative for fevers, chills, sweats, fatigue, weight loss  HEENT:  negative for vision, hearing changes, runny nose, throat pain  RESPIRATORY:  negative for shortness of breath, cough, congestion, wheezing. CARDIOVASCULAR:  negative for chest pain, palpitations. GASTROINTESTINAL:  negative for nausea, vomiting, diarrhea, constipation, change in bowel habits, abdominal pain   GENITOURINARY:  negative for difficulty of urination, burning with urination, frequency   INTEGUMENT:  negative for rash, skin lesions, easy bruising   HEMATOLOGIC/LYMPHATIC:  negative for swelling/edema   ALLERGIC/IMMUNOLOGIC:  negative for urticaria , itching  ENDOCRINE:  negative increase in drinking, increase in urination, hot or cold intolerance  MUSCULOSKELETAL:  negative joint pains, muscle aches, swelling of joints  NEUROLOGICAL: Positive for left upper and lower extremity weakness extremities      Physical Exam:     BP (!) 150/76   Pulse 82   Temp 98.3 °F (36.8 °C) (Oral)   Resp 20   Ht 5' 7\" (1.702 m)   Wt 172 lb (78 kg)   SpO2 100%   BMI 26.94 kg/m²   Temp (24hrs), Av.1 °F (36.7 °C), Min:97.8 °F (36.6 °C), Max:98.3 °F (36.8 °C)    No results for input(s): POCGLU in the last 72 hours. Intake/Output Summary (Last 24 hours) at 2021 1417  Last data filed at 2021 0855  Gross per 24 hour   Intake    Output 1350 ml   Net -1350 ml       General Appearance:  alert, well appearing, and in no acute distress  Mental status: oriented to person, place, and time with normal affect  Head:  normocephalic, atraumatic.   Eye: no icterus, redness, pupils equal and reactive, extraocular eye movements intact, conjunctiva clear  Ear: normal external ear, no discharge, hearing intact  Nose:  no drainage noted  Mouth: mucous membranes moist  Neck: supple, no carotid bruits, thyroid not palpable  Lungs: Bilateral equal air entry, for 3-4 out of 5  Aspirin Plavix and Lipitor  Hypertension beta-blocker and Norvasc control  Hypothyroidism on Synthroid follow with TSH in 6 weeks  Mild protein calorie malnutrition  SYEDA  resolved    BP labile - on amlodipine , metoprolol  BP Readings from Last 3 Encounters:   04/18/21 (!) 150/76   04/07/21 139/68   03/12/21 135/76                   Lew Clements MD  4/18/2021  2:17 PM    Copy sent to Dr. Joy Garnett, APRN - CNP    Please note that this chart was generated using voice recognition [address] dictation software. Although every effort was made to ensure the accuracy of this automated transcription, some errors in transcription may have occurred.

## 2021-04-19 PROCEDURE — 1180000000 HC REHAB R&B

## 2021-04-19 PROCEDURE — 6360000002 HC RX W HCPCS: Performed by: STUDENT IN AN ORGANIZED HEALTH CARE EDUCATION/TRAINING PROGRAM

## 2021-04-19 PROCEDURE — 97110 THERAPEUTIC EXERCISES: CPT

## 2021-04-19 PROCEDURE — 99231 SBSQ HOSP IP/OBS SF/LOW 25: CPT | Performed by: INTERNAL MEDICINE

## 2021-04-19 PROCEDURE — 99231 SBSQ HOSP IP/OBS SF/LOW 25: CPT | Performed by: STUDENT IN AN ORGANIZED HEALTH CARE EDUCATION/TRAINING PROGRAM

## 2021-04-19 PROCEDURE — 6370000000 HC RX 637 (ALT 250 FOR IP): Performed by: PHYSICAL MEDICINE & REHABILITATION

## 2021-04-19 PROCEDURE — 6370000000 HC RX 637 (ALT 250 FOR IP): Performed by: STUDENT IN AN ORGANIZED HEALTH CARE EDUCATION/TRAINING PROGRAM

## 2021-04-19 PROCEDURE — 97530 THERAPEUTIC ACTIVITIES: CPT

## 2021-04-19 PROCEDURE — 2500000003 HC RX 250 WO HCPCS: Performed by: STUDENT IN AN ORGANIZED HEALTH CARE EDUCATION/TRAINING PROGRAM

## 2021-04-19 PROCEDURE — 97116 GAIT TRAINING THERAPY: CPT

## 2021-04-19 PROCEDURE — 97535 SELF CARE MNGMENT TRAINING: CPT

## 2021-04-19 PROCEDURE — 92526 ORAL FUNCTION THERAPY: CPT

## 2021-04-19 PROCEDURE — 97129 THER IVNTJ 1ST 15 MIN: CPT

## 2021-04-19 RX ADMIN — ANTI-FUNGAL POWDER MICONAZOLE NITRATE TALC FREE: 1.42 POWDER TOPICAL at 09:09

## 2021-04-19 RX ADMIN — AMLODIPINE BESYLATE 5 MG: 5 TABLET ORAL at 07:50

## 2021-04-19 RX ADMIN — ATORVASTATIN CALCIUM 40 MG: 40 TABLET, FILM COATED ORAL at 22:14

## 2021-04-19 RX ADMIN — ASPIRIN 81 MG: 81 TABLET, COATED ORAL at 07:50

## 2021-04-19 RX ADMIN — POLYETHYLENE GLYCOL 3350 17 G: 17 POWDER, FOR SOLUTION ORAL at 07:50

## 2021-04-19 RX ADMIN — FERROUS SULFATE TAB 325 MG (65 MG ELEMENTAL FE) 325 MG: 325 (65 FE) TAB at 07:50

## 2021-04-19 RX ADMIN — CLOPIDOGREL BISULFATE 75 MG: 75 TABLET ORAL at 07:50

## 2021-04-19 RX ADMIN — ANTI-FUNGAL POWDER MICONAZOLE NITRATE TALC FREE: 1.42 POWDER TOPICAL at 22:26

## 2021-04-19 RX ADMIN — Medication 1 CAPSULE: at 07:50

## 2021-04-19 RX ADMIN — FERROUS SULFATE TAB 325 MG (65 MG ELEMENTAL FE) 325 MG: 325 (65 FE) TAB at 22:14

## 2021-04-19 RX ADMIN — ENOXAPARIN SODIUM 40 MG: 40 INJECTION SUBCUTANEOUS at 07:50

## 2021-04-19 RX ADMIN — OXYBUTYNIN CHLORIDE 5 MG: 5 TABLET, EXTENDED RELEASE ORAL at 07:54

## 2021-04-19 RX ADMIN — LEVOTHYROXINE SODIUM 112 MCG: 112 TABLET ORAL at 05:51

## 2021-04-19 RX ADMIN — METOPROLOL TARTRATE 12.5 MG: 25 TABLET, FILM COATED ORAL at 07:50

## 2021-04-19 RX ADMIN — METOPROLOL TARTRATE 12.5 MG: 25 TABLET, FILM COATED ORAL at 22:14

## 2021-04-19 RX ADMIN — PANTOPRAZOLE SODIUM 40 MG: 40 TABLET, DELAYED RELEASE ORAL at 05:51

## 2021-04-19 NOTE — PROGRESS NOTES
Physical Therapy  Facility/Department: [facility]  Daily Treatment Note  NAME: Sade Ware  : 1932  MRN: [id number]    Date of Service: 2021    Discharge Recommendations:  Patient would benefit from continued therapy after discharge, Home with assist PRN, Home with Home health PT        Assessment      PT Education: Adaptive Device Training;Functional Mobility Training  Activity Tolerance  Activity Tolerance: Patient limited by endurance; Patient limited by fatigue     Patient Diagnosis(es): There were no encounter diagnoses. has a past medical history of Anemia, Hypertension, Hypothyroidism, Osteoarthritis, Other long term (current) drug therapy, and Renal insufficiency. has no past surgical history on file. Restrictions  Restrictions/Precautions  Restrictions/Precautions: Fall Risk, Modified Diet, Swallowing - Thickened Liquids, General Precautions  Required Braces or Orthoses?: No  Implants present? : (L TKA)  Subjective   General  Chart Reviewed: Yes  Subjective  Subjective: \" this is the first time I've gotten up without being dizzy! \"  General Comment  Comments: abdominal binder donned for preventative dizziness   Pain Screening  Patient Currently in Pain: Denies  Vital Signs  Patient Currently in Pain: Denies       Orientation     Cognition      Objective   Bed mobility  Bridging: Contact guard assistance;Stand by assistance(patient unable to completely clear bed)  Rolling to Left: Stand by assistance  Rolling to Right: Stand by assistance  Supine to Sit: Stand by assistance(with leg  used)  Sit to Supine: Stand by assistance;Contact guard assistance(with leg  used)  Scooting: Contact guard assistance;Stand by assistance  Comment: training with leg  used with good understanding demonstrated with cueing. patient has help in the home to get into bed at night.   Transfers  Sit to Stand: Contact guard assistance;Stand by assistance  Stand to sit: Contact guard assistance;Stand by assistance  Bed to Chair: Contact guard assistance(needs cueing to present to front of chair and square up to chair for safety)  Comment: patient needs cueing and intermittent tactile cues for posturing and extra time for stability. patient tendency to maintain posterior weight of heels with difficulty to weight shift forward to walker with release from chair support. Needs cueing control descent to chair. patient reports use of lift chair in the home. re-ed patient with neuro rocking with alternate hand/foot forward to improve transfer from standard chair surface and height. Ambulation  Ambulation?: Yes  Ambulation 1  Surface: level tile  Device: Rolling Walker  Assistance: Contact guard assistance;Stand by assistance  Quality of Gait: increased trunk flexion , UE weight bearing heavily on walker, hips beyond LEN of walker  Distance: 125 feet x 2; 2MWT 109 feet;  Comments: cueing for posturing only. intermittent tactile cues for safety within LEN of walker. Stairs  # Steps : 4  Stairs Height: 6\"  Rails: Bilateral  Curbs: 6\"  Device: Rolling walker  Assistance: Contact guard assistance  Comment: step to right ascending , pt instructed in foot placement and increased hip extension, pt reports fatigue at 4 steps evident by increased flexion         Other exercises  Other exercises 1: seated bilateral LE 2.5# lt green x 20  Other exercises 2: standing BLE 10 reps  Other exercises 3: nustep L4 10 minutes   Other exercises 5: bed mobility with leg  training preformed 3 times  Other exercises 6: Supine BLE 2.5# with slider pad 20 reps SAQ's, heel slides, hip ABD/ADD;   Other exercises 7: Bridging 5 reps                        G-Code     OutComes Score                                                     AM-PAC Score             Goals  Short term goals  Time Frame for Short term goals:  8 to 9 days  Short term goal 1: Pt to perform bed mobility at [facility] with rail  Short term goal 2: Pt to demonstrate transfers at min A  Short term goal 3: Pt to amb 50 to 80 ft with appropriate device, min/mod A   Short term goal 4: Pt to improve L LE strength by 1/3 MMG to improve fucntion. Short term goal 5: Pt able to go up and down 5 steps with 2 UE support, mod A   Long term goals  Time Frame for Long term goals : By LOS  Long term goal 1: Pt able to perform bed mobility mod-I  Long term goal 2: Pt able to perform transfers from varies surfaces at mod-I /supervsion level. Long term goal 3: Pt able to ambulate with appropriate device distance of 80 ft, mod-I/supervsion level. Long term goal 4: Pt able to perform step curb with assisitive device at min A  Long term goal 5: Pt able to go up and down 5 steps with 2 rails at min A to improve LE strength. Long term goal 6: Improve PASS score to atleast 25//36 improve overall fucntion  Long term goal 7: Improve Tinetti balance score to 20 or more/28 to reduce fall risk. Long term goal 8: Pt able to propel w/c on level surfaces 150 ft, with set up/supervsion. Patient Goals   Patient goals : I want to move my Left side to walk better    Plan    Plan  Times per week: 1.5 hr/day, 5 to 7 days/week  Plan weeks: 6  Current Treatment Recommendations: Strengthening, Neuromuscular Re-education, Home Exercise Program, Safety Education & Training, Patient/Caregiver Education & Training, Equipment Evaluation, Education, & procurement, Functional Mobility Training, Balance Training, Endurance Training, Transfer Training, Gait Training, ROM, Stair training  Safety Devices  Type of devices:  All fall risk precautions in place, Patient at risk for falls, Gait belt  Restraints  Initially in place: No     Therapy Time     04/19/21 0852   PT Individual Minutes   Time In [facility]   Time Out 0927   Minutes 55   PT Concurrent Minutes   Time In 0927   Time Out 1008   Minutes 321 Harris Ledbetter, PTA

## 2021-04-19 NOTE — PLAN OF CARE
Problem: Skin Integrity:  Goal: Absence of new skin breakdown  Description: Absence of new skin breakdown  Outcome: Met This Shift   Skin assessment completed this shift. Nutrition and Hydration status assessed with adequate intake. Lalito Score as charted. Pressure Relief Overlay remains intact and inflated to patient's bed throughout the shift  Chair cushion in use for when pt is up to chair. Bilateral heels remain elevated on pillows throughout the shift. Patient tolerates repositioning by staff at least every 2 hours. Patient verbalizes understanding of pressure ulcer prevention measures. Skin integrity maintained. No new skin breakdown noted. Skin to high risk pressure areas including coccyx and heels are clear.     Odalis / Incontinence care provided as needed throughout the shift. Aloe Vesta Moisture Barrier ointment applied to buttocks as a preventative measure. Groin/Odalis Area very red. Miconazole powder ordered this shift. Will continue to monitor.          Problem: Confusion - Acute:  Goal: Mental status will be restored to baseline  Description: Mental status will be restored to baseline  Outcome: Met This Shift  Pt remains A&O X4 so far throughout the shift. Will continue to monitor for changes.        Problem: Injury - Risk of, Physical Injury:  Goal: Will remain free from falls  Description: Will remain free from falls  Outcome: Met This Shift  No falls or injuries sustained at this time. No attempts to get out of bed without nursing assistance. Call light within reach and pt. uses appropriately for assistance. Siderails up x 2. Nonskid footwear remains on. Bed in low and locked position. Hourly nursing rounds made. Pt. Alert and oriented, aware of limitations, and exhibits good safety judgement. Pt. uses assistive devices appropriately. Pt. understands individual fall risk factors.     Pt.  reminded to use call light with each nurse/patient interaction.     Bed alarm / Personal alarm remains engaged throughout the shift as a precaution.          Problem: Pain:  Goal: Pain level will decrease  Description: Pain level will decrease  Outcome: Met This Shift  Pain assessment completed so far this shift. Pt. able to rest without the use of pain medication. Patient denies any pain so far this shift.   Will continue to monitor.

## 2021-04-19 NOTE — CARE COORDINATION
Call received from daughter Asif Rascon; pt had ARI FINNEGAN.   is [facility] [phone]

## 2021-04-19 NOTE — PROGRESS NOTES
[facility]   ACUTE REHABILITATION OCCUPATIONAL THERAPY  DAILY NOTE    Date: 21  Patient Name: Melissa Hickey      Room: 0176/9545-35    MRN: [id number]   : 1932  (80 y.o.)  Gender: female   Referring Practitioner: Pawan Barrett MD  Diagnosis: Acute CVA  Additional Pertinent Hx:  Per ARU preadmission assessment:80year-old female admitted with acute left hemiparesis causing a fall of her chair. Noted acute left-sided weakness worse than previous date. .  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 Patient on baby aspirin at home. Patient given TPA during TPA became hysterical repeat CT showed no change completed TPA. 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 Pt admitted to rehab unit on 21. Restrictions  Restrictions/Precautions: Fall Risk, Modified Diet, Swallowing - Thickened Liquids, General Precautions  Implants present? : (L TKA)  Required Braces or Orthoses?: No  Equipment Used: Bed, Wheelchair(rolling walker)    Subjective  Subjective: \" I am so tired today\"   Comments: Pt reports she has increased fatigue thisPM   Patient Currently in Pain: Denies  Restrictions/Precautions: Fall Risk;Modified Diet;Swallowing - Thickened Liquids; General Precautions  Overall Orientation Status: Impaired          Objective  Cognition  Overall Cognitive Status: Impaired  Arousal/Alertness: Delayed responses to stimuli  Attention Span: Attends with cues to redirect  Memory: Decreased short term memory;Decreased recall of recent events  Following Commands:  Follows multistep commands with repetition  Safety Judgement: Decreased awareness of need for assistance  Awareness of Errors: Assistance required to identify errors made Insights: Decreased awareness of deficits  Sequencing and Organization: Assistance required to implement solutions;Assistance required to generate solutions;Assistance required to identify errors made  Balance  Sitting Balance: Supervision(seated at sink )  Standing Balance: Minimal assistance(standing at sink for ADL activities )  Bed mobility  Supine to Sit: Stand by assistance(HOB slightly raised, using bed rails )  Transfers  Sit to stand: Minimal assistance  Stand to sit: Minimal assistance  Standing Balance  Time: 1-2 min x3, 3 min   Activity: ADL activities, functional mobility   Comment: no LOB noted this date   Functional Mobility  Functional - Mobility Device: Rolling Walker  Activity: To/from bathroom  Assist Level: Contact guard assistance  Functional Mobility Comments: VCs for safety and proper tech               ADL  Feeding: Setup  Grooming: Stand by assistance(seated at sink )  UE Bathing: None  LE Bathing: Moderate assistance  UE Dressing: None  LE Dressing: Moderate assistance  Toileting: Maximum assistance  Additional Comments: Pt requiring assist this date for donning/doffing brief, pants and shoes. Pt having BM on toilet, assist wipping for throughness, siliva area and buttocks. Pt seated in wc urinating, unaware of voiding episode. Pts silvia and buttocks are red and irriated. RN aware           Assessment  Performance deficits / Impairments: Decreased functional mobility ; Decreased strength;Decreased endurance;Decreased balance;Decreased high-level IADLs;Decreased posture;Decreased ADL status; Decreased ROM; Decreased safe awareness;Decreased cognition;Decreased fine motor control;Decreased coordination  Assessment: Education provided to pt on AE, DME, and home safety   Prognosis: Good  Activity Tolerance: Patient Tolerated treatment well;Patient limited by fatigue  Safety Devices in place: Yes  Type of devices: Left in bed;Call light within reach          Patient Education:  Patient Goals   Patient goals : To return home with family support  Learner:patient  Method: demonstration and explanation       Outcome: acknowledged understanding         Plan  Plan  Times per week: 5-7  Times per day: Twice a day  Current Treatment Recommendations: Strengthening, ROM, Safety Education & Training, Positioning, Balance Training, Patient/Caregiver Education & Training, Self-Care / ADL, Functional Mobility Training, Endurance Training, Neuromuscular Re-education, Wheelchair Mobility Training, Cognitive Reorientation, Equipment Evaluation, Education, & procurement, Home Management Training, Cognitive/Perceptual Training  Patient Goals   Patient goals : To return home with family support  Short term goals  Time Frame for Short term goals: 7 to 10 days  Short term goal 1: Pt will perform upper body bathing/dressing with stand by assist.  Short term goal 2: Pt will perform lower body bathing/dressing and toileting tasks with Moderate assist and Good safety. Short term goal 3: Pt will perform functional functional transfers and mobility during self-care with Moderate assist, least restrictive mobility device, and Good safety. Short term goal 4: Pt will for 4+ minutes with 1-2 UE support and Minimal assist while engaging in functional activity of choice. Short term goal 5: Pt will actively participate in 30+ minutes of therapeutic exercise/functional activities to promote increased independence with self-care and mobility. Short term goal 6: Pt will verbalize/demonstrate Good understanding of assistive equipment/durable medical equipment/modified techniques for increased independence with self-care and mobility. Long term goals  Time Frame for Long term goals : By discharge  Long term goal 1: Pt will perform bathing during shower with stand by assist and Good safety. Long term goal 2: Pt will perform dressing and toileting tasks with modified independence and Good safety.   Long term goal 3: Pt will stand for 8+ minutes with

## 2021-04-19 NOTE — PATIENT CARE CONFERENCE
[address] [facility] [address]   ACUTE REHABILITATION  TEAM CONFERENCE NOTE  Date: 21  Patient Name: Jac Castelan       Room: 2165/6360-07  MRN: [id number]       : 1932  (80 y.o.)     Gender: female       Acute CVA (cerebrovascular accident) [facility]) [I63.9]  Diagnosis: Acute CVA     NURSING  Bladder  Incontinent  Bowel   Incontinent  Intervention    Both Bowel & Bladder Program     Wounds/Incisions/Ulcers: No skin issues identified  Medication Education Program: Patient and family unable to manage medication needs currently  Pain: no pain concerns to address    Fall Risk:  Falling star program initiated    PHYSICAL THERAPY  Bed mobility  Bridging: Contact guard assistance;Stand by assistance(patient unable to completely clear bed)  Rolling to Left: Stand by assistance  Rolling to Right: Stand by assistance  Supine to Sit: Stand by assistance(with leg  used)  Sit to Supine: Stand by assistance;Contact guard assistance(with leg  used)  Scooting: Contact guard assistance;Stand by assistance  Comment: training with leg  used with good understanding demonstrated with cueing. patient has help in the home to get into bed at night. Transfers:  Sit to Stand: Contact guard assistance;Stand by assistance  Stand to sit: Contact guard assistance;Stand by assistance  Bed to Chair: Contact guard assistance(needs cueing to present to front of chair and square up to chair for safety)  Comment: patient needs cueing and intermittent tactile cues for posturing and extra time for stability. patient tendency to maintain posterior weight of heels with difficulty to weight shift forward to walker with release from chair support. Needs cueing control descent to chair. patient reports use of lift chair in the home. re-ed patient with neuro rocking with alternate hand/foot forward to improve transfer from standard chair surface and height.     Ambulation 1  Surface: level tile  Device: Rolling Walker Assistance: Contact guard assistance;Stand by assistance  Quality of Gait: increased trunk flexion , UE weight bearing heavily on walker, hips beyond LEN of walker  Distance: 125 feet x 2; 2MWT 109 feet;  Comments: cueing for posturing only. intermittent tactile cues for safety within LEN of walker. # Steps : 4  Stairs Height: 6\"  Rails: Bilateral  Curbs: 6\"  Device: Rolling walker  Assistance: Contact guard assistance  Comment: step to right ascending , pt instructed in foot placement and increased hip extension, pt reports fatigue at 4 steps evident by increased flexion             Equipment Needed: No      Goals  Time Frame for Short term goals:  8 to 9 days  Short term goal 1: Pt to perform bed mobility at SBA with rail  Short term goal 2: Pt to demonstrate transfers at min A  Short term goal 3: Pt to amb 50 to 80 ft with appropriate device, min/mod A   Short term goal 4: Pt to improve L LE strength by 1/3 MMG to improve fucntion. Short term goal 5: Pt able to go up and down 5 steps with 2 UE support, mod A       OCCUPATIONAL THERAPY  SELF CARE      Eating            Setup   Oral Hygiene            Stand by assistance (seated at sink )   Shower/Bathe Self             UE Bathing: None refused for this session   LE Bathing:  Moderate assistance   Upper Body Dressing            None -  Declined to perform during OT session   Lower Body Dressing            Putting On/Taking Off Footwear             Moderate assistance   Toilet Transfer               Max Assist   Toileting Hygiene            Maximum assistance    Bed mobility  Supine to Sit: Stand by assistance(HOB slightly raised, using bed rails )        Balance  Sitting Balance: Supervision(seated at sink )  Standing Balance: Minimal assistance(standing at sink for ADL activities )  Standing Balance  Time: 1-2 min x3, 3 min   Activity: ADL activities, functional mobility   Comment: no LOB noted this date     Equipment Recommendations  Equipment Needed: (TBD) Assessment: Education provided to pt on AE, DME, and home safety     Short term goals  Time Frame for Short term goals: 7 to 10 days  Short term goal 1: Pt will perform upper body bathing/dressing with stand by assist.  Short term goal 2: Pt will perform lower body bathing/dressing and toileting tasks with Moderate assist and Good safety. Short term goal 3: Pt will perform functional functional transfers and mobility during self-care with Moderate assist, least restrictive mobility device, and Good safety. Short term goal 4: Pt will for 4+ minutes with 1-2 UE support and Minimal assist while engaging in functional activity of choice. Short term goal 5: Pt will actively participate in 30+ minutes of therapeutic exercise/functional activities to promote increased independence with self-care and mobility. Short term goal 6: Pt will verbalize/demonstrate Good understanding of assistive equipment/durable medical equipment/modified techniques for increased independence with self-care and mobility. SPEECH THERAPY  Receiving vital stim, on honey  Thick liquids, minced and moist diet, needs max encouragement and cues to participate. Supervision level comprehension and expression, mod A memory and problem solving. Short Term Goal: diet at least restrictive consistency. Mod I comprehension and expression, min A memory and problem solving    NUTRITION  Weight: 172 lb (78 kg) / Body mass index is 26.94 kg/m². Diet Rx: Dysphagia Minced and Moist with Honey Thick liquids and Magic Cup supplements provided twice daily. Intake appears around 50%. Intake appears inadequate, will continue to monitor. Please see nutrition note for details. SOCIAL WORK ASSESSMENT  Assessment: Family is unsure of being able to provide 24 hr supervision. Are considering a SNF until she is safe to be home for short periods of time.    Pre-Admission Status:  Lives With: Alone  Type of Home: House  Home Layout: Able to Live on Main level with patient's neighbor checks in at night. There are times patient is home alone, however frequent checks. Family Education: Family Education initiated and Ongoing    Percentage Risk for Readmission: Low 0 - 18%   Risk of Unplanned Readmission:      16 %    Critical Items: None     Problem / Barrier Intervention / Plan  Results   dysphagia NMES, oral motor exercises     Impaired comprehension and expression Language retraining exercises     Impaired cognition Cognitive retraining exercises     Impaired functional mobility 2* CVA Progress bed mobility, transfers, ambulation with appropriate AD, family training, safety training, weight bearing activities     High Fall risk Static/dynamic balance activities, varied supports, reaching activities     Altered ADL status related to CVA   Patient and family training in modified care techniques and use of devices to support safe care performance       Inadequate nutritional intake Supplement, encourage intake          Total Self Care Score    Total Mobility Score  Admission Score:  15      Admission Score:  18  Goal:  40/42         Goal:  60/90   `  Discharge Plan   Estimated Discharge Date: 4/22/21  Home evaluation needed? No home evaluation need indicated for patient at this time  Overnight or Day Pass: No  Factors facilitating achievement of predicted outcomes: Motivated, Cooperative and Pleasant  Barriers to the achievement of predicted outcomes: Upper extremity weakness, Lower extremity weakness, Incontinence of bladder, Skin Care and Medication managment    Functional Goals at discharge:  Predicted Outcome: Skilled Nursing FacilityPATIENT'S LEVEL OF ASSISTANCE: Modified independence, minimum assistance, moderate assistance  Discharge therapy goals:  PT: Long term goals  Time Frame for Long term goals : By LOS  Long term goal 1: Pt able to perform bed mobility mod-I  Long term goal 2: Pt able to perform transfers from varies surfaces at mod-I /supervsion level.    Long term goal 3: Pt able to ambulate with appropriate device distance of 80 ft, mod-I/supervsion level. Long term goal 4: Pt able to perform step curb with assisitive device at min A  Long term goal 5: Pt able to go up and down 5 steps with 2 rails at min A to improve LE strength. Long term goal 6: Improve PASS score to atleast 25//36 improve overall fucntion  Long term goal 7: Improve Tinetti balance score to 20 or more/28 to reduce fall risk. Long term goal 8: Pt able to propel w/c on level surfaces 150 ft, with set up/supervsion. OT:Long term goals  Time Frame for Long term goals : By discharge  Long term goal 1: Pt will perform bathing during shower with stand by assist and Good safety. Long term goal 2: Pt will perform dressing and toileting tasks with modified independence and Good safety. Long term goal 3: Pt will stand for 8+ minutes with 1-2 UE support, modified independence, no loss of balance while engaging in functional activity of choice. Long term goal 4: Pt will perform functional transfers and mobility with modified independence, least restrictive mobility device, and Good safety. Long term goal 5: Pt will verbalize/demonstrate Good understanding of Fall Prevention Strategies for increased independence with self-care and mobility. Long term goals 6: 9 hole peg and box and blocks to be assessed for LUE as appropriate  ST: diet at least restrictive consistency, supervision for memory and problem solving    Team Members Present at Conference:  :  Mey Dunham  Occupational Therapist: Solitario Connor [address] PT  Speech Therapist: Raymond LOOMISCCC/SLP  Nurse: Ebenezer Gilliland RN   Dietary/Nutrition: Rand Stevens RD LD  Pastoral Care: Yamilet Jiménez  CMG:  Tracie Mai RN    I approve the established interdisciplinary plan of care as documented within the medical record of [facility].     Trudy Davenport MD

## 2021-04-19 NOTE — PROGRESS NOTES
Speech Language Pathology  Speech Language Pathology  [address] [place] [address]    Dysphagia/Cognitive Treatment Note    Date: 4/19/2021  Patients Name: Olivia Rondon  MRN: [id number]  Diagnosis:   Patient Active Problem List   Diagnosis Code    Anemia D64.9    Arthritis M19.90    HTN (hypertension) I10    Hypothyroidism E03.9    Renal insufficiency N28.9    Other long term (current) drug therapy Z79.899    Anemia due to stage 3 chronic kidney disease N18.30, D63.1    Chronic UTI N39.0    Mixed hyperlipidemia E78.2    Gastroesophageal reflux disease without esophagitis K21.9    S/P revision of total knee, 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    Muscular weakness M62.81    Other instability, left knee M25.362    Primary osteoarthritis of both hips M16.0    Primary osteoarthritis of right knee M17.11    Cerebrovascular accident (CVA) ([address] I63.9    Received intravenous tissue plasminogen activator (tPA) in emergency department Z92.82    Acute left hemiparesis ([address] G81.94    Chest pain in adult R07.9    Acute CVA (cerebrovascular accident) ([address] I63.9    Nausea R11.0    Malaise and fatigue R53.81, R53.83       Pain: none reported    Cognitive Treatment    Treatment time: 4234-1078    Subjective: [] Alert [x] Cooperative     [] Confused     [] Agitated    [x] Lethargic      Objective/Assessment:  Attention:   Max cues and repetition needed to maintain pt. on task for swallowing exercises. Cognition: Paragraph recall- 70%    Verbal expression: General expression functional in conversation. Dysphagia: Vital stim completed for 40 minutes at 2.5 mA. Pt. took trials of honey thick liquids via straw x10. Pt. Demonstrating cough/throat clear on all consistencies. Pt. completed simple tongue base strengthening exercises x5, 10 reps each. Briseyda maneuver completed x8. Pt. Completed oral motor exercises x1, 5 reps. MAX cues needed on all tasks. Other: No family present.    Pt. Just taken back to bed by PCT as ST entering room. Pt. Reports she is not feeling well at all this pm.     Plan:  [x] Continue ST services    [] Discharge from ST:      Discharge recommendations: [] Inpatient Rehab   [] [place]   [] Outpatient Therapy  [] Follow up at trauma clinic   [] Other:       Treatment completed by: Donavon Jiménez. JIMMIE/SLP

## 2021-04-19 NOTE — PROGRESS NOTES
Physical Medicine & Rehabilitation  Progress Note      Subjective:      Sreedhar Michel is a 80 y.o. female with ischemic CVA with left nondominant hemiparesis. She reports doing fine today. She states that dizziness is improved from yesterday. She notes that she has been trying to drink more fluids. She denies any other acute concerns. ROS:  Denies fevers, chills, sweats. No chest pain, palpitations, lightheadedness  Denies coughing, wheezing or shortness of breath. Denies abdominal pain, nausea, diarrhea or constipation. No new areas of joint pain. Denies new areas of numbness or weakness. Denies new anxiety or depression issues. No new skin problems. Rehabilitation:   Progressing in therapies. PT:  Restrictions/Precautions: Fall Risk, Modified Diet, Swallowing - Thickened Liquids, General Precautions  Implants present? : (L TKA)   Transfers  Sit to Stand: Contact guard assistance, Stand by assistance  Stand to sit: Contact guard assistance, Stand by assistance  Bed to Chair: Contact guard assistance(needs cueing to present to front of chair and square up to chair for safety)  Stand Pivot Transfers: Contact guard assistance(instruction for RW managment and LE proximal position to sit)  Squat Pivot Transfers: Maximum Assistance(to left , no AD , pt sequences correctly after pre instructi)  Comment: patient needs cueing and intermittent tactile cues for posturing and extra time for stability. patient tendency to maintain posterior weight of heels with difficulty to weight shift forward to walker with release from chair support. Needs cueing control descent to chair. patient reports use of lift chair in the home. re-ed patient with neuro rocking with alternate hand/foot forward to improve transfer from standard chair surface and height. Ambulation 1  Surface: level tile  Device: Rolling Walker  Other Apparatus:  (IV)  Assistance: Contact guard assistance, Stand by assistance  Quality of Gait: increased trunk flexion , UE weight bearing heavily on walker, hips beyond LEN of walker  Gait Deviations: Slow Zuly  Distance: 125 feet x 2; 2MWT 109 feet;  Comments: cueing for posturing only. intermittent tactile cues for safety within LEN of walker. Transfers  Sit to Stand: Contact guard assistance, Stand by assistance  Stand to sit: Contact guard assistance, Stand by assistance  Bed to Chair: Contact guard assistance(needs cueing to present to front of chair and square up to chair for safety)  Stand Pivot Transfers: Contact guard assistance(instruction for RW managment and LE proximal position to sit)  Squat Pivot Transfers: Maximum Assistance(to left , no AD , pt sequences correctly after pre instructi)  Comment: patient needs cueing and intermittent tactile cues for posturing and extra time for stability. patient tendency to maintain posterior weight of heels with difficulty to weight shift forward to walker with release from chair support. Needs cueing control descent to chair. patient reports use of lift chair in the home. re-ed patient with neuro rocking with alternate hand/foot forward to improve transfer from standard chair surface and height. Ambulation  Ambulation?: Yes  More Ambulation?: No  Ambulation 1  Surface: level tile  Device: Rolling Walker  Other Apparatus: (IV)  Assistance: Contact guard assistance, Stand by assistance  Quality of Gait: increased trunk flexion , UE weight bearing heavily on walker, hips beyond LEN of walker  Gait Deviations: Slow Zuly  Distance: 125 feet x 2; 2MWT 109 feet;  Comments: cueing for posturing only. intermittent tactile cues for safety within LEN of walker. Surface: level tile  Ambulation 1  Surface: level tile  Device: Rolling Walker  Other Apparatus:  (IV)  Assistance: Contact guard assistance, Stand by assistance  Quality of Gait: increased trunk flexion , UE weight bearing heavily on walker, hips beyond LEN of walker  Gait Deviations: Slow Zuly Distance: 125 feet x 2; 2MWT 109 feet;  Comments: cueing for posturing only. intermittent tactile cues for safety within LEN of walker. OT:  ADL  Feeding: Setup  Grooming: Stand by assistance(seated at sink )  UE Bathing: None  LE Bathing: Moderate assistance  UE Dressing: None  LE Dressing: Moderate assistance  Toileting: Maximum assistance  Additional Comments: Pt requiring assist this date for donning/doffing brief, pants and shoes. Pt having BM on toilet, assist wipping for throughness, silvia area and buttocks. Pt seated in wc urinating, unaware of voiding episode. Pts silvia and buttocks are red and irriated. RN aware          Balance  Sitting Balance: Supervision(seated at sink )  Standing Balance: Minimal assistance(standing at sink for ADL activities )   Standing Balance  Time: 1-2 min x3, 3 min   Activity: ADL activities, functional mobility   Comment: no LOB noted this date   Functional Mobility  Functional - Mobility Device: Rolling Walker  Activity: To/from bathroom  Assist Level: Contact guard assistance  Functional Mobility Comments: VCs for safety and proper tech     Bed mobility  Bridging: Contact guard assistance, Stand by assistance(patient unable to completely clear bed)  Rolling to Left: Stand by assistance  Rolling to Right: Stand by assistance  Supine to Sit: Stand by assistance(HOB slightly raised, using bed rails )  Sit to Supine: Stand by assistance, Contact guard assistance(with leg  used)  Scooting: Contact guard assistance, Stand by assistance  Comment: training with leg  used with good understanding demonstrated with cueing. patient has help in the home to get into bed at night.   Transfers  Stand Step Transfers: Minimal assistance(no device, increased time req)  Stand Pivot Transfers: Contact guard assistance  Sit to stand: Minimal assistance  Stand to sit: Minimal assistance  Transfer Comments: cuing for proper tech   Toilet Transfers  Toilet - Technique: Ambulating Equipment Used: Raised toilet seat with rails  Toilet Transfer: Contact guard assistance  Toilet Transfers Comments: Pt completed transfer to toilet with RW      Shower Transfers  Shower - Transfer From: Wheelchair  Shower - Transfer Type: To and From  Shower - Transfer To: Transfer tub bench  Shower - Technique: Stand pivot, To right, To left  Shower Transfers: Minimal assistance  Shower Transfers Comments: with Fair safety awareness  Wheelchair Bed Transfers  Wheelchair/Bed - Technique: Ambulating  Equipment Used: Bed, Wheelchair(rolling walker)  Level of Asssistance: 2 Person assistance, Moderate assistance  Wheelchair Transfers Comments: during PM session    SPEECH:  Subjective: []? Alert     [x]? Cooperative     []? Confused     []? Agitated    [x]? Lethargic        Objective/Assessment:  Attention:   Max cues and repetition needed to maintain pt. on task for swallowing exercises.      Cognition: Paragraph recall- 70%     Verbal expression: General expression functional in conversation.       Dysphagia: Vital stim completed for 40 minutes at 2.5 mA. Pt. took trials of honey thick liquids via straw x10. Pt. Demonstrating cough/throat clear on all consistencies.         Pt. completed simple tongue base strengthening exercises x5, 10 reps each.   Briseyda maneuver completed x8. Pt. Completed oral motor exercises x1, 5 reps. MAX cues needed on all tasks.        Other: No family present. Pt. Just taken back to bed by PCT as ST entering room.   Pt. Reports she is not feeling well at all this pm.     Current Medications:   Current Facility-Administered Medications: miconazole (MICOTIN) 2 % powder, , Topical, BID  lactobacillus (CULTURELLE) capsule 1 capsule, 1 capsule, Oral, Daily with breakfast  ondansetron (ZOFRAN) tablet 4 mg, 4 mg, Oral, Q8H PRN  enoxaparin (LOVENOX) injection 40 mg, 40 mg, Subcutaneous, Daily  aspirin EC tablet 81 mg, 81 mg, Oral, Daily **OR** [DISCONTINUED] aspirin suppository 300 mg, 300 mg, Rectal, Daily  amLODIPine (NORVASC) tablet 5 mg, 5 mg, Oral, Daily  atorvastatin (LIPITOR) tablet 40 mg, 40 mg, Oral, Nightly  clopidogrel (PLAVIX) tablet 75 mg, 75 mg, Oral, Daily  ferrous sulfate (IRON 325) tablet 325 mg, 325 mg, Oral, BID  levothyroxine (SYNTHROID) tablet 112 mcg, 112 mcg, Oral, Daily  melatonin tablet 6 mg, 6 mg, Oral, Nightly PRN  metoprolol tartrate (LOPRESSOR) tablet 12.5 mg, 12.5 mg, Oral, BID  oxybutynin (DITROPAN-XL) extended release tablet 5 mg, 5 mg, Oral, Daily  pantoprazole (PROTONIX) tablet 40 mg, 40 mg, Oral, QAM AC  acetaminophen (TYLENOL) tablet 650 mg, 650 mg, Oral, Q4H PRN  polyethylene glycol (GLYCOLAX) packet 17 g, 17 g, Oral, Daily  bisacodyl (DULCOLAX) suppository 10 mg, 10 mg, Rectal, Daily PRN  magnesium hydroxide (MILK OF MAGNESIA) 400 MG/5ML suspension 30 mL, 30 mL, Oral, Daily PRN      Objective:  /60   Pulse 82   Temp 97.8 °F (36.6 °C) (Oral)   Resp 18   Ht 5' 7\" (1.702 m)   Wt 172 lb (78 kg)   SpO2 98%   BMI 26.94 kg/m²       GEN: Well developed, well nourished, no acute distress  HEENT: NCAT. EOM grossly intact. Hearing grossly intact. Mucous membranes pink and moist.  RESP: Normal breath sounds with no wheezing, rales, or rhonchi. Respirations WNL and unlabored. CV: Regular rate and rhythm. No murmurs, rubs, or gallops. ABD: Soft, non-distended, BS+ and equal.  NEURO: Alert. Speech fluent. Sensation to light touch intact. MSK: Muscle tone and bulk are normal bilaterally. Strength 4+/5 in bilateral upper limbs. Strength 4+/5 with bilateral knee extension and ankle dorsiflexion. LIMBS: No edema in bilateral lower limbs. SKIN: Warm and dry with good turgor. PSYCH: Mood WNL. Affect WNL. Appropriately interactive. Diagnostics:     CBC:   No results for input(s): WBC, RBC, HGB, HCT, MCV, RDW, PLT in the last 72 hours. BMP:   No results for input(s): NA, K, CL, CO2, PHOS, BUN, CREATININE, GLUCOSE in the last 72 hours.     Invalid input(s): CA of magnesia prn, dulcolax prn. On probiotic for loose stools which are improved. 13. DVT Prophylaxis:  low molecular weight heparin, SCD's while in bed and ARSH's   14.  Internal medicine for medical management      Electronically signed by Camilo De La Torre MD on 4/19/2021 at 9:47 PM

## 2021-04-19 NOTE — CONSULTS
[facility]    CONSULTATION / HISTORY AND PHYSICAL EXAMINATION            Date:   4/19/2021  Patient name:  Yo Umana  Date of admission:  4/7/2021 12:21 PM  MRN:   [id number]  Account:  [de-identified]  YOB: 1932  PCP:    VERNELL Jacobs CNP  Room:   [facility]/9026-58  Code Status:    Full Code    Physician Requesting Consult: Krista Lindsay MD    Reason for Consult:  medical management    Chief Complaint:     No chief complaint on file. Ischemic CVA    History Obtained From:     Patient medical record nursing staff    History of Present Illness:     59-year-old elderly lady was admitted to [facility] earlier this month with the acute left-sided weakness after she finished her physical therapy at home she has chronic left leg weakness post multiple surgeries and has chronic shoulder issues and difficulty overhead abduction  Patient received a TPA    4/19/2021    No new complaints/symptoms . Symptoms or ailment  course ;                                   are improving over time. · Continue present regimen     BP Readings from Last 3 Encounters:   04/19/21 (!) 145/57   04/07/21 139/68   03/12/21 135/76    1.     2.             Past Medicl History:     Past Medical History:   Diagnosis Date    Anemia     Hypertension     Hypothyroidism     Osteoarthritis     Other long term (current) drug therapy     Renal insufficiency         Past Surgical History:     No past surgical history on file. Medications Prior to Admission:     Prior to Admission medications    Medication Sig Start Date End Date Taking?  Authorizing Provider   atorvastatin (LIPITOR) 20 MG tablet Take 1 tablet by mouth nightly 3/1/21  Yes Alexus Tripp APRALICIA - CNP   levothyroxine (SYNTHROID) 112 MCG tablet Take 1 tablet by mouth Daily 3/1/21  Yes Alexus Tripp APRN - CNP   oxybutynin (DITROPAN-XL) 5 MG extended release tablet Take 1 tablet by mouth daily 1/5/21  Yes Génesiscrispin Arora, VERNELL - CNP   acetaminophen (TYLENOL) 325 MG tablet Take 650 mg by mouth Take 2 tablets at bedtime for sleeping comfort PRN   Yes Historical Provider, MD   amLODIPine (NORVASC) 5 MG tablet Take 1 tablet by mouth daily 3/1/21   Génesiscrispin Arora, APRN - CNP   Ferrous Sulfate 324 MG TBEC Take 1 tablet by mouth 2 times daily 3/1/21   Génesiscrispin Arora, APRN - CNP   omeprazole (PRILOSEC) 20 MG delayed release capsule Take 1 capsule by mouth daily 3/1/21   Carmen Arora, APRN - CNP   sodium bicarbonate 650 MG tablet Take 1 tablet by mouth 4 times daily Two tabs BID 3/1/21   Carmen Arora, APRN - CNP   metoprolol tartrate (LOPRESSOR) 25 MG tablet Take 0.5 tablets by mouth 2 times daily Take 1/2 tablet BID 3/1/21   Carmen Arora, APRN - CNP   docusate sodium (COLACE) 100 MG capsule Take 1 capsule by mouth every other day 2/24/21   Génesiscrispin Arora, VERNELL - CNP   cephALEXin (KEFLEX) 250 MG capsule TAKE ONE CAPSULE BY MOUTH ONCE A DAY. 2/1/21   VERNELL Dunbar - CNP   fluorouracil (EFUDEX) 5 % cream Apply topically for 4 weeks . 6/29/20   Historical Provider, MD   Calcium Carb-Cholecalciferol (CALCIUM + D3) 600-200 MG-UNIT TABS tablet Take 1 tablet by mouth Daily with lunch    Historical Provider, MD   Multiple Vitamins-Minerals (THERAPEUTIC MULTIVITAMIN-MINERALS) tablet Take 1 tablet by mouth daily    Historical Provider, MD   magnesium oxide (MAG-OX) 400 MG tablet Take 400 mg by mouth daily    Historical Provider, MD   Apoaequorin (PREVAGEN) 10 MG CAPS Take by mouth daily    Historical Provider, MD   GLUCOSA-CHONDR-NA CHONDR-MSM PO Take by mouth    Historical Provider, MD        Allergies:     Ciprofloxacin, Hydrocodone, Macrobid [nitrofurantoin], and Ciprofloxacin    Social History:     Tobacco:    reports that she has never smoked. She has never used smokeless tobacco.  Alcohol:      reports no history of alcohol use. Drug Use:  reports no history of drug use.     Family History:     No family history on file. Review of Systems:     Positive and Negative as described in HPI. CONSTITUTIONAL:  negative for fevers, chills, sweats, fatigue, weight loss  HEENT:  negative for vision, hearing changes, runny nose, throat pain  RESPIRATORY:  negative for shortness of breath, cough, congestion, wheezing. CARDIOVASCULAR:  negative for chest pain, palpitations. GASTROINTESTINAL:  negative for nausea, vomiting, diarrhea, constipation, change in bowel habits, abdominal pain   GENITOURINARY:  negative for difficulty of urination, burning with urination, frequency   INTEGUMENT:  negative for rash, skin lesions, easy bruising   HEMATOLOGIC/LYMPHATIC:  negative for swelling/edema   ALLERGIC/IMMUNOLOGIC:  negative for urticaria , itching  ENDOCRINE:  negative increase in drinking, increase in urination, hot or cold intolerance  MUSCULOSKELETAL:  negative joint pains, muscle aches, swelling of joints  NEUROLOGICAL: Positive for left upper and lower extremity weakness extremities      Physical Exam:     BP (!) 145/57   Pulse 78   Temp 98 °F (36.7 °C)   Resp 19   Ht 5' 7\" (1.702 m)   Wt 172 lb (78 kg)   SpO2 97%   BMI 26.94 kg/m²   Temp (24hrs), Av.1 °F (36.7 °C), Min:98 °F (36.7 °C), Max:98.1 °F (36.7 °C)    No results for input(s): POCGLU in the last 72 hours. Intake/Output Summary (Last 24 hours) at 2021 1319  Last data filed at 2021 0553  Gross per 24 hour   Intake    Output 600 ml   Net -600 ml       General Appearance:  alert, well appearing, and in no acute distress  Mental status: oriented to person, place, and time with normal affect  Head:  normocephalic, atraumatic.   Eye: no icterus, redness, pupils equal and reactive, extraocular eye movements intact, conjunctiva clear  Ear: normal external ear, no discharge, hearing intact  Nose:  no drainage noted  Mouth: mucous membranes moist  Neck: supple, no carotid bruits, thyroid not palpable  Lungs: Bilateral equal air entry, clear to ausculation, no wheezing, rales or rhonchi, normal effort  Cardiovascular: normal rate, regular rhythm, no murmur, gallop, rub. Abdomen: Soft, nontender, nondistended, normal bowel sounds, no hepatomegaly or splenomegaly  Neurologic: Left upper and left lower extremity weakness  Skin: No gross lesions, rashes, bruising or bleeding on exposed skin area  Extremities:  peripheral pulses palpable, no pedal edema or calf pain with palpation      Investigations:      Laboratory Testing:  No results found for this or any previous visit (from the past 24 hour(s)). Medications: Allergies:     Allergies   Allergen Reactions    Ciprofloxacin     Hydrocodone     Macrobid [Nitrofurantoin]     Ciprofloxacin Rash       Current Meds:   Scheduled Meds:    miconazole   Topical BID    lactobacillus  1 capsule Oral Daily with breakfast    enoxaparin  40 mg Subcutaneous Daily    aspirin  81 mg Oral Daily    amLODIPine  5 mg Oral Daily    atorvastatin  40 mg Oral Nightly    clopidogrel  75 mg Oral Daily    ferrous sulfate  325 mg Oral BID    levothyroxine  112 mcg Oral Daily    metoprolol tartrate  12.5 mg Oral BID    oxybutynin  5 mg Oral Daily    pantoprazole  40 mg Oral QAM AC    polyethylene glycol  17 g Oral Daily     Continuous Infusions:   PRN Meds: ondansetron, melatonin, acetaminophen, bisacodyl, magnesium hydroxide      Consultations:   IP CONSULT TO SOCIAL WORK  IP CONSULT TO INTERNAL MEDICINE  Assessment :      Primary Problem  <principal problem not specified>    Active Hospital Problems    Diagnosis Date Noted    Malaise and fatigue [R53.81, R53.83] 04/10/2021    Acute CVA (cerebrovascular accident) ([facility] [address] [I63.9] 04/07/2021    Acute left hemiparesis (HCC) [G81.94]     Gastroesophageal reflux disease without esophagitis [K21.9] 02/24/2021    HTN (hypertension) [I10] 10/21/2020    Hypothyroidism [E03.9] 10/21/2020       Plan:     Acute right ischemic infarct of the rhonda status post thrombolytics  Left hemiparesis for 3-4 out of 5  Aspirin Plavix and Lipitor  Hypertension beta-blocker and Norvasc control  Hypothyroidism on Synthroid follow with TSH in 6 weeks  Mild protein calorie malnutrition  SYEDA  resolved    BP labile - on amlodipine , metoprolol  BP Readings from Last 3 Encounters:   04/19/21 (!) 145/57   04/07/21 139/68   03/12/21 135/76                   Roberta Jiang MD  4/19/2021  1:19 PM    Copy sent to Dr. Didier Toledo, VERNELL - CNP    Please note that this chart was generated using voice recognition [address] dictation software. Although every effort was made to ensure the accuracy of this automated transcription, some errors in transcription may have occurred.

## 2021-04-20 PROCEDURE — 92526 ORAL FUNCTION THERAPY: CPT

## 2021-04-20 PROCEDURE — 6370000000 HC RX 637 (ALT 250 FOR IP): Performed by: STUDENT IN AN ORGANIZED HEALTH CARE EDUCATION/TRAINING PROGRAM

## 2021-04-20 PROCEDURE — 97110 THERAPEUTIC EXERCISES: CPT

## 2021-04-20 PROCEDURE — 97530 THERAPEUTIC ACTIVITIES: CPT

## 2021-04-20 PROCEDURE — 97116 GAIT TRAINING THERAPY: CPT

## 2021-04-20 PROCEDURE — 6370000000 HC RX 637 (ALT 250 FOR IP): Performed by: PHYSICAL MEDICINE & REHABILITATION

## 2021-04-20 PROCEDURE — 99231 SBSQ HOSP IP/OBS SF/LOW 25: CPT | Performed by: INTERNAL MEDICINE

## 2021-04-20 PROCEDURE — 6360000002 HC RX W HCPCS: Performed by: STUDENT IN AN ORGANIZED HEALTH CARE EDUCATION/TRAINING PROGRAM

## 2021-04-20 PROCEDURE — 97535 SELF CARE MNGMENT TRAINING: CPT

## 2021-04-20 PROCEDURE — 1180000000 HC REHAB R&B

## 2021-04-20 PROCEDURE — 99231 SBSQ HOSP IP/OBS SF/LOW 25: CPT | Performed by: STUDENT IN AN ORGANIZED HEALTH CARE EDUCATION/TRAINING PROGRAM

## 2021-04-20 RX ADMIN — ANTI-FUNGAL POWDER MICONAZOLE NITRATE TALC FREE: 1.42 POWDER TOPICAL at 22:14

## 2021-04-20 RX ADMIN — ANTI-FUNGAL POWDER MICONAZOLE NITRATE TALC FREE: 1.42 POWDER TOPICAL at 21:26

## 2021-04-20 RX ADMIN — AMLODIPINE BESYLATE 5 MG: 5 TABLET ORAL at 12:14

## 2021-04-20 RX ADMIN — LEVOTHYROXINE SODIUM 112 MCG: 112 TABLET ORAL at 06:42

## 2021-04-20 RX ADMIN — Medication 1 CAPSULE: at 08:18

## 2021-04-20 RX ADMIN — ENOXAPARIN SODIUM 40 MG: 40 INJECTION SUBCUTANEOUS at 12:22

## 2021-04-20 RX ADMIN — ANTI-FUNGAL POWDER MICONAZOLE NITRATE TALC FREE: 1.42 POWDER TOPICAL at 12:22

## 2021-04-20 RX ADMIN — OXYBUTYNIN CHLORIDE 5 MG: 5 TABLET, EXTENDED RELEASE ORAL at 12:20

## 2021-04-20 RX ADMIN — CLOPIDOGREL BISULFATE 75 MG: 75 TABLET ORAL at 12:17

## 2021-04-20 RX ADMIN — PANTOPRAZOLE SODIUM 40 MG: 40 TABLET, DELAYED RELEASE ORAL at 06:42

## 2021-04-20 RX ADMIN — FERROUS SULFATE TAB 325 MG (65 MG ELEMENTAL FE) 325 MG: 325 (65 FE) TAB at 21:23

## 2021-04-20 RX ADMIN — FERROUS SULFATE TAB 325 MG (65 MG ELEMENTAL FE) 325 MG: 325 (65 FE) TAB at 12:14

## 2021-04-20 RX ADMIN — ASPIRIN 81 MG: 81 TABLET, COATED ORAL at 12:15

## 2021-04-20 RX ADMIN — METOPROLOL TARTRATE 12.5 MG: 25 TABLET, FILM COATED ORAL at 12:15

## 2021-04-20 RX ADMIN — Medication 6 MG: at 21:28

## 2021-04-20 RX ADMIN — ATORVASTATIN CALCIUM 40 MG: 40 TABLET, FILM COATED ORAL at 21:23

## 2021-04-20 RX ADMIN — METOPROLOL TARTRATE 12.5 MG: 25 TABLET, FILM COATED ORAL at 21:23

## 2021-04-20 NOTE — PROGRESS NOTES
[address]    ACUTE REHABILITATION OCCUPATIONAL THERAPY  DAILY NOTE    Date: 21  Patient Name: Jac Castelan      Room: 3660/8782-33    MRN: [id number]   : 1932  (80 y.o.)  Gender: female   Referring Practitioner: Jael Dean MD  Diagnosis: Acute CVA  Additional Pertinent Hx:  Per ARU preadmission assessment:80year-old female admitted with acute left hemiparesis causing a fall of her chair. Noted acute left-sided weakness worse than previous date. .  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 Rosalva Colder She was life flighted to "[facility]. Patient on baby aspirin at home. Patient given TPA during TPA became hysterical repeat CT showed no change completed TPA. 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 Pt admitted to rehab unit on 21. Restrictions  Restrictions/Precautions: Fall Risk, Modified Diet, Swallowing - Thickened Liquids, General Precautions  Implants present? : (l TKA)  Required Braces or Orthoses?: No  Equipment Used: Bed, Wheelchair(rolling walker)    Subjective  Subjective: \"well I guess I can do that\"  Comments: pt states in regards to denture care, pt declines remaining ADL tasks this date due to fatigue and states she does not feel that need to do so today, this writer educated pt on OT POC and increasing indep for ADL tasks, pt receptive and verbalized understanding to education but continues to decline tasks due to fatigue this date, pt sat sinkside for denture care  Patient Currently in Pain: Denies  Restrictions/Precautions: Fall Risk;Modified Diet;Swallowing - Thickened Liquids; General Precautions        Pain Assessment  Pain Assessment: 0-10  Pain Level: 8  Pain Type: Acute pain  Pain Location: Arm  Pain Orientation: Left  Pain Descriptors: Sore    Objective  Cognition  Overall Cognitive Status: Impaired  Arousal/Alertness: Delayed responses to stimuli  Attention Span: Attends with cues to redirect  Memory: Decreased short term memory;Decreased recall of recent events  Following Commands:  Follows multistep commands with repetition  Safety Judgement: Decreased awareness of need for assistance  Awareness of Errors: Assistance required to identify errors made  Insights: Decreased awareness of deficits  Sequencing and Organization: Assistance required to implement solutions;Assistance required to generate solutions;Assistance required to identify errors made  Perception  Overall Perceptual Status: Impaired  Initiation: Cues to initiate tasks  Perseveration: Perseverates during ADLs  Balance  Sitting Balance: Supervision(seated at sink, chair alarm in w/c)  Standing Balance: Contact guard assistance  Transfers  Stand Step Transfers: Contact guard assistance(R/W)  Sit to stand: Minimal assistance;Contact guard assistance  Stand to sit: Contact guard assistance  Transfer Comments: cuing for proper tech  Standing Balance  Time: PM: 2 min  Activity: functional transfer/mobility in room using R/W  Comment: no LOB noted, flexed forward posture req cuing to correct-fair return noted  Functional Mobility  Functional - Mobility Device: Rolling Walker  Activity: Other  Assist Level: Contact guard assistance  Functional Mobility Comments: VCs for safety, posture and proper tech           Additional Activities: PM: family meeting with OT this date to discuss current level of A and DC planning, family concerned with pt returning home, this writer discussed pt's level of A for ADL's, transfers and mobility, pt currently at about baseline, family agreeable, this writer discussed with family that continued therapy at home with be an option and recommendation (PT/OT/ST), as well as NSG to follow with an aid, family very pleased with hearing this and decreased their anxiety of pt returning home, pt satisfied with discussion with her wishes to return home                 ADL  Feeding: Setup  Grooming: Setup(seated for denture care)  UE Bathing: None  LE Bathing: None  UE Dressing: None  LE Dressing: None(declines TEDs this date, NSG notified)  Toileting: None(did not req use/need at this time)  Additional Comments: increased time req at sink for denture care, pt req cuing for soaking dentures due to excessive build up of food particles, extra time req for brushing dentures          Assessment  Performance deficits / Impairments: Decreased functional mobility ; Decreased strength;Decreased endurance;Decreased balance;Decreased high-level IADLs;Decreased posture;Decreased ADL status; Decreased ROM; Decreased safe awareness;Decreased cognition;Decreased fine motor control;Decreased coordination  Prognosis: Good  Discharge Recommendations: Home with assist PRN;Home with Home health OT  Activity Tolerance: Patient limited by fatigue  Activity Tolerance: fatigued, rest breaks req, increased time req  Safety Devices in place: Yes  Type of devices: Nurse notified; Left in chair;Patient at risk for falls;Call light within reach; Chair alarm in place  Equipment Recommendations  Equipment Needed: (TBD)       Patient Education: DC planning, ADL tasks, activity promotion, family discussion for returning home   Patient Goals   Patient goals :  To return home with family support  Learner:family and patient  Method: explanation       Outcome: verbalized concerns, demonstrated understanding, needs reinforcement and asked questions        Plan  Plan  Times per week: 5-7  Times per day: Twice a day  Current Treatment Recommendations: Strengthening, ROM, Safety Education & Training, Positioning, Balance Training, Patient/Caregiver Education & Training, Self-Care / ADL, Functional Mobility Training, Endurance Training, Neuromuscular Re-education, Wheelchair Mobility Training, Cognitive Reorientation, Equipment Evaluation, Education, & procurement, Home Management Training, Cognitive/Perceptual Training  Patient Goals   Patient goals : To return home with family support  Short term goals  Time Frame for Short term goals: 7 to 10 days  Short term goal 1: Pt will perform upper body bathing/dressing with stand by assist.  Short term goal 2: Pt will perform lower body bathing/dressing and toileting tasks with Moderate assist and Good safety. Short term goal 3: Pt will perform functional functional transfers and mobility during self-care with Moderate assist, least restrictive mobility device, and Good safety. Short term goal 4: Pt will for 4+ minutes with 1-2 UE support and Minimal assist while engaging in functional activity of choice. Short term goal 5: Pt will actively participate in 30+ minutes of therapeutic exercise/functional activities to promote increased independence with self-care and mobility. Short term goal 6: Pt will verbalize/demonstrate Good understanding of assistive equipment/durable medical equipment/modified techniques for increased independence with self-care and mobility. Long term goals  Time Frame for Long term goals : By discharge  Long term goal 1: Pt will perform bathing during shower with stand by assist and Good safety. Long term goal 2: Pt will perform dressing and toileting tasks with modified independence and Good safety. Long term goal 3: Pt will stand for 8+ minutes with 1-2 UE support, modified independence, no loss of balance while engaging in functional activity of choice. Long term goal 4: Pt will perform functional transfers and mobility with modified independence, least restrictive mobility device, and Good safety. Long term goal 5: Pt will verbalize/demonstrate Good understanding of Fall Prevention Strategies for increased independence with self-care and mobility.   Long term goals 6: 9 hole peg and box and blocks to be assessed for LUE as appropriate

## 2021-04-20 NOTE — PROGRESS NOTES
Speech Language Pathology  Speech Language Pathology  [facility]    Dysphagia/Cognitive Treatment Note    Date: 4/20/2021  Patients Name: Bindu Hutchison  MRN: [id number]  Diagnosis:   Patient Active Problem List   Diagnosis Code    Anemia D64.9    Arthritis M19.90    HTN (hypertension) I10    Hypothyroidism E03.9    Renal insufficiency N28.9    Other long term (current) drug therapy Z79.899    Anemia due to stage 3 chronic kidney disease N18.30, D63.1    Chronic UTI N39.0    Mixed hyperlipidemia E78.2    Gastroesophageal reflux disease without esophagitis K21.9    S/P revision of total knee, 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    Muscular weakness M62.81    Other instability, left knee M25.362    Primary osteoarthritis of both hips M16.0    Primary osteoarthritis of right knee M17.11    Cerebrovascular accident (CVA) ([address] I63.9    Received intravenous tissue plasminogen activator (tPA) in emergency department Z92.82    Acute left hemiparesis ([address] G81.94    Chest pain in adult R07.9    Acute CVA (cerebrovascular accident) ([facility] [address] I63.9    Nausea R11.0    Malaise and fatigue R53.81, R53.83       Pain: none reported    Cognitive Treatment    Treatment time: 4406-5838    Subjective: [] Alert [x] Cooperative     [] Confused     [] Agitated    [x] Lethargic      Objective/Assessment:  Attention:   Max cues and repetition needed to maintain pt. on task and alertness for swallowing exercises. Cognition: n/a    Verbal expression: General expression functional in conversation. Dysphagia: Vital stim completed for 46 minutes at 3.5 mA. Pt. took trials of honey thick liquids via straw x25. Pt. Demonstrating throat clear following 2 trials. Pt. completed simple tongue base strengthening exercises x5, 10 reps each. Briseyda maneuver completed x10. Pt. Completed oral motor exercises x2, 10 reps. MAX cues needed on all tasks. Other: Pt. Son in law and daughter were present.    ST recommending 24 hour care at home, though family reports they are unable to provide that at this time and are considering SNF. ST reporting that pt. Could be left home possibly an hour at a time in between family/caregivers that come to her house. ST shared concerns of pt. Drinking unthickened liquid at home and attempting to eat foods that are not part of her diet recommendations. Pt. Daughter reports they will remove any food that is not safe for her to eat from the home. Plan:  [x] Continue ST services    [] Discharge from ST:      Discharge recommendations: [] Inpatient Rehab   [] [place]   [] Outpatient Therapy  [] Follow up at trauma clinic   [] Other:       Treatment completed by: Ila Pierson A.CCC/SLP

## 2021-04-20 NOTE — PROGRESS NOTES
[facility] Internal Medicine    CONSULTATION / HISTORY AND PHYSICAL EXAMINATION            Date:   4/20/2021  Patient name:  Nola Bumpers  Date of admission:  4/7/2021 12:21 PM  MRN:   [id number]  Account:  [de-identified]  YOB: 1932  PCP:    VERNELL Barragan CNP  Room:   [facility]  Code Status:    Full Code    Physician Requesting Consult: Benjamin Marrero MD    Reason for Consult:  medical management    Chief Complaint:     No chief complaint on file. Ischemic CVA    History Obtained From:     Patient medical record nursing staff    History of Present Illness:     49-year-old elderly lady was admitted to [facility] earlier this month with the acute left-sided weakness after she finished her physical therapy at home she has chronic left leg weakness post multiple surgeries and has chronic shoulder issues and difficulty overhead abduction  Patient received a TPA    4/20/2021    No new complaints/symptoms . Symptoms or ailment  course ;                                   are improving over time. · Continue present regimen     BP Readings from Last 3 Encounters:   04/20/21 (!) 144/75   04/07/21 139/68   03/12/21 135/76    1.     2.             Past Medicl History:     Past Medical History:   Diagnosis Date    Anemia     Hypertension     Hypothyroidism     Osteoarthritis     Other long term (current) drug therapy     Renal insufficiency         Past Surgical History:     No past surgical history on file. Medications Prior to Admission:     Prior to Admission medications    Medication Sig Start Date End Date Taking?  Authorizing Provider   atorvastatin (LIPITOR) 20 MG tablet Take 1 tablet by mouth nightly 3/1/21  Yes VERNELL Mc CNP   levothyroxine (SYNTHROID) 112 MCG tablet Take 1 tablet by mouth Daily 3/1/21  Yes VERNELL Mc CNP   oxybutynin (DITROPAN-XL) 5 MG extended release tablet Take 1 tablet by mouth daily 1/5/21  Yes VERNELL Sanchez CNP   acetaminophen (TYLENOL) 325 MG tablet Take 650 mg by mouth Take 2 tablets at bedtime for sleeping comfort PRN   Yes Historical Provider, MD   amLODIPine (NORVASC) 5 MG tablet Take 1 tablet by mouth daily 3/1/21   VERNELL Sanchez CNP   Ferrous Sulfate 324 MG TBEC Take 1 tablet by mouth 2 times daily 3/1/21   VERNELL Sanchez CNP   omeprazole (PRILOSEC) 20 MG delayed release capsule Take 1 capsule by mouth daily 3/1/21   VERNELL Sanchez CNP   sodium bicarbonate 650 MG tablet Take 1 tablet by mouth 4 times daily Two tabs BID 3/1/21   VERNELL Sanchez - CNP   metoprolol tartrate (LOPRESSOR) 25 MG tablet Take 0.5 tablets by mouth 2 times daily Take 1/2 tablet BID 3/1/21   VERNELL Sanchez CNP   docusate sodium (COLACE) 100 MG capsule Take 1 capsule by mouth every other day 2/24/21   VERNELL Sanchez CNP   cephALEXin (KEFLEX) 250 MG capsule TAKE ONE CAPSULE BY MOUTH ONCE A DAY. 2/1/21   VERNELL Monaco CNP   fluorouracil (EFUDEX) 5 % cream Apply topically for 4 weeks . 6/29/20   Historical Provider, MD   Calcium Carb-Cholecalciferol (CALCIUM + D3) 600-200 MG-UNIT TABS tablet Take 1 tablet by mouth Daily with lunch    Historical Provider, MD   Multiple Vitamins-Minerals (THERAPEUTIC MULTIVITAMIN-MINERALS) tablet Take 1 tablet by mouth daily    Historical Provider, MD   magnesium oxide (MAG-OX) 400 MG tablet Take 400 mg by mouth daily    Historical Provider, MD   Apoaequorin (PREVAGEN) 10 MG CAPS Take by mouth daily    Historical Provider, MD   GLUCOSA-CHONDR-NA CHONDR-MSM PO Take by mouth    Historical Provider, MD        Allergies:     Ciprofloxacin, Hydrocodone, Macrobid [nitrofurantoin], and Ciprofloxacin    Social History:     Tobacco:    reports that she has never smoked. She has never used smokeless tobacco.  Alcohol:      reports no history of alcohol use. Drug Use:  reports no history of drug use.     Family History:     No family history on file. Review of Systems:     Positive and Negative as described in HPI. CONSTITUTIONAL:  negative for fevers, chills, sweats, fatigue, weight loss  HEENT:  negative for vision, hearing changes, runny nose, throat pain  RESPIRATORY:  negative for shortness of breath, cough, congestion, wheezing. CARDIOVASCULAR:  negative for chest pain, palpitations. GASTROINTESTINAL:  negative for nausea, vomiting, diarrhea, constipation, change in bowel habits, abdominal pain   GENITOURINARY:  negative for difficulty of urination, burning with urination, frequency   INTEGUMENT:  negative for rash, skin lesions, easy bruising   HEMATOLOGIC/LYMPHATIC:  negative for swelling/edema   ALLERGIC/IMMUNOLOGIC:  negative for urticaria , itching  ENDOCRINE:  negative increase in drinking, increase in urination, hot or cold intolerance  MUSCULOSKELETAL:  negative joint pains, muscle aches, swelling of joints  NEUROLOGICAL: Positive for left upper and lower extremity weakness extremities      Physical Exam:     BP (!) 144/75   Pulse 99   Temp 97.9 °F (36.6 °C) (Oral)   Resp 20   Ht 5' 7\" (1.702 m)   Wt 149 lb (67.6 kg)   SpO2 98%   BMI 23.34 kg/m²   Temp (24hrs), Av.9 °F (36.6 °C), Min:97.8 °F (36.6 °C), Max:97.9 °F (36.6 °C)    No results for input(s): POCGLU in the last 72 hours. No intake or output data in the 24 hours ending 21 1816    General Appearance:  alert, well appearing, and in no acute distress  Mental status: oriented to person, place, and time with normal affect  Head:  normocephalic, atraumatic.   Eye: no icterus, redness, pupils equal and reactive, extraocular eye movements intact, conjunctiva clear  Ear: normal external ear, no discharge, hearing intact  Nose:  no drainage noted  Mouth: mucous membranes moist  Neck: supple, no carotid bruits, thyroid not palpable  Lungs: Bilateral equal air entry, clear to ausculation, no wheezing, rales or rhonchi, normal effort  Cardiovascular: normal rate, regular rhythm, no murmur, gallop, rub. Abdomen: Soft, nontender, nondistended, normal bowel sounds, no hepatomegaly or splenomegaly  Neurologic: Left upper and left lower extremity weakness  Skin: No gross lesions, rashes, bruising or bleeding on exposed skin area  Extremities:  peripheral pulses palpable, no pedal edema or calf pain with palpation      Investigations:      Laboratory Testing:  No results found for this or any previous visit (from the past 24 hour(s)). Medications: Allergies:     Allergies   Allergen Reactions    Ciprofloxacin     Hydrocodone     Macrobid [Nitrofurantoin]     Ciprofloxacin Rash       Current Meds:   Scheduled Meds:    miconazole   Topical BID    lactobacillus  1 capsule Oral Daily with breakfast    enoxaparin  40 mg Subcutaneous Daily    aspirin  81 mg Oral Daily    amLODIPine  5 mg Oral Daily    atorvastatin  40 mg Oral Nightly    clopidogrel  75 mg Oral Daily    ferrous sulfate  325 mg Oral BID    levothyroxine  112 mcg Oral Daily    metoprolol tartrate  12.5 mg Oral BID    oxybutynin  5 mg Oral Daily    pantoprazole  40 mg Oral QAM AC    polyethylene glycol  17 g Oral Daily     Continuous Infusions:   PRN Meds: ondansetron, melatonin, acetaminophen, bisacodyl, magnesium hydroxide      Consultations:   IP CONSULT TO SOCIAL WORK  IP CONSULT TO INTERNAL MEDICINE  Assessment :      Primary Problem  <principal problem not specified>    Active Hospital Problems    Diagnosis Date Noted    Malaise and fatigue [R53.81, R53.83] 04/10/2021    Acute CVA (cerebrovascular accident) ([facility] [I63.9] 04/07/2021    Acute left hemiparesis (HCC) [G81.94]     Gastroesophageal reflux disease without esophagitis [K21.9] 02/24/2021    HTN (hypertension) [I10] 10/21/2020    Hypothyroidism [E03.9] 10/21/2020       Plan:     Acute right ischemic infarct of the rhonda status post thrombolytics  Left hemiparesis for 3-4 out of 5  Aspirin Plavix and Lipitor  Hypertension beta-blocker and Norvasc control  Hypothyroidism on Synthroid follow with TSH in 6 weeks  Mild protein calorie malnutrition  SYEDA  resolved    BP labile - on amlodipine , metoprolol  BP Readings from Last 3 Encounters:   04/20/21 (!) 144/75   04/07/21 139/68   03/12/21 135/76             04/20/21   Patient reports feeling well  Patient reports no new complaints. Engaging with physical therapy. Labs and vitals reviewed. No new issues. Continue with current care. Eli Godwin MD  4/20/2021  6:16 PM    Copy sent to Dr. Anish Skinner, APRN - CNP    Please note that this chart was generated using voice recognition [address] dictation software. Although every effort was made to ensure the accuracy of this automated transcription, some errors in transcription may have occurred.

## 2021-04-20 NOTE — PROGRESS NOTES
walker. Surface: level tile  Ambulation 1  Surface: level tile  Device: Rolling Walker  Other Apparatus: (IV)  Assistance: Stand by assistance  Quality of Gait: increased trunk flexion , UE weight bearing heavily on walker, hips beyond LEN of walker  Gait Deviations: Slow Zuly  Distance: 125 feet x 2;  Comments: cueing for posturing only. intermittent tactile cues for safety within LEN of walker.     OT:  ADL  Feeding: Setup  Grooming: Setup(seated for denture care)  UE Bathing: None  LE Bathing: None  UE Dressing: None  LE Dressing: None(declines TEDs this date, NSG notified)  Toileting: None(did not req use/need at this time)  Additional Comments: increased time req at sink for denture care, pt req cuing for soaking dentures due to excessive build up of food particles, extra time req for brushing dentures         Balance  Sitting Balance: Supervision(seated at sink, chair alarm in w/c)  Standing Balance: Contact guard assistance   Standing Balance  Time: PM: 2 min  Activity: functional transfer/mobility in room using R/W  Comment: no LOB noted, flexed forward posture req cuing to correct-fair return noted  Functional Mobility  Functional - Mobility Device: Rolling Walker  Activity: Other  Assist Level: Contact guard assistance  Functional Mobility Comments: VCs for safety, posture and proper tech     Bed mobility  Bridging: Stand by assistance(patient unable to completely clear bed)  Rolling to Left: Stand by assistance  Rolling to Right: Stand by assistance  Supine to Sit: Stand by assistance(with leg  used)  Sit to Supine: Stand by assistance(with leg  used)  Scooting: Stand by assistance  Comment: training with leg  good demonstration unassisted today  Transfers  Stand Step Transfers: Contact guard assistance(R/W)  Stand Pivot Transfers: Contact guard assistance  Sit to stand: Minimal assistance, Contact guard assistance  Stand to sit: Contact guard assistance  Transfer Comments: cuing for proper tech   Toilet Transfers  Toilet - Technique: Ambulating  Equipment Used: Raised toilet seat with rails  Toilet Transfer: Contact guard assistance  Toilet Transfers Comments: Pt completed transfer to toilet with RW      Shower Transfers  Shower - Transfer From: Wheelchair  Shower - Transfer Type: To and From  Shower - Transfer To: Transfer tub bench  Shower - Technique: Stand pivot, To right, To left  Shower Transfers: Minimal assistance  Shower Transfers Comments: with Fair safety awareness  Wheelchair Bed Transfers  Wheelchair/Bed - Technique: Ambulating  Equipment Used: Bed, Wheelchair(rolling walker)  Level of Asssistance: 2 Person assistance, Moderate assistance  Wheelchair Transfers Comments: during PM session    SPEECH:  Subjective: []? Alert     [x]? Cooperative     []? Confused     []? Agitated    [x]? Lethargic        Objective/Assessment:  Attention:   Max cues and repetition needed to maintain pt. on task and alertness for swallowing exercises.      Cognition: n/a     Verbal expression: General expression functional in conversation.       Dysphagia: Vital stim completed for 46 minutes at 3.5 mA. Pt. took trials of honey thick liquids via straw x25. Pt. Demonstrating throat clear following 2 trials.       Pt. completed simple tongue base strengthening exercises x5, 10 reps each.   Briseyda maneuver completed x10. Pt. Completed oral motor exercises x2, 10 reps. MAX cues needed on all tasks.        Other: Pt. Son in law and daughter were present. ST recommending 24 hour care at home, though family reports they are unable to provide that at this time and are considering SNF. ST reporting that pt. Could be left home possibly an hour at a time in between family/caregivers that come to her house. ST shared concerns of pt. Drinking unthickened liquid at home and attempting to eat foods that are not part of her diet recommendations.   Pt. Daughter reports they will remove any food that is not upper limbs with at least antigravity strength. LIMBS: No edema in bilateral lower limbs. SKIN: Warm and dry with good turgor. PSYCH: Mood WNL. Affect WNL. Appropriately interactive. Diagnostics:     CBC:   No results for input(s): WBC, RBC, HGB, HCT, MCV, RDW, PLT in the last 72 hours. BMP:   No results for input(s): NA, K, CL, CO2, PHOS, BUN, CREATININE, GLUCOSE in the last 72 hours. Invalid input(s): CA  BNP: No results for input(s): BNP in the last 72 hours. PT/INR: No results for input(s): PROTIME, INR in the last 72 hours. APTT: No results for input(s): APTT in the last 72 hours. CARDIAC ENZYMES: No results for input(s): CKMB, CKMBINDEX, TROPONINT in the last 72 hours. Invalid input(s): CKTOTAL;3  FASTING LIPID PANEL:  Lab Results   Component Value Date    CHOL 121 04/02/2021    HDL 55 04/02/2021    TRIG 61 04/02/2021     LIVER PROFILE: No results for input(s): AST, ALT, ALB, BILIDIR, BILITOT, ALKPHOS in the last 72 hours. Impression/Plan:   Impaired ADLs, gait, and mobility due to:    1. 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 On 3 weeks ASA, Plavix with ASA to continue after 4/23. On atorvastatin. 2. Dysphagia: On minced and moist solids and moderately-thick liquids. SLP treating and including vital stim. Will continue to monitor to determine if she is a candidate for [employer] but she has a lot of food residue in her mouth after each meal.   3. Dehydration/hyponatremia:  Resolved. IM treated with IV fluid until 4/12 and serial lab tests - improved Na and creatinine. 4. HTN: On amlodipine, metoprolol tartrate. Follow up Dr. Modesta Cobos in [facility] in 4 weeks for stress test.   5. Orthostasis: Improved. Ensure she wears ARSH hose and abdominal binder when out of bed. Encourage hydration. 6. OA/L rotator cuff injury:  Stable. Will monitor  7. CKD:  Stable. Will monitor BMP  8.  Hypothyroidism: On levothyroxine  9. Anemia: On ferrous sulfate. Monitoring Hb  10. Hx chronic cystitis:  On oxybutynin ER. Frequent incontinence is normal baseline for patient. Urine culture positive for E Coli. Keflex renally adjusted through 4/17. Leukocytosis resolved 4/11. 11. GERD:  On pantoprazole  12. Bowel Management: Miralax daily, senokot prn, milk of magnesia prn, dulcolax prn. On probiotic for loose stools which are improved. 13. DVT Prophylaxis:  low molecular weight heparin, SCD's while in bed and ARSH's   14. Internal medicine for medical management  15. Discussed discharge plan with patient and family today. Team recommending 24-hour supervision for patient at home but family is unable to provide this level of supervision. Daughter states that family members and friends frequently check in on the patient throughout the day. After patient and family discussions with multiple team members, family would like patient to return home at discharge with home health services.       Electronically signed by Santosh Banegas MD on 4/21/2021 at 12:23 AM

## 2021-04-20 NOTE — PLAN OF CARE
Problem: Skin Integrity:  Goal: Will show no infection signs and symptoms  Description: Will show no infection signs and symptoms  Outcome: Met This Shift  Goal: Absence of new skin breakdown  Description: Absence of new skin breakdown  Outcome: Met This Shift     Problem: Injury - Risk of, Physical Injury:  Goal: Absence of physical injury  Description: Absence of physical injury  Outcome: Met This Shift  Goal: Will remain free from falls  Description: Will remain free from falls  Outcome: Met This Shift     Problem: Mood - Altered:  Goal: Mood stable  Description: Mood stable  Outcome: Met This Shift  Goal: Absence of abusive behavior  Description: Absence of abusive behavior  Outcome: Met This Shift  Goal: Verbalizations of feeling emotionally comfortable while being cared for will increase  Description: Verbalizations of feeling emotionally comfortable while being cared for will increase  Outcome: Met This Shift     Problem: Psychomotor Activity - Altered:  Goal: Absence of psychomotor disturbance signs and symptoms  Description: Absence of psychomotor disturbance signs and symptoms  Outcome: Met This Shift     Problem: Sleep Pattern Disturbance:  Goal: Appears well-rested  Description: Appears well-rested  Outcome: Met This Shift     Problem: Pain:  Goal: Pain level will decrease  Description: Pain level will decrease  Outcome: Met This Shift  Goal: Control of acute pain  Description: Control of acute pain  Outcome: Met This Shift  Goal: Control of chronic pain  Description: Control of chronic pain  Outcome: Met This Shift     Problem: Confusion - Acute:  Goal: Absence of continued neurological deterioration signs and symptoms  Description: Absence of continued neurological deterioration signs and symptoms  Outcome: Ongoing  Goal: Mental status will be restored to baseline  Description: Mental status will be restored to baseline  Outcome: Ongoing     Problem: Discharge Planning:  Goal: Ability to perform activities of daily living will improve  Description: Ability to perform activities of daily living will improve  Outcome: Ongoing  Goal: Participates in care planning  Description: Participates in care planning  Outcome: Ongoing     Problem: Sensory Perception - Impaired:  Goal: Demonstrations of improved sensory functioning will increase  Description: Demonstrations of improved sensory functioning will increase  Outcome: Ongoing  Goal: Decrease in sensory misperception frequency  Description: Decrease in sensory misperception frequency  Outcome: Ongoing  Goal: Able to refrain from responding to false sensory perceptions  Description: Able to refrain from responding to false sensory perceptions  Outcome: Ongoing  Goal: Demonstrates accurate environmental perceptions  Description: Demonstrates accurate environmental perceptions  Outcome: Ongoing  Goal: Able to distinguish between reality-based and nonreality-based thinking  Description: Able to distinguish between reality-based and nonreality-based thinking  Outcome: Ongoing  Goal: Able to interrupt nonreality-based thinking  Description: Able to interrupt nonreality-based thinking  Outcome: Ongoing     Problem: Neurological  Goal: Maximum potential motor/sensory/cognitive function  Outcome: Ongoing     Problem: Nutrition  Goal: Optimal nutrition therapy  Description: Nutrition Problem #1: Inadequate oral intake  Intervention: Food and/or Nutrient Delivery: Continue Current Diet, Start Oral Nutrition Supplement  Nutritional Goals: intake more than 75%     Outcome: Ongoing     Problem: Neurological  Goal: Maximum potential motor/sensory/cognitive function  Outcome: Ongoing

## 2021-04-20 NOTE — PROGRESS NOTES
Physical Therapy  Facility/Department: [facility]  Daily Treatment Note  NAME: Truman Aparicio  : 1932  MRN: [id number]    Date of Service: 2021    Discharge Recommendations:  Patient would benefit from continued therapy after discharge, Home with assist PRN, Home with Home health PT        Assessment            Patient Diagnosis(es): There were no encounter diagnoses. has a past medical history of Anemia, Hypertension, Hypothyroidism, Osteoarthritis, Other long term (current) drug therapy, and Renal insufficiency. has no past surgical history on file. Restrictions  Restrictions/Precautions  Restrictions/Precautions: Fall Risk, Modified Diet, Swallowing - Thickened Liquids, General Precautions  Required Braces or Orthoses?: No  Implants present? : (l TKA)  Subjective   General  Chart Reviewed: Yes  Family / Caregiver Present: No          Orientation     Cognition      Objective   Bed mobility  Bridging: Stand by assistance(patient unable to completely clear bed)  Rolling to Left: Stand by assistance  Rolling to Right: Stand by assistance  Supine to Sit: Stand by assistance(with leg  used)  Sit to Supine: Stand by assistance(with leg  used)  Scooting: Stand by assistance  Comment: training with leg  good demonstration unassisted today  Transfers  Sit to Stand: Stand by assistance  Stand to sit: Stand by assistance  Bed to Chair: Contact guard assistance;Stand by assistance(needs cueing to present to front of chair and square up to chair for safety)  Comment: patient needs cueing and intermittent tactile cues for posturing and extra time for stability. patient tendency to maintain posterior weight of heels with difficulty to weight shift forward to walker with release from chair support. Needs cueing control descent to chair. patient reports use of lift chair in the home.  re-ed patient with neuro rocking with alternate hand/foot forward to improve transfer from standard chair surface and height. Ambulation  Ambulation?: Yes  Ambulation 1  Surface: level tile  Device: Rolling Walker  Assistance: Stand by assistance  Quality of Gait: increased trunk flexion , UE weight bearing heavily on walker, hips beyond LEN of walker  Distance: 125 feet x 2;  Comments: cueing for posturing only. intermittent tactile cues for safety within LEN of walker. Ambulation 2  Surface - 2: carpet  Device 2: Rolling Walker  Assistance 2: Stand by assistance  Gait Deviations: Slow Zuly  Distance: 25 feet x 2  Stairs  # Steps : 8  Stairs Height: 6\"  Rails: Bilateral  Curbs: 6\"  Device: Rolling walker  Assistance: Contact guard assistance  Comment: step to right ascending , pt instructed in foot placement and increased hip extension, pt reports fatigue at 4 steps evident by increased flexion         Other exercises  Other exercises 1: seated bilateral LE 2.5# lt green x 20  Other exercises 2: standing BLE 10 reps  Other exercises 3: nustep L4 10 minutes   Other exercises 5: bed mobility with leg  training preformed 3 times  Other exercises 6: Supine BLE 2.5# with slider pad 20 reps SAQ's, heel slides, hip ABD/ADD; Other exercises 7: Bridging 5 reps                        G-Code     OutComes Score                                                     AM-PAC Score             Goals  Short term goals  Time Frame for Short term goals:  8 to 9 days  Short term goal 1: Pt to perform bed mobility at SBA with rail  Short term goal 2: Pt to demonstrate transfers at min A  Short term goal 3: Pt to amb 50 to 80 ft with appropriate device, min/mod A   Short term goal 4: Pt to improve L LE strength by 1/3 MMG to improve fucntion. Short term goal 5: Pt able to go up and down 5 steps with 2 UE support, mod A   Long term goals  Time Frame for Long term goals : By LOS  Long term goal 1: Pt able to perform bed mobility mod-I  Long term goal 2: Pt able to perform transfers from varies surfaces at mod-I /supervsion level. Long term goal 3: Pt able to ambulate with appropriate device distance of 80 ft, mod-I/supervsion level. Long term goal 4: Pt able to perform step curb with assisitive device at min A  Long term goal 5: Pt able to go up and down 5 steps with 2 rails at min A to improve LE strength. Long term goal 6: Improve PASS score to atleast 25//36 improve overall fucntion  Long term goal 7: Improve Tinetti balance score to 20 or more/28 to reduce fall risk. Long term goal 8: Pt able to propel w/c on level surfaces 150 ft, with set up/supervsion. Patient Goals   Patient goals : I want to move my Left side to walk better    Plan    Plan  Times per week: 1.5 hr/day, 5 to 7 days/week  Plan weeks: 6  Current Treatment Recommendations: Strengthening, Neuromuscular Re-education, Home Exercise Program, Safety Education & Training, Patient/Caregiver Education & Training, Equipment Evaluation, Education, & procurement, Functional Mobility Training, Balance Training, Endurance Training, Transfer Training, Gait Training, ROM, Stair training  Safety Devices  Type of devices:  All fall risk precautions in place, Patient at risk for falls, Gait belt  Restraints  Initially in place: No     Therapy Time     04/20/21 1022   PT Individual Minutes   Time In 0850   Time Out 1020   Minutes [address] [place] [address], [facility]

## 2021-04-21 PROCEDURE — 97530 THERAPEUTIC ACTIVITIES: CPT

## 2021-04-21 PROCEDURE — 1180000000 HC REHAB R&B

## 2021-04-21 PROCEDURE — 6370000000 HC RX 637 (ALT 250 FOR IP): Performed by: PHYSICAL MEDICINE & REHABILITATION

## 2021-04-21 PROCEDURE — 6370000000 HC RX 637 (ALT 250 FOR IP): Performed by: STUDENT IN AN ORGANIZED HEALTH CARE EDUCATION/TRAINING PROGRAM

## 2021-04-21 PROCEDURE — 99231 SBSQ HOSP IP/OBS SF/LOW 25: CPT | Performed by: STUDENT IN AN ORGANIZED HEALTH CARE EDUCATION/TRAINING PROGRAM

## 2021-04-21 PROCEDURE — 97116 GAIT TRAINING THERAPY: CPT

## 2021-04-21 PROCEDURE — 97129 THER IVNTJ 1ST 15 MIN: CPT

## 2021-04-21 PROCEDURE — 97110 THERAPEUTIC EXERCISES: CPT

## 2021-04-21 PROCEDURE — 97535 SELF CARE MNGMENT TRAINING: CPT

## 2021-04-21 PROCEDURE — 92526 ORAL FUNCTION THERAPY: CPT

## 2021-04-21 PROCEDURE — 6360000002 HC RX W HCPCS: Performed by: STUDENT IN AN ORGANIZED HEALTH CARE EDUCATION/TRAINING PROGRAM

## 2021-04-21 PROCEDURE — 99231 SBSQ HOSP IP/OBS SF/LOW 25: CPT | Performed by: INTERNAL MEDICINE

## 2021-04-21 RX ADMIN — LEVOTHYROXINE SODIUM 112 MCG: 112 TABLET ORAL at 05:58

## 2021-04-21 RX ADMIN — ANTI-FUNGAL POWDER MICONAZOLE NITRATE TALC FREE: 1.42 POWDER TOPICAL at 21:32

## 2021-04-21 RX ADMIN — ENOXAPARIN SODIUM 40 MG: 40 INJECTION SUBCUTANEOUS at 12:20

## 2021-04-21 RX ADMIN — OXYBUTYNIN CHLORIDE 5 MG: 5 TABLET, EXTENDED RELEASE ORAL at 12:20

## 2021-04-21 RX ADMIN — FERROUS SULFATE TAB 325 MG (65 MG ELEMENTAL FE) 325 MG: 325 (65 FE) TAB at 21:32

## 2021-04-21 RX ADMIN — CLOPIDOGREL BISULFATE 75 MG: 75 TABLET ORAL at 12:19

## 2021-04-21 RX ADMIN — FERROUS SULFATE TAB 325 MG (65 MG ELEMENTAL FE) 325 MG: 325 (65 FE) TAB at 08:00

## 2021-04-21 RX ADMIN — METOPROLOL TARTRATE 12.5 MG: 25 TABLET, FILM COATED ORAL at 12:19

## 2021-04-21 RX ADMIN — Medication 1 CAPSULE: at 08:00

## 2021-04-21 RX ADMIN — AMLODIPINE BESYLATE 5 MG: 5 TABLET ORAL at 12:19

## 2021-04-21 RX ADMIN — ASPIRIN 81 MG: 81 TABLET, COATED ORAL at 12:19

## 2021-04-21 RX ADMIN — METOPROLOL TARTRATE 12.5 MG: 25 TABLET, FILM COATED ORAL at 21:32

## 2021-04-21 RX ADMIN — PANTOPRAZOLE SODIUM 40 MG: 40 TABLET, DELAYED RELEASE ORAL at 05:58

## 2021-04-21 RX ADMIN — ATORVASTATIN CALCIUM 40 MG: 40 TABLET, FILM COATED ORAL at 21:32

## 2021-04-21 RX ADMIN — ANTI-FUNGAL POWDER MICONAZOLE NITRATE TALC FREE: 1.42 POWDER TOPICAL at 12:19

## 2021-04-21 NOTE — PLAN OF CARE
Problem: Skin Integrity:  Goal: Will show no infection signs and symptoms  Description: Will show no infection signs and symptoms  Outcome: Ongoing  Skin assessment completed this shift. Nutrition and Hydration status assessed with adequate intake. Lalito Score as charted. Pressure Relief Overlay remains intact and inflated to patient's bed throughout the shift. Bilateral heels remain elevated on pillows throughout the shift. Patient tolerates repositioning by staff at least every 2 hours. Patient able to reposition self for comfort and to prevent breakdown. Patient verbalizes understanding of pressure ulcer prevention measures. Skin integrity maintained. No new skin breakdown noted. Skin to high risk pressure areas including coccyx and heels are clear. Odalis / Incontinence care provided as needed throughout the shift. Aloe Vesta Moisture Barrier ointment applied to buttocks as a preventative measure.        Problem: Confusion - Acute:  Goal: Absence of continued neurological deterioration signs and symptoms  Description: Absence of continued neurological deterioration signs and symptoms  Outcome: Ongoing     Problem: Discharge Planning:  Goal: Ability to perform activities of daily living will improve  Description: Ability to perform activities of daily living will improve  Outcome: Ongoing     Problem: Injury - Risk of, Physical Injury:  Goal: Absence of physical injury  Description: Absence of physical injury  Outcome: Ongoing     Problem: Mood - Altered:  Goal: Verbalizations of feeling emotionally comfortable while being cared for will increase  Description: Verbalizations of feeling emotionally comfortable while being cared for will increase  Outcome: Ongoing     Problem: Mood - Altered:  Goal: Verbalizations of feeling emotionally comfortable while being cared for will increase  Description: Verbalizations of feeling emotionally comfortable while being cared for will increase  Outcome: Ongoing     Problem: Psychomotor Activity - Altered:  Goal: Absence of psychomotor disturbance signs and symptoms  Description: Absence of psychomotor disturbance signs and symptoms  Outcome: Ongoing     Problem: Neurological  Goal: Maximum potential motor/sensory/cognitive function  Outcome: Ongoing     Problem: Nutrition  Goal: Optimal nutrition therapy  Description: Nutrition Problem #1: Inadequate oral intake  Intervention: Food and/or Nutrient Delivery: Continue Current Diet, Start Oral Nutrition Supplement  Nutritional Goals: intake more than 75%      Outcome: Ongoing     Problem: Neurological  Goal: Maximum potential motor/sensory/cognitive function  Outcome: Ongoing     Problem: Pain:  Goal: Pain level will decrease  Description: Pain level will decrease  Outcome: Ongoing  Patient assessed for pain, medicated per orders. Patient reports an acceptable level of discomfort with the current medications. Patient was repositioned every two hours and as needed for comfort.

## 2021-04-21 NOTE — PROGRESS NOTES
Physical Therapy  Facility/Department: [facility] ACUTE REHAB  Daily Treatment Note  NAME: Mini Ramirez  : 1932  MRN: [id number]    Date of Service: 2021    Discharge Recommendations:  Patient would benefit from continued therapy after discharge, Home with assist PRN, Home with Home health PT        Assessment      PT Education: Adaptive Device Training;Functional Mobility Training  Activity Tolerance  Activity Tolerance: Patient limited by endurance; Patient limited by fatigue     Patient Diagnosis(es): There were no encounter diagnoses. has a past medical history of Anemia, Hypertension, Hypothyroidism, Osteoarthritis, Other long term (current) drug therapy, and Renal insufficiency. has no past surgical history on file. Restrictions  Restrictions/Precautions  Restrictions/Precautions: Fall Risk, Modified Diet, Swallowing - Thickened Liquids, General Precautions  Required Braces or Orthoses?: No  Implants present? : (l TKA)  Subjective   General  Chart Reviewed: Yes  Family / Caregiver Present: No  Subjective  Subjective: patient stated \"i'm going home\" and excited to be          Orientation     Cognition      Objective   Bed mobility  Bridging: Stand by assistance(difficulty with bending knees for foot placement to perform activity)  Rolling to Left: Stand by assistance  Rolling to Right: Stand by assistance  Supine to Sit: Stand by assistance(with leg )  Sit to Supine: Stand by assistance(with leg )  Scooting: Stand by assistance  Comment: needes extra time and modifications to preform bed mobility patient demonstrated on flat firm surface with out handrails. Transfers  Sit to Stand: Stand by assistance  Stand to sit: Stand by assistance  Bed to Chair: Stand by assistance(patient needs safety cues to present to front of chair for transfer technique demonstrate safely)  Comment: patient needs cueing and intermittent tactile cues for posturing and extra time for stability.  patient tendency to maintain posterior weight of heels with difficulty to weight shift forward to walker with release from chair support. Needs cueing control descent to chair. patient reports use of lift chair in the home. re-ed patient with neuro rocking with alternate hand/foot forward to improve transfer from standard chair surface and height. Ambulation  Ambulation?: Yes  Ambulation 1  Surface: level tile  Device: Rolling Walker  Assistance: Stand by assistance  Quality of Gait: increased trunk flexion , UE weight bearing heavily on walker, hips beyond LEN of walker  Gait Deviations: Slow Nisha  Distance: 125 feet x 2  Comments: cueing for posturing only. intermittent tactile cues for safety within LEN of walker. Ambulation 2  Surface - 2: carpet  Device 2: Rolling Walker  Assistance 2: Stand by assistance  Gait Deviations: Slow Nisha  Distance: 25 feet x 2  Ambulation 3  Surface - 3: ramp  Device 3: Rolling Walker  Assistance 3: Contact guard assistance  Gait Deviations: Slow Nisha  Distance: 50 feet  Comments: as above plus noted increased nisha descending ramp with need to intervene with postural correct  to prevent fall risk  Stairs  # Steps : 8  Stairs Height: 6\"  Rails: Bilateral  Curbs: 6\"  Device: Rolling walker  Assistance: Contact guard assistance  Comment: step to right ascending , pt instructed in foot placement and increased hip extension, pt reports fatigue at 4 steps evident by increased flexion         Other exercises  Other exercises 1: seated bilateral LE 2.5# lt green x 20  Other exercises 2: standing BLE 10 reps  Other exercises 3: nustep L4 10 minutes   Other exercises 4: standing balloon 3 minutes seated 2 additional minutes  Other exercises 5: bed mobility with leg  training preformed 3 times  Other exercises 6: Supine BLE 2.5# with slider pad 20 reps SAQ's, heel slides, hip ABD/ADD;   Other exercises 7: Bridging 5 reps                        G-Code     OutComes Score AM-PAC Score             Goals  Short term goals  Time Frame for Short term goals:  8 to 9 days  Short term goal 1: Pt to perform bed mobility at SBA with rail  Short term goal 2: Pt to demonstrate transfers at min A  Short term goal 3: Pt to amb 50 to 80 ft with appropriate device, min/mod A   Short term goal 4: Pt to improve L LE strength by 1/3 MMG to improve fucntion. Short term goal 5: Pt able to go up and down 5 steps with 2 UE support, mod A   Long term goals  Time Frame for Long term goals : By LOS  Long term goal 1: Pt able to perform bed mobility mod-I  Long term goal 2: Pt able to perform transfers from varies surfaces at mod-I /supervsion level. Long term goal 3: Pt able to ambulate with appropriate device distance of 80 ft, mod-I/supervsion level. Long term goal 4: Pt able to perform step curb with assisitive device at min A  Long term goal 5: Pt able to go up and down 5 steps with 2 rails at min A to improve LE strength. Long term goal 6: Improve PASS score to atleast 25//36 improve overall fucntion  Long term goal 7: Improve Tinetti balance score to 20 or more/28 to reduce fall risk. Long term goal 8: Pt able to propel w/c on level surfaces 150 ft, with set up/supervsion. Patient Goals   Patient goals : I want to move my Left side to walk better    Plan    Plan  Times per week: 1.5 hr/day, 5 to 7 days/week  Plan weeks: 6  Current Treatment Recommendations: Strengthening, Neuromuscular Re-education, Home Exercise Program, Safety Education & Training, Patient/Caregiver Education & Training, Equipment Evaluation, Education, & procurement, Functional Mobility Training, Balance Training, Endurance Training, Transfer Training, Gait Training, ROM, Stair training  Safety Devices  Type of devices:  All fall risk precautions in place, Patient at risk for falls, Gait belt  Restraints  Initially in place: No     Therapy Time   Individual Concurrent Group Co-treatment   Time In [address] [facility]

## 2021-04-21 NOTE — PROGRESS NOTES
Physical Medicine & Rehabilitation  Progress Note      Subjective:      Bindu Hutchison is a 80 y.o. female with ischemic CVA with left nondominant hemiparesis. She reports doing fine today. Discussed moving discharge date back to next week since plan is for her to go home - she expressed understanding. She denies any dizziness, pain, and any other acute concerns. ROS:  Denies fevers, chills, sweats. No chest pain, palpitations, lightheadedness  Denies coughing, wheezing or shortness of breath. Denies abdominal pain, nausea, diarrhea or constipation. No new areas of joint pain. Denies new areas of numbness or weakness. Denies new anxiety or depression issues. No new skin problems. Rehabilitation:   Progressing in therapies. PT:  Restrictions/Precautions: Fall Risk, Modified Diet, Swallowing - Thickened Liquids, General Precautions  Implants present? : (l TKA)   Transfers  Sit to Stand: Stand by assistance  Stand to sit: Stand by assistance  Bed to Chair: Stand by assistance(patient needs safety cues to present to front of chair for transfer technique demonstrate safely)  Stand Pivot Transfers: Contact guard assistance(instruction for RW managment and LE proximal position to sit)  Squat Pivot Transfers: Maximum Assistance(to left , no AD , pt sequences correctly after pre instructi)  Comment: patient needs cueing and intermittent tactile cues for posturing and extra time for stability. patient tendency to maintain posterior weight of heels with difficulty to weight shift forward to walker with release from chair support. Needs cueing control descent to chair. patient reports use of lift chair in the home. re-ed patient with neuro rocking with alternate hand/foot forward to improve transfer from standard chair surface and height. Ambulation 1  Surface: level tile  Device: Rolling Walker  Other Apparatus:  (IV)  Assistance: Stand by assistance  Quality of Gait: increased trunk flexion , UE weight bearing heavily on walker, hips beyond LEN of walker  Gait Deviations: Slow Zuly  Distance: 125 feet x 2  Comments: cueing for posturing only. intermittent tactile cues for safety within LEN of walker. Transfers  Sit to Stand: Stand by assistance  Stand to sit: Stand by assistance  Bed to Chair: Stand by assistance(patient needs safety cues to present to front of chair for transfer technique demonstrate safely)  Stand Pivot Transfers: Contact guard assistance(instruction for RW managment and LE proximal position to sit)  Squat Pivot Transfers: Maximum Assistance(to left , no AD , pt sequences correctly after pre instructi)  Comment: patient needs cueing and intermittent tactile cues for posturing and extra time for stability. patient tendency to maintain posterior weight of heels with difficulty to weight shift forward to walker with release from chair support. Needs cueing control descent to chair. patient reports use of lift chair in the home. re-ed patient with neuro rocking with alternate hand/foot forward to improve transfer from standard chair surface and height. Ambulation  Ambulation?: Yes  More Ambulation?: No  Ambulation 1  Surface: level tile  Device: Rolling Walker  Other Apparatus: (IV)  Assistance: Stand by assistance  Quality of Gait: increased trunk flexion , UE weight bearing heavily on walker, hips beyond LEN of walker  Gait Deviations: Slow Zuly  Distance: 125 feet x 2  Comments: cueing for posturing only. intermittent tactile cues for safety within LEN of walker. Surface: level tile  Ambulation 1  Surface: level tile  Device: Rolling Walker  Other Apparatus: (IV)  Assistance: Stand by assistance  Quality of Gait: increased trunk flexion , UE weight bearing heavily on walker, hips beyond LEN of walker  Gait Deviations: Slow Zuly  Distance: 125 feet x 2  Comments: cueing for posturing only. intermittent tactile cues for safety within LEN of walker.     OT:  ADL  Feeding: Setup(increased time req, dentures)  Grooming: Setup(combing hair)  UE Bathing: None  LE Bathing: None  UE Dressing: Setup(increased time req, pt able to don/doff shirt seated in w/c)  LE Dressing: None(declines TEDs this date, NSG notified)  Toileting: None(did not req use/need at this time)  Additional Comments: increased time req at sink for denture care, pt req cuing for soaking dentures due to excessive build up of food particles, extra time req for brushing dentures         Balance  Sitting Balance: Supervision(seated at sink, chair alarm in w/c)  Standing Balance: Contact guard assistance   Standing Balance  Time: PM: 2 min  Activity: functional transfer/mobility in room using R/W  Comment: no LOB noted, flexed forward posture req cuing to correct-fair return noted  Functional Mobility  Functional - Mobility Device: Rolling Walker  Activity: Other  Assist Level: Contact guard assistance  Functional Mobility Comments: VCs for safety, posture and proper tech     Bed mobility  Bridging: Stand by assistance(difficulty with bending knees for foot placement to perform activity)  Rolling to Left: Stand by assistance  Rolling to Right: Stand by assistance  Supine to Sit: Stand by assistance(with leg )  Sit to Supine: Stand by assistance(with leg )  Scooting: Stand by assistance  Comment: needes extra time and modifications to preform bed mobility patient demonstrated on flat firm surface with out handrails.   Transfers  Stand Step Transfers: Contact guard assistance(R/W)  Stand Pivot Transfers: Contact guard assistance  Sit to stand: Minimal assistance, Contact guard assistance  Stand to sit: Contact guard assistance  Transfer Comments: cuing for proper tech   Toilet Transfers  Toilet - Technique: Ambulating  Equipment Used: Raised toilet seat with rails  Toilet Transfer: Contact guard assistance  Toilet Transfers Comments: Pt completed transfer to toilet with RW      Shower Transfers  Shower - Transfer From: Wheelchair  Shower - Transfer Type: To and From  Shower - Transfer To: Transfer tub bench  Shower - Technique: Stand pivot, To right, To left  Shower Transfers: Minimal assistance  Shower Transfers Comments: with Fair safety awareness  Wheelchair Bed Transfers  Wheelchair/Bed - Technique: Ambulating  Equipment Used: Bed, Wheelchair(rolling walker)  Level of Asssistance: 2 Person assistance, Moderate assistance  Wheelchair Transfers Comments: during PM session    SPEECH:  Subjective: []? Alert     [x]? Cooperative     []? Confused     []? Agitated    [x]? Lethargic        Objective/Assessment:  Attention:   Max cues and repetition needed to maintain pt. on task and alertness for swallowing exercises.      Cognition: Pt. Described differences/similarities between 2 objects c 95% accuracy, 100% c cues.      Verbal expression: General expression functional in conversation.       Dysphagia: Vital stim completed for 40 minutes at 3.5- 4.0 mA. Pt. took trials of honey thick liquids via straw x10.         Pt. completed simple tongue base strengthening exercises x5, 10 reps each.   Briseyda maneuver completed x13. Pt. Completed oral motor exercises x2, 10 reps. MAX cues needed on all tasks, pt. Kept eyes closed entire session.        Other: Family not present.      Current Medications:   Current Facility-Administered Medications: miconazole (MICOTIN) 2 % powder, , Topical, BID  lactobacillus (CULTURELLE) capsule 1 capsule, 1 capsule, Oral, Daily with breakfast  ondansetron (ZOFRAN) tablet 4 mg, 4 mg, Oral, Q8H PRN  enoxaparin (LOVENOX) injection 40 mg, 40 mg, Subcutaneous, Daily  aspirin EC tablet 81 mg, 81 mg, Oral, Daily **OR** [DISCONTINUED] aspirin suppository 300 mg, 300 mg, Rectal, Daily  amLODIPine (NORVASC) tablet 5 mg, 5 mg, Oral, Daily  atorvastatin (LIPITOR) tablet 40 mg, 40 mg, Oral, Nightly  clopidogrel (PLAVIX) tablet 75 mg, 75 mg, Oral, Daily  ferrous sulfate (IRON 325) tablet 325 mg, 325 mg, Oral, BID levothyroxine (SYNTHROID) tablet 112 mcg, 112 mcg, Oral, Daily  melatonin tablet 6 mg, 6 mg, Oral, Nightly PRN  metoprolol tartrate (LOPRESSOR) tablet 12.5 mg, 12.5 mg, Oral, BID  oxybutynin (DITROPAN-XL) extended release tablet 5 mg, 5 mg, Oral, Daily  pantoprazole (PROTONIX) tablet 40 mg, 40 mg, Oral, QAM AC  acetaminophen (TYLENOL) tablet 650 mg, 650 mg, Oral, Q4H PRN  polyethylene glycol (GLYCOLAX) packet 17 g, 17 g, Oral, Daily  bisacodyl (DULCOLAX) suppository 10 mg, 10 mg, Rectal, Daily PRN  magnesium hydroxide (MILK OF MAGNESIA) 400 MG/5ML suspension 30 mL, 30 mL, Oral, Daily PRN      Objective:  BP (!) 122/48   Pulse 63   Temp 98.3 °F (36.8 °C) (Oral)   Resp 17   Ht 5' 7\" (1.702 m)   Wt 149 lb (67.6 kg)   SpO2 100%   BMI 23.34 kg/m²       GEN: Well developed, well nourished, no acute distress  HEENT: NCAT. EOM grossly intact. Hearing grossly intact. Mucous membranes pink and moist.  RESP: Normal breath sounds with no wheezing, rales, or rhonchi. Respirations WNL and unlabored. CV: Regular rate and rhythm. No murmurs, rubs, or gallops. ABD: Soft, non-distended, BS+ and equal.  NEURO: Alert. Speech fluent. Sensation to light touch intact. MSK: Muscle tone and bulk are normal bilaterally. Strength 4+/5 in right upper limbs. Strength 4/5 in left upper limb. Strength 4+/5 in bilateral lower limbs. LIMBS: No edema in bilateral lower limbs. SKIN: Warm and dry with good turgor. PSYCH: Mood WNL. Affect WNL. Appropriately interactive. Diagnostics:     CBC:   No results for input(s): WBC, RBC, HGB, HCT, MCV, RDW, PLT in the last 72 hours. BMP:   No results for input(s): NA, K, CL, CO2, PHOS, BUN, CREATININE, GLUCOSE in the last 72 hours. Invalid input(s): CA  BNP: No results for input(s): BNP in the last 72 hours. PT/INR: No results for input(s): PROTIME, INR in the last 72 hours. APTT: No results for input(s): APTT in the last 72 hours.   CARDIAC ENZYMES: No results for input(s): CKMB, Benji Alvarez in the last 72 hours. Invalid input(s): CKTOTAL;3  FASTING LIPID PANEL:  Lab Results   Component Value Date    CHOL 121 04/02/2021    HDL 55 04/02/2021    TRIG 61 04/02/2021     LIVER PROFILE: No results for input(s): AST, ALT, ALB, BILIDIR, BILITOT, ALKPHOS in the last 72 hours. Impression/Plan:   Impaired ADLs, gait, and mobility due to:    1. 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 On 3 weeks ASA, Plavix with ASA to continue after 4/23. On atorvastatin. 2. Dysphagia: On minced and moist solids and moderately-thick liquids. SLP treating and including vital stim. Will continue to monitor to determine if she is a candidate for [employer] but she has a lot of food residue in her mouth after each meal.   3. Dehydration/hyponatremia:  Resolved. IM treated with IV fluid until 4/12 and serial lab tests - improved Na and creatinine. 4. HTN: On amlodipine, metoprolol tartrate. Follow up Dr. Milla Farfan in [facility] in 4 weeks for stress test.   5. Orthostasis: Resolved. Ensure she wears ARSH hose when out of bed. Encourage hydration. 6. OA/L rotator cuff injury:  Stable. Will monitor  7. CKD:  Stable. Will monitor BMP  8. Hypothyroidism: On levothyroxine  9. Anemia: On ferrous sulfate. Monitoring Hb  10. Hx chronic cystitis:  On oxybutynin ER. Frequent incontinence is normal baseline for patient. Urine culture positive for E Coli. Keflex renally adjusted through 4/17. Leukocytosis resolved 4/11. 11. GERD:  On pantoprazole  12. Bowel Management: Miralax daily, senokot prn, milk of magnesia prn, dulcolax prn. On probiotic for loose stools which are improved. 13. DVT Prophylaxis:  low molecular weight heparin, SCD's while in bed and ARSH's   14. Internal medicine for medical management  15. Discussed discharge plan with patient and family on 4/20.   Team recommending 24-hour supervision for patient at home but family is unable to provide this level of supervision. Daughter states that family members and friends frequently check in on the patient throughout the day. After patient and family discussions with multiple team members, family would like patient to return home at discharge with home health services.       Electronically signed by Felix Martinez MD on 4/22/2021 at 1:09 AM

## 2021-04-21 NOTE — CARE COORDINATION
[facility] and spoke with [facility]. Pt is accepted and they will follow at discharge.  Additional information faxed to them; [phone] [facility] is familiar with pt as they have provided services for her in the past.

## 2021-04-21 NOTE — PROGRESS NOTES
Comprehensive Nutrition Assessment    Type and Reason for Visit:  Reassess    Nutrition Recommendations/Plan: Continue current diet and nutrition supplements. Nutrition Assessment:  PO intake was reportedly decreased previously, but pt was eating well at lunch today and overall intake is likely fairly adequate. Pt states she usually eats the [employer] although today indicated that her stomach felt a little funny and she wasn't going to eat it at lunch. She has been drinking thickened liquids. Recorded wts appear to indicate loss, however, it appears previous wt history may be stated wts. Malnutrition Assessment:  Malnutrition Status: At risk for malnutrition (Comment)    Context:  Acute Illness     Findings of the 6 clinical characteristics of malnutrition:  Energy Intake:  Mild decrease in energy intake (Comment)  Weight Loss:  Unable to assess     Body Fat Loss:  Unable to assess     Muscle Mass Loss:  Unable to assess    Fluid Accumulation:  1 - Mild(May not be related to nutritional status) Extremities   Strength:  Not Performed    Estimated Daily Nutrient Needs:  Energy (kcal): [place] x 1.2= 1500 kcal; Weight Used for Energy Requirements:  Admission     Protein (g):  1.3g/kg= 80 g; Weight Used for Protein Requirements:  Ideal          Nutrition Related Findings:  Edema: +1 RLE, LLE (4/20). Labs and meds reviewed.       Wounds:  None       Current Nutrition Therapies:    Dietary Nutrition Supplements: Frozen Oral Supplement  DIET GENERAL; Dysphagia Minced and Moist; Moderately Thick (Honey)    Anthropometric Measures:  · Height: 5' 7\" (170.2 cm)  · Current Body Weight: 149 lb 0.5 oz (67.6 kg)   · Admission Body Weight: 172 lb (78 kg)(not specified)    · Usual Body Weight: (163-170 #)     · Ideal Body Weight: 135 lbs; % Ideal Body Weight     · BMI: 23.3  · BMI Categories: Normal Weight (BMI 22.0 to 24.9) age over 72       Nutrition Diagnosis:   · Inadequate oral intake related to (poor appetite) as evidenced by intake 26-50%, intake 51-75%    Nutrition Interventions:   Food and/or Nutrient Delivery:  Continue Current Diet, Continue Oral Nutrition Supplement  Nutrition Education/Counseling:  No recommendation at this time(SLP has discussed diet texture with family)   Coordination of Nutrition Care:  Continue to monitor while inpatient    Goals:  intake more than 75%       Nutrition Monitoring and Evaluation:   Behavioral-Environmental Outcomes:  None Identified   Food/Nutrient Intake Outcomes:  Food and Nutrient Intake, Supplement Intake  Physical Signs/Symptoms Outcomes:  Biochemical Data, GI Status, Skin, Weight, Fluid Status or Edema     Discharge Planning:    Continue current diet     Some areas of assessment may be incomplete due to standard COVID-19 Precautions. Manny Don R.D., L.D.   Phone: [phone]

## 2021-04-21 NOTE — PROGRESS NOTES
Speech Language Pathology  Speech Language Pathology  [facility]    Dysphagia/Cognitive Treatment Note    Date: 4/21/2021  Patients Name: Diane Romero  MRN: [id number]  Diagnosis:   Patient Active Problem List   Diagnosis Code    Anemia D64.9    Arthritis M19.90    HTN (hypertension) I10    Hypothyroidism E03.9    Renal insufficiency N28.9    Other long term (current) drug therapy Z79.899    Anemia due to stage 3 chronic kidney disease N18.30, D63.1    Chronic UTI N39.0    Mixed hyperlipidemia E78.2    Gastroesophageal reflux disease without esophagitis K21.9    S/P revision of total knee, 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    Muscular weakness M62.81    Other instability, left knee M25.362    Primary osteoarthritis of both hips M16.0    Primary osteoarthritis of right knee M17.11    Cerebrovascular accident (CVA) ([address] I63.9    Received intravenous tissue plasminogen activator (tPA) in emergency department Z92.82    Acute left hemiparesis ([address] G81.94    Chest pain in adult R07.9    Acute CVA (cerebrovascular accident) ([address] I63.9    Nausea R11.0    Malaise and fatigue R53.81, R53.83       Pain: none reported    Cognitive Treatment    Treatment time: 9856-4243    Subjective: [] Alert [x] Cooperative     [] Confused     [] Agitated    [x] Lethargic      Objective/Assessment:  Attention:   Max cues and repetition needed to maintain pt. on task and alertness for swallowing exercises. Cognition: Pt. Described differences/similarities between 2 objects c 95% accuracy, 100% c cues. Verbal expression: General expression functional in conversation. Dysphagia: Vital stim completed for 40 minutes at 3.5- 4.0 mA. Pt. took trials of honey thick liquids via straw x10. Pt. completed simple tongue base strengthening exercises x5, 10 reps each. Briseyda maneuver completed x13. Pt. Completed oral motor exercises x2, 10 reps. MAX cues needed on all tasks, pt. Kept eyes closed entire session. Other: Family not present. Plan:  [x] Continue ST services    [] Discharge from ST:      Discharge recommendations: [] Inpatient Rehab   [] [place]   [] Outpatient Therapy  [] Follow up at trauma clinic   [] Other:       Treatment completed by: Ember Nichols A.CCC/SLP

## 2021-04-21 NOTE — PLAN OF CARE
Problem: Skin Integrity:  Goal: Absence of new skin breakdown  Description: Absence of new skin breakdown  Outcome: Met This Shift   Skin assessment completed this shift. Nutrition and Hydration status assessed with adequate intake. Lalito Score as charted. Pressure Relief Overlay remains intact and inflated to patient's bed throughout the shift  Chair cushion in use for when pt is up to chair. Bilateral heels remain elevated on pillows throughout the shift. Patient tolerates repositioning by staff at least every 2 hours. Patient verbalizes understanding of pressure ulcer prevention measures. Skin integrity maintained. No new skin breakdown noted. Skin to high risk pressure areas including coccyx and heels are clear.     Odalis / Incontinence care provided as needed throughout the shift. Aloe Vesta Moisture Barrier ointment applied to buttocks as a preventative measure.     Groin/Odalis Area very red. Miconazole powder ordered this shift. Will continue to monitor.           Problem: Confusion - Acute:  Goal: Mental status will be restored to baseline  Description: Mental status will be restored to baseline  Outcome: Met This Shift  Pt remains A&O X4 so far throughout the shift. Will continue to monitor for changes.        Problem: Injury - Risk of, Physical Injury:  Goal: Will remain free from falls  Description: Will remain free from falls  Outcome: Met This Shift  No falls or injuries sustained at this time. No attempts to get out of bed without nursing assistance. Call light within reach and pt. uses appropriately for assistance. Siderails up x 2. Nonskid footwear remains on. Bed in low and locked position. Hourly nursing rounds made. Pt. Alert and oriented, aware of limitations, and exhibits good safety judgement. Pt. uses assistive devices appropriately. Pt. understands individual fall risk factors.     Pt.  reminded to use call light with each nurse/patient interaction.     Bed alarm / Personal alarm remains engaged throughout the shift as a precaution.          Problem: Pain:  Goal: Pain level will decrease  Description: Pain level will decrease  Outcome: Met This Shift  Pain assessment completed so far this shift. Pt. able to rest without the use of pain medication. Patient denies any pain so far this shift.   Will continue to [facility]

## 2021-04-21 NOTE — PROGRESS NOTES
[address]   ACUTE REHABILITATION OCCUPATIONAL THERAPY  DAILY NOTE    Date: 21  Patient Name: Santo Luis      Room: 4711/1582-13    MRN: [id number]   : 1932  (80 y.o.)  Gender: female   Referring Practitioner: Misa Parks MD  Diagnosis: Acute CVA  Additional Pertinent Hx:  Per ARU preadmission assessment:80year-old female admitted with acute left hemiparesis causing a fall of her chair. Noted acute left-sided weakness worse than previous date. .  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 Patient on baby aspirin at home. Patient given TPA during TPA became hysterical repeat CT showed no change completed TPA. 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 Pt admitted to rehab unit on 21. Restrictions  Restrictions/Precautions: Fall Risk, Modified Diet, Swallowing - Thickened Liquids, General Precautions  Implants present? : (l TKA)  Required Braces or Orthoses?: No  Equipment Used: Bed, Wheelchair(rolling walker)    Subjective  Subjective: \"well my top is dirty, so that should be okay to change\"  Comments: pt agreeable to change top but declines washing up or changing pants, socks/shoes already donned, declines TEDs  Patient Currently in Pain: Denies  Restrictions/Precautions: Fall Risk;Modified Diet;Swallowing - Thickened Liquids; General Precautions     Patient Observation  Observations: increased use of LUE during tasks this date  Pain Assessment  Pain Assessment: 0-10  Pain Level: 8  Pain Type: Acute pain  Pain Location: Arm  Pain Orientation: Left  Pain Descriptors: Sore    Objective  Cognition  Overall Cognitive Status: Impaired  Arousal/Alertness: Delayed responses to stimuli  Attention Span: Attends with cues to redirect  Memory: Decreased short term memory;Decreased recall of recent events  Following Commands: Follows multistep commands with repetition  Safety Judgement: Decreased awareness of need for assistance  Awareness of Errors: Assistance required to identify errors made  Insights: Decreased awareness of deficits  Sequencing and Organization: Assistance required to implement solutions;Assistance required to generate solutions;Assistance required to identify errors made  Perception  Overall Perceptual Status: Impaired  Initiation: Cues to initiate tasks                       Additional Activities: AM: pt participated in Visual/Perceptual board activity to place shapes according to size, max time req for processing, cuing req for correct location of shapes and proper sizing for placement, pt unable to remove pegs from board req A for removal, pt demo'd good use of LUE during task this date; PM: pt completing craft with family in room, family reports pt makes a gift for each grandchild every [place], pt assembling tidy ornament, task to address FM skills in LUE and bimanual coordination, pt completed 3 ornaments with increased time, pt only req A to push threading needle through button hole and tying knot at top of ornament, good tolerance noted with pt verbalizing her happiness and desire to makes gifts for grandchildren                  ADL  Feeding: Setup(increased time req, dentures)  Grooming: Setup(combing hair)  UE Dressing: Setup(increased time req, pt able to don/doff shirt seated in w/c)          Assessment  Performance deficits / Impairments: Decreased functional mobility ; Decreased strength;Decreased endurance;Decreased balance;Decreased high-level IADLs;Decreased posture;Decreased ADL status; Decreased ROM; Decreased safe awareness;Decreased cognition;Decreased fine motor control;Decreased coordination  Prognosis: Good  Discharge Recommendations: Home with assist PRN;Home with Home health OT  Activity Tolerance: Patient limited by fatigue  Activity Tolerance: fatigued, rest breaks req, increased time req  Safety Devices in place: Yes  Type of devices: Nurse notified; Left in chair;Patient at risk for falls;Call light within reach; Chair alarm in place  Equipment Recommendations  Equipment Needed: (TBD)       Patient Education: ADL tasks, OT POC, activity promotion, increasing indep, endurance and LUE use during functional tasks   Patient Goals   Patient goals : To return home with family support  Learner:patient (family in PM)  Method: demonstration and explanation       Outcome: needs reinforcement        Plan  Plan  Times per week: 5-7  Times per day: Twice a day  Current Treatment Recommendations: Strengthening, ROM, Safety Education & Training, Positioning, Balance Training, Patient/Caregiver Education & Training, Self-Care / ADL, Functional Mobility Training, Endurance Training, Neuromuscular Re-education, Wheelchair Mobility Training, Cognitive Reorientation, Equipment Evaluation, Education, & procurement, Home Management Training, Cognitive/Perceptual Training  Patient Goals   Patient goals : To return home with family support  Short term goals  Time Frame for Short term goals: 7 to 10 days  Short term goal 1: Pt will perform upper body bathing/dressing with stand by assist.  Short term goal 2: Pt will perform lower body bathing/dressing and toileting tasks with Moderate assist and Good safety. Short term goal 3: Pt will perform functional functional transfers and mobility during self-care with Moderate assist, least restrictive mobility device, and Good safety. Short term goal 4: Pt will for 4+ minutes with 1-2 UE support and Minimal assist while engaging in functional activity of choice. Short term goal 5: Pt will actively participate in 30+ minutes of therapeutic exercise/functional activities to promote increased independence with self-care and mobility.   Short term goal 6: Pt will verbalize/demonstrate Good understanding of assistive equipment/durable medical equipment/modified techniques for increased independence with self-care and mobility. Long term goals  Time Frame for Long term goals : By discharge  Long term goal 1: Pt will perform bathing during shower with stand by assist and Good safety. Long term goal 2: Pt will perform dressing and toileting tasks with modified independence and Good safety. Long term goal 3: Pt will stand for 8+ minutes with 1-2 UE support, modified independence, no loss of balance while engaging in functional activity of choice. Long term goal 4: Pt will perform functional transfers and mobility with modified independence, least restrictive mobility device, and Good safety. Long term goal 5: Pt will verbalize/demonstrate Good understanding of Fall Prevention Strategies for increased independence with self-care and mobility.   Long term goals 6: 9 hole peg and box and blocks to be assessed for LUE as appropriate        04/21/21 1100 04/21/21 1408   OT Individual Minutes   Time In 1100 1408   Time Out 1135 1435   Minutes 35 27     Electronically signed by AYESHA Modi on 4/21/21 at 3:59 PM EDT

## 2021-04-22 LAB
ANION GAP SERPL CALCULATED.3IONS-SCNC: 10 MMOL/L (ref 9–17)
BUN BLDV-MCNC: 22 MG/DL (ref 8–23)
BUN/CREAT BLD: ABNORMAL (ref 9–20)
CALCIUM SERPL-MCNC: 9.2 MG/DL (ref 8.6–10.4)
CHLORIDE BLD-SCNC: 103 MMOL/L (ref 98–107)
CO2: 25 MMOL/L (ref 20–31)
CREAT SERPL-MCNC: 1.16 MG/DL (ref 0.5–0.9)
GFR AFRICAN AMERICAN: 53 ML/MIN
GFR NON-AFRICAN AMERICAN: 44 ML/MIN
GFR SERPL CREATININE-BSD FRML MDRD: ABNORMAL ML/MIN/{1.73_M2}
GFR SERPL CREATININE-BSD FRML MDRD: ABNORMAL ML/MIN/{1.73_M2}
GLUCOSE BLD-MCNC: 86 MG/DL (ref 70–99)
HCT VFR BLD CALC: 33.7 % (ref 36–46)
HEMOGLOBIN: 11.2 G/DL (ref 12–16)
MCH RBC QN AUTO: 30.4 PG (ref 26–34)
MCHC RBC AUTO-ENTMCNC: 33.2 G/DL (ref 31–37)
MCV RBC AUTO: 91.4 FL (ref 80–100)
NRBC AUTOMATED: ABNORMAL PER 100 WBC
PDW BLD-RTO: 14 % (ref 11.5–14.9)
PLATELET # BLD: 328 K/UL (ref 150–450)
PMV BLD AUTO: 8.9 FL (ref 6–12)
POTASSIUM SERPL-SCNC: 4.2 MMOL/L (ref 3.7–5.3)
RBC # BLD: 3.68 M/UL (ref 4–5.2)
SODIUM BLD-SCNC: 138 MMOL/L (ref 135–144)
WBC # BLD: 7.6 K/UL (ref 3.5–11)

## 2021-04-22 PROCEDURE — 99231 SBSQ HOSP IP/OBS SF/LOW 25: CPT | Performed by: STUDENT IN AN ORGANIZED HEALTH CARE EDUCATION/TRAINING PROGRAM

## 2021-04-22 PROCEDURE — 6370000000 HC RX 637 (ALT 250 FOR IP): Performed by: PHYSICAL MEDICINE & REHABILITATION

## 2021-04-22 PROCEDURE — 1180000000 HC REHAB R&B

## 2021-04-22 PROCEDURE — 97535 SELF CARE MNGMENT TRAINING: CPT

## 2021-04-22 PROCEDURE — 92526 ORAL FUNCTION THERAPY: CPT

## 2021-04-22 PROCEDURE — 85027 COMPLETE CBC AUTOMATED: CPT

## 2021-04-22 PROCEDURE — 99232 SBSQ HOSP IP/OBS MODERATE 35: CPT | Performed by: INTERNAL MEDICINE

## 2021-04-22 PROCEDURE — 6360000002 HC RX W HCPCS: Performed by: STUDENT IN AN ORGANIZED HEALTH CARE EDUCATION/TRAINING PROGRAM

## 2021-04-22 PROCEDURE — 6370000000 HC RX 637 (ALT 250 FOR IP): Performed by: STUDENT IN AN ORGANIZED HEALTH CARE EDUCATION/TRAINING PROGRAM

## 2021-04-22 PROCEDURE — 97530 THERAPEUTIC ACTIVITIES: CPT

## 2021-04-22 PROCEDURE — 80048 BASIC METABOLIC PNL TOTAL CA: CPT

## 2021-04-22 PROCEDURE — 36415 COLL VENOUS BLD VENIPUNCTURE: CPT

## 2021-04-22 RX ADMIN — LEVOTHYROXINE SODIUM 112 MCG: 112 TABLET ORAL at 06:01

## 2021-04-22 RX ADMIN — ATORVASTATIN CALCIUM 40 MG: 40 TABLET, FILM COATED ORAL at 20:18

## 2021-04-22 RX ADMIN — AMLODIPINE BESYLATE 5 MG: 5 TABLET ORAL at 08:45

## 2021-04-22 RX ADMIN — ENOXAPARIN SODIUM 40 MG: 40 INJECTION SUBCUTANEOUS at 08:45

## 2021-04-22 RX ADMIN — ANTI-FUNGAL POWDER MICONAZOLE NITRATE TALC FREE: 1.42 POWDER TOPICAL at 08:48

## 2021-04-22 RX ADMIN — FERROUS SULFATE TAB 325 MG (65 MG ELEMENTAL FE) 325 MG: 325 (65 FE) TAB at 08:45

## 2021-04-22 RX ADMIN — PANTOPRAZOLE SODIUM 40 MG: 40 TABLET, DELAYED RELEASE ORAL at 06:01

## 2021-04-22 RX ADMIN — ANTI-FUNGAL POWDER MICONAZOLE NITRATE TALC FREE: 1.42 POWDER TOPICAL at 20:19

## 2021-04-22 RX ADMIN — FERROUS SULFATE TAB 325 MG (65 MG ELEMENTAL FE) 325 MG: 325 (65 FE) TAB at 20:18

## 2021-04-22 RX ADMIN — METOPROLOL TARTRATE 12.5 MG: 25 TABLET, FILM COATED ORAL at 08:49

## 2021-04-22 RX ADMIN — METOPROLOL TARTRATE 12.5 MG: 25 TABLET, FILM COATED ORAL at 20:18

## 2021-04-22 RX ADMIN — OXYBUTYNIN CHLORIDE 5 MG: 5 TABLET, EXTENDED RELEASE ORAL at 08:44

## 2021-04-22 RX ADMIN — Medication 1 CAPSULE: at 08:45

## 2021-04-22 RX ADMIN — Medication 6 MG: at 20:18

## 2021-04-22 RX ADMIN — ASPIRIN 81 MG: 81 TABLET, COATED ORAL at 08:44

## 2021-04-22 RX ADMIN — CLOPIDOGREL BISULFATE 75 MG: 75 TABLET ORAL at 08:44

## 2021-04-22 ASSESSMENT — PAIN SCALES - GENERAL: PAINLEVEL_OUTOF10: 0

## 2021-04-22 NOTE — PROGRESS NOTES
[address] [facility]    OCCUPATIONAL THERAPY MISSED TREATMENT NOTE   ACUTE REHAB  Date: 21  Patient Name: Ashleigh Coats       Room: 0389/8787-74  MRN: [id number]   Account #: [de-identified]    : 1932  (80 y.o.)  Gender: female   Referring Practitioner: Mai Sal MD  Diagnosis: Acute CVA             REASON FOR MISSED TREATMENT:  Patient refusal   -    Pt resting in recliner upon arrival of this writer, this writer offered pt shower this date, pt declining shower and ADL tasks, this writer attempting to bring pt down to OT gym to address OT POC, pt declines, pt states \"I'm so dizzy\", \"I just don't feel very bright today\" and \"I don't see things right like I should\", this writer obtained vitals and documented, continued encouragement provided with pt refusing. NSG notified.          Georgeanne Closs, COTA/L

## 2021-04-22 NOTE — PROGRESS NOTES
Physical Therapy  Facility/Department: [facility] ACUTE REHAB  Daily Treatment Note  NAME: Anette Brambila  : 1932  MRN: [id number]    Date of Service: 2021    Discharge Recommendations:  Patient would benefit from continued therapy after discharge, Home with assist PRN, Home with Home health PT        Assessment      PT Education: Adaptive Device Training;Functional Mobility Training  Patient Education: leg  training with bed mobility today. needs further training to improved independence  Activity Tolerance  Activity Tolerance: Patient limited by endurance; Patient limited by fatigue     Patient Diagnosis(es): There were no encounter diagnoses. has a past medical history of Anemia, Hypertension, Hypothyroidism, Osteoarthritis, Other long term (current) drug therapy, and Renal insufficiency. has no past surgical history on file. Restrictions  Restrictions/Precautions  Restrictions/Precautions: Fall Risk, Modified Diet, Swallowing - Thickened Liquids, General Precautions  Required Braces or Orthoses?: No  Implants present? : (l TKA)  Subjective              Orientation     Cognition      Objective   Bed mobility  Bridging: Stand by assistance(difficulty with bending knees for foot placement to perform activity)  Rolling to Left: Stand by assistance  Rolling to Right: Stand by assistance  Supine to Sit: Stand by assistance(with leg )  Sit to Supine: Stand by assistance(with leg )  Scooting: Stand by assistance  Comment: needes extra time and modifications to preform bed mobility patient demonstrated on flat firm surface with out handrails  Transfers  Sit to Stand: Stand by assistance  Stand to sit: Stand by assistance  Bed to Chair: Stand by assistance(patient needs safety cues to present to front of chair for transfer technique demonstrate safely)  Comment: patient needs cueing and intermittent tactile cues for posturing and extra time for stability.  patient tendency to maintain posterior weight of heels with difficulty to weight shift forward to walker with release from chair support. Needs cueing control descent to chair. patient reports use of lift chair in the home. re-ed patient with neuro rocking with alternate hand/foot forward to improve transfer from standard chair surface and height. Ambulation  Ambulation?: Yes  Ambulation 1  Surface: level tile  Device: Rolling Walker  Assistance: Stand by assistance  Quality of Gait: increased trunk flexion , UE weight bearing heavily on walker, hips beyond LEN of walker  Gait Deviations: Slow Nisha  Distance: 125 feet x 2  Comments: cueing for posturing only. intermittent tactile cues for safety within LEN of walker.   Ambulation 2  Surface - 2: carpet  Device 2: Rolling Walker  Assistance 2: Stand by assistance  Gait Deviations: Slow Nisha  Distance: 25 feet x 2  Ambulation 3  Surface - 3: ramp  Device 3: Rolling Walker  Assistance 3: Contact guard assistance  Gait Deviations: Slow Nisha  Distance: 50 feet  Comments: as above plus noted increased nisha descending ramp with need to intervene with postural correct  to prevent fall risk  Stairs  # Steps : 8  Stairs Height: 6\"  Rails: Bilateral  Curbs: 6\"  Device: Rolling walker  Assistance: Contact guard assistance  Comment: step to right ascending , pt instructed in foot placement and increased hip extension, pt reports fatigue at 4 steps evident by increased flexion         Other exercises  Other exercises 1: seated bilateral LE 2.5# lt green x 20  Other exercises 2: standing BLE 10 reps  Other exercises 3: nustep L4 10 minutes   Other exercises 6: Supine BLE 2.5# with slider pad 20 reps SAQ's, heel slides, hip ABD/ADD;                        G-Code     OutComes Score                                                     AM-PAC Score             Goals  Short term goals  Time Frame for Short term goals:  8 to 9 days  Short term goal 1: Pt to perform bed mobility at [facility] with rail  Short term goal 2: Pt to demonstrate transfers at min A  Short term goal 3: Pt to amb 50 to 80 ft with appropriate device, min/mod A   Short term goal 4: Pt to improve L LE strength by 1/3 MMG to improve fucntion. Short term goal 5: Pt able to go up and down 5 steps with 2 UE support, mod A   Long term goals  Time Frame for Long term goals : By LOS  Long term goal 1: Pt able to perform bed mobility mod-I  Long term goal 2: Pt able to perform transfers from varies surfaces at mod-I /supervsion level. Long term goal 3: Pt able to ambulate with appropriate device distance of 80 ft, mod-I/supervsion level. Long term goal 4: Pt able to perform step curb with assisitive device at min A  Long term goal 5: Pt able to go up and down 5 steps with 2 rails at min A to improve LE strength. Long term goal 6: Improve PASS score to atleast 25//36 improve overall fucntion  Long term goal 7: Improve Tinetti balance score to 20 or more/28 to reduce fall risk. Long term goal 8: Pt able to propel w/c on level surfaces 150 ft, with set up/supervsion. Patient Goals   Patient goals : I want to move my Left side to walk better    Plan    Plan  Times per week: 1.5 hr/day, 5 to 7 days/week  Plan weeks: 6  Current Treatment Recommendations: Strengthening, Neuromuscular Re-education, Home Exercise Program, Safety Education & Training, Patient/Caregiver Education & Training, Equipment Evaluation, Education, & procurement, Functional Mobility Training, Balance Training, Endurance Training, Transfer Training, Gait Training, ROM, Stair training  Safety Devices  Type of devices:  All fall risk precautions in place, Patient at risk for falls, Gait belt  Restraints  Initially in place: No     Therapy Time     04/22/21 1003   PT Individual Minutes   Time In 0855   Time Out 1020   Minutes 85   PT Concurrent Minutes   Time In 1020   Time Out 1025   Minutes 5     Pita Jimenez, PTA

## 2021-04-22 NOTE — PROGRESS NOTES
Speech Language Pathology  Speech Language Pathology  [facility]    Dysphagia/Cognitive Treatment Note    Date: 4/22/2021  Patients Name: Diane Romero  MRN: [id number]  Diagnosis:   Patient Active Problem List   Diagnosis Code    Anemia D64.9    Arthritis M19.90    HTN (hypertension) I10    Hypothyroidism E03.9    Renal insufficiency N28.9    Other long term (current) drug therapy Z79.899    Anemia due to stage 3 chronic kidney disease N18.30, D63.1    Chronic UTI N39.0    Mixed hyperlipidemia E78.2    Gastroesophageal reflux disease without esophagitis K21.9    S/P revision of total knee, 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    Muscular weakness M62.81    Other instability, left knee M25.362    Primary osteoarthritis of both hips M16.0    Primary osteoarthritis of right knee M17.11    Cerebrovascular accident (CVA) ([address] I63.9    Received intravenous tissue plasminogen activator (tPA) in emergency department Z92.82    Acute left hemiparesis ([address] G81.94    Chest pain in adult R07.9    Acute CVA (cerebrovascular accident) ([address] I63.9    Nausea R11.0    Malaise and fatigue R53.81, R53.83       Pain: none reported    Cognitive Treatment    Treatment time: 6617-2641    Subjective: [x] Alert [x] Cooperative     [] Confused     [] Agitated    [] Lethargic      Objective/Assessment:  Attention:   Max cues and repetition needed to maintain pt. on task and alertness for swallowing exercises. Cognition: n/a    Verbal expression: General expression functional in conversation. Dysphagia: Vital stim completed for 35 minutes at  4.0 mA. Pt. took trials of honey thick liquids via straw x13. Pt. completed simple tongue base strengthening exercises x5, 10 reps each. Briseyda maneuver completed x15. Pt. Completed oral motor exercises x2, 10 reps. MAX cues needed on all tasks, pt. Kept eyes closed entire session. Other: Family not present. Pt. Reporting \"I just feel off today. \"    Plan:  [x] Continue ST services    [] Discharge from :      Discharge recommendations: [] Inpatient Rehab   [] [place]   [] Outpatient Therapy  [] Follow up at trauma clinic   [] Other:       Treatment completed by: Solitario Dominguez A.CCC/SLP

## 2021-04-22 NOTE — PROGRESS NOTES
[facility]    CONSULTATION / HISTORY AND PHYSICAL EXAMINATION            Date:   4/22/2021  Patient name:  Mini Ramirez  Date of admission:  4/7/2021 12:21 PM  MRN:   [id number]  Account:  [de-identified]  YOB: 1932  PCP:    VERNELL Hernandez CNP  Room:   1750/3040-15  Code Status:    Full Code    Physician Requesting Consult: Brian Martínez MD    Reason for Consult:  medical management    Chief Complaint:     No chief complaint on file. Ischemic CVA    History Obtained From:     Patient medical record nursing staff    History of Present Illness:     66-year-old elderly lady was admitted to [facility] earlier this month with the acute left-sided weakness after she finished her physical therapy at home she has chronic left leg weakness post multiple surgeries and has chronic shoulder issues and difficulty overhead abduction  Patient received a TPA    4/22/2021    No new complaints/symptoms . Symptoms or ailment  course ;                                   are improving over time. · Continue present regimen     BP Readings from Last 3 Encounters:   04/22/21 (!) 121/58   04/07/21 139/68   03/12/21 135/76    1.     2.             Past Medicl History:     Past Medical History:   Diagnosis Date    Anemia     Hypertension     Hypothyroidism     Osteoarthritis     Other long term (current) drug therapy     Renal insufficiency         Past Surgical History:     No past surgical history on file. Medications Prior to Admission:     Prior to Admission medications    Medication Sig Start Date End Date Taking?  Authorizing Provider   atorvastatin (LIPITOR) 20 MG tablet Take 1 tablet by mouth nightly 3/1/21  Yes VERNELL Velez CNP   levothyroxine (SYNTHROID) 112 MCG tablet Take 1 tablet by mouth Daily 3/1/21  Yes VERNELL Velez CNP   oxybutynin (DITROPAN-XL) 5 MG extended release tablet Take 1 tablet by mouth daily 1/5/21  Yes VERNELL Nunez - CNP   acetaminophen (TYLENOL) 325 MG tablet Take 650 mg by mouth Take 2 tablets at bedtime for sleeping comfort PRN   Yes Historical Provider, MD   amLODIPine (NORVASC) 5 MG tablet Take 1 tablet by mouth daily 3/1/21   VERNELL Nunez - CNP   Ferrous Sulfate 324 MG TBEC Take 1 tablet by mouth 2 times daily 3/1/21   VERNELL Nunez - CNP   omeprazole (PRILOSEC) 20 MG delayed release capsule Take 1 capsule by mouth daily 3/1/21   VERNELL Nunez - CNP   sodium bicarbonate 650 MG tablet Take 1 tablet by mouth 4 times daily Two tabs BID 3/1/21   VERNELL Nunez CNP   metoprolol tartrate (LOPRESSOR) 25 MG tablet Take 0.5 tablets by mouth 2 times daily Take 1/2 tablet BID 3/1/21   VERNELL Nunez - CNP   docusate sodium (COLACE) 100 MG capsule Take 1 capsule by mouth every other day 2/24/21   VERNELL Nunez CNP   cephALEXin (KEFLEX) 250 MG capsule TAKE ONE CAPSULE BY MOUTH ONCE A DAY. 2/1/21   VERNELL Waggoner - CNP   fluorouracil (EFUDEX) 5 % cream Apply topically for 4 weeks . 6/29/20   Historical Provider, MD   Calcium Carb-Cholecalciferol (CALCIUM + D3) 600-200 MG-UNIT TABS tablet Take 1 tablet by mouth Daily with lunch    Historical Provider, MD   Multiple Vitamins-Minerals (THERAPEUTIC MULTIVITAMIN-MINERALS) tablet Take 1 tablet by mouth daily    Historical Provider, MD   magnesium oxide (MAG-OX) 400 MG tablet Take 400 mg by mouth daily    Historical Provider, MD   Apoaequorin (PREVAGEN) 10 MG CAPS Take by mouth daily    Historical Provider, MD   GLUCOSA-CHONDR-NA CHONDR-MSM PO Take by mouth    Historical Provider, MD        Allergies:     Ciprofloxacin, Hydrocodone, Macrobid [nitrofurantoin], and Ciprofloxacin    Social History:     Tobacco:    reports that she has never smoked. She has never used smokeless tobacco.  Alcohol:      reports no history of alcohol use. Drug Use:  reports no history of drug use.     Family History:     No family history on file. Review of Systems:     Positive and Negative as described in HPI. CONSTITUTIONAL:  negative for fevers, chills, sweats, fatigue, weight loss  HEENT:  negative for vision, hearing changes, runny nose, throat pain  RESPIRATORY:  negative for shortness of breath, cough, congestion, wheezing. CARDIOVASCULAR:  negative for chest pain, palpitations. GASTROINTESTINAL:  negative for nausea, vomiting, diarrhea, constipation, change in bowel habits, abdominal pain   GENITOURINARY:  negative for difficulty of urination, burning with urination, frequency   INTEGUMENT:  negative for rash, skin lesions, easy bruising   HEMATOLOGIC/LYMPHATIC:  negative for swelling/edema   ALLERGIC/IMMUNOLOGIC:  negative for urticaria , itching  ENDOCRINE:  negative increase in drinking, increase in urination, hot or cold intolerance  MUSCULOSKELETAL:  negative joint pains, muscle aches, swelling of joints  NEUROLOGICAL: Positive for left upper and lower extremity weakness extremities      Physical Exam:     BP (!) 121/58   Pulse 56   Temp 97.9 °F (36.6 °C)   Resp 19   Ht 5' 7\" (1.702 m)   Wt 149 lb (67.6 kg)   SpO2 100%   BMI 23.34 kg/m²   Temp (24hrs), Av.1 °F (36.7 °C), Min:97.9 °F (36.6 °C), Max:98.3 °F (36.8 °C)    No results for input(s): POCGLU in the last 72 hours. Intake/Output Summary (Last 24 hours) at 2021 1722  Last data filed at 2021 0200  Gross per 24 hour   Intake    Output 650 ml   Net -650 ml       General Appearance:  alert, well appearing, and in no acute distress  Mental status: oriented to person, place, and time with normal affect  Head:  normocephalic, atraumatic.   Eye: no icterus, redness, pupils equal and reactive, extraocular eye movements intact, conjunctiva clear  Ear: normal external ear, no discharge, hearing intact  Nose:  no drainage noted  Mouth: mucous membranes moist  Neck: supple, no carotid bruits, thyroid not palpable  Lungs: Bilateral equal air entry, clear to ausculation, no wheezing, rales or rhonchi, normal effort  Cardiovascular: normal rate, regular rhythm, no murmur, gallop, rub. Abdomen: Soft, nontender, nondistended, normal bowel sounds, no hepatomegaly or splenomegaly  Neurologic: Left upper and left lower extremity weakness  Skin: No gross lesions, rashes, bruising or bleeding on exposed skin area  Extremities:  peripheral pulses palpable, no pedal edema or calf pain with palpation      Investigations:      Laboratory Testing:  Recent Results (from the past 24 hour(s))   Basic Metabolic Panel w/ Reflex to MG    Collection Time: 04/22/21  7:03 AM   Result Value Ref Range    Glucose 86 70 - 99 mg/dL    BUN 22 8 - 23 mg/dL    CREATININE 1.16 (H) 0.50 - 0.90 mg/dL    Bun/Cre Ratio NOT REPORTED 9 - 20    Calcium 9.2 8.6 - 10.4 mg/dL    Sodium 138 135 - 144 mmol/L    Potassium 4.2 3.7 - 5.3 mmol/L    Chloride 103 98 - 107 mmol/L    CO2 25 20 - 31 mmol/L    Anion Gap 10 9 - 17 mmol/L    GFR Non-African American 44 (L) >60 mL/min    GFR  53 (L) >60 mL/min    GFR Comment          GFR Staging NOT REPORTED    CBC    Collection Time: 04/22/21  7:03 AM   Result Value Ref Range    WBC 7.6 3.5 - 11.0 k/uL    RBC 3.68 (L) 4.0 - 5.2 m/uL    Hemoglobin 11.2 (L) 12.0 - 16.0 g/dL    Hematocrit 33.7 (L) 36 - 46 %    MCV 91.4 80 - 100 fL    MCH 30.4 26 - 34 pg    MCHC 33.2 31 - 37 g/dL    RDW 14.0 11.5 - 14.9 %    Platelets 305 867 - 772 k/uL    MPV 8.9 6.0 - 12.0 fL    NRBC Automated NOT REPORTED per 100 WBC         Medications: Allergies:     Allergies   Allergen Reactions    Ciprofloxacin     Hydrocodone     Macrobid [Nitrofurantoin]     Ciprofloxacin Rash       Current Meds:   Scheduled Meds:    miconazole   Topical BID    lactobacillus  1 capsule Oral Daily with breakfast    enoxaparin  40 mg Subcutaneous Daily    aspirin  81 mg Oral Daily    atorvastatin  40 mg Oral Nightly    ferrous sulfate  325 mg Oral BID    levothyroxine  112 mcg Oral Daily    metoprolol tartrate  12.5 mg Oral BID    oxybutynin  5 mg Oral Daily    pantoprazole  40 mg Oral QAM AC    polyethylene glycol  17 g Oral Daily     Continuous Infusions:   PRN Meds: ondansetron, melatonin, acetaminophen, bisacodyl, magnesium hydroxide      Consultations:   IP CONSULT TO SOCIAL WORK  IP CONSULT TO INTERNAL MEDICINE  Assessment :      Primary Problem  <principal problem not specified>    Active Hospital Problems    Diagnosis Date Noted    Malaise and fatigue [R53.81, R53.83] 04/10/2021    Acute CVA (cerebrovascular accident) ([facility] [address] [I63.9] 04/07/2021    Acute left hemiparesis ([facility] [address] [G81.94]     Gastroesophageal reflux disease without esophagitis [K21.9] 02/24/2021    HTN (hypertension) [I10] 10/21/2020    Hypothyroidism [E03.9] 10/21/2020       Plan:     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 Blood pressure has been low this morning. Will stop amlodipine. Alivia Polanco MD  4/22/2021  5:22 PM    Copy sent to Dr. Lili Valderrama, APRN - CNP    Please note that this chart was generated using voice recognition [address] dictation software. Although every effort was made to ensure the accuracy of this automated transcription, some errors in transcription may have occurred.

## 2021-04-22 NOTE — PROGRESS NOTES
Herbiehemenia ShreyaVERNELL - CNP   acetaminophen (TYLENOL) 325 MG tablet Take 650 mg by mouth Take 2 tablets at bedtime for sleeping comfort PRN   Yes Historical Provider, MD   amLODIPine (NORVASC) 5 MG tablet Take 1 tablet by mouth daily 3/1/21   Herbiehemenia ShreyaVERNELL - CNP   Ferrous Sulfate 324 MG TBEC Take 1 tablet by mouth 2 times daily 3/1/21   Herbiehemenia ShreyaVERNELL - CNP   omeprazole (PRILOSEC) 20 MG delayed release capsule Take 1 capsule by mouth daily 3/1/21   Wilhemenia Shreya, APRN - CNP   sodium bicarbonate 650 MG tablet Take 1 tablet by mouth 4 times daily Two tabs BID 3/1/21   Wilhemenia ShreyaVERNELL - MALCOLM   metoprolol tartrate (LOPRESSOR) 25 MG tablet Take 0.5 tablets by mouth 2 times daily Take 1/2 tablet BID 3/1/21   Wilhemenia ShreyaVERNELL - CNP   docusate sodium (COLACE) 100 MG capsule Take 1 capsule by mouth every other day 2/24/21   Wilhemenia ShreyaVERNELL - CNP   cephALEXin (KEFLEX) 250 MG capsule TAKE ONE CAPSULE BY MOUTH ONCE A DAY. 2/1/21   VERNELL Valera CNP   fluorouracil (EFUDEX) 5 % cream Apply topically for 4 weeks . 6/29/20   Historical Provider, MD   Calcium Carb-Cholecalciferol (CALCIUM + D3) 600-200 MG-UNIT TABS tablet Take 1 tablet by mouth Daily with lunch    Historical Provider, MD   Multiple Vitamins-Minerals (THERAPEUTIC MULTIVITAMIN-MINERALS) tablet Take 1 tablet by mouth daily    Historical Provider, MD   magnesium oxide (MAG-OX) 400 MG tablet Take 400 mg by mouth daily    Historical Provider, MD   Apoaequorin (PREVAGEN) 10 MG CAPS Take by mouth daily    Historical Provider, MD   GLUCOSA-CHONDR-NA CHONDR-MSM PO Take by mouth    Historical Provider, MD        Allergies:     Ciprofloxacin, Hydrocodone, Macrobid [nitrofurantoin], and Ciprofloxacin    Social History:     Tobacco:    reports that she has never smoked. She has never used smokeless tobacco.  Alcohol:      reports no history of alcohol use. Drug Use:  reports no history of drug use.     Family History:     No family history on file. Review of Systems:     Positive and Negative as described in HPI. CONSTITUTIONAL:  negative for fevers, chills, sweats, fatigue, weight loss  HEENT:  negative for vision, hearing changes, runny nose, throat pain  RESPIRATORY:  negative for shortness of breath, cough, congestion, wheezing. CARDIOVASCULAR:  negative for chest pain, palpitations. GASTROINTESTINAL:  negative for nausea, vomiting, diarrhea, constipation, change in bowel habits, abdominal pain   GENITOURINARY:  negative for difficulty of urination, burning with urination, frequency   INTEGUMENT:  negative for rash, skin lesions, easy bruising   HEMATOLOGIC/LYMPHATIC:  negative for swelling/edema   ALLERGIC/IMMUNOLOGIC:  negative for urticaria , itching  ENDOCRINE:  negative increase in drinking, increase in urination, hot or cold intolerance  MUSCULOSKELETAL:  negative joint pains, muscle aches, swelling of joints  NEUROLOGICAL: Positive for left upper and lower extremity weakness extremities      Physical Exam:     BP (!) 122/48   Pulse 63   Temp 98.3 °F (36.8 °C) (Oral)   Resp 17   Ht 5' 7\" (1.702 m)   Wt 149 lb (67.6 kg)   SpO2 100%   BMI 23.34 kg/m²   Temp (24hrs), Av °F (36.7 °C), Min:97.6 °F (36.4 °C), Max:98.3 °F (36.8 °C)    No results for input(s): POCGLU in the last 72 hours. Intake/Output Summary (Last 24 hours) at 20212  Last data filed at 2021 0715  Gross per 24 hour   Intake    Output 1300 ml   Net -1300 ml       General Appearance:  alert, well appearing, and in no acute distress  Mental status: oriented to person, place, and time with normal affect  Head:  normocephalic, atraumatic.   Eye: no icterus, redness, pupils equal and reactive, extraocular eye movements intact, conjunctiva clear  Ear: normal external ear, no discharge, hearing intact  Nose:  no drainage noted  Mouth: mucous membranes moist  Neck: supple, no carotid bruits, thyroid not palpable  Lungs: Bilateral equal air entry, clear to ausculation, no wheezing, rales or rhonchi, normal effort  Cardiovascular: normal rate, regular rhythm, no murmur, gallop, rub. Abdomen: Soft, nontender, nondistended, normal bowel sounds, no hepatomegaly or splenomegaly  Neurologic: Left upper and left lower extremity weakness  Skin: No gross lesions, rashes, bruising or bleeding on exposed skin area  Extremities:  peripheral pulses palpable, no pedal edema or calf pain with palpation      Investigations:      Laboratory Testing:  No results found for this or any previous visit (from the past 24 hour(s)). Medications: Allergies:     Allergies   Allergen Reactions    Ciprofloxacin     Hydrocodone     Macrobid [Nitrofurantoin]     Ciprofloxacin Rash       Current Meds:   Scheduled Meds:    miconazole   Topical BID    lactobacillus  1 capsule Oral Daily with breakfast    enoxaparin  40 mg Subcutaneous Daily    aspirin  81 mg Oral Daily    amLODIPine  5 mg Oral Daily    atorvastatin  40 mg Oral Nightly    clopidogrel  75 mg Oral Daily    ferrous sulfate  325 mg Oral BID    levothyroxine  112 mcg Oral Daily    metoprolol tartrate  12.5 mg Oral BID    oxybutynin  5 mg Oral Daily    pantoprazole  40 mg Oral QAM AC    polyethylene glycol  17 g Oral Daily     Continuous Infusions:   PRN Meds: ondansetron, melatonin, acetaminophen, bisacodyl, magnesium hydroxide      Consultations:   IP CONSULT TO SOCIAL WORK  IP CONSULT TO INTERNAL MEDICINE  Assessment :      Primary Problem  <principal problem not specified>    Active Hospital Problems    Diagnosis Date Noted    Malaise and fatigue [R53.81, R53.83] 04/10/2021    Acute CVA (cerebrovascular accident) ([facility] [address] [I63.9] 04/07/2021    Acute left hemiparesis (H[facility]) [G81.94]     Gastroesophageal reflux disease without esophagitis [K21.9] 02/24/2021    HTN (hypertension) [I10] 10/21/2020    Hypothyroidism [E03.9] 10/21/2020       Plan:     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 Engaging with physical therapy. Labs and vitals reviewed. No new issues. Continue with current care. Madeleine Mcelroy MD  4/21/2021  10:02 PM    Copy sent to Dr. Renee Cevallos, APRN - CNP    Please note that this chart was generated using voice recognition [address] dictation software. Although every effort was made to ensure the accuracy of this automated transcription, some errors in transcription may have occurred.

## 2021-04-22 NOTE — PLAN OF CARE
Problem: Skin Integrity:  Goal: Will show no infection signs and symptoms  Description: Will show no infection signs and symptoms  4/22/2021 1800 by Fernando Gayle RN  Outcome: Ongoing     Problem: Discharge Planning:  Goal: Ability to perform activities of daily living will improve  Description: Ability to perform activities of daily living will improve  Outcome: Ongoing     Problem: Sensory Perception - Impaired:  Goal: Demonstrations of improved sensory functioning will increase  Description: Demonstrations of improved sensory functioning will increase  Outcome: Ongoing

## 2021-04-22 NOTE — PROGRESS NOTES
[address] [facility]    ACUTE REHABILITATION OCCUPATIONAL THERAPY  DAILY NOTE    Date: 21  Patient Name: Ashleigh Coats      Room: 3502/4434-79    MRN: [id number]   : 1932  (80 y.o.)  Gender: female   Referring Practitioner: Mai Sal MD  Diagnosis: Acute CVA  Additional Pertinent Hx:  Per ARU preadmission assessment:80year-old female admitted with acute left hemiparesis causing a fall of her chair. Noted acute left-sided weakness worse than previous date. .  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 Cornell Mcgovern She was life flighted to [place]. Patient on baby aspirin at home. Patient given TPA during TPA became hysterical repeat CT showed no change completed TPA. 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 Pt admitted to rehab unit on 21. Restrictions  Restrictions/Precautions: Fall Risk, Modified Diet, Swallowing - Thickened Liquids, General Precautions  Implants present? : (L TKA)  Required Braces or Orthoses?: No  Equipment Used: Bed, Wheelchair(rolling walker)    Subjective  Subjective: \"well I guess I have to\"  Comments: pt states in regards to participating in OT this date  Patient Currently in Pain: Denies  Restrictions/Precautions: Fall Risk;Modified Diet;Swallowing - Thickened Liquids; General Precautions     Patient Observation  Observations: increased use of LUE during tasks this date  Pain Assessment  Pain Assessment: 0-10  Pain Level: 8  Pain Type: Acute pain  Pain Location: Arm  Pain Orientation: Left  Pain Descriptors: Sore    Objective  Cognition  Overall Cognitive Status: Impaired  Arousal/Alertness: Delayed responses to stimuli  Attention Span: Attends with cues to redirect  Memory: Decreased short term memory;Decreased recall of recent events IADLs; Decreased posture;Decreased ADL status; Decreased ROM; Decreased safe awareness;Decreased cognition;Decreased fine motor control;Decreased coordination  Prognosis: Good  Discharge Recommendations: Home with assist PRN;Home with Home health OT  Activity Tolerance: Patient limited by fatigue;Patient Tolerated treatment well  Activity Tolerance: fatigued, rest breaks req, increased time req  Safety Devices in place: Yes  Type of devices: Nurse notified; Left in chair;Patient at risk for falls;Call light within reach(family in room)  Equipment Recommendations  Equipment Needed: (TBD)       Patient Education: transfer safety, OT POC, activity promotion, increasing indep  Patient Goals   Patient goals : To return home with family support  Learner:family and patient  Method: demonstration and explanation       Outcome: needs reinforcement        Plan  Plan  Times per week: 5-7  Times per day: Twice a day  Current Treatment Recommendations: Strengthening, ROM, Safety Education & Training, Positioning, Balance Training, Patient/Caregiver Education & Training, Self-Care / ADL, Functional Mobility Training, Endurance Training, Neuromuscular Re-education, Wheelchair Mobility Training, Cognitive Reorientation, Equipment Evaluation, Education, & procurement, Home Management Training, Cognitive/Perceptual Training  Patient Goals   Patient goals : To return home with family support  Short term goals  Time Frame for Short term goals: 7 to 10 days  Short term goal 1: Pt will perform upper body bathing/dressing with stand by assist.  Short term goal 2: Pt will perform lower body bathing/dressing and toileting tasks with Moderate assist and Good safety. Short term goal 3: Pt will perform functional functional transfers and mobility during self-care with Moderate assist, least restrictive mobility device, and Good safety.   Short term goal 4: Pt will for 4+ minutes with 1-2 UE support and Minimal assist while engaging in functional activity of choice. Short term goal 5: Pt will actively participate in 30+ minutes of therapeutic exercise/functional activities to promote increased independence with self-care and mobility. Short term goal 6: Pt will verbalize/demonstrate Good understanding of assistive equipment/durable medical equipment/modified techniques for increased independence with self-care and mobility. Long term goals  Time Frame for Long term goals : By discharge  Long term goal 1: Pt will perform bathing during shower with stand by assist and Good safety. Long term goal 2: Pt will perform dressing and toileting tasks with modified independence and Good safety. Long term goal 3: Pt will stand for 8+ minutes with 1-2 UE support, modified independence, no loss of balance while engaging in functional activity of choice. Long term goal 4: Pt will perform functional transfers and mobility with modified independence, least restrictive mobility device, and Good safety. Long term goal 5: Pt will verbalize/demonstrate Good understanding of Fall Prevention Strategies for increased independence with self-care and mobility.   Long term goals 6: 9 hole peg and box and blocks to be assessed for LUE as appropriate        04/22/21 1408   OT Individual Minutes   Time In 1408   Time Out 1500   Minutes 52     Electronically signed by AYESHA Valiente on 4/22/21 at 3:43 PM EDT

## 2021-04-22 NOTE — PROGRESS NOTES
Physical Medicine & Rehabilitation  Progress Note      Subjective:      Taty Lai is a 80 y.o. female with ischemic CVA with left nondominant hemiparesis. She reports feeling okay today. She notes feeling fatigued, and she notes some dizziness with standing this morning. She denies any chest pain, shortness of breath, nausea, and abdominal pain. She denies any other acute concerns. ROS:  Denies fevers, chills, sweats. No chest pain, palpitations, lightheadedness  Denies coughing, wheezing or shortness of breath. Denies abdominal pain, nausea, diarrhea or constipation. No new areas of joint pain. Denies new areas of numbness or weakness. Denies new anxiety or depression issues. No new skin problems. Rehabilitation:   Progressing in therapies. PT:  Restrictions/Precautions: Fall Risk, Modified Diet, Swallowing - Thickened Liquids, General Precautions  Implants present? : (L TKA)   Transfers  Sit to Stand: Stand by assistance  Stand to sit: Stand by assistance  Bed to Chair: Stand by assistance(patient needs safety cues to present to front of chair for transfer technique demonstrate safely)  Stand Pivot Transfers: Contact guard assistance(instruction for RW managment and LE proximal position to sit)  Squat Pivot Transfers: Maximum Assistance(to left , no AD , pt sequences correctly after pre instructi)  Comment: patient needs cueing and intermittent tactile cues for posturing and extra time for stability. patient tendency to maintain posterior weight of heels with difficulty to weight shift forward to walker with release from chair support. Needs cueing control descent to chair. patient reports use of lift chair in the home. re-ed patient with neuro rocking with alternate hand/foot forward to improve transfer from standard chair surface and height. Ambulation 1  Surface: level tile  Device: Rolling Walker  Other Apparatus:  (IV)  Assistance: Stand by assistance  Quality of Gait: increased trunk flexion , UE weight bearing heavily on walker, hips beyond LEN of walker  Gait Deviations: Slow Zuly  Distance: 125 feet x 2  Comments: cueing for posturing only. intermittent tactile cues for safety within LEN of walker. Transfers  Sit to Stand: Stand by assistance  Stand to sit: Stand by assistance  Bed to Chair: Stand by assistance(patient needs safety cues to present to front of chair for transfer technique demonstrate safely)  Stand Pivot Transfers: Contact guard assistance(instruction for RW managment and LE proximal position to sit)  Squat Pivot Transfers: Maximum Assistance(to left , no AD , pt sequences correctly after pre instructi)  Comment: patient needs cueing and intermittent tactile cues for posturing and extra time for stability. patient tendency to maintain posterior weight of heels with difficulty to weight shift forward to walker with release from chair support. Needs cueing control descent to chair. patient reports use of lift chair in the home. re-ed patient with neuro rocking with alternate hand/foot forward to improve transfer from standard chair surface and height. Ambulation  Ambulation?: Yes  More Ambulation?: No  Ambulation 1  Surface: level tile  Device: Rolling Walker  Other Apparatus: (IV)  Assistance: Stand by assistance  Quality of Gait: increased trunk flexion , UE weight bearing heavily on walker, hips beyond LEN of walker  Gait Deviations: Slow Zuly  Distance: 125 feet x 2  Comments: cueing for posturing only. intermittent tactile cues for safety within LEN of walker. Surface: level tile  Ambulation 1  Surface: level tile  Device: Rolling Walker  Other Apparatus: (IV)  Assistance: Stand by assistance  Quality of Gait: increased trunk flexion , UE weight bearing heavily on walker, hips beyond LEN of walker  Gait Deviations: Slow Zuly  Distance: 125 feet x 2  Comments: cueing for posturing only. intermittent tactile cues for safety within LEN of walker.     OT:  ADL Feeding: Setup(increased time req, dentures)  Grooming: Setup(combing hair)  UE Bathing: None  LE Bathing: None  UE Dressing: Setup(increased time req, pt able to don/doff shirt seated in w/c)  LE Dressing: None(declines TEDs this date, NSG notified)  Toileting:  Moderate assistance(A for hygiene after BM, brief mgmt and intermittent A for cl)  Additional Comments: increased time req at sink for denture care, pt req cuing for soaking dentures due to excessive build up of food particles, extra time req for brushing dentures         Balance  Sitting Balance: Supervision  Standing Balance: Contact guard assistance(CGA/SBA with UE support)   Standing Balance  Time: PM: 1-2 min x4  Activity: PM: functional mobility, toilet transfer/toileting tasks  Comment: no LOB noted, slow to move, flexed forward with fatigue  Functional Mobility  Functional - Mobility Device: Rolling Walker  Activity: Other  Assist Level: Contact guard assistance  Functional Mobility Comments: VCs for safety, posture and proper tech     Bed mobility  Bridging: Stand by assistance(difficulty with bending knees for foot placement to perform activity)  Rolling to Left: Stand by assistance  Rolling to Right: Stand by assistance  Supine to Sit: Stand by assistance(with leg )  Sit to Supine: Stand by assistance(with leg )  Scooting: Stand by assistance  Comment: needes extra time and modifications to preform bed mobility patient demonstrated on flat firm surface with out handrails  Transfers  Stand Step Transfers: Contact guard assistance(using R/W)  Stand Pivot Transfers: Contact guard assistance(GBs in bathroom)  Sit to stand: Contact guard assistance  Stand to sit: Contact guard assistance, Stand by assistance  Transfer Comments: cuing for proper tech   Toilet Transfers  Toilet - Technique: Stand step, To right, To left  Equipment Used: Raised toilet seat with rails  Toilet Transfer: Contact guard assistance  Toilet Transfers Comments: slow 121 04/02/2021    HDL 55 04/02/2021    TRIG 61 04/02/2021     LIVER PROFILE: No results for input(s): AST, ALT, ALB, BILIDIR, BILITOT, ALKPHOS in the last 72 hours. Impression/Plan:   Impaired ADLs, gait, and mobility due to:    1. 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 On 3 weeks ASA, Plavix with ASA to continue after 4/22. On atorvastatin. 2. Dysphagia: On minced and moist solids and moderately-thick liquids. SLP treating and including vital stim. Will continue to monitor to determine if she is a candidate for [employer] but she has a lot of food residue in her mouth after each meal.   3. Dehydration/hyponatremia:  Resolved. IM treated with IV fluid until 4/12 and serial lab tests - improved Na and creatinine. 4. HTN: On amlodipine - discontinued 4/22 by IM, metoprolol tartrate. Follow up Dr. Augusto Ribera in [facility] in 4 weeks for stress test.   5. Orthostasis: Ensure she wears ARSH hose and abdominal binder when out of bed. Encourage hydration. 6. OA/L rotator cuff injury:  Stable. Will monitor  7. CKD:  Stable. Will monitor BMP  8. Hypothyroidism: On levothyroxine  9. Anemia: On ferrous sulfate. Monitoring Hb  10. Hx chronic cystitis:  On oxybutynin ER. Frequent incontinence is normal baseline for patient. Urine culture positive for E Coli. Keflex renally adjusted through 4/17. Leukocytosis resolved 4/11. 11. GERD:  On pantoprazole  12. Bowel Management: Miralax daily, senokot prn, milk of magnesia prn, dulcolax prn. On probiotic for loose stools which are improved. 13. DVT Prophylaxis:  low molecular weight heparin, SCD's while in bed and ARSH's   14. Internal medicine for medical management  15. Discussed discharge plan with patient and family on 4/20. Team recommending 24-hour supervision for patient at home but family is unable to provide this level of supervision.   Daughter states that family members and friends frequently check in on the patient throughout the day. After patient and family discussions with multiple team members, family would like patient to return home at discharge with home health services.       Electronically signed by Trudy Davenport MD on 4/23/2021 at 12:48 AM

## 2021-04-22 NOTE — PLAN OF CARE
Problem: Skin Integrity:  Goal: Will show no infection signs and symptoms  Description: Will show no infection signs and symptoms  Outcome: Ongoing     Problem: Skin Integrity:  Goal: Absence of new skin breakdown  Description: Absence of new skin breakdown  Outcome: Ongoing     Problem: Neurological  Goal: Maximum potential motor/sensory/cognitive function  Outcome: Ongoing     Problem: Pain:  Goal: Pain level will decrease  Description: Pain level will decrease  Outcome: Ongoing     Problem: Pain:  Goal: Control of acute pain  Description: Control of acute pain  Outcome: Ongoing     Problem: Pain:  Goal: Control of chronic pain  Description: Control of chronic pain  Outcome: Ongoing

## 2021-04-23 PROCEDURE — 6370000000 HC RX 637 (ALT 250 FOR IP): Performed by: PHYSICAL MEDICINE & REHABILITATION

## 2021-04-23 PROCEDURE — 6360000002 HC RX W HCPCS: Performed by: STUDENT IN AN ORGANIZED HEALTH CARE EDUCATION/TRAINING PROGRAM

## 2021-04-23 PROCEDURE — 99231 SBSQ HOSP IP/OBS SF/LOW 25: CPT | Performed by: STUDENT IN AN ORGANIZED HEALTH CARE EDUCATION/TRAINING PROGRAM

## 2021-04-23 PROCEDURE — 97116 GAIT TRAINING THERAPY: CPT

## 2021-04-23 PROCEDURE — 1180000000 HC REHAB R&B

## 2021-04-23 PROCEDURE — 97530 THERAPEUTIC ACTIVITIES: CPT

## 2021-04-23 PROCEDURE — 6370000000 HC RX 637 (ALT 250 FOR IP): Performed by: STUDENT IN AN ORGANIZED HEALTH CARE EDUCATION/TRAINING PROGRAM

## 2021-04-23 PROCEDURE — 92526 ORAL FUNCTION THERAPY: CPT

## 2021-04-23 PROCEDURE — 97535 SELF CARE MNGMENT TRAINING: CPT

## 2021-04-23 PROCEDURE — 99232 SBSQ HOSP IP/OBS MODERATE 35: CPT | Performed by: INTERNAL MEDICINE

## 2021-04-23 PROCEDURE — 97110 THERAPEUTIC EXERCISES: CPT

## 2021-04-23 RX ADMIN — ENOXAPARIN SODIUM 40 MG: 40 INJECTION SUBCUTANEOUS at 07:44

## 2021-04-23 RX ADMIN — ATORVASTATIN CALCIUM 40 MG: 40 TABLET, FILM COATED ORAL at 20:25

## 2021-04-23 RX ADMIN — OXYBUTYNIN CHLORIDE 5 MG: 5 TABLET, EXTENDED RELEASE ORAL at 07:45

## 2021-04-23 RX ADMIN — METOPROLOL TARTRATE 12.5 MG: 25 TABLET, FILM COATED ORAL at 20:25

## 2021-04-23 RX ADMIN — ASPIRIN 81 MG: 81 TABLET, COATED ORAL at 07:44

## 2021-04-23 RX ADMIN — METOPROLOL TARTRATE 12.5 MG: 25 TABLET, FILM COATED ORAL at 07:44

## 2021-04-23 RX ADMIN — LEVOTHYROXINE SODIUM 112 MCG: 112 TABLET ORAL at 06:16

## 2021-04-23 RX ADMIN — ANTI-FUNGAL POWDER MICONAZOLE NITRATE TALC FREE: 1.42 POWDER TOPICAL at 07:44

## 2021-04-23 RX ADMIN — PANTOPRAZOLE SODIUM 40 MG: 40 TABLET, DELAYED RELEASE ORAL at 06:16

## 2021-04-23 RX ADMIN — FERROUS SULFATE TAB 325 MG (65 MG ELEMENTAL FE) 325 MG: 325 (65 FE) TAB at 07:45

## 2021-04-23 RX ADMIN — Medication 1 CAPSULE: at 07:44

## 2021-04-23 RX ADMIN — ANTI-FUNGAL POWDER MICONAZOLE NITRATE TALC FREE: 1.42 POWDER TOPICAL at 20:25

## 2021-04-23 RX ADMIN — FERROUS SULFATE TAB 325 MG (65 MG ELEMENTAL FE) 325 MG: 325 (65 FE) TAB at 20:25

## 2021-04-23 ASSESSMENT — PAIN SCALES - GENERAL
PAINLEVEL_OUTOF10: 0

## 2021-04-23 NOTE — PROGRESS NOTES
Speech Language Pathology  Speech Language Pathology  [facility]    Dysphagia/Cognitive Treatment Note    Date: 4/23/2021  Patients Name: Elijah Joseph  MRN: [id number]  Diagnosis:   Patient Active Problem List   Diagnosis Code    Anemia D64.9    Arthritis M19.90    HTN (hypertension) I10    Hypothyroidism E03.9    Renal insufficiency N28.9    Other long term (current) drug therapy Z79.899    Anemia due to stage 3 chronic kidney disease N18.30, D63.1    Chronic UTI N39.0    Mixed hyperlipidemia E78.2    Gastroesophageal reflux disease without esophagitis K21.9    S/P revision of total knee, 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    Muscular weakness M62.81    Other instability, left knee M25.362    Primary osteoarthritis of both hips M16.0    Primary osteoarthritis of right knee M17.11    Cerebrovascular accident (CVA) ([address] I63.9    Received intravenous tissue plasminogen activator (tPA) in emergency department Z92.82    Acute left hemiparesis ([address] G81.94    Chest pain in adult R07.9    Acute CVA (cerebrovascular accident) ([address] I63.9    Nausea R11.0    Malaise and fatigue R53.81, R53.83       Pain: none reported    Cognitive Treatment    Treatment time: [id number]    Subjective: [x] Alert [x] Cooperative     [] Confused     [] Agitated    [] Lethargic      Objective/Assessment:  Attention:   Max cues and repetition needed to maintain pt. on task and alertness for swallowing exercises. Cognition: n/a    Verbal expression: General expression functional in conversation. Dysphagia: Vital stim completed for 32 minutes at  2.5 mA, pt. Unable to tolerate mA increasing at this time. Pt. took trials of honey thick liquids via straw x8. Pt. completed simple tongue base strengthening exercises x5, 10 reps each. L sided labial weakness continues. Briseyda maneuver completed x10. Pt. Completed oral motor exercises x2, 10 reps. MAX cues needed on all tasks, pt. Kept eyes closed entire session. Pt. Repeatedly requesting to \"be done with this now. \"     Other: Family not present. Pt. Reporting \"I just feel off today. \"    Plan:  [x] Continue ST services    [] Discharge from ST:      Discharge recommendations: [] Inpatient Rehab   [] [place]   [] Outpatient Therapy  [] Follow up at trauma clinic   [] Other:       Treatment completed by: Luke Morrissey A.CCC/SLP

## 2021-04-23 NOTE — PROGRESS NOTES
[facility] Internal Medicine    CONSULTATION / HISTORY AND PHYSICAL EXAMINATION            Date:   4/23/2021  Patient name:  Isiah Blue  Date of admission:  4/7/2021 12:21 PM  MRN:   [id number]  Account:  [de-identified]  YOB: 1932  PCP:    VERNELL Wright CNP  Room:   [address] [place] [address]  Code Status:    Full Code    Physician Requesting Consult: Ellie Ryder MD    Reason for Consult:  medical management    Chief Complaint:     No chief complaint on file. Ischemic CVA    History Obtained From:     Patient medical record nursing staff    History of Present Illness:     80-year-old elderly lady was admitted to [facility] earlier this month with the acute left-sided weakness after she finished her physical therapy at home she has chronic left leg weakness post multiple surgeries and has chronic shoulder issues and difficulty overhead abduction  Patient received a TPA    4/23/2021    No new complaints/symptoms . Symptoms or ailment  course ;                                   are improving over time. · Continue present regimen     BP Readings from Last 3 Encounters:   04/23/21 (!) 147/65   04/07/21 139/68   03/12/21 135/76    1.     2.             Past Medicl History:     Past Medical History:   Diagnosis Date    Anemia     Hypertension     Hypothyroidism     Osteoarthritis     Other long term (current) drug therapy     Renal insufficiency         Past Surgical History:     No past surgical history on file. Medications Prior to Admission:     Prior to Admission medications    Medication Sig Start Date End Date Taking?  Authorizing Provider   atorvastatin (LIPITOR) 20 MG tablet Take 1 tablet by mouth nightly 3/1/21  Yes VERNELL Kiser CNP   levothyroxine (SYNTHROID) 112 MCG tablet Take 1 tablet by mouth Daily 3/1/21  Yes VERNELL Kiser CNP   oxybutynin (DITROPAN-XL) 5 MG extended release tablet Take 1 tablet by mouth daily 1/5/21  Yes VERNELL Miguel CNP   acetaminophen (TYLENOL) 325 MG tablet Take 650 mg by mouth Take 2 tablets at bedtime for sleeping comfort PRN   Yes Historical Provider, MD   amLODIPine (NORVASC) 5 MG tablet Take 1 tablet by mouth daily 3/1/21   VERNELL Miguel CNP   Ferrous Sulfate 324 MG TBEC Take 1 tablet by mouth 2 times daily 3/1/21   VERNELL Miguel CNP   omeprazole (PRILOSEC) 20 MG delayed release capsule Take 1 capsule by mouth daily 3/1/21   VERNELL Miguel CNP   sodium bicarbonate 650 MG tablet Take 1 tablet by mouth 4 times daily Two tabs BID 3/1/21   VERNELL Miguel CNP   metoprolol tartrate (LOPRESSOR) 25 MG tablet Take 0.5 tablets by mouth 2 times daily Take 1/2 tablet BID 3/1/21   VERNELL Miguel CNP   docusate sodium (COLACE) 100 MG capsule Take 1 capsule by mouth every other day 2/24/21   VERNELL Miguel CNP   cephALEXin (KEFLEX) 250 MG capsule TAKE ONE CAPSULE BY MOUTH ONCE A DAY. 2/1/21   VERNELL Frey CNP   fluorouracil (EFUDEX) 5 % cream Apply topically for 4 weeks . 6/29/20   Historical Provider, MD   Calcium Carb-Cholecalciferol (CALCIUM + D3) 600-200 MG-UNIT TABS tablet Take 1 tablet by mouth Daily with lunch    Historical Provider, MD   Multiple Vitamins-Minerals (THERAPEUTIC MULTIVITAMIN-MINERALS) tablet Take 1 tablet by mouth daily    Historical Provider, MD   magnesium oxide (MAG-OX) 400 MG tablet Take 400 mg by mouth daily    Historical Provider, MD   Apoaequorin (PREVAGEN) 10 MG CAPS Take by mouth daily    Historical Provider, MD   GLUCOSA-CHONDR-NA CHONDR-MSM PO Take by mouth    Historical Provider, MD        Allergies:     Ciprofloxacin, Hydrocodone, Macrobid [nitrofurantoin], and Ciprofloxacin    Social History:     Tobacco:    reports that she has never smoked. She has never used smokeless tobacco.  Alcohol:      reports no history of alcohol use. Drug Use:  reports no history of drug use.     Family History:     No family history on file. Review of Systems:     Positive and Negative as described in HPI. CONSTITUTIONAL:  negative for fevers, chills, sweats, fatigue, weight loss  HEENT:  negative for vision, hearing changes, runny nose, throat pain  RESPIRATORY:  negative for shortness of breath, cough, congestion, wheezing. CARDIOVASCULAR:  negative for chest pain, palpitations. GASTROINTESTINAL:  negative for nausea, vomiting, diarrhea, constipation, change in bowel habits, abdominal pain   GENITOURINARY:  negative for difficulty of urination, burning with urination, frequency   INTEGUMENT:  negative for rash, skin lesions, easy bruising   HEMATOLOGIC/LYMPHATIC:  negative for swelling/edema   ALLERGIC/IMMUNOLOGIC:  negative for urticaria , itching  ENDOCRINE:  negative increase in drinking, increase in urination, hot or cold intolerance  MUSCULOSKELETAL:  negative joint pains, muscle aches, swelling of joints  NEUROLOGICAL: Positive for left upper and lower extremity weakness extremities      Physical Exam:     BP (!) 147/65   Pulse 70   Temp 97.8 °F (36.6 °C) (Oral)   Resp 16   Ht 5' 7\" (1.702 m)   Wt 149 lb (67.6 kg)   SpO2 98%   BMI 23.34 kg/m²   Temp (24hrs), Av.8 °F (36.6 °C), Min:97.8 °F (36.6 °C), Max:97.8 °F (36.6 °C)    No results for input(s): POCGLU in the last 72 hours. Intake/Output Summary (Last 24 hours) at 2021 1313  Last data filed at 2021 0617  Gross per 24 hour   Intake    Output 1100 ml   Net -1100 ml       General Appearance:  alert, well appearing, and in no acute distress  Mental status: oriented to person, place, and time with normal affect  Head:  normocephalic, atraumatic.   Eye: no icterus, redness, pupils equal and reactive, extraocular eye movements intact, conjunctiva clear  Ear: normal external ear, no discharge, hearing intact  Nose:  no drainage noted  Mouth: mucous membranes moist  Neck: supple, no carotid bruits, thyroid not palpable  Lungs: Bilateral equal air entry, clear to ausculation, no wheezing, rales or rhonchi, normal effort  Cardiovascular: normal rate, regular rhythm, no murmur, gallop, rub. Abdomen: Soft, nontender, nondistended, normal bowel sounds, no hepatomegaly or splenomegaly  Neurologic: Left upper and left lower extremity weakness  Skin: No gross lesions, rashes, bruising or bleeding on exposed skin area  Extremities:  peripheral pulses palpable, no pedal edema or calf pain with palpation      Investigations:      Laboratory Testing:  No results found for this or any previous visit (from the past 24 hour(s)). Medications: Allergies:     Allergies   Allergen Reactions    Ciprofloxacin     Hydrocodone     Macrobid [Nitrofurantoin]     Ciprofloxacin Rash       Current Meds:   Scheduled Meds:    miconazole   Topical BID    lactobacillus  1 capsule Oral Daily with breakfast    enoxaparin  40 mg Subcutaneous Daily    aspirin  81 mg Oral Daily    atorvastatin  40 mg Oral Nightly    ferrous sulfate  325 mg Oral BID    levothyroxine  112 mcg Oral Daily    metoprolol tartrate  12.5 mg Oral BID    oxybutynin  5 mg Oral Daily    pantoprazole  40 mg Oral QAM AC    polyethylene glycol  17 g Oral Daily     Continuous Infusions:   PRN Meds: ondansetron, melatonin, acetaminophen, bisacodyl, magnesium hydroxide      Consultations:   IP CONSULT TO SOCIAL WORK  IP CONSULT TO INTERNAL MEDICINE  Assessment :      Primary Problem  <principal problem not specified>    Active Hospital Problems    Diagnosis Date Noted    Malaise and fatigue [R53.81, R53.83] 04/10/2021    Acute CVA (cerebrovascular accident) ([facility] [I63.9] 04/07/2021    Acute left hemiparesis ([facility] [G81.94]     Gastroesophageal reflux disease without esophagitis [K21.9] 02/24/2021    HTN (hypertension) [I10] 10/21/2020    Hypothyroidism [E03.9] 10/21/2020       Plan:     Acute right ischemic infarct of the rhonda status post thrombolytics  Left hemiparesis for 3-4 out of 5  Aspirin Plavix and Lipitor  Hypertension beta-blocker and Norvasc control  Hypothyroidism on Synthroid follow with TSH in 6 weeks  Mild protein calorie malnutrition  SYEDA  resolved    BP labile - on amlodipine , metoprolol  BP Readings from Last 3 Encounters:   04/23/21 (!) 147/65   04/07/21 139/68   03/12/21 135/76             Dizziness  Check orthostatic bp                  Yves Stuart MD  4/23/2021  1:13 PM    Copy sent to Dr. Hector Clay, APRN - CNP    Please note that this chart was generated using voice recognition [address] dictation software. Although every effort was made to ensure the accuracy of this automated transcription, some errors in transcription may have occurred.

## 2021-04-23 NOTE — PLAN OF CARE
Problem: Skin Integrity:  Goal: Absence of new skin breakdown  Description: Absence of new skin breakdown  Outcome: Met This Shift   Skin assessment completed this shift. Nutrition and Hydration status assessed with adequate intake. Lalito Score as charted. Pressure Relief Overlay remains intact and inflated to patient's bed throughout the shift  Chair cushion in use for when pt is up to chair. Bilateral heels remain elevated on pillows throughout the shift. Patient tolerates repositioning by staff at least every 2 hours. Patient verbalizes understanding of pressure ulcer prevention measures. Skin integrity maintained. No new skin breakdown noted. Skin to high risk pressure areas including coccyx and heels are clear.     Odalis / Incontinence care provided as needed throughout the shift. Aloe Vesta Moisture Barrier ointment applied to buttocks as a preventative measure.     Groin/Odalis Area very red. Miconazole powder used this shift. Will continue to monitor.           Problem: Confusion - Acute:  Goal: Mental status will be restored to baseline  Description: Mental status will be restored to baseline  Outcome: Met This Shift  Pt remains A&O X4 so far throughout the shift. Will continue to monitor for changes.        Problem: Injury - Risk of, Physical Injury:  Goal: Will remain free from falls  Description: Will remain free from falls  Outcome: Met This Shift  No falls or injuries sustained at this time. No attempts to get out of bed without nursing assistance. Call light within reach and pt. uses appropriately for assistance. Siderails up x 2. Nonskid footwear remains on. Bed in low and locked position. Hourly nursing rounds made. Pt. Alert and oriented, aware of limitations, and exhibits good safety judgement. Pt. uses assistive devices appropriately. Pt. understands individual fall risk factors.     Pt.  reminded to use call light with each nurse/patient interaction.     Bed alarm / Personal alarm remains engaged throughout the shift as a precaution.          Problem: Pain:  Goal: Pain level will decrease  Description: Pain level will decrease  Outcome: Met This Shift  Pain assessment completed so far this shift. Pt. able to rest without the use of pain medication. Patient denies any pain so far this shift.   Will continue to [employer]

## 2021-04-23 NOTE — PROGRESS NOTES
instruction at times. does well with leg  on LLE  and right side of bed. Transfers  Sit to Stand: Stand by assistance(tactile/verbal cueing for posturing nose over toes. patient heavy relies on posterior legs against bed;)  Stand to sit: Stand by assistance(posterior LOB due to weakness and decreased ROM in BLE knees)  Bed to Chair: Stand by assistance(patient needs safety cues to present to front of chair; cues for transfer technique to demonstrate safely. intermittently demonstrates without intervention)  Comment: patient needs cueing and intermittent tactile cues for posturing and extra time for stability. patient tendency to maintain posterior weight of heels with difficulty to weight shift forward to walker with release from chair support. Needs cueing control descent to chair. patient reports use of lift chair in the home. re-ed patient with neuro rocking with alternate hand/foot forward to improve transfer from standard chair surface and height. Limited successful d/t needing cueing to preform new information  Ambulation  Ambulation?: Yes  Ambulation 1  Surface: level tile  Device: Rolling Walker  Assistance: Stand by assistance  Quality of Gait: increased trunk flexion , UE weight bearing heavily on walker, hips beyond LEN of walker  Gait Deviations: Slow Nisha  Distance: 125 feet x 2;  Comments: cueing for posturing only. intermittent tactile cues for safety within LEN of walker.   Ambulation 2  Surface - 2: carpet  Device 2: Rolling Walker  Assistance 2: Stand by assistance  Gait Deviations: Slow Nisha  Distance: 25 feet x 2  Comments: needs intermittent cueing to stand tall and stay within LEN of walker  Ambulation 3  Surface - 3: ramp  Device 3: Rollator  Assistance 3: Contact guard assistance  Gait Deviations: Slow Nisha  Distance: 50 feet  Comments: as above plus noted increased nisha descending ramp with need to intervene with postural correct  to prevent fall risk; patient also has a rollator in home for use and has ramp to enter/exit home. Needs further training to improve safey fall risk concerns. Stairs  # Steps : 6  Stairs Height: 6\"  Rails: Bilateral  Assistance: Contact guard assistance  Comment: pt instructed in foot placement and increased hip extension, pt reports fatigue with increased flexion noted        Other exercises  Other exercises 1: seated bilateral LE 2.5# lt green x 20  Other exercises 2: standing BLE 10 reps  Other exercises 3: nustep L4 10 minutes   Other exercises 4: standing balloon 3 minutes seated 2 additional minutes  Other exercises 5: bed mobility with leg  training preformed 3 times  Other exercises 6: Supine and sidelying BLE 2.5# with slider pad 20 reps SAQ's, heel slides, hip ABD/ADD; Other exercises 7: Bridging 5 reps                        G-Code     OutComes Score                                                     AM-PAC Score             Goals  Short term goals  Time Frame for Short term goals:  8 to 9 days  Short term goal 1: Pt to perform bed mobility at SBA with rail  Short term goal 2: Pt to demonstrate transfers at min A  Short term goal 3: Pt to amb 50 to 80 ft with appropriate device, min/mod A   Short term goal 4: Pt to improve L LE strength by 1/3 MMG to improve fucntion. Short term goal 5: Pt able to go up and down 5 steps with 2 UE support, mod A   Long term goals  Time Frame for Long term goals : By LOS  Long term goal 1: Pt able to perform bed mobility mod-I  Long term goal 2: Pt able to perform transfers from varies surfaces at mod-I /supervsion level. Long term goal 3: Pt able to ambulate with appropriate device distance of 80 ft, mod-I/supervsion level. Long term goal 4: Pt able to perform step curb with assisitive device at min A  Long term goal 5: Pt able to go up and down 5 steps with 2 rails at min A to improve LE strength.   Long term goal 6: Improve PASS score to atleast 25//36 improve overall fucntion  Long term goal 7:

## 2021-04-23 NOTE — PROGRESS NOTES
Physical Medicine & Rehabilitation  Progress Note      Subjective:      Lizabeth Lambert is a 80 y.o. female with ischemic CVA with left nondominant hemiparesis. She reports feeling fine today. She states that she feels better than yesterday. She denies any dizziness. She notes sleeping well. Discussed going without the use of purewick overnight with patient and nurse, as patient will not have this device when she goes home. Patient denies any other acute concerns. ROS:  Denies fevers, chills, sweats. No chest pain, palpitations, lightheadedness  Denies coughing, wheezing or shortness of breath. Denies abdominal pain, nausea, diarrhea or constipation. No new areas of joint pain. Denies new areas of numbness or weakness. Denies new anxiety or depression issues. No new skin problems. Rehabilitation:   Progressing in therapies. PT:  Restrictions/Precautions: Fall Risk, Modified Diet, Swallowing - Thickened Liquids, General Precautions  Implants present? : (L TKA)   Transfers  Sit to Stand: Stand by assistance(tactile/verbal cueing for posturing nose over toes. patient heavy relies on posterior legs against bed;)  Stand to sit: Stand by assistance(posterior LOB due to weakness and decreased ROM in BLE knees)  Bed to Chair: Stand by assistance(patient needs safety cues to present to front of chair; cues for transfer technique to demonstrate safely. intermittently demonstrates without intervention)  Stand Pivot Transfers: Contact guard assistance(instruction for RW managment and LE proximal position to sit)  Squat Pivot Transfers: Maximum Assistance(to left , no AD , pt sequences correctly after pre instructi)  Comment: patient needs cueing and intermittent tactile cues for posturing and extra time for stability. patient tendency to maintain posterior weight of heels with difficulty to weight shift forward to walker with release from chair support. Needs cueing control descent to chair.  patient reports use of lift chair in the home. re-ed patient with neuro rocking with alternate hand/foot forward to improve transfer from standard chair surface and height. Limited successful d/t needing cueing to preform new information  Ambulation 1  Surface: level tile  Device: Rolling Walker  Other Apparatus: (IV)  Assistance: Stand by assistance  Quality of Gait: increased trunk flexion , UE weight bearing heavily on walker, hips beyond LEN of walker  Gait Deviations: Slow Zuly  Distance: 125 feet x 2;  Comments: cueing for posturing only. intermittent tactile cues for safety within LEN of walker. Transfers  Sit to Stand: Stand by assistance(tactile/verbal cueing for posturing nose over toes. patient heavy relies on posterior legs against bed;)  Stand to sit: Stand by assistance(posterior LOB due to weakness and decreased ROM in BLE knees)  Bed to Chair: Stand by assistance(patient needs safety cues to present to front of chair; cues for transfer technique to demonstrate safely. intermittently demonstrates without intervention)  Stand Pivot Transfers: Contact guard assistance(instruction for RW managment and LE proximal position to sit)  Squat Pivot Transfers: Maximum Assistance(to left , no AD , pt sequences correctly after pre instructi)  Comment: patient needs cueing and intermittent tactile cues for posturing and extra time for stability. patient tendency to maintain posterior weight of heels with difficulty to weight shift forward to walker with release from chair support. Needs cueing control descent to chair. patient reports use of lift chair in the home. re-ed patient with neuro rocking with alternate hand/foot forward to improve transfer from standard chair surface and height. Limited successful d/t needing cueing to preform new information  Ambulation  Ambulation?: Yes  More Ambulation?: No  Ambulation 1  Surface: level tile  Device: Rolling Walker  Other Apparatus:  (IV)  Assistance: Stand by assistance Quality of Gait: increased trunk flexion , UE weight bearing heavily on walker, hips beyond LEN of walker  Gait Deviations: Slow Zuly  Distance: 125 feet x 2;  Comments: cueing for posturing only. intermittent tactile cues for safety within LEN of walker. Surface: level tile  Ambulation 1  Surface: level tile  Device: Rolling Walker  Other Apparatus: (IV)  Assistance: Stand by assistance  Quality of Gait: increased trunk flexion , UE weight bearing heavily on walker, hips beyond LEN of walker  Gait Deviations: Slow Zuly  Distance: 125 feet x 2;  Comments: cueing for posturing only. intermittent tactile cues for safety within LEN of walker.     OT:  ADL  Feeding: Setup  Grooming: Setup  UE Bathing: Setup  LE Bathing: Minimal assistance(A for perineal hygiene due to fecal matter on skin)  UE Dressing: Setup(A bra hooks, seated)  LE Dressing: Contact guard assistance(CGA/close SBA in standing, pt able to don socks/pants/brief/)  Toileting: Minimal assistance(A for hygiene with incontinence in brief)  Additional Comments: pt completed full shower this date, incontinent in shower, A for hygiene, increased time req with cues for tasks, CGA/close SBA using GBs for washing when standing, pt able to place foot funnel in shoe and don with cuing; PM: pt requesting assist to place hear aids in B ears, pt able to verbalize how to correctly place in ears but unable to put in ears due to decreased coordination         Balance  Sitting Balance: Supervision  Standing Balance: Contact guard assistance(CGA/close SBA)   Standing Balance  Time: AM: 2 min x2, 1-2 min x4  Activity: AM: functional mobility/transfers in room/bathroom, ADL tasks  Comment: no LOB noted, slow to move, flexed forward with fatigue  Functional Mobility  Functional - Mobility Device: Rolling Walker  Activity: To/from bathroom, Other  Assist Level: Stand by assistance(close SBA)  Functional Mobility Comments: VCs for safety, posture and proper tech     Bed mobility  Bridging: Stand by assistance(difficulty with bending knees for foot placement to perform activity)  Rolling to Left: Stand by assistance  Rolling to Right: Stand by assistance  Supine to Sit: Stand by assistance(with leg )  Sit to Supine: Stand by assistance(with leg )  Scooting: Stand by assistance  Comment: needes extra time and modifications to preform bed mobility patient demonstrated on flat firm surface with out handrails; patient use of leg  and limitations by BLE weakness and arthritic knees; needs cueing with tactile cues to preform verbal instruction at times. does well with leg  on LLE  and right side of bed. Transfers  Stand Step Transfers: Stand by assistance(using R/W)  Stand Pivot Transfers: Contact guard assistance(GBs in bathroom)  Sit to stand: Contact guard assistance  Stand to sit: Contact guard assistance, Stand by assistance  Transfer Comments: cuing for proper tech   Toilet Transfers  Toilet - Technique: Stand step, To right, To left  Equipment Used: Raised toilet seat with rails  Toilet Transfer: Contact guard assistance  Toilet Transfers Comments: slow to move, no LOB noted     Shower Transfers  Shower - Transfer From: Walker(rolling)  Shower - Transfer Type: To and From  Shower - Transfer To: Transfer tub bench  Shower - Technique: Ambulating  Shower Transfers: Contact Guard  Shower Transfers Comments: verbal and tactile cues for proper tech and safety over threshold using R/W  Wheelchair Bed Transfers  Wheelchair/Bed - Technique: Ambulating  Equipment Used: Bed, Wheelchair(rolling walker)  Level of Asssistance: 2 Person assistance, Moderate assistance  Wheelchair Transfers Comments: during PM session    SPEECH:  Subjective: [x]? Alert     [x]? Cooperative     []? Confused     []? Agitated    []?  Lethargic        Objective/Assessment:  Attention:   Max cues and repetition needed to maintain pt. on task and alertness for swallowing exercises.     Cognition: n/a     Verbal expression: General expression functional in conversation.       Dysphagia: Vital stim completed for 32 minutes at  2.5 mA, pt. Unable to tolerate mA increasing at this time. Pt. took trials of honey thick liquids via straw x8.         Pt. completed simple tongue base strengthening exercises x5, 10 reps each.   L sided labial weakness continues. Briseyda maneuver completed x10. Pt. Completed oral motor exercises x2, 10 reps. MAX cues needed on all tasks, pt. Kept eyes closed entire session.   Pt. Repeatedly requesting to \"be done with this now. \"     Other: Family not present. Pt. Reporting \"I just feel off today. \"       Current Medications:   Current Facility-Administered Medications: miconazole (MICOTIN) 2 % powder, , Topical, BID  lactobacillus (CULTURELLE) capsule 1 capsule, 1 capsule, Oral, Daily with breakfast  ondansetron (ZOFRAN) tablet 4 mg, 4 mg, Oral, Q8H PRN  enoxaparin (LOVENOX) injection 40 mg, 40 mg, Subcutaneous, Daily  aspirin EC tablet 81 mg, 81 mg, Oral, Daily **OR** [DISCONTINUED] aspirin suppository 300 mg, 300 mg, Rectal, Daily  atorvastatin (LIPITOR) tablet 40 mg, 40 mg, Oral, Nightly  ferrous sulfate (IRON 325) tablet 325 mg, 325 mg, Oral, BID  levothyroxine (SYNTHROID) tablet 112 mcg, 112 mcg, Oral, Daily  melatonin tablet 6 mg, 6 mg, Oral, Nightly PRN  metoprolol tartrate (LOPRESSOR) tablet 12.5 mg, 12.5 mg, Oral, BID  oxybutynin (DITROPAN-XL) extended release tablet 5 mg, 5 mg, Oral, Daily  pantoprazole (PROTONIX) tablet 40 mg, 40 mg, Oral, QAM AC  acetaminophen (TYLENOL) tablet 650 mg, 650 mg, Oral, Q4H PRN  polyethylene glycol (GLYCOLAX) packet 17 g, 17 g, Oral, Daily  bisacodyl (DULCOLAX) suppository 10 mg, 10 mg, Rectal, Daily PRN  magnesium hydroxide (MILK OF MAGNESIA) 400 MG/5ML suspension 30 mL, 30 mL, Oral, Daily PRN      Objective:  BP (!) 147/65   Pulse 70   Temp 97.8 °F (36.6 °C) (Oral)   Resp 16   Ht 5' 7\" (1.702 m)   Wt 149 lb (67.6 kg) SpO2 98%   BMI 23.34 kg/m²       GEN: Well developed, well nourished, no acute distress  HEENT: NCAT. EOM grossly intact. Hearing grossly intact. Mucous membranes pink and moist.  RESP: Normal breath sounds with no wheezing, rales, or rhonchi. Respirations WNL and unlabored. CV: Regular rate and rhythm. No murmurs, rubs, or gallops. ABD: Soft, non-distended, BS+ and equal.  NEURO: Alert. Speech fluent. Sensation to light touch intact. MSK: Muscle tone and bulk are normal bilaterally. Moving bilateral upper limbs with at least antigravity strength. Strength 4+/5 with bilateral ankle dorsiflexion and plantarflexion. LIMBS: No edema in bilateral lower limbs. SKIN: Warm and dry with good turgor. PSYCH: Mood WNL. Affect WNL. Appropriately interactive. Diagnostics:     CBC:   Recent Labs     04/22/21  0703   WBC 7.6   RBC 3.68*   HGB 11.2*   HCT 33.7*   MCV 91.4   RDW 14.0        BMP:   Recent Labs     04/22/21  0703      K 4.2      CO2 25   BUN 22   CREATININE 1.16*   GLUCOSE 86     BNP: No results for input(s): BNP in the last 72 hours. PT/INR: No results for input(s): PROTIME, INR in the last 72 hours. APTT: No results for input(s): APTT in the last 72 hours. CARDIAC ENZYMES: No results for input(s): CKMB, CKMBINDEX, TROPONINT in the last 72 hours. Invalid input(s): CKTOTAL;3  FASTING LIPID PANEL:  Lab Results   Component Value Date    CHOL 121 04/02/2021    HDL 55 04/02/2021    TRIG 61 04/02/2021     LIVER PROFILE: No results for input(s): AST, ALT, ALB, BILIDIR, BILITOT, ALKPHOS in the last 72 hours. Impression/Plan:   Impaired ADLs, gait, and mobility due to:    1. 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 On 3 weeks ASA, Plavix with ASA to continue after 4/22. On atorvastatin. 2. Dysphagia: On minced and moist solids and moderately-thick liquids.  SLP treating and including vital stim. Will not start free water protocol due to having a lot of food residue in her mouth after each meal; additionally, cognitive impairment will likely make it difficult for her to understand when she can have free water and when she cannot. SLP considering repeat MBS 4/26. 3. Dehydration/hyponatremia:  Resolved. IM treated with IV fluid until 4/12 and serial lab tests - improved Na and creatinine. 4. HTN: On amlodipine - discontinued 4/22 by IM, metoprolol tartrate. Follow up Dr. Rebeca Hodgkins in [place] [id number] in 4 weeks for stress test.   5. Orthostasis: Ensure she wears ARSH hose and abdominal binder when out of bed. Encourage hydration. Amlodipine discontinued as above. 6. OA/L rotator cuff injury:  Stable. Will monitor  7. CKD:  Stable. Will monitor BMP  8. Hypothyroidism: On levothyroxine  9. Anemia: On ferrous sulfate. Monitoring Hb  10. Hx chronic cystitis:  On oxybutynin ER. Frequent incontinence is normal baseline for patient. Urine culture positive for E Coli. Keflex renally adjusted through 4/17. Leukocytosis resolved 4/11. 11. GERD:  On pantoprazole  12. Bowel Management: Miralax daily, senokot prn, milk of magnesia prn, dulcolax prn. On probiotic for loose stools which are improved. 13. DVT Prophylaxis:  low molecular weight heparin, SCD's while in bed and ARSH's   14. Internal medicine for medical management  15. Discussed discharge plan with patient and family on 4/20. Team recommending 24-hour supervision for patient at home but family is unable to provide this level of supervision. Daughter states that family members and friends frequently check in on the patient throughout the day. After patient and family discussions with multiple team members, family would like patient to return home at discharge with home health services.       Electronically signed by Martine Sauer MD on 4/23/2021 at 7:14 PM

## 2021-04-23 NOTE — PLAN OF CARE
Problem: Skin Integrity:  Goal: Will show no infection signs and symptoms  Description: Will show no infection signs and symptoms  Outcome: Ongoing     Problem: Skin Integrity:  Goal: Will show no infection signs and symptoms  Description: Will show no infection signs and symptoms  Outcome: Ongoing  Goal: Absence of new skin breakdown  Description: Absence of new skin breakdown  Outcome: Ongoing     Problem: Confusion - Acute:  Goal: Absence of continued neurological deterioration signs and symptoms  Description: Absence of continued neurological deterioration signs and symptoms  Outcome: Ongoing  Goal: Mental status will be restored to baseline  Description: Mental status will be restored to baseline  Outcome: Ongoing     Problem: Discharge Planning:  Goal: Ability to perform activities of daily living will improve  Description: Ability to perform activities of daily living will improve  Outcome: Ongoing  Goal: Participates in care planning  Description: Participates in care planning  Outcome: Ongoing     Problem: Injury - Risk of, Physical Injury:  Goal: Absence of physical injury  Description: Absence of physical injury  Outcome: Ongoing  Goal: Will remain free from falls  Description: Will remain free from falls  Outcome: Ongoing     Problem: Mood - Altered:  Goal: Mood stable  Description: Mood stable  Outcome: Ongoing  Goal: Absence of abusive behavior  Description: Absence of abusive behavior  Outcome: Ongoing  Goal: Verbalizations of feeling emotionally comfortable while being cared for will increase  Description: Verbalizations of feeling emotionally comfortable while being cared for will increase  Outcome: Ongoing

## 2021-04-24 PROCEDURE — 97535 SELF CARE MNGMENT TRAINING: CPT

## 2021-04-24 PROCEDURE — 97542 WHEELCHAIR MNGMENT TRAINING: CPT

## 2021-04-24 PROCEDURE — 6370000000 HC RX 637 (ALT 250 FOR IP): Performed by: STUDENT IN AN ORGANIZED HEALTH CARE EDUCATION/TRAINING PROGRAM

## 2021-04-24 PROCEDURE — 1180000000 HC REHAB R&B

## 2021-04-24 PROCEDURE — 97110 THERAPEUTIC EXERCISES: CPT

## 2021-04-24 PROCEDURE — 97116 GAIT TRAINING THERAPY: CPT

## 2021-04-24 PROCEDURE — 97530 THERAPEUTIC ACTIVITIES: CPT

## 2021-04-24 PROCEDURE — 92526 ORAL FUNCTION THERAPY: CPT

## 2021-04-24 PROCEDURE — 6360000002 HC RX W HCPCS: Performed by: STUDENT IN AN ORGANIZED HEALTH CARE EDUCATION/TRAINING PROGRAM

## 2021-04-24 PROCEDURE — 6370000000 HC RX 637 (ALT 250 FOR IP): Performed by: PHYSICAL MEDICINE & REHABILITATION

## 2021-04-24 PROCEDURE — 99232 SBSQ HOSP IP/OBS MODERATE 35: CPT | Performed by: PHYSICAL MEDICINE & REHABILITATION

## 2021-04-24 RX ADMIN — ENOXAPARIN SODIUM 40 MG: 40 INJECTION SUBCUTANEOUS at 07:50

## 2021-04-24 RX ADMIN — OXYBUTYNIN CHLORIDE 5 MG: 5 TABLET, EXTENDED RELEASE ORAL at 07:54

## 2021-04-24 RX ADMIN — LEVOTHYROXINE SODIUM 112 MCG: 112 TABLET ORAL at 06:25

## 2021-04-24 RX ADMIN — ATORVASTATIN CALCIUM 40 MG: 40 TABLET, FILM COATED ORAL at 20:31

## 2021-04-24 RX ADMIN — PANTOPRAZOLE SODIUM 40 MG: 40 TABLET, DELAYED RELEASE ORAL at 06:25

## 2021-04-24 RX ADMIN — ASPIRIN 81 MG: 81 TABLET, COATED ORAL at 07:50

## 2021-04-24 RX ADMIN — ANTI-FUNGAL POWDER MICONAZOLE NITRATE TALC FREE: 1.42 POWDER TOPICAL at 07:59

## 2021-04-24 RX ADMIN — METOPROLOL TARTRATE 12.5 MG: 25 TABLET, FILM COATED ORAL at 07:50

## 2021-04-24 RX ADMIN — Medication 1 CAPSULE: at 07:50

## 2021-04-24 RX ADMIN — FERROUS SULFATE TAB 325 MG (65 MG ELEMENTAL FE) 325 MG: 325 (65 FE) TAB at 07:50

## 2021-04-24 RX ADMIN — POLYETHYLENE GLYCOL 3350 17 G: 17 POWDER, FOR SOLUTION ORAL at 07:50

## 2021-04-24 RX ADMIN — METOPROLOL TARTRATE 12.5 MG: 25 TABLET, FILM COATED ORAL at 20:31

## 2021-04-24 RX ADMIN — ANTI-FUNGAL POWDER MICONAZOLE NITRATE TALC FREE: 1.42 POWDER TOPICAL at 20:31

## 2021-04-24 RX ADMIN — FERROUS SULFATE TAB 325 MG (65 MG ELEMENTAL FE) 325 MG: 325 (65 FE) TAB at 20:31

## 2021-04-24 NOTE — PROGRESS NOTES
[address] [facility]    ACUTE REHABILITATION OCCUPATIONAL THERAPY  DAILY NOTE    Date: 21  Patient Name: Marco A Alba      Room: 5985/9851-65    MRN: [id number]   : 1932  (80 y.o.)  Gender: female   Referring Practitioner: Josefina Rosas MD  Diagnosis: Acute CVA  Additional Pertinent Hx:  Per ARU preadmission assessment:80year-old female admitted with acute left hemiparesis causing a fall of her chair. Noted acute left-sided weakness worse than previous date. .  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 Excell Albertina She was life flighted to [facility]. Patient on baby aspirin at home. Patient given TPA during TPA became hysterical repeat CT showed no change completed TPA. 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 Pt admitted to rehab unit on 21. Restrictions  Restrictions/Precautions: Fall Risk, Modified Diet, Swallowing - Thickened Liquids, General Precautions  Implants present? : (L TKA)  Required Braces or Orthoses?: No  Equipment Used: Bed, Wheelchair(rolling walker)    Subjective  Subjective: \"I'm just not feeling up to power\"   Comments: Pt reports feeling fatigued however pleasant and cooperative. Patient Currently in Pain: Denies  Restrictions/Precautions: Fall Risk;Modified Diet;Swallowing - Thickened Liquids; General Precautions           Objective  Cognition  Overall Cognitive Status: Impaired  Arousal/Alertness: Delayed responses to stimuli  Attention Span: Attends with cues to redirect  Memory: Decreased short term memory;Decreased recall of recent events  Following Commands:  Follows multistep commands with repetition  Safety Judgement: Decreased awareness of need for assistance  Awareness of Errors: Assistance required to identify errors made  Insights: throughness at buttocks)  UE Dressing: Minimal assistance(assist to doff overhead nightgown. )  LE Dressing: Minimal assistance(minor assist to thread RLE,CGA for standing balance assist. )  Toileting: Minimal assistance(assist for hygiene following BM, CGA for clothing mgmt. )  Additional Comments: Pt completed bathing tasks at seated level for safety and energy conservation. SBA/CGA provided for tasks completed in standing(toileting completed AM and PM)        Assessment  Performance deficits / Impairments: Decreased functional mobility ; Decreased strength;Decreased endurance;Decreased balance;Decreased high-level IADLs;Decreased posture;Decreased ADL status; Decreased ROM; Decreased safe awareness;Decreased cognition;Decreased fine motor control;Decreased coordination  Prognosis: Good  Discharge Recommendations: Home with assist PRN;Home with Home health OT  Activity Tolerance: Patient limited by fatigue;Patient Tolerated treatment well  Safety Devices in place: Yes  Type of devices: Left in chair;Chair alarm in place;Call light within reach(AM: tray table placed in front of pt, LE's elevated. )           Plan  Plan  Times per week: 5-7  Times per day: Twice a day  Current Treatment Recommendations: Strengthening, ROM, Safety Education & Training, Positioning, Balance Training, Patient/Caregiver Education & Training, Self-Care / ADL, Functional Mobility Training, Endurance Training, Neuromuscular Re-education, Wheelchair Mobility Training, Cognitive Reorientation, Equipment Evaluation, Education, & procurement, Home Management Training, Cognitive/Perceptual Training  Patient Goals   Patient goals : To return home with family support  Short term goals  Time Frame for Short term goals: 7 to 10 days  Short term goal 1: Pt will perform upper body bathing/dressing with stand by assist.  Short term goal 2: Pt will perform lower body bathing/dressing and toileting tasks with Moderate assist and Good safety.   Short term

## 2021-04-24 NOTE — PROGRESS NOTES
Physical Medicine & Rehabilitation  Progress Note    4/24/2021 1:55 PM     CC: Ambulatory and ADL dysfunction due to ischemic CVA left nondominant hemiparesis    Subjective:   No complaints seen in gym. Reviewed discharge plansshe notes she has friends and family will check in on her frequently. .    ROS:  Denies fevers, chills, sweats. No chest pain, palpitations, lightheadedness. Denies coughing, wheezing or shortness of breath. Denies abdominal pain, nausea, diarrhea or constipation. No new areas of joint pain. Denies new areas of numbness or weakness. Denies new anxiety or depression issues. No new skin problems. Rehabilitation:   PT:  Restrictions/Precautions: Fall Risk, Modified Diet, Swallowing - Thickened Liquids, General Precautions  Implants present? : (L TKA)   Transfers  Sit to Stand: Stand by assistance(vc's for \"nose over toes\" and to shift wt fwd)  Stand to sit: Stand by assistance(demos good hand placement)  Bed to Chair: Stand by assistance(patient needs safety cues to present to front of chair; cues for transfer technique to demonstrate safely. intermittently demonstrates without intervention)  Stand Pivot Transfers: Stand by assistance(vc's to stay up inside RW)  Squat Pivot Transfers: Maximum Assistance(to left , no AD , pt sequences correctly after pre instructi)  Comment: patient needs cueing and intermittent tactile cues for posturing and extra time for stability. patient tendency to maintain posterior weight of heels with difficulty to weight shift forward to walker with release from chair support. Needs cueing control descent to chair. patient reports use of lift chair in the home. re-ed patient with neuro rocking with alternate hand/foot forward to improve transfer from standard chair surface and height. Limited successful d/t needing cueing to preform new information  Ambulation 1  Surface: level tile  Device: Rolling Walker  Other Apparatus:  (IV)  Assistance: Stand by assistance  Quality of Gait: increased trunk flexion , UE weight bearing heavily on walker, hips beyond LEN of walker  Gait Deviations: Slow Zuly, Decreased step length, Decreased step height  Distance: 180ft  Comments: cueing for posturing only. intermittent verbal cues for safety within LEN of walker. OT:  ADL  Feeding: Modified independent (utilizes spoon as compensatory to open tabbed container)  Grooming: Setup(assist to gather washcloth)  UE Bathing: Setup(assist for gathering supplies)  LE Bathing: Minimal assistance, Increased time to complete(assist for throughness at buttocks)  UE Dressing: Minimal assistance(assist to doff overhead nightgown. )  LE Dressing: Minimal assistance(minor assist to thread RLE,CGA for standing balance assist. )  Toileting: Minimal assistance(assist for hygiene following BM)  Additional Comments: Pt completed bathing tasks at seated level for safety and energy conservation. SBA/CGA provided for tasks completed in standing         Balance  Sitting Balance: Supervision(reaching towards feet)  Standing Balance: Contact guard assistance(SBA/CGA)   Standing Balance  Time: ~2 min x 2, ~4 min   Activity: functional mobility, toileting tasks, LB dressing  Comment: cues for upright posture and body positioning in RW for increased safety; fair return.    Functional Mobility  Functional - Mobility Device: Rolling Walker  Activity: To/from bathroom  Assist Level: Stand by assistance(close stand by assist. )  Functional Mobility Comments: VCs for safety, posture and proper tech     Bed mobility  Bridging: Stand by assistance(difficulty with bending knees for foot placement to perform activity)  Rolling to Left: Stand by assistance  Rolling to Right: Stand by assistance  Supine to Sit: Stand by assistance(with leg )  Sit to Supine: Stand by assistance(with leg )  Scooting: Stand by assistance  Comment: needes extra time and modifications to preform bed mobility patient demonstrated on flat firm surface with out handrails; patient use of leg  and limitations by BLE weakness and arthritic knees; needs cueing with tactile cues to preform verbal instruction at times. does well with leg  on LLE  and right side of bed. Transfers  Stand Step Transfers: Stand by assistance(using R/W)  Stand Pivot Transfers: Contact guard assistance(GBs in bathroom)  Sit to stand: Contact guard assistance, Minimal assistance  Stand to sit: Contact guard assistance  Transfer Comments: cueing for proper tech   Toilet Transfers  Toilet - Technique: Ambulating  Equipment Used: Raised toilet seat without rails(grab bar and sink utilized for UE support prn. )  Toilet Transfer: Contact guard assistance  Toilet Transfers Comments: slow to move, no LOB noted, cueing for technique. Shower Transfers  Shower - Transfer From: Walker(rolling)  Shower - Transfer Type: To and From  Shower - Transfer To: Transfer tub bench  Shower - Technique: Ambulating  Shower Transfers: Contact Guard  Shower Transfers Comments: verbal and tactile cues for proper tech and safety over threshold using R/W  Wheelchair Bed Transfers  Wheelchair/Bed - Technique: Ambulating  Equipment Used: Bed, Wheelchair(rolling walker)  Level of Asssistance: 2 Person assistance, Moderate assistance  Wheelchair Transfers Comments: during PM session      ST:    Attention: Sustained during meal.  MAX cues needed during swallowing exercises.       Cognition: n/a     Verbal expression: General expression functional in conversation.       Dysphagia: Vital stim completed for 30 minutes at 3.5 mA while Pt. eating lunch. Pt. took trials of honey thick liquids via straw x6, puree solid x18, and minced and moist solid x7. Pt. Occasionally throat clearing during meal.  No additional overt s/s of aspiration observed with consistencies.   Min amount lingual residue noted with solids, cleared with double swallow.       Following meal, swallowing exercises completed with VitalStim at 3.5 mA. Pt. completed simple tongue base strengthening exercises x2, 10 reps each.   Pt. Completed effortful swallow x5. MAX cues needed on all tasks.           Objective:  /77   Pulse 90   Temp 98 °F (36.7 °C)   Resp 18   Ht 5' 7\" (1.702 m)   Wt 149 lb (67.6 kg)   SpO2 99%   BMI 23.34 kg/m²  I Body mass index is 23.34 kg/m². I   Wt Readings from Last 1 Encounters:   21 149 lb (67.6 kg)      Temp (24hrs), Av.1 °F (36.7 °C), Min:97.7 °F (36.5 °C), Max:98.6 °F (37 °C)         GEN: well developed, well nourished, no acute distress  HEENT: Normocephalic atraumatic, EOMI, mucous membranes pink and moist  CV: RRR, no murmurs, rubs or gallops  PULM: CTAB, no rales or rhonchi. Respirations WNL and unlabored  ABD: soft, NT, ND, +BS and equal  NEURO: A&O x3 currently knew year, president and location, follows commands, named objects. Sensation intact to light touch. MSK: 4/5 upper and lower extremities  EXTREMITIES: No calf tenderness to palpation bilaterally. No edema BLEs  SKIN: warm dry and intact with good turgor  PSYCH: appropriately interactive. Affect WNL.   Good spirits        Medications   Scheduled Meds:   miconazole   Topical BID    lactobacillus  1 capsule Oral Daily with breakfast    enoxaparin  40 mg Subcutaneous Daily    aspirin  81 mg Oral Daily    atorvastatin  40 mg Oral Nightly    ferrous sulfate  325 mg Oral BID    levothyroxine  112 mcg Oral Daily    metoprolol tartrate  12.5 mg Oral BID    oxybutynin  5 mg Oral Daily    pantoprazole  40 mg Oral QAM AC    polyethylene glycol  17 g Oral Daily     Continuous Infusions:  PRN Meds:.ondansetron, melatonin, acetaminophen, bisacodyl, magnesium hydroxide     Diagnostics:     CBC:   Recent Labs     21  0703   WBC 7.6   RBC 3.68*   HGB 11.2*   HCT 33.7*   MCV 91.4   RDW 14.0        BMP:   Recent Labs     21  0703      K 4.2      CO2 25   BUN 22   CREATININE 1.16*     BNP: No results for input(s): BNP in the last 72 hours. PT/INR: No results for input(s): PROTIME, INR in the last 72 hours. APTT: No results for input(s): APTT in the last 72 hours. CARDIAC ENZYMES: No results for input(s): CKMB, CKMBINDEX, TROPONINT in the last 72 hours. Invalid input(s): CKTOTAL;3  FASTING LIPID PANEL:  Lab Results   Component Value Date    CHOL 121 04/02/2021    HDL 55 04/02/2021    TRIG 61 04/02/2021     LIVER PROFILE: No results for input(s): AST, ALT, ALB, BILIDIR, BILITOT, ALKPHOS in the last 72 hours. I/O (24Hr): Intake/Output Summary (Last 24 hours) at 4/24/2021 1355  Last data filed at 4/24/2021 1878  Gross per 24 hour   Intake    Output 1100 ml   Net -1100 ml       Glu last 24 hour  No results for input(s): POCGLU in the last 72 hours. No results for input(s): CLARITYU, COLORU, PHUR, SPECGRAV, PROTEINU, RBCUA, BLOODU, BACTERIA, NITRU, WBCUA, LEUKOCYTESUR, YEAST, GLUCOSEU, BILIRUBINUR in the last 72 hours. Impression/Plan:   Impaired ADLs, gait, and mobility due to:     1. 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 2. CVAon 3 weeks ASA, Plavix with ASA to continue after 4/22.  On atorvastatin. 3. Dysphagia: On minced and moist solids and moderately-thick liquids. SLP treating and including vital stim. Will not start free water protocol due to having a lot of food residue in her mouth after each meal; additionally, cognitive impairment will likely make it difficult for her to understand when she can have free water and when she cannot. SLP considering repeat MBS 4/26.,  No discharge plan 4/27  4. Dehydration/hyponatremia:  Resolved.  IM treated with IV fluid until 4/12 and serial lab tests - improved Na and creatinine. 5. HTN:  amlodipine - discontinued 4/22 by IM, metoprolol tartrate.  Follow up Dr. Deni Reddy in [facility] in 4 weeks for stress test.   6. Orthostasis: Ensure she wears ARSH hose and abdominal binder when out of bed. Encourage hydration. Amlodipine discontinued as above. Internal medicine following  7. OA/L rotator cuff injury:  Stable. Will monitor  8. CKD:  Stable. Will monitor BMP  9. Hypothyroidism: On levothyroxine  10. Anemia: On ferrous sulfate. Monitoring Hb  11. Hx chronic cystitis:  On oxybutynin ER. Frequent incontinence is normal baseline for patient. Urine culture positive for E Coli. Keflex renally adjusted through 4/17. Leukocytosis resolved 4/11. Completed course  12. GERD:  On pantoprazole  13. Bowel Management: Miralax daily, senokot prn, milk of magnesia prn, dulcolax prn. On probiotic for loose stools which are improved. Last BM 4/22  14. DVT Prophylaxis: Lovenox SCD's while in bed and ARSH's   15. Internal medicine for medical management  16. Discussed discharge plan with patient and family on 4/20by Dr. Felicity Devlin. Team recommending 24-hour supervision for patient at home but family is unable to provide this level of supervision. Daughter states that family members and friends frequently check in on the patient throughout the day. After patient and family discussions with multiple team members, family would like patient to return home at discharge with home health services. Family aware of concerns of falls, diet, etc. , patient also adamant her home discharge    Pauletta Favre S. Winfield Igo, MD       This note is created with the assistance of a speech recognition program.  While intending to generate a document that actually reflects the content of the visit, the document can still have some errors including those of syntax and sound a like substitutions which may escape proof reading.   In such instances, actual meaning can be extrapolated by contextual diversion

## 2021-04-24 NOTE — PROGRESS NOTES
Speech Language Pathology  Speech Language Pathology  [facility]    Dysphagia/Cognitive Treatment Note    Date: 4/24/2021  Patients Name: Tonia Zaragoza  MRN: [id number]  Diagnosis:   Patient Active Problem List   Diagnosis Code    Anemia D64.9    Arthritis M19.90    HTN (hypertension) I10    Hypothyroidism E03.9    Renal insufficiency N28.9    Other long term (current) drug therapy Z79.899    Anemia due to stage 3 chronic kidney disease N18.30, D63.1    Chronic UTI N39.0    Mixed hyperlipidemia E78.2    Gastroesophageal reflux disease without esophagitis K21.9    S/P revision of total knee, 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    Muscular weakness M62.81    Other instability, left knee M25.362    Primary osteoarthritis of both hips M16.0    Primary osteoarthritis of right knee M17.11    Cerebrovascular accident (CVA) ([address] I63.9    Received intravenous tissue plasminogen activator (tPA) in emergency department Z92.82    Acute left hemiparesis ([address] G81.94    Chest pain in adult R07.9    Acute CVA (cerebrovascular accident) ([address] I63.9    Nausea R11.0    Malaise and fatigue R53.81, R53.83       Pain: none reported    Cognitive Treatment    Treatment time: 7791-1498    Subjective: [] Alert [x] Cooperative     [] Confused     [] Agitated    [] Lethargic      Objective/Assessment:  Attention: Sustained during meal.  MAX cues needed during swallowing exercises. Cognition: n/a    Verbal expression: General expression functional in conversation. Dysphagia: Vital stim completed for 30 minutes at 3.5 mA while Pt. eating lunch. Pt. took trials of honey thick liquids via straw x6, puree solid x18, and minced and moist solid x7. Pt. Occasionally throat clearing during meal.  No additional overt s/s of aspiration observed with consistencies. Min amount lingual residue noted with solids, cleared with double swallow. Following meal, swallowing exercises completed with VitalStim at 3.5 mA.   Pt. completed simple tongue base strengthening exercises x2, 10 reps each. Pt. Completed effortful swallow x5. MAX cues needed on all tasks. Other: No family present. Plan:  [x] Continue ST services    [] Discharge from ST:      Discharge recommendations: [] Inpatient Rehab   [] [place]   [] Outpatient Therapy  [] Follow up at trauma clinic   [] Other:       Treatment completed by:  Caleb Lind M.A., CCC-SLP

## 2021-04-24 NOTE — PLAN OF CARE
Problem: Skin Integrity:  Goal: Absence of new skin breakdown  Description: Absence of new skin breakdown  4/24/2021 0500 by Kim Hall RN  Outcome: Ongoing  Skin assessment completed this shift. Nutrition and Hydration status assessed with adequate intake. Lalito Score as charted. Pressure Relief Overlay remains intact and inflated to patient's bed throughout the shift. Bilateral heels remain elevated on pillows throughout the shift. Patient tolerates repositioning by staff at least every 2 hours. Patient able to reposition self for comfort and to prevent breakdown. Patient verbalizes understanding of pressure ulcer prevention measures. Skin integrity maintained. No new skin breakdown noted. Skin to high risk pressure areas including coccyx and heels are clear. Odalis / Incontinence care provided as needed throughout the shift. Aloe Vesta Moisture Barrier ointment applied to buttocks as a preventative measure. Problem: Confusion - Acute:  Goal: Absence of continued neurological deterioration signs and symptoms  Description: Absence of continued neurological deterioration signs and symptoms  4/24/2021 0500 by Kim Hall RN  Outcome: Ongoing     Problem: Discharge Planning:  Goal: Participates in care planning  Description: Participates in care planning  4/24/2021 0500 by Kim Hall RN  Outcome: Ongoing     Problem: Injury - Risk of, Physical Injury:  Goal: Will remain free from falls  Description: Will remain free from falls  4/24/2021 0500 by Kim Hall RN  Outcome: Ongoing  No falls or injury this shift. Bed is maintained at the lowest level, brakes engaged, call light and assistive devices in reach. Room is clutter free, adequate lighting for safe transfers and ambulation. Assistance provided for transfers and toileting.       Problem: Mood - Altered:  Goal: Verbalizations of feeling emotionally comfortable while being cared for will increase  Description: Verbalizations of feeling emotionally comfortable while being cared for will increase  4/24/2021 0500 by Sujata Pressley RN  Outcome: Ongoing     Problem: Sensory Perception - Impaired:  Goal: Able to distinguish between reality-based and nonreality-based thinking  Description: Able to distinguish between reality-based and nonreality-based thinking  Outcome: Ongoing     Problem: Sleep Pattern Disturbance:  Goal: Appears well-rested  Description: Appears well-rested  Outcome: Ongoing     Problem: Nutrition  Goal: Optimal nutrition therapy  Description: Nutrition Problem #1: Inadequate oral intake  Intervention: Food and/or Nutrient Delivery: Continue Current Diet, Start Oral Nutrition Supplement  Nutritional Goals: intake more than 75%   Outcome: Ongoing     Problem: Pain:  Goal: Control of acute pain  Description: Control of acute pain  Outcome: Ongoing  Patient assessed for pain, medicated per orders. Patient reports an acceptable level of discomfort with the current medications. Patient was repositioned as needed for skin integrity and comfort.

## 2021-04-24 NOTE — PROGRESS NOTES
Physical Therapy  [address]  Acute Rehabilitation Physical Therapy Progress Note    Date: 21  Patient Name: Jazz Carreno       Room: 9599/8245-88  MRN: [id number]   Account: [de-identified]   : 1932  (80 y.o.) Gender: female     Referring Practitioner: Jaquan Ojeda MD  Diagnosis: Acute CVA  Past Medical History:  has a past medical history of Anemia, Hypertension, Hypothyroidism, Osteoarthritis, Other long term (current) drug therapy, and Renal insufficiency. Past Surgical History:   has no past surgical history on file. Additional Pertinent Hx: Per ARU preadmission assessment:80year-old female admitted with acute left hemiparesis causing a fall of her chair. Noted acute left-sided weakness worse than previous date. .  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 Venkata Carcamo She was life flighted to [employer]. Patient on baby aspirin at home. Patient given TPA during TPA became hysterical repeat CT showed no change completed TPA.  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 Pt admitted to rehab unit on 21. Restrictions/Precautions  Restrictions/Precautions: Fall Risk;Modified Diet;Swallowing - Thickened Liquids; General Precautions  Required Braces or Orthoses?: No  Implants present? : (L TKA)            Vital Signs  Patient Currently in Pain: Denies       Bed Mobility:   Bed Mobility  Bridging: Contact guard assistance(difficulty with bending knees for foot placement-not able to clear buttocks)  Supine to Sit: Stand by assistance(slight dizziness upon initially sitting up)  Sit to Supine: Minimal assistance(with getting (B) LE's up into bed)  Scooting: Stand by assistance(to EOB)  Comment: Pt reports she has an aide stay the night with her and she assist her getting in/out of bed. Bed mobility  Bridging: Contact guard assistance(difficulty with bending knees for foot placement-not able to clear buttocks)  Scooting: Stand by assistance(to EOB)    Transfers:  Sit to Stand: Stand by assistance(vc's for \"nose over toes\" and to shift wt fwd)  Stand to sit: Stand by assistance(demos good hand placement)                 Ambulation 1  Surface: level tile  Device: Rolling Walker  Assistance: Stand by assistance  Quality of Gait: increased trunk flexion , UE weight bearing heavily on walker, hips beyond LEN of walker  Gait Deviations: Slow Zuly;Decreased step length;Decreased step height  Distance: 180ft  Comments: cueing for posturing only. intermittent verbal cues for safety within LEN of walker. Stairs/Curb  Stairs?: Yes  Stairs  # Steps : 6  Stairs Height: 6\"(and 4\")  Rails: Bilateral  Curbs: 6\"  Device: Rolling walker  Assistance: Contact guard assistance;Stand by assistance  Comment: pt demonstrates correct step to sequencing.  Cues for posture           Wheelchair Activities  Propulsion: Yes  Propulsion 1  Propulsion: Manual  Level: Level Tile  Method: RUE;LUE  Level of Assistance: Stand by assistance  Description/ Details: straight path with 180º turn , short rest breaks due to fatigue   Distance: 150ft       BALANCE Posture: Fair  Sitting - Static: Fair;+  Sitting - Dynamic: Fair  Standing - Static: Fair;-  Standing - Dynamic: Poor;+  Comments: RW standing     EXERCISES    Other exercises?: Yes  Other exercises 1: seated bilateral LE 2.5# lt green x 20  Other exercises 2: NU-step L4 x 10 min  Other exercises 3: standing (B) LE ex  x 10(rest breaks prn between ex)  Other exercises 4: sit<>stand x 5 throughout am tx session  Other exercises 5: Supine (B) LE ex x 10              PT Equipment Recommendations  Equipment Needed: No       Current Treatment Recommendations: Strengthening, Neuromuscular Re-education, Home Exercise Program, Safety Education & Training, Patient/Caregiver Education & Training, Equipment Evaluation, Education, & procurement, Functional Mobility Training, Balance Training, Endurance Training, Transfer Training, Gait Training, ROM, Stair training    Conditions Requiring Skilled Therapeutic Intervention  Body structures, Functions, Activity limitations: Decreased functional mobility ; Decreased balance;Decreased strength;Decreased endurance  Assessment: Pt reports her left knee was unstable weak prior to surgery. Reports has arthritis in her R knee too. Reports she was not able to stand too long due to \"bad\" knees prior to her stroke. Recommending continued skilled physical therapy to address balance deficits to return pt to prior level of independence. Prognosis: Good  REQUIRES PT FOLLOW UP: Yes  Discharge Recommendations: Patient would benefit from continued therapy after discharge;Home with assist PRN;Home with Home health PT    Goals  Short term goals  Time Frame for Short term goals:  8 to 9 days  Short term goal 1: Pt to perform bed mobility at SBA with rail  Short term goal 2: Pt to demonstrate transfers at min A  Short term goal 3: Pt to amb 50 to 80 ft with appropriate device, min/mod A   Short term goal 4: Pt to improve L LE strength by 1/3 MMG to improve fucntion. Short term goal 5: Pt able to go up and down 5 steps with 2 UE support, mod A   Long term goals  Time Frame for Long term goals : By LOS  Long term goal 1: Pt able to perform bed mobility mod-I  Long term goal 2: Pt able to perform transfers from varies surfaces at mod-I /supervsion level. Long term goal 3: Pt able to ambulate with appropriate device distance of 80 ft, mod-I/supervsion level. Long term goal 4: Pt able to perform step curb with assisitive device at min A  Long term goal 5: Pt able to go up and down 5 steps with 2 rails at min A to improve LE strength.   Long term goal 6: Improve PASS score to atleast 25//36 improve overall fucntion  Long term goal 7: Improve Tinetti balance score to 20 or more/28 to reduce fall risk. Long term goal 8: Pt able to propel w/c on level surfaces 150 ft, with set up/supervsion.        04/24/21 0846 04/24/21 1439   PT Individual Minutes   Time In 0845 1435   Time Out 0331 1170   Minutes 62 31       Electronically signed by Qiana Rowe PTA on 4/24/21 at 3:21 PM EDT

## 2021-04-25 PROCEDURE — 6360000002 HC RX W HCPCS: Performed by: STUDENT IN AN ORGANIZED HEALTH CARE EDUCATION/TRAINING PROGRAM

## 2021-04-25 PROCEDURE — 97530 THERAPEUTIC ACTIVITIES: CPT

## 2021-04-25 PROCEDURE — 6370000000 HC RX 637 (ALT 250 FOR IP): Performed by: STUDENT IN AN ORGANIZED HEALTH CARE EDUCATION/TRAINING PROGRAM

## 2021-04-25 PROCEDURE — 1180000000 HC REHAB R&B

## 2021-04-25 PROCEDURE — 99232 SBSQ HOSP IP/OBS MODERATE 35: CPT | Performed by: PHYSICAL MEDICINE & REHABILITATION

## 2021-04-25 PROCEDURE — 6370000000 HC RX 637 (ALT 250 FOR IP): Performed by: PHYSICAL MEDICINE & REHABILITATION

## 2021-04-25 PROCEDURE — 99231 SBSQ HOSP IP/OBS SF/LOW 25: CPT | Performed by: INTERNAL MEDICINE

## 2021-04-25 PROCEDURE — 97110 THERAPEUTIC EXERCISES: CPT

## 2021-04-25 PROCEDURE — 97116 GAIT TRAINING THERAPY: CPT

## 2021-04-25 RX ORDER — PANTOPRAZOLE SODIUM 40 MG/1
40 TABLET, DELAYED RELEASE ORAL
Qty: 30 TABLET | Refills: 0 | Status: SHIPPED | OUTPATIENT
Start: 2021-04-26 | End: 2021-05-26 | Stop reason: SDUPTHER

## 2021-04-25 RX ORDER — LACTOBACILLUS RHAMNOSUS GG 10B CELL
1 CAPSULE ORAL
Qty: 30 CAPSULE | Refills: 0 | Status: SHIPPED | OUTPATIENT
Start: 2021-04-26

## 2021-04-25 RX ORDER — ATORVASTATIN CALCIUM 40 MG/1
40 TABLET, FILM COATED ORAL NIGHTLY
Qty: 30 TABLET | Refills: 3 | Status: SHIPPED | OUTPATIENT
Start: 2021-04-25 | End: 2021-08-16

## 2021-04-25 RX ORDER — POLYETHYLENE GLYCOL 3350 17 G/17G
17 POWDER, FOR SOLUTION ORAL DAILY
Qty: 527 G | Refills: 1 | Status: SHIPPED | OUTPATIENT
Start: 2021-04-26 | End: 2021-05-04 | Stop reason: ALTCHOICE

## 2021-04-25 RX ORDER — ASPIRIN 81 MG/1
81 TABLET ORAL DAILY
Qty: 30 TABLET | Refills: 3 | Status: SHIPPED | OUTPATIENT
Start: 2021-04-26 | End: 2021-09-23 | Stop reason: SDUPTHER

## 2021-04-25 RX ADMIN — ANTI-FUNGAL POWDER MICONAZOLE NITRATE TALC FREE: 1.42 POWDER TOPICAL at 08:28

## 2021-04-25 RX ADMIN — OXYBUTYNIN CHLORIDE 5 MG: 5 TABLET, EXTENDED RELEASE ORAL at 08:29

## 2021-04-25 RX ADMIN — Medication 6 MG: at 20:26

## 2021-04-25 RX ADMIN — ENOXAPARIN SODIUM 40 MG: 40 INJECTION SUBCUTANEOUS at 08:27

## 2021-04-25 RX ADMIN — ASPIRIN 81 MG: 81 TABLET, COATED ORAL at 08:27

## 2021-04-25 RX ADMIN — ANTI-FUNGAL POWDER MICONAZOLE NITRATE TALC FREE: 1.42 POWDER TOPICAL at 20:26

## 2021-04-25 RX ADMIN — Medication 1 CAPSULE: at 08:27

## 2021-04-25 RX ADMIN — PANTOPRAZOLE SODIUM 40 MG: 40 TABLET, DELAYED RELEASE ORAL at 06:00

## 2021-04-25 RX ADMIN — FERROUS SULFATE TAB 325 MG (65 MG ELEMENTAL FE) 325 MG: 325 (65 FE) TAB at 08:27

## 2021-04-25 RX ADMIN — METOPROLOL TARTRATE 12.5 MG: 25 TABLET, FILM COATED ORAL at 20:26

## 2021-04-25 RX ADMIN — ATORVASTATIN CALCIUM 40 MG: 40 TABLET, FILM COATED ORAL at 20:26

## 2021-04-25 RX ADMIN — LEVOTHYROXINE SODIUM 112 MCG: 112 TABLET ORAL at 06:00

## 2021-04-25 RX ADMIN — METOPROLOL TARTRATE 12.5 MG: 25 TABLET, FILM COATED ORAL at 08:27

## 2021-04-25 RX ADMIN — FERROUS SULFATE TAB 325 MG (65 MG ELEMENTAL FE) 325 MG: 325 (65 FE) TAB at 20:26

## 2021-04-25 NOTE — PROGRESS NOTES
Physical Therapy  [address]  Acute Rehabilitation Physical Therapy Progress Note    Date: 21  Patient Name: Elijah Joseph       Room: 9606/6349-38  MRN: [id number]   Account: [de-identified]   : 1932  (80 y.o.) Gender: female     Referring Practitioner: Maty Christianson MD  Diagnosis: Acute CVA  Past Medical History:  has a past medical history of Anemia, Hypertension, Hypothyroidism, Osteoarthritis, Other long term (current) drug therapy, and Renal insufficiency. Past Surgical History:   has no past surgical history on file. Additional Pertinent Hx: Per ARU preadmission assessment:80year-old female admitted with acute left hemiparesis causing a fall of her chair. Noted acute left-sided weakness worse than previous date. .  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 [place] She was life flighted to [facility]. Patient on baby aspirin at home. Patient given TPA during TPA became hysterical repeat CT showed no change completed TPA.  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 Pt admitted to rehab unit on 21. Restrictions/Precautions  Restrictions/Precautions: Fall Risk;Modified Diet;Swallowing - Thickened Liquids; General Precautions  Required Braces or Orthoses?: No  Implants present? : (L TKA)       Comments: Pt in bed finishing breakfast upon arrival to room. Morning session spent completing ADL's with pt as she was found incotinent of stools/urine. Pt required assist with pericare. Was able to wash her face, upper body armpits independently. Pt did demonsrate good balance with HR and RW with standing for pericare and was able to pull both her brief and pants up with SBA for safety. Pt found incotinent of urine in pm sitting in recliner.  Was taken to Long term goal 3: Pt able to ambulate with appropriate device distance of 80 ft, mod-I/supervsion level. Long term goal 4: Pt able to perform step curb with assisitive device at min A  Long term goal 5: Pt able to go up and down 5 steps with 2 rails at min A to improve LE strength. Long term goal 6: Improve PASS score to atleast 25//36 improve overall fucntion  Long term goal 7: Improve Tinetti balance score to 20 or more/28 to reduce fall risk. Long term goal 8: Pt able to propel w/c on level surfaces 150 ft, with set up/supervsion.        04/25/21 0939 04/25/21 1306   PT Individual Minutes   Time In 0845 1225   Time Out 0935 1305   Minutes 50 40       Electronically signed by Selene Claudio PTA on 4/25/21 at 3:56 PM EDT

## 2021-04-25 NOTE — DISCHARGE INSTR - COC
Continuity of Care Form    Patient Name: Nola Bumpers   :  1932  MRN:  [id number]    Admit date:  2021  Discharge date:  2021    Code Status Order: Full Code   Advance Directives:   [facility] Documentation       Date/Time Healthcare Directive Type of Healthcare Directive Copy in [facility] [address] Agent's Name Healthcare Agent's Phone Number    [phone]  Yes, patient has an advance directive for healthcare treatment  Cherelle Bell [address]            Admitting Physician:  Benjamin Marrero MD  PCP: Blair Latham, APRN - CNP    Discharging Nurse: [facility] Unit/Room#: 1082/4724-00  Discharging Unit Phone Number: [phone]    Emergency Contact:   Extended Emergency Contact Information  Primary Emergency Contact: Dayday Garcia  [place] Phone: [phone]  Relation: Child   needed? No  Secondary Emergency Contact: Sanya Todd [address], [address] Phone: [phone]  Relation: Other  Preferred language: English   needed? No    Past Surgical History:  No past surgical history on file.     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    Muscular weakness M62.81    Other instability, left knee M25.362    Primary osteoarthritis of both hips M16.0    Primary osteoarthritis of right knee M17.11    Cerebrovascular accident (CVA) ([facility] [address] I63.9    Received intravenous tissue plasminogen activator (tPA) in emergency department Z92.82    Acute left hemiparesis (HCC) G81.94    Chest pain in adult R07.9    Acute CVA (cerebrovascular accident) ([facility] [address] I63.9    Nausea R11.0    Malaise and fatigue R53.81, R53.83       Isolation/Infection:   Isolation            No Isolation          Patient Infection Status       Infection Onset Added Last Indicated Last Indicated By Review Planned Expiration Resolved Resolved By    None active    Resolved    COVID-19 20 COVID-19 (Sent to [facility])   21     COVID-19 Rule Out 20 COVID-19 (Sent to [facility]) (Ordered)   20 Rule-Out Test Resulted            Nurse Assessment:  Last Vital Signs: /63   Pulse 77   Temp 98.1 °F (36.7 °C) (Oral)   Resp 16   Ht 5' 7\" (1.702 m)   Wt 149 lb (67.6 kg)   SpO2 97%   BMI 23.34 kg/m²     Last documented pain score (0-10 scale): Pain Level: 0  Last Weight:   Wt Readings from Last 1 Encounters:   21 149 lb (67.6 kg)     Mental Status:  oriented and alert    IV Access:  - None    Nursing Mobility/ADLs:  Walking   Dependent  Transfer  Assisted  Bathing  Assisted  Dressing  Assisted  Toileting  Assisted  Feeding  Assisted-needs set up assistance  Med Admin  Assisted  Med Delivery   Whole with pudding or yogurt. No applesauce    Wound Care Documentation and Therapy:        Elimination:  Continence:   · Bowel: No  · Bladder: No  Urinary Catheter: None   Colostomy/Ileostomy/Ileal Conduit: No       Date of Last BM: 2021    Intake/Output Summary (Last 24 hours) at 2021 1359  Last data filed at 2021 0329  Gross per 24 hour   Intake    Output 700 ml   Net -700 ml     I/O last 3 completed shifts:  In: -   Out: 700 [Urine:700]    Safety Concerns:      At Risk for Falls and Aspiration Risk    Impairments/Disabilities:      Vision and Hearing    Nutrition Therapy:  Current Nutrition Therapy:   - Oral Diet:  Dysphagia 2 mechanically altered    Routes of Feeding: Oral  Liquids: Honey Thick Liquids  Daily Fluid Restriction: no  Last Modified Barium Swallow with Video (Video Swallowing Test): done on 4/26/2021/***    Treatments at the Time of Hospital Discharge:   Respiratory Treatments: N/A  Oxygen Therapy:  is not on home oxygen therapy. Ventilator:    - No ventilator support    Rehab Therapies: Physical Therapy, Occupational Therapy and Speech/Language Therapy  Weight Bearing Status/Restrictions: No weight bearing restirctions  Other Medical Equipment (for information only, NOT a DME order): Wheelchair and walker   Other Treatments: Nurse and nurse aid    Patient's personal belongings (please select all that are sent with patient):  Glasses, Hearing Aides bilateral, Dentures upper and lower, Jewelry    RN SIGNATURE:  Electronically signed by Bella Warner RN on 4/27/21 at 1:46 PM EDT    CASE MANAGEMENT/SOCIAL WORK SECTION    Inpatient Status Date: 4/7/2021    Readmission Risk Assessment Score:  Readmission Risk              Risk of Unplanned Readmission:        17           Discharging to Facility/ Agency   Name:   [address] [address]  Phone: [phone]  Fax: [phone]      / signature: Electronically signed by CHARITO Berry on 4/27/21 at 11:58 AM EDT    PHYSICIAN SECTION    Prognosis: Good    Condition at Discharge: Stable    Rehab Potential (if transferring to Rehab): Good    Recommended Labs or Other Treatments After Discharge:    follow-up with 1. PCP 2. [facility] 3.  Neurology 4. [facility] for stress test,  BMP in 1 week to PCP, continue dysphagia diet, aspiration precautions, monitor for dehydration    Physician Certification: I certify the above information and transfer of Truman Aparicio  is necessary for the continuing treatment of the diagnosis listed and that she requires [address] for less 30 days.      Update Admission H&P: No change in H&P    PHYSICIAN SIGNATURE: Electronically signed by Mary Christianson. Neyda Lindsay MD on 4/25/21 at 2:00 PM EDT

## 2021-04-25 NOTE — PROGRESS NOTES
[facility] Internal Medicine    CONSULTATION / HISTORY AND PHYSICAL EXAMINATION            Date:   4/25/2021  Patient name:  Samira Mendosa  Date of admission:  4/7/2021 12:21 PM  MRN:   [id number]  Account:  [de-identified]  YOB: 1932  PCP:    VERNELL Acosta CNP  Room:   3373/4845-58  Code Status:    Full Code    Physician Requesting Consult: Viona Mortimer, MD    Reason for Consult:  medical management    Chief Complaint:     No chief complaint on file. Ischemic CVA    History Obtained From:     Patient medical record nursing staff    History of Present Illness:     80-year-old elderly lady was admitted to [place] [address] earlier this month with the acute left-sided weakness after she finished her physical therapy at home she has chronic left leg weakness post multiple surgeries and has chronic shoulder issues and difficulty overhead abduction  Patient received a TPA    4/25/2021    No new complaints/symptoms . Symptoms or ailment  course ;                                   are improving over time. · Continue present regimen     BP Readings from Last 3 Encounters:   04/25/21 138/63   04/07/21 139/68   03/12/21 135/76    1.     2.             Past Medicl History:     Past Medical History:   Diagnosis Date    Anemia     Hypertension     Hypothyroidism     Osteoarthritis     Other long term (current) drug therapy     Renal insufficiency         Past Surgical History:     No past surgical history on file. Medications Prior to Admission:     Prior to Admission medications    Medication Sig Start Date End Date Taking?  Authorizing Provider   aspirin 81 MG EC tablet Take 1 tablet by mouth daily 4/26/21  Yes Gorge Escobar MD   lactobacillus (CULTURELLE) capsule Take 1 capsule by mouth daily (with breakfast) 4/26/21  Yes Gorge Escobar MD   atorvastatin (LIPITOR) 40 MG tablet Take 1 tablet by mouth nightly 4/25/21  Yes Gorge Escobar MD   metoprolol tartrate (LOPRESSOR) 25 MG tablet Take 0.5 tablets by mouth 2 times daily 4/25/21  Yes Cecilia Seaman MD   polyethylene glycol (GLYCOLAX) 17 g packet Take 17 g by mouth daily 4/26/21 5/26/21 Yes Cecilia Seaman MD   pantoprazole (PROTONIX) 40 MG tablet Take 1 tablet by mouth every morning (before breakfast) 4/26/21  Yes Cecilia Seaman MD   levothyroxine (SYNTHROID) 112 MCG tablet Take 1 tablet by mouth Daily 3/1/21  Yes VERNELL Crawford CNP   oxybutynin (DITROPAN-XL) 5 MG extended release tablet Take 1 tablet by mouth daily 1/5/21  Yes VERNELL Crawford CNP   Ferrous Sulfate 324 MG TBEC Take 1 tablet by mouth 2 times daily 3/1/21   VERNELL Crawford CNP   sodium bicarbonate 650 MG tablet Take 1 tablet by mouth 4 times daily Two tabs BID 3/1/21   VERNELL Crawford CNP   docusate sodium (COLACE) 100 MG capsule Take 1 capsule by mouth every other day 2/24/21   VERNELL Crawford CNP   Calcium Carb-Cholecalciferol (CALCIUM + D3) 600-200 MG-UNIT TABS tablet Take 1 tablet by mouth Daily with lunch    Historical Provider, MD   Multiple Vitamins-Minerals (THERAPEUTIC MULTIVITAMIN-MINERALS) tablet Take 1 tablet by mouth daily    Historical Provider, MD   Apoaequorin (PREVAGEN) 10 MG CAPS Take by mouth daily    Historical Provider, MD        Allergies:     Ciprofloxacin, Hydrocodone, Macrobid [nitrofurantoin], and Ciprofloxacin    Social History:     Tobacco:    reports that she has never smoked. She has never used smokeless tobacco.  Alcohol:      reports no history of alcohol use. Drug Use:  reports no history of drug use. Family History:     No family history on file. Review of Systems:     Positive and Negative as described in HPI. CONSTITUTIONAL:  negative for fevers, chills, sweats, fatigue, weight loss  HEENT:  negative for vision, hearing changes, runny nose, throat pain  RESPIRATORY:  negative for shortness of breath, cough, congestion, wheezing.   CARDIOVASCULAR:  negative for chest pain, palpitations. GASTROINTESTINAL:  negative for nausea, vomiting, diarrhea, constipation, change in bowel habits, abdominal pain   GENITOURINARY:  negative for difficulty of urination, burning with urination, frequency   INTEGUMENT:  negative for rash, skin lesions, easy bruising   HEMATOLOGIC/LYMPHATIC:  negative for swelling/edema   ALLERGIC/IMMUNOLOGIC:  negative for urticaria , itching  ENDOCRINE:  negative increase in drinking, increase in urination, hot or cold intolerance  MUSCULOSKELETAL:  negative joint pains, muscle aches, swelling of joints  NEUROLOGICAL: Positive for left upper and lower extremity weakness extremities      Physical Exam:     /63   Pulse 77   Temp 98.1 °F (36.7 °C) (Oral)   Resp 16   Ht 5' 7\" (1.702 m)   Wt 149 lb (67.6 kg)   SpO2 97%   BMI 23.34 kg/m²   Temp (24hrs), Av.6 °F (37 °C), Min:98.1 °F (36.7 °C), Max:99 °F (37.2 °C)    No results for input(s): POCGLU in the last 72 hours. Intake/Output Summary (Last 24 hours) at 2021 1734  Last data filed at 2021 0329  Gross per 24 hour   Intake    Output 700 ml   Net -700 ml       General Appearance:  alert, well appearing, and in no acute distress  Mental status: oriented to person, place, and time with normal affect  Head:  normocephalic, atraumatic. Eye: no icterus, redness, pupils equal and reactive, extraocular eye movements intact, conjunctiva clear  Ear: normal external ear, no discharge, hearing intact  Nose:  no drainage noted  Mouth: mucous membranes moist  Neck: supple, no carotid bruits, thyroid not palpable  Lungs: Bilateral equal air entry, clear to ausculation, no wheezing, rales or rhonchi, normal effort  Cardiovascular: normal rate, regular rhythm, no murmur, gallop, rub.   Abdomen: Soft, nontender, nondistended, normal bowel sounds, no hepatomegaly or splenomegaly  Neurologic: Left upper and left lower extremity weakness  Skin: No gross lesions, rashes, bruising or bleeding on exposed skin area  Extremities:  peripheral pulses palpable, no pedal edema or calf pain with palpation      Investigations:      Laboratory Testing:  No results found for this or any previous visit (from the past 24 hour(s)). Medications: Allergies:     Allergies   Allergen Reactions    Ciprofloxacin     Hydrocodone     Macrobid [Nitrofurantoin]     Ciprofloxacin Rash       Current Meds:   Scheduled Meds:    miconazole   Topical BID    lactobacillus  1 capsule Oral Daily with breakfast    enoxaparin  40 mg Subcutaneous Daily    aspirin  81 mg Oral Daily    atorvastatin  40 mg Oral Nightly    ferrous sulfate  325 mg Oral BID    levothyroxine  112 mcg Oral Daily    metoprolol tartrate  12.5 mg Oral BID    oxybutynin  5 mg Oral Daily    pantoprazole  40 mg Oral QAM AC    polyethylene glycol  17 g Oral Daily     Continuous Infusions:   PRN Meds: ondansetron, melatonin, acetaminophen, bisacodyl, magnesium hydroxide      Consultations:   IP CONSULT TO SOCIAL WORK  IP CONSULT TO INTERNAL MEDICINE  Assessment :      Primary Problem  <principal problem not specified>    Active Hospital Problems    Diagnosis Date Noted    Malaise and fatigue [R53.81, R53.83] 04/10/2021    Acute CVA (cerebrovascular accident) ([facility] [address] [I63.9] 04/07/2021    Acute left hemiparesis ([facility] [address] [G81.94]     Gastroesophageal reflux disease without esophagitis [K21.9] 02/24/2021    HTN (hypertension) [I10] 10/21/2020    Hypothyroidism [E03.9] 10/21/2020       Plan:     Acute right ischemic infarct of the rhonda status post thrombolytics  Left hemiparesis for 3-4 out of 5  Aspirin Plavix and Lipitor  Hypertension beta-blocker and Norvasc control  Hypothyroidism on Synthroid follow with TSH in 6 weeks  Mild protein calorie malnutrition  SYEDA  resolved    BP labile - on amlodipine , metoprolol  BP Readings from Last 3 Encounters:   04/25/21 138/63   04/07/21 139/68   03/12/21 135/76             Dizziness  Check orthostatic bp    4/25 Hypertension controlled  Patient feels that weakness is improving  No new complaints                  Jonathon Mireles MD  4/25/2021  5:34 PM    Copy sent to Dr. Ary Vickers, APRN - CNP    Please note that this chart was generated using voice recognition [address] dictation software. Although every effort was made to ensure the accuracy of this automated transcription, some errors in transcription may have occurred.

## 2021-04-25 NOTE — PLAN OF CARE
Problem: Skin Integrity:  Goal: Will show no infection signs and symptoms  Description: Will show no infection signs and symptoms  4/25/2021 0342 by Carolynn Pelayo RN  Outcome: Ongoing  Note: Pt shows no signs or symptoms of infection at this time. VS stable, alert and oriented x4. Hand hygiene utilized upon entry and exit. Will continue to monitor. Problem: Skin Integrity:  Goal: Absence of new skin breakdown  Description: Absence of new skin breakdown  4/25/2021 0342 by Carolynn Pelayo RN  Outcome: Ongoing  Note: RN noted no new skin breakdown on pt. Will monitor      Problem: Injury - Risk of, Physical Injury:  Goal: Absence of physical injury  Description: Absence of physical injury  4/25/2021 0342 by Carolynn Pelayo RN  Outcome: Ongoing  Note: Pt remains free from any injury this RN's shift. Will monitor      Problem: Injury - Risk of, Physical Injury:  Goal: Will remain free from falls  Description: Will remain free from falls  4/25/2021 0342 by Carolynn Pelayo RN  Outcome: Ongoing  Note: Pt assessed as a fall risk this shift. Remains free from falls and accidental injury at this time. Fall precautions in place, including falling star sign and fall risk band on pt. Floor free from obstacles, and bed is locked and in lowest position. Adequate lighting provided. Pt encouraged to call before getting OOB for any need. Bed alarm activated. Will continue to monitor needs during hourly rounding, and reinforce education on use of call light. Problem: Sleep Pattern Disturbance:  Goal: Appears well-rested  Description: Appears well-rested  4/25/2021 0342 by Carolynn Pelayo RN  Outcome: Ongoing  Note: Pt slept all night for this RN     Problem: Pain:  Goal: Pain level will decrease  Description: Pain level will decrease  4/25/2021 0342 by Carolynn Pelayo RN  Outcome: Ongoing  Note: Pt has not complained of any pain for this RN.  Will monitor

## 2021-04-25 NOTE — PROGRESS NOTES
Physical Medicine & Rehabilitation  Progress Note    4/25/2021 1:13 PM     CC: Ambulatory and ADL dysfunction due to ischemic CVA left nondominant hemiparesis    Subjective:   No complaints seen in gym. Patient continued pure wick. Discussed with nursing to try to avoid as patient would not have this at home. ROS:  Denies fevers, chills, sweats. No chest pain, palpitations, lightheadedness. Denies coughing, wheezing or shortness of breath. Denies abdominal pain, nausea, diarrhea or constipation. No new areas of joint pain. Denies new areas of numbness or weakness. Denies new anxiety or depression issues. No new skin problems. Rehabilitation:   PT:  Restrictions/Precautions: Fall Risk, Modified Diet, Swallowing - Thickened Liquids, General Precautions  Implants present? : (L TKA)   Transfers  Sit to Stand: Stand by assistance(vc's for \"nose over toes\" and to shift wt fwd)  Stand to sit: Stand by assistance(demos good hand placement)  Bed to Chair: Stand by assistance(patient needs safety cues to present to front of chair; cues for transfer technique to demonstrate safely. intermittently demonstrates without intervention)  Stand Pivot Transfers: Stand by assistance(vc's to stay up inside RW)  Squat Pivot Transfers: Maximum Assistance(to left , no AD , pt sequences correctly after pre instructi)  Comment: patient needs cueing and intermittent tactile cues for posturing and extra time for stability. patient tendency to maintain posterior weight of heels with difficulty to weight shift forward to walker with release from chair support. Needs cueing control descent to chair. patient reports use of lift chair in the home. re-ed patient with neuro rocking with alternate hand/foot forward to improve transfer from standard chair surface and height. Limited successful d/t needing cueing to preform new information  Ambulation 1  Surface: level tile  Device: Rolling Walker  Other Apparatus:  (IV)  Assistance: Stand by assistance  Quality of Gait: increased trunk flexion , UE weight bearing heavily on walker, hips beyond LEN of walker  Gait Deviations: Slow Zuly, Decreased step length, Decreased step height  Distance: 20ft x 2  Comments: cueing for posturing only. intermittent verbal cues for safety within LEN of walker. OT:  ADL  Feeding: Modified independent (utilizes spoon as compensatory to open tabbed container)  Grooming: Setup(assist to gather washcloth)  UE Bathing: Setup(assist for gathering supplies)  LE Bathing: Minimal assistance, Increased time to complete(assist for throughness at buttocks)  UE Dressing: Minimal assistance(assist to doff overhead nightgown. )  LE Dressing: Minimal assistance(minor assist to thread RLE,CGA for standing balance assist. )  Toileting: Minimal assistance(assist for hygiene following BM, CGA for clothing mgmt. )  Additional Comments: Pt completed bathing tasks at seated level for safety and energy conservation. SBA/CGA provided for tasks completed in standing(toileting completed AM and PM)         Balance  Sitting Balance: Supervision(reaching towards feet)  Standing Balance: Contact guard assistance(SBA/CGA)   Standing Balance  Time: ~2 min x 4, ~4 min  Activity: functional mobility, toileting tasks, LB dressing  Comment: cues for upright posture and body positioning in RW for increased safety; fair return.    Functional Mobility  Functional - Mobility Device: Rolling Walker  Activity: To/from bathroom  Assist Level: Stand by assistance(close stand by assist. )  Functional Mobility Comments: VCs for safety, posture and proper tech     Bed mobility  Bridging: Contact guard assistance(difficulty with bending knees for foot placement-not able to clear buttocks)  Rolling to Left: Stand by assistance  Rolling to Right: Stand by assistance  Supine to Sit: Stand by assistance(with leg )  Sit to Supine: Stand by assistance(with leg )  Scooting: Stand by assistance(to EOB)  Comment: needes extra time and modifications to preform bed mobility patient demonstrated on flat firm surface with out handrails; patient use of leg  and limitations by BLE weakness and arthritic knees; needs cueing with tactile cues to preform verbal instruction at times. does well with leg  on LLE  and right side of bed. Transfers  Stand Step Transfers: Stand by assistance(using R/W)  Stand Pivot Transfers: Contact guard assistance(GBs in bathroom)  Sit to stand: Contact guard assistance, Minimal assistance  Stand to sit: Contact guard assistance  Transfer Comments: cueing for proper tech   Toilet Transfers  Toilet - Technique: Ambulating  Equipment Used: Raised toilet seat without rails(grab bar and sink utilized for UE support prn. )  Toilet Transfer: Contact guard assistance  Toilet Transfers Comments: slow to move, no LOB noted, cueing for technique. Shower Transfers  Shower - Transfer From: Walker(rolling)  Shower - Transfer Type: To and From  Shower - Transfer To: Transfer tub bench  Shower - Technique: Ambulating  Shower Transfers: Contact Guard  Shower Transfers Comments: verbal and tactile cues for proper tech and safety over threshold using R/W  Wheelchair Bed Transfers  Wheelchair/Bed - Technique: Ambulating  Equipment Used: Bed, Wheelchair(rolling walker)  Level of Asssistance: 2 Person assistance, Moderate assistance  Wheelchair Transfers Comments: during PM session      ST:    Attention: Sustained during meal.  MAX cues needed during swallowing exercises.       Cognition: n/a     Verbal expression: General expression functional in conversation.       Dysphagia: Vital stim completed for 30 minutes at 3.5 mA while Pt. eating lunch. Pt. took trials of honey thick liquids via straw x6, puree solid x18, and minced and moist solid x7. Pt. Occasionally throat clearing during meal.  No additional overt s/s of aspiration observed with consistencies.   Min amount lingual residue noted with solids, cleared with double swallow.       Following meal, swallowing exercises completed with VitalStim at 3.5 mA. Pt. completed simple tongue base strengthening exercises x2, 10 reps each.   Pt. Completed effortful swallow x5. MAX cues needed on all tasks.           Objective:  /63   Pulse 77   Temp 98.1 °F (36.7 °C) (Oral)   Resp 16   Ht 5' 7\" (1.702 m)   Wt 149 lb (67.6 kg)   SpO2 97%   BMI 23.34 kg/m²  I Body mass index is 23.34 kg/m². I   Wt Readings from Last 1 Encounters:   21 149 lb (67.6 kg)      Temp (24hrs), Av.6 °F (37 °C), Min:98.1 °F (36.7 °C), Max:99 °F (37.2 °C)         GEN: well developed, well nourished, no acute distress  HEENT: Normocephalic atraumatic, EOMI, mucous membranes pink and moist  CV: RRR, no murmurs, rubs or gallops  PULM: CTAB, no rales or rhonchi. Respirations WNL and unlabored  ABD: soft, NT, ND, +BS and equal  NEURO: A&O x3 currently knew year, president and location, follows commands, named objects. Sensation intact to light touch. No change  MSK: 4/5 upper and lower extremities  EXTREMITIES: No calf tenderness to palpation bilaterally. No edema BLEs  SKIN: warm dry and intact with good turgor  PSYCH: appropriately interactive. Affect WNL. Good spirits        Medications   Scheduled Meds:   miconazole   Topical BID    lactobacillus  1 capsule Oral Daily with breakfast    enoxaparin  40 mg Subcutaneous Daily    aspirin  81 mg Oral Daily    atorvastatin  40 mg Oral Nightly    ferrous sulfate  325 mg Oral BID    levothyroxine  112 mcg Oral Daily    metoprolol tartrate  12.5 mg Oral BID    oxybutynin  5 mg Oral Daily    pantoprazole  40 mg Oral QAM AC    polyethylene glycol  17 g Oral Daily     Continuous Infusions:  PRN Meds:.ondansetron, melatonin, acetaminophen, bisacodyl, magnesium hydroxide     Diagnostics:     CBC:   No results for input(s): WBC, RBC, HGB, HCT, MCV, RDW, PLT in the last 72 hours.   BMP:   No results for input(s): NA, K, CL, CO2, PHOS, BUN, CREATININE in the last 72 hours. Invalid input(s): CA  BNP: No results for input(s): BNP in the last 72 hours. PT/INR: No results for input(s): PROTIME, INR in the last 72 hours. APTT: No results for input(s): APTT in the last 72 hours. CARDIAC ENZYMES: No results for input(s): CKMB, CKMBINDEX, TROPONINT in the last 72 hours. Invalid input(s): CKTOTAL;3  FASTING LIPID PANEL:  Lab Results   Component Value Date    CHOL 121 04/02/2021    HDL 55 04/02/2021    TRIG 61 04/02/2021     LIVER PROFILE: No results for input(s): AST, ALT, ALB, BILIDIR, BILITOT, ALKPHOS in the last 72 hours. I/O (24Hr): Intake/Output Summary (Last 24 hours) at 4/25/2021 1313  Last data filed at 4/25/2021 0329  Gross per 24 hour   Intake    Output 700 ml   Net -700 ml       Glu last 24 hour  No results for input(s): POCGLU in the last 72 hours. No results for input(s): CLARITYU, COLORU, PHUR, SPECGRAV, PROTEINU, RBCUA, BLOODU, BACTERIA, NITRU, WBCUA, LEUKOCYTESUR, YEAST, GLUCOSEU, BILIRUBINUR in the last 72 hours. Impression/Plan:   Impaired ADLs, gait, and mobility due to:     1. 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 Noted discharge plan 4/27, concern of patient safety and risk of fallsdiscussed with patientshe notes that family and friends checking on her frequentlyshe is adamant on discharge. Family is also aware of concerns as noted below. Also concern of if patient will continue with dysphagia diet and risk of aspiration which hopefully will improve with swallow study 4/26  2. CVAon 3 weeks ASA, Plavix with ASA to continue after 4/22.  On atorvastatin. 3. Dysphagia: On minced and moist solids and moderately-thick liquids. SLP treating and including vital stim.  Will not start free water protocol due to having a lot of food residue in her mouth after each meal; additionally, cognitive impairment will likely make it difficult for her to understand when she can have free water and when she cannot. SLP considering repeat MBS 4/26.,    4. Dehydration/hyponatremia:  Resolved.  IM treated with IV fluid until 4/12 and serial lab tests - improved Na and creatinine. Recheck in a.m.  5. HTN:  amlodipine - discontinued 4/22 by IM, metoprolol tartrate. Follow up Dr. Blayne Murry in [facility] in 4 weeks for stress test.   6. Orthostasis: Ensure she wears ARSH hose and abdominal binder when out of bed. Encourage hydration. Amlodipine discontinued as above. Internal medicine following  7. OA/L rotator cuff injury:  Stable. Will monitor  8. CKD:  Stable. Will monitor BMP  9. Hypothyroidism: On levothyroxine  10. Anemia: On ferrous sulfate. Monitoring Hb  11. Hx chronic cystitis:  On oxybutynin ER. Frequent incontinence is normal baseline for patient. Urine culture positive for E Coli. Keflex renally adjusted through 4/17. Leukocytosis resolved 4/11. Completed course of antibiotics  12. GERD:  On pantoprazole  13. Bowel Management: Miralax daily, senokot prn, milk of magnesia prn, dulcolax prn. On probiotic for loose stools which are improved. Last BM 4/22  14. DVT Prophylaxis: Lovenox SCD's while in bed and ARSH's   15. Internal medicine for medical management  16. Dr. Garett Gibbons discharge plan with patient and family on 4/20. Team recommending 24-hour supervision for patient at home but family is unable to provide this level of supervision. Daughter states that family members and friends frequently check in on the patient throughout the day. After patient and family discussions with multiple team members, family would like patient to return home at discharge with home health services. Family aware of concerns of falls, diet, etc. , patient also adamant her home discharge  17. Discharge 4/27 if okay with internal medicine, follow-up with 1. PCP 2.   Re[facility] 3.  Neurology BMP in 1 week to PCP continue dysphagia diet, prescription sent to [address] [place] [address]. Mansi Villa MD       This note is created with the assistance of a speech recognition program.  While intending to generate a document that actually reflects the content of the visit, the document can still have some errors including those of syntax and sound a like substitutions which may escape proof reading.   In such instances, actual meaning can be extrapolated by contextual diversion

## 2021-04-26 ENCOUNTER — APPOINTMENT (OUTPATIENT)
Dept: GENERAL RADIOLOGY | Age: 86
DRG: 057 | End: 2021-04-26
Attending: PHYSICAL MEDICINE & REHABILITATION
Payer: MEDICARE

## 2021-04-26 LAB
ANION GAP SERPL CALCULATED.3IONS-SCNC: 8 MMOL/L (ref 9–17)
BUN BLDV-MCNC: 20 MG/DL (ref 8–23)
BUN/CREAT BLD: ABNORMAL (ref 9–20)
CALCIUM SERPL-MCNC: 8.7 MG/DL (ref 8.6–10.4)
CHLORIDE BLD-SCNC: 104 MMOL/L (ref 98–107)
CO2: 24 MMOL/L (ref 20–31)
CREAT SERPL-MCNC: 1.12 MG/DL (ref 0.5–0.9)
GFR AFRICAN AMERICAN: 56 ML/MIN
GFR NON-AFRICAN AMERICAN: 46 ML/MIN
GFR SERPL CREATININE-BSD FRML MDRD: ABNORMAL ML/MIN/{1.73_M2}
GFR SERPL CREATININE-BSD FRML MDRD: ABNORMAL ML/MIN/{1.73_M2}
GLUCOSE BLD-MCNC: 95 MG/DL (ref 70–99)
POTASSIUM SERPL-SCNC: 4.2 MMOL/L (ref 3.7–5.3)
SODIUM BLD-SCNC: 136 MMOL/L (ref 135–144)

## 2021-04-26 PROCEDURE — 74230 X-RAY XM SWLNG FUNCJ C+: CPT

## 2021-04-26 PROCEDURE — 80048 BASIC METABOLIC PNL TOTAL CA: CPT

## 2021-04-26 PROCEDURE — 92611 MOTION FLUOROSCOPY/SWALLOW: CPT

## 2021-04-26 PROCEDURE — 97530 THERAPEUTIC ACTIVITIES: CPT

## 2021-04-26 PROCEDURE — 6370000000 HC RX 637 (ALT 250 FOR IP): Performed by: PHYSICAL MEDICINE & REHABILITATION

## 2021-04-26 PROCEDURE — 6370000000 HC RX 637 (ALT 250 FOR IP): Performed by: STUDENT IN AN ORGANIZED HEALTH CARE EDUCATION/TRAINING PROGRAM

## 2021-04-26 PROCEDURE — 99231 SBSQ HOSP IP/OBS SF/LOW 25: CPT | Performed by: INTERNAL MEDICINE

## 2021-04-26 PROCEDURE — 97116 GAIT TRAINING THERAPY: CPT

## 2021-04-26 PROCEDURE — 6360000002 HC RX W HCPCS: Performed by: STUDENT IN AN ORGANIZED HEALTH CARE EDUCATION/TRAINING PROGRAM

## 2021-04-26 PROCEDURE — 97110 THERAPEUTIC EXERCISES: CPT

## 2021-04-26 PROCEDURE — 99232 SBSQ HOSP IP/OBS MODERATE 35: CPT | Performed by: PHYSICAL MEDICINE & REHABILITATION

## 2021-04-26 PROCEDURE — 97535 SELF CARE MNGMENT TRAINING: CPT

## 2021-04-26 PROCEDURE — 1180000000 HC REHAB R&B

## 2021-04-26 PROCEDURE — 36415 COLL VENOUS BLD VENIPUNCTURE: CPT

## 2021-04-26 RX ADMIN — ATORVASTATIN CALCIUM 40 MG: 40 TABLET, FILM COATED ORAL at 20:10

## 2021-04-26 RX ADMIN — ANTI-FUNGAL POWDER MICONAZOLE NITRATE TALC FREE: 1.42 POWDER TOPICAL at 20:10

## 2021-04-26 RX ADMIN — FERROUS SULFATE TAB 325 MG (65 MG ELEMENTAL FE) 325 MG: 325 (65 FE) TAB at 20:10

## 2021-04-26 RX ADMIN — METOPROLOL TARTRATE 12.5 MG: 25 TABLET, FILM COATED ORAL at 20:10

## 2021-04-26 RX ADMIN — FERROUS SULFATE TAB 325 MG (65 MG ELEMENTAL FE) 325 MG: 325 (65 FE) TAB at 07:23

## 2021-04-26 RX ADMIN — Medication 1 CAPSULE: at 07:23

## 2021-04-26 RX ADMIN — ANTI-FUNGAL POWDER MICONAZOLE NITRATE TALC FREE: 1.42 POWDER TOPICAL at 07:27

## 2021-04-26 RX ADMIN — LEVOTHYROXINE SODIUM 112 MCG: 112 TABLET ORAL at 06:11

## 2021-04-26 RX ADMIN — Medication 6 MG: at 20:10

## 2021-04-26 RX ADMIN — PANTOPRAZOLE SODIUM 40 MG: 40 TABLET, DELAYED RELEASE ORAL at 06:11

## 2021-04-26 RX ADMIN — METOPROLOL TARTRATE 12.5 MG: 25 TABLET, FILM COATED ORAL at 07:23

## 2021-04-26 RX ADMIN — POLYETHYLENE GLYCOL 3350 17 G: 17 POWDER, FOR SOLUTION ORAL at 07:24

## 2021-04-26 RX ADMIN — OXYBUTYNIN CHLORIDE 5 MG: 5 TABLET, EXTENDED RELEASE ORAL at 07:27

## 2021-04-26 RX ADMIN — ASPIRIN 81 MG: 81 TABLET, COATED ORAL at 07:23

## 2021-04-26 RX ADMIN — ENOXAPARIN SODIUM 40 MG: 40 INJECTION SUBCUTANEOUS at 07:24

## 2021-04-26 ASSESSMENT — 9 HOLE PEG TEST
TEST_RESULT: IMPAIRED
TEST_RESULT: IMPAIRED
TESTTIME_SECONDS: 209

## 2021-04-26 NOTE — PROGRESS NOTES
[place] [id number] Internal Medicine    CONSULTATION / HISTORY AND PHYSICAL EXAMINATION            Date:   4/26/2021  Patient name:  Diane Romero  Date of admission:  4/7/2021 12:21 PM  MRN:   [id number]  Account:  [de-identified]  YOB: 1932  PCP:    VERNELL Jessica CNP  Room:   [address] [place] [address]  Code Status:    Full Code    Physician Requesting Consult: Davi Grady MD    Reason for Consult:  medical management    Chief Complaint:     No chief complaint on file. Ischemic CVA    History Obtained From:     Patient medical record nursing staff    History of Present Illness:     45-year-old elderly lady was admitted to [facility] earlier this month with the acute left-sided weakness after she finished her physical therapy at home she has chronic left leg weakness post multiple surgeries and has chronic shoulder issues and difficulty overhead abduction  Patient received a TPA    4/26/2021    No new complaints/symptoms . Symptoms or ailment  course ;                                   are improving over time. · Continue present regimen     BP Readings from Last 3 Encounters:   04/26/21 139/61   04/07/21 139/68   03/12/21 135/76    1.     2.             Past Medicl History:     Past Medical History:   Diagnosis Date    Anemia     Hypertension     Hypothyroidism     Osteoarthritis     Other long term (current) drug therapy     Renal insufficiency         Past Surgical History:     No past surgical history on file. Medications Prior to Admission:     Prior to Admission medications    Medication Sig Start Date End Date Taking?  Authorizing Provider   aspirin 81 MG EC tablet Take 1 tablet by mouth daily 4/26/21  Yes Ritika Flor MD   lactobacillus (CULTURELLE) capsule Take 1 capsule by mouth daily (with breakfast) 4/26/21  Yes Ritika Flor MD   atorvastatin (LIPITOR) 40 MG tablet Take 1 tablet by mouth nightly 4/25/21  Yes Ritika Flor MD   metoprolol tartrate (LOPRESSOR) 25 MG tablet Take 0.5 tablets by mouth 2 times daily 4/25/21  Yes Geeta Altamirano MD   polyethylene glycol (GLYCOLAX) 17 g packet Take 17 g by mouth daily 4/26/21 5/26/21 Yes Geeta Altamirano MD   pantoprazole (PROTONIX) 40 MG tablet Take 1 tablet by mouth every morning (before breakfast) 4/26/21  Yes Geeta Altamirano MD   levothyroxine (SYNTHROID) 112 MCG tablet Take 1 tablet by mouth Daily 3/1/21  Yes VERNELL Sanchez CNP   oxybutynin (DITROPAN-XL) 5 MG extended release tablet Take 1 tablet by mouth daily 1/5/21  Yes VERNELL Sanchez CNP   Ferrous Sulfate 324 MG TBEC Take 1 tablet by mouth 2 times daily 3/1/21   VERNELL Sanchez CNP   docusate sodium (COLACE) 100 MG capsule Take 1 capsule by mouth every other day 2/24/21   VERNELL Sanchez CNP   Calcium Carb-Cholecalciferol (CALCIUM + D3) 600-200 MG-UNIT TABS tablet Take 1 tablet by mouth Daily with lunch    Historical Provider, MD   Multiple Vitamins-Minerals (THERAPEUTIC MULTIVITAMIN-MINERALS) tablet Take 1 tablet by mouth daily    Historical Provider, MD        Allergies:     Ciprofloxacin, Hydrocodone, Macrobid [nitrofurantoin], and Ciprofloxacin    Social History:     Tobacco:    reports that she has never smoked. She has never used smokeless tobacco.  Alcohol:      reports no history of alcohol use. Drug Use:  reports no history of drug use. Family History:     No family history on file. Review of Systems:     Positive and Negative as described in HPI. CONSTITUTIONAL:  negative for fevers, chills, sweats, fatigue, weight loss  HEENT:  negative for vision, hearing changes, runny nose, throat pain  RESPIRATORY:  negative for shortness of breath, cough, congestion, wheezing. CARDIOVASCULAR:  negative for chest pain, palpitations.   GASTROINTESTINAL:  negative for nausea, vomiting, diarrhea, constipation, change in bowel habits, abdominal pain   GENITOURINARY:  negative for difficulty of urination, burning with Panel    Collection Time: 04/26/21  6:31 AM   Result Value Ref Range    Glucose 95 70 - 99 mg/dL    BUN 20 8 - 23 mg/dL    CREATININE 1.12 (H) 0.50 - 0.90 mg/dL    Bun/Cre Ratio NOT REPORTED 9 - 20    Calcium 8.7 8.6 - 10.4 mg/dL    Sodium 136 135 - 144 mmol/L    Potassium 4.2 3.7 - 5.3 mmol/L    Chloride 104 98 - 107 mmol/L    CO2 24 20 - 31 mmol/L    Anion Gap 8 (L) 9 - 17 mmol/L    GFR Non-African American 46 (L) >60 mL/min    GFR  56 (L) >60 mL/min    GFR Comment          GFR Staging NOT REPORTED          Medications: Allergies:     Allergies   Allergen Reactions    Ciprofloxacin     Hydrocodone     Macrobid [Nitrofurantoin]     Ciprofloxacin Rash       Current Meds:   Scheduled Meds:    miconazole   Topical BID    lactobacillus  1 capsule Oral Daily with breakfast    enoxaparin  40 mg Subcutaneous Daily    aspirin  81 mg Oral Daily    atorvastatin  40 mg Oral Nightly    ferrous sulfate  325 mg Oral BID    levothyroxine  112 mcg Oral Daily    metoprolol tartrate  12.5 mg Oral BID    oxybutynin  5 mg Oral Daily    pantoprazole  40 mg Oral QAM AC    polyethylene glycol  17 g Oral Daily     Continuous Infusions:   PRN Meds: ondansetron, melatonin, acetaminophen, bisacodyl, magnesium hydroxide      Consultations:   IP CONSULT TO SOCIAL WORK  IP CONSULT TO INTERNAL MEDICINE  Assessment :      Primary Problem  <principal problem not specified>    Active Hospital Problems    Diagnosis Date Noted    Malaise and fatigue [R53.81, R53.83] 04/10/2021    Acute CVA (cerebrovascular accident) ([facility] [I63.9] 04/07/2021    [facility] [G81.94]     Gastroesophageal reflux disease without esophagitis [K21.9] 02/24/2021    HTN (hypertension) [I10] 10/21/2020    Hypothyroidism [E03.9] 10/21/2020       Plan:     Acute right ischemic infarct of the rhonda status post thrombolytics  Left hemiparesis for 3-4 out of 5  Aspirin Plavix and Lipitor  Hypertension beta-blocker and Norvasc control  Hypothyroidism on Synthroid follow with TSH in 6 weeks  Mild protein calorie malnutrition  SYEDA  resolved    BP labile - on amlodipine , metoprolol  BP Readings from Last 3 Encounters:   04/26/21 139/61   04/07/21 139/68   03/12/21 135/76             Dizziness  Check orthostatic bp    4/26   Hypertension controlled  Patient feels that weakness is improving  No new complaints                  Chad Acosta MD  4/26/2021  4:35 PM    Copy sent to Dr. Elisha Garland, APRN - CNP    Please note that this chart was generated using voice recognition [employer]  [address] dictation software. Although every effort was made to ensure the accuracy of this automated transcription, some errors in transcription may have occurred.

## 2021-04-26 NOTE — PROGRESS NOTES
Physical Therapy  Facility/Department: [facility]  Daily Treatment Note  NAME: Olivia Rondon  : 1932  MRN: [id number]    Date of Service: 2021    Discharge Recommendations:  Patient would benefit from continued therapy after discharge, Home with assist PRN, Home with Home health PT        Assessment      PT Education: Home Exercise Program;Functional Mobility Training  Activity Tolerance  Activity Tolerance: Patient limited by endurance     Patient Diagnosis(es): There were no encounter diagnoses. has a past medical history of Anemia, Hypertension, Hypothyroidism, Osteoarthritis, Other long term (current) drug therapy, and Renal insufficiency. has no past surgical history on file. Restrictions  Restrictions/Precautions  Restrictions/Precautions: Fall Risk, Modified Diet, Swallowing - Thickened Liquids, General Precautions  Required Braces or Orthoses?: No  Implants present? : (L TKA)  Subjective   Subjective  Subjective: no complaints  Pain Screening  Patient Currently in Pain: Denies  Vital Signs  Patient Currently in Pain: Denies       Orientation     Cognition      Objective   Bed mobility  Bridging: Modified independent   Rolling to Left: Modified independent  Rolling to Right: Modified independent  Supine to Sit: Modified independent  Sit to Supine: Modified independent  Scooting: Modified independent  Comment: needes extra time and modifications to preform bed mobility patient demonstrated on flat firm surface with out handrails; patient use of leg  and limitations by BLE weakness and arthritic knees; needs cueing with tactile cues to preform verbal instruction at times. does well with leg  on LLE and right side of bed. Transfers  Sit to Stand: Modified independent  Stand to sit: Modified independent  Bed to Chair: Modified independent  Stand Pivot Transfers: Modified independent  Comment: patient needs extra time for stability. demonstrates posterior weight shift. Performance on the amount of help required. Do not consider the quality of performance. 3  Can perform without help  8. Supine to Sitting up on the Edge of the Mat   Examiner:  Begin with the subject in supine on a treatment mat. Instruct the subject to come to sitting on the edge of the mat. Assist as necessary. Evaluate the subject's performance on the amount of help required. Do not considered the quality performance. 3  Can perform without help   9. Sitting on the Edge of the Mat to Supine  Examiner: Begin with the subject sitting on the edge of a treatment mat. Instruct the subject to return to supine. Assist as necessary. Evaluate the subjects performance on the amount of help required. Co not consider the quality of performance. 3  Can perform without help  10. Sitting to Standing Up  Examiner:  Begin with the subject sitting on the edge of a treatment mat. Instruct the subject to stand up without support. Assist if necessary. Evaluated the subject's performance on the amount ot help required. Do not consider the quality of the performance. 3  Can perform without help  11. Standing Up to Sitting Down  Examiner:  Begin with the subject standing by the edge of a treatment mat. Instruct the subject to sit on the edge of mat without support. Assist if necessary. Evaluated the subject's performance on the amount of help required. Do not consider the quality of the performance. 3  Can perform without help  12 . Standing, Picking up a Pencil from the Floor  Examiner:  Begin with the subject standing. Instruct the subject to  a pencil from the floor without support. Assist if necessary. Evaluate the subject's performance on the amount of help required. Do Not consider the quality of the performance.    2  Can perform with little help    Changing Posture SUBTOTAL   20/21    PASS TOTAL   34/36     AM-PAC Score             Goals  Short term goals  Time Frame for Short term goals:

## 2021-04-26 NOTE — PLAN OF CARE
engaged throughout the shift as a precaution.          Problem: Pain:  Goal: Pain level will decrease  Description: Pain level will decrease  Outcome: Met This Shift  Pain assessment completed so far this shift. Pt. able to rest without the use of pain medication. Patient denies any pain so far this shift.   Will continue to [facility]

## 2021-04-26 NOTE — PROGRESS NOTES
Physical Medicine & Rehabilitation  Progress Note    4/26/2021 1:29 PM     CC: Ambulatory and ADL dysfunction due to ischemic CVA left nondominant hemiparesis    Subjective:   No complaints seen in gym. ROS:  Denies fevers, chills, sweats. No chest pain, palpitations, lightheadedness. Denies coughing, wheezing or shortness of breath. Denies abdominal pain, nausea, diarrhea or constipation. No new areas of joint pain. Denies new areas of numbness or weakness. Denies new anxiety or depression issues. No new skin problems. Rehabilitation:   PT:  Restrictions/Precautions: Fall Risk, Modified Diet, Swallowing - Thickened Liquids, General Precautions  Implants present? : (L TKA)   Transfers  Sit to Stand: Modified independent  Stand to sit: Modified independent  Bed to Chair: Modified independent  Stand Pivot Transfers: Modified independent  Squat Pivot Transfers: Maximum Assistance(to left , no AD , pt sequences correctly after pre instructi)  Comment: patient needs extra time for stability. demonstrates posterior weight shift. demonstrates fair control to descend to chair. Ambulation 1  Surface: level tile  Device: Rolling Walker  Other Apparatus: (IV)  Assistance: Modified Independent  Quality of Gait: increased trunk flexion , UE weight bearing heavily on walker, hips beyond LEN of walker  Gait Deviations: Slow Zuly  Distance: 10 feet; 50 feet  Comments: posture safety concerns and increased potential fall risk. patient demonstrates task without intervention short distance for home management.           OT:  ADL  Feeding: Modified independent (utilizes spoon as compensatory to open tabbed container)  Grooming: Setup(assist to gather washcloth)  UE Bathing: Setup(assist for gathering supplies)  LE Bathing: Minimal assistance, Increased time to complete(assist for throughness at buttocks)  UE Dressing: Minimal assistance(assist to doff overhead nightgown. )  LE Dressing: Minimal assistance(minor assist to thread RLE,CGA for standing balance assist. )  Toileting: Minimal assistance(assist for hygiene following BM, CGA for clothing mgmt. )  Additional Comments: Pt completed bathing tasks at seated level for safety and energy conservation. SBA/CGA provided for tasks completed in standing(toileting completed AM and PM)         Balance  Sitting Balance: Supervision(reaching towards feet)  Standing Balance: Contact guard assistance(SBA/CGA)   Standing Balance  Time: ~2 min x 4, ~4 min  Activity: functional mobility, toileting tasks, LB dressing  Comment: cues for upright posture and body positioning in RW for increased safety; fair return. Functional Mobility  Functional - Mobility Device: Rolling Walker  Activity: To/from bathroom  Assist Level: Stand by assistance(close stand by assist. )  Functional Mobility Comments: VCs for safety, posture and proper tech     Bed mobility  Bridging: Modified independent   Rolling to Left: Modified independent  Rolling to Right: Modified independent  Supine to Sit: Modified independent  Sit to Supine: Modified independent  Scooting: Modified independent  Comment: needes extra time and modifications to preform bed mobility patient demonstrated on flat firm surface with out handrails; patient use of leg  and limitations by BLE weakness and arthritic knees; needs cueing with tactile cues to preform verbal instruction at times. does well with leg  on LLE and right side of bed.   Transfers  Stand Step Transfers: Stand by assistance(using R/W)  Stand Pivot Transfers: Contact guard assistance(GBs in bathroom)  Sit to stand: Contact guard assistance, Minimal assistance  Stand to sit: Contact guard assistance  Transfer Comments: cueing for proper tech   Toilet Transfers  Toilet - Technique: Ambulating  Equipment Used: Raised toilet seat without rails(grab bar and sink utilized for UE support prn. )  Toilet Transfer: Contact guard assistance  Toilet Transfers Comments: slow to move, no LOB noted, cueing for technique. Shower Transfers  Shower - Transfer From: Walker(rolling)  Shower - Transfer Type: To and From  Shower - Transfer To: Transfer tub bench  Shower - Technique: Ambulating  Shower Transfers: Contact Guard  Shower Transfers Comments: verbal and tactile cues for proper tech and safety over threshold using R/W  Wheelchair Bed Transfers  Wheelchair/Bed - Technique: Ambulating  Equipment Used: Bed, Wheelchair(rolling walker)  Level of Asssistance: 2 Person assistance, Moderate assistance  Wheelchair Transfers Comments: during PM session      ST:    Attention: Sustained during meal.  MAX cues needed during swallowing exercises.       Cognition: n/a     Verbal expression: General expression functional in conversation.       Dysphagia: Vital stim completed for 30 minutes at 3.5 mA while Pt. eating lunch. Pt. took trials of honey thick liquids via straw x6, puree solid x18, and minced and moist solid x7. Pt. Occasionally throat clearing during meal.  No additional overt s/s of aspiration observed with consistencies. Min amount lingual residue noted with solids, cleared with double swallow.       Following meal, swallowing exercises completed with VitalStim at 3.5 mA. Pt. completed simple tongue base strengthening exercises x2, 10 reps each.   Pt. Completed effortful swallow x5. MAX cues needed on all tasks.           Objective:  /61   Pulse 65   Temp 98.1 °F (36.7 °C) (Oral)   Resp 16   Ht 5' 7\" (1.702 m)   Wt 149 lb (67.6 kg)   SpO2 96%   BMI 23.34 kg/m²  I Body mass index is 23.34 kg/m². I   Wt Readings from Last 1 Encounters:   21 149 lb (67.6 kg)      Temp (24hrs), Av.1 °F (36.7 °C), Min:98.1 °F (36.7 °C), Max:98.1 °F (36.7 °C)         GEN: well developed, well nourished, no acute distress  HEENT: Normocephalic atraumatic, EOMI, mucous membranes pink and moist  CV: RRR, no murmurs, rubs or gallops  PULM: CTAB, no rales or rhonchi. Respirations WNL and unlabored  ABD: soft, NT, ND, +BS and equal  NEURO: A&O x3 currently knew year, president and location, follows commands, named objects. Sensation intact to light touch. MSK: 4/5 upper and lower extremities  EXTREMITIES: No calf tenderness to palpation bilaterally. No edema BLEs  SKIN: warm dry and intact with good turgor  PSYCH: appropriately interactive. Affect WNL. Good spirits        Medications   Scheduled Meds:   miconazole   Topical BID    lactobacillus  1 capsule Oral Daily with breakfast    enoxaparin  40 mg Subcutaneous Daily    aspirin  81 mg Oral Daily    atorvastatin  40 mg Oral Nightly    ferrous sulfate  325 mg Oral BID    levothyroxine  112 mcg Oral Daily    metoprolol tartrate  12.5 mg Oral BID    oxybutynin  5 mg Oral Daily    pantoprazole  40 mg Oral QAM AC    polyethylene glycol  17 g Oral Daily     Continuous Infusions:  PRN Meds:.ondansetron, melatonin, acetaminophen, bisacodyl, magnesium hydroxide     Diagnostics:     CBC:   No results for input(s): WBC, RBC, HGB, HCT, MCV, RDW, PLT in the last 72 hours. BMP:   Recent Labs     04/26/21  0631      K 4.2      CO2 24   BUN 20   CREATININE 1.12*     BNP: No results for input(s): BNP in the last 72 hours. PT/INR: No results for input(s): PROTIME, INR in the last 72 hours. APTT: No results for input(s): APTT in the last 72 hours. CARDIAC ENZYMES: No results for input(s): CKMB, CKMBINDEX, TROPONINT in the last 72 hours. Invalid input(s): CKTOTAL;3  FASTING LIPID PANEL:  Lab Results   Component Value Date    CHOL 121 04/02/2021    HDL 55 04/02/2021    TRIG 61 04/02/2021     LIVER PROFILE: No results for input(s): AST, ALT, ALB, BILIDIR, BILITOT, ALKPHOS in the last 72 hours. I/O (24Hr):     Intake/Output Summary (Last 24 hours) at 4/26/2021 1329  Last data filed at 4/26/2021 0614  Gross per 24 hour   Intake    Output 1200 ml   Net -1200 ml       Glu last 24 hour  No results for input(s): POCGLU in the last 72 hours. No results for input(s): CLARITYU, COLORU, PHUR, SPECGRAV, PROTEINU, RBCUA, BLOODU, BACTERIA, NITRU, WBCUA, LEUKOCYTESUR, YEAST, GLUCOSEU, BILIRUBINUR in the last 72 hours. Impression/Plan:   Impaired ADLs, gait, and mobility due to:     1. 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 Noted discharge plan 4/27, concern of patient safety and risk of fallsdiscussed with patientshe notes that family and friends checking on her frequentlyshe is adamant on discharge. Family is also aware of concerns as noted below. Also concern of if patient will continue with dysphagia diet and risk of aspiration which hopefully will improve with swallow study 4/26. Discussed with notes patient has someone there most of the time, they also provide her meals, etc.  2. CVAon 3 weeks ASA, Plavix with ASA to continue after 4/22.  On atorvastatin. 3. Dysphagia: On minced and moist solids and moderately-thick liquids. SLP treating and including vital stim. Will not start free water protocol due to having a lot of food residue in her mouth after each meal; additionally, cognitive impairment will likely make it difficult for her to understand when she can have free water and when she cannot. SLP considering repeat MBS 4/26.,  Await results  4. Dehydration/hyponatremia:  Resolved.  IM treated with IV fluid until 4/12 and serial lab tests - improved Na and creatinine. Stable  5. HTN:  amlodipine - discontinued 4/22 by IM, metoprolol tartrate. Follow up Dr. Abel Banks in [place] [id number] in 4 weeks for stress test.   6. Orthostasis: Ensure she wears ARSH hose and abdominal binder when out of bed. Encourage hydration. Amlodipine discontinued as above. Internal medicine following  7. OA/L rotator cuff injury:  Stable. Will monitor  8. CKD:  Stable. Will monitor BMP  9. Hypothyroidism: On levothyroxine  10.  Anemia: On ferrous

## 2021-04-26 NOTE — PROGRESS NOTES
memory;Decreased recall of recent events  Following Commands: Follows multistep commands with repetition  Safety Judgement: Decreased awareness of need for assistance  Awareness of Errors: Assistance required to identify errors made  Insights: Decreased awareness of deficits  Sequencing and Organization: Assistance required to implement solutions;Assistance required to generate solutions;Assistance required to identify errors made  Perception  Overall Perceptual Status: Impaired  Initiation: Cues to initiate tasks  Balance  Sitting Balance: Supervision  Standing Balance: Contact guard assistance(CGA/SBA)  Transfers  Stand Step Transfers: Stand by assistance(R/W)  Sit to stand: Contact guard assistance;Minimal assistance  Stand to sit: Contact guard assistance(CGA/SBA)  Transfer Comments: cuing for proper tech  Standing Balance  Time: AM: 1-2 min x3; PM: 1 min  Activity: AM: functional mobility, toilet transfer/toileting tasks; PM: functional mobility in room  Comment: cues for upright posture and body positioning in RW for increased safety; fair return  Functional Mobility  Functional - Mobility Device: Rolling Walker  Activity: To/from bathroom; Other  Assist Level: Stand by assistance(close SBA)  Functional Mobility Comments: VCs for safety, posture and proper tech  Toilet Transfers  Toilet - Technique: Ambulating(R/W)  Equipment Used: Raised toilet seat without rails(GB and sink utilized for transfers)  Toilet Transfer: Contact guard assistance  Toilet Transfers Comments: slow to move, no LOB noted, cueing for technique.             Additional Activities: PM: pt educated on UE HEP to increase/maintain strength in BUE's, handout provided, pt demo'd good understanding, completing 5 reps per ex's, remaining handouts left with pt to be addressed: sponge HEP and fall prevention/home safety (session ended early due to transport entering needing to take pt to swallow study)                 ADL  Feeding: Modified independent   Toileting: Minimal assistance(assist for hygiene after BM, CGA for clothing mgmt)  Additional Comments: declines ADL tasks this date          Assessment  Performance deficits / Impairments: Decreased functional mobility ; Decreased strength;Decreased endurance;Decreased balance;Decreased high-level IADLs;Decreased posture;Decreased ADL status; Decreased ROM; Decreased safe awareness;Decreased cognition;Decreased fine motor control;Decreased coordination  Prognosis: Good  Discharge Recommendations: Home with assist PRN;Home with Home health OT  Activity Tolerance: Patient limited by fatigue;Patient Tolerated treatment well  Activity Tolerance: fatigued, rest breaks req, increased time req  Safety Devices in place: Yes  Type of devices: Left in chair;Nurse notified; Chair alarm in place;Call light within reach(recliner)  Equipment Recommendations  Equipment Needed: Yes  Other: foot funnel, family reports pt having DME/AE at home       Patient Education: activity promotion, ADL tasks, transfer safety, HEP, functional use of LUE/promotion   Patient Goals   Patient goals : To return home with family support  Learner:patient  Method: demonstration, explanation and handout       Outcome: needs reinforcement        Plan  Plan  Times per week: 5-7  Times per day: Twice a day  Current Treatment Recommendations: Strengthening, ROM, Safety Education & Training, Positioning, Balance Training, Patient/Caregiver Education & Training, Self-Care / ADL, Functional Mobility Training, Endurance Training, Neuromuscular Re-education, Wheelchair Mobility Training, Cognitive Reorientation, Equipment Evaluation, Education, & procurement, Home Management Training, Cognitive/Perceptual Training  Patient Goals   Patient goals :  To return home with family support  Short term goals  Time Frame for Short term goals: 7 to 10 days  Short term goal 1: Pt will perform upper body bathing/dressing with stand by assist.  Short term goal 2: Pt

## 2021-04-26 NOTE — PROCEDURES
INSTRUMENTAL SWALLOW REPORT  MODIFIED BARIUM SWALLOW    NAME: Sherri Lyons   : 1932  MRN: [id number]       Date of Eval: 2021              Referring Diagnosis(es):  dysphagia    Past Medical History:  has a past medical history of Anemia, Hypertension, Hypothyroidism, Osteoarthritis, Other long term (current) drug therapy, and Renal insufficiency. Past Surgical History:  has no past surgical history on file. Date of Prior Study:   Type of Study: Repeat MBS  Results of Prior Study: Oral Phase: premature vallecular spillage of all consistencies. Prolonged mastication of soft solids. Pharyngeal: Nectar thick liquids:  trace to deep penetration. Honey thick,  pudding and soft solids: functional.   Dry solids and thin liquids not assessed    Recent CXR/CT of Chest: Date n/a    Patient Complaints/Reason for Referral:  Sherri Lyons was referred for a MBS to assess the efficiency of his/her swallow function, assess for aspiration, and to make recommendations regarding safe dietary consistencies, effective compensatory strategies, and safe eating environment. Onset of problem:    pt. Has been receiving dysphagia therapy with use of NMES. Max cues and encouragement are given to pt. Throughout tx. Behavior/Cognition/Vision/Hearing:  Behavior/Cognition: Alert; Cooperative;Pleasant mood; Requires cueing  Vision: Impaired  Vision Exceptions: Wears glasses at all times  Hearing: Exceptions to [facility]  Hearing Exceptions: Hard of hearing/hearing concerns;Bilateral hearing aid    Impressions:     Patient Position: Lateral     Consistencies Administered: Dysphagia Soft and Bite-Sized (Dysphagia III); Honey teaspoon;Nectar cup;Pudding teaspoon              Oral Phase: premature vallecular spillage of all consistencies. Prolonged mastication of soft solids. Pharyngeal: Nectar thick liquids:  trace to deep penetration.  Honey thick,  pudding and soft solids: functional.   Dry solids and thin liquids not assessed         Dysphagia Outcome Severity Scale: Level 3: Moderate dysphagia- Total assisstance, supervision or strategies. Two or more diet consistencies restricted  Penetration-Aspiration Scale (PAS): 3 - Material enters the airway, remains above the vocal folds, and is not ejected from airway    Recommended Diet:  Solid consistency: Dysphagia Minced and Moist (Dysphagia II)  Liquid consistency: Moderately Thick (Honey)            Safe Swallow Protocol:     Compensatory Swallowing Strategies: Upright as possible for all oral intake;Small bites/sips; Check for pocketing of food on the Left;Assist feed              Recommendations/Treatment  Requires SLP Intervention: Yes                  Recommended Exercises:    Therapeutic Interventions: Oral motor exercises; Tongue base strengthening;Vital Stim/NMES         Education: Results and recommendations were reviewed with pt.  following this exam.      Patient Education Response: Verbalizes understanding;Needs reinforcement           Goals:    Long Term:    Diet at least restrictive consistency           Short Term:     Goal 1: Increase oral tongue and tongue base strength to improve swallow function while receiving NMES                          Oral Preparation / Oral Phase  Oral Phase: Impaired        Pharyngeal Phase  Pharyngeal Phase: Impaired      Esophageal Phase  Esophageal Screen: WNL        Pain   Patient Currently in Pain: Denies        Therapy Time:   Individual Concurrent Group Co-treatment   Time In 1425         Time Out 1445         Minutes [address] [place] [address] M. A.CCC/SLP     4/26/2021, 3:07 PM

## 2021-04-26 NOTE — PATIENT CARE CONFERENCE
assistance from [facility] and private care providers. Family will assist with transportation. Pre-Admission Status:  Lives With: Alone  Type of Home: House  Home Layout: Able to Live on Main level with bedroom/bathroom, Two level, Performs ADL's on one level  Home Access: Ramped entrance  Bathroom Shower/Tub: Walk-in shower, Shower chair with back, Doors  Bathroom Toilet: Handicap height  Bathroom Equipment: Grab bars in shower, Grab bars around toilet, Hand-held shower, 3-in-1 commode, Shower chair(bedside commode next to bed)  Bathroom Accessibility: [address], 4 wheeled walker, Lift chair, Rolling walker, Alert [employer], Reacher, Sock aid  Receives Help From: Family, Personal care attendant, Friend(s)  ADL Assistance: Needs assistance(SBA showering; modified IND dressing/toileting)  Bath: Stand by assistance  Dressing: Modified independent  Grooming: Modified independent   Feeding: Modified independent   Toileting: Needs assistance(assist for personal hygiene with bowel movement)  Homemaking Assistance: Needs assistance(Pt's friends and family complete all IADL tasks.)  Homemaking Responsibilities: Yes  Meal Prep Responsibility: Secondary(light meal prep, microwave meals)  Ambulation Assistance: Independent(using grab bars or RW or 4WW.)  Transfer Assistance: Independent  Active : No  Patient's  Info: Family  Mode of Transportation: Car  Occupation: Retired  Leisure & Hobbies: put together puzzles, reading  IADL Comments: Sleeps in a regular flat bed. Patient's neighbor/friend Mirna Memory checks on her at night. Logan Stafford and Rae help during the day time. Pt is surrounded by family, friends, and helpful neighbors. Patient's son installed grab bars/rails in each room for her. Patient's support system provide assist for all laundry, meals, dishes, shopping and cleaning.   Additional Comments: Patient's daughter present during OT evaluation to provide insight into prior level of term goals : By LOS  Long term goal 1: Pt able to perform bed mobility mod-I  Long term goal 2: Pt able to perform transfers from varies surfaces at mod-I /supervsion level. Long term goal 3: Pt able to ambulate with appropriate device distance of 80 ft, mod-I/supervsion level. Long term goal 4: Pt able to perform step curb with assisitive device at min A  Long term goal 5: Pt able to go up and down 5 steps with 2 rails at min A to improve LE strength. Long term goal 6: Improve PASS score to atleast 25//36 improve overall fucntion  Long term goal 7: Improve Tinetti balance score to 20 or more/28 to reduce fall risk. Long term goal 8: Pt able to propel w/c on level surfaces 150 ft, with set up/supervsion. OT:Long term goals  Time Frame for Long term goals : By discharge  Long term goal 1: Pt will perform bathing during shower with stand by assist and Good safety. Long term goal 2: Pt will perform dressing and toileting tasks with modified independence and Good safety. Long term goal 3: Pt will stand for 8+ minutes with 1-2 UE support, modified independence, no loss of balance while engaging in functional activity of choice. Long term goal 4: Pt will perform functional transfers and mobility with modified independence, least restrictive mobility device, and Good safety. Long term goal 5: Pt will verbalize/demonstrate Good understanding of Fall Prevention Strategies for increased independence with self-care and mobility. Long term goals 6: 9 hole peg and box and blocks to be assessed for LUE as appropriate  ST: diet at least restrictive consistency, supervision level memory and problem solving. Team Members Present at Conference:  :  Wilman Hidalgo  Occupational Therapist: Brit Simmons OT   [address] PT  Speech Therapist: Arslan LOOMISCCC/SLP  Nurse: Slick Frost RN   Dietary/Nutrition: Reuben Bethea RD LD.     Pastoral Care: Jonah Riedel [facility]  CMG: Yaquelin Bell RN    I approve the established interdisciplinary plan of care as documented within the medical record of Yo Umana. Noel Morgan.  Kyra Davis MD

## 2021-04-26 NOTE — PLAN OF CARE
Problem: Skin Integrity:  Goal: Will show no infection signs and symptoms  Description: Will show no infection signs and symptoms  Outcome: Ongoing     Problem: Injury - Risk of, Physical Injury:  Goal: Absence of physical injury  Description: Absence of physical injury  Outcome: Ongoing     Problem: Pain:  Goal: Pain level will decrease  Description: Pain level will decrease  Outcome: Ongoing

## 2021-04-27 VITALS
BODY MASS INDEX: 23.39 KG/M2 | HEART RATE: 85 BPM | HEIGHT: 67 IN | WEIGHT: 149 LBS | DIASTOLIC BLOOD PRESSURE: 63 MMHG | RESPIRATION RATE: 16 BRPM | SYSTOLIC BLOOD PRESSURE: 161 MMHG | OXYGEN SATURATION: 97 % | TEMPERATURE: 98.4 F

## 2021-04-27 PROCEDURE — 99238 HOSP IP/OBS DSCHRG MGMT 30/<: CPT | Performed by: PHYSICAL MEDICINE & REHABILITATION

## 2021-04-27 PROCEDURE — 92526 ORAL FUNCTION THERAPY: CPT

## 2021-04-27 PROCEDURE — 6360000002 HC RX W HCPCS: Performed by: STUDENT IN AN ORGANIZED HEALTH CARE EDUCATION/TRAINING PROGRAM

## 2021-04-27 PROCEDURE — 6370000000 HC RX 637 (ALT 250 FOR IP): Performed by: PHYSICAL MEDICINE & REHABILITATION

## 2021-04-27 PROCEDURE — 97535 SELF CARE MNGMENT TRAINING: CPT

## 2021-04-27 PROCEDURE — 6370000000 HC RX 637 (ALT 250 FOR IP): Performed by: STUDENT IN AN ORGANIZED HEALTH CARE EDUCATION/TRAINING PROGRAM

## 2021-04-27 PROCEDURE — 97110 THERAPEUTIC EXERCISES: CPT

## 2021-04-27 PROCEDURE — 97116 GAIT TRAINING THERAPY: CPT

## 2021-04-27 RX ADMIN — PANTOPRAZOLE SODIUM 40 MG: 40 TABLET, DELAYED RELEASE ORAL at 05:30

## 2021-04-27 RX ADMIN — ANTI-FUNGAL POWDER MICONAZOLE NITRATE TALC FREE: 1.42 POWDER TOPICAL at 07:24

## 2021-04-27 RX ADMIN — FERROUS SULFATE TAB 325 MG (65 MG ELEMENTAL FE) 325 MG: 325 (65 FE) TAB at 07:21

## 2021-04-27 RX ADMIN — OXYBUTYNIN CHLORIDE 5 MG: 5 TABLET, EXTENDED RELEASE ORAL at 07:27

## 2021-04-27 RX ADMIN — ENOXAPARIN SODIUM 40 MG: 40 INJECTION SUBCUTANEOUS at 07:22

## 2021-04-27 RX ADMIN — METOPROLOL TARTRATE 12.5 MG: 25 TABLET, FILM COATED ORAL at 07:22

## 2021-04-27 RX ADMIN — Medication 1 CAPSULE: at 07:23

## 2021-04-27 RX ADMIN — ASPIRIN 81 MG: 81 TABLET, COATED ORAL at 07:22

## 2021-04-27 RX ADMIN — LEVOTHYROXINE SODIUM 112 MCG: 112 TABLET ORAL at 05:30

## 2021-04-27 NOTE — PROGRESS NOTES
Speech Language Pathology  Speech Language Pathology  [facility]    Dysphagia/Cognitive Treatment Note    Date: 4/27/2021  Patients Name: Yanni Castro  MRN: [id number]  Diagnosis:   Patient Active Problem List   Diagnosis Code    Anemia D64.9    Arthritis M19.90    HTN (hypertension) I10    Hypothyroidism E03.9    Renal insufficiency N28.9    Other long term (current) drug therapy Z79.899    Anemia due to stage 3 chronic kidney disease N18.30, D63.1    Chronic UTI N39.0    Mixed hyperlipidemia E78.2    Gastroesophageal reflux disease without esophagitis K21.9    S/P revision of total knee, 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    Muscular weakness M62.81    Other instability, left knee M25.362    Primary osteoarthritis of both hips M16.0    Primary osteoarthritis of right knee M17.11    Cerebrovascular accident (CVA) ([address] I63.9    Received intravenous tissue plasminogen activator (tPA) in emergency department Z92.82    Acute left hemiparesis ([address] G81.94    Chest pain in adult R07.9    Acute CVA (cerebrovascular accident) ([address] I63.9    Nausea R11.0    Malaise and fatigue R53.81, R53.83       Pain: none reported    Cognitive Treatment    Treatment time: [id number]    Subjective: [x] Alert [x] Cooperative     [] Confused     [] Agitated    [] Lethargic      Objective/Assessment:  Attention:   Occasional redirection needed. Verbal expression: General expression functional in conversation. Dysphagia: Vital stim completed for 23 minutes at 2.5 mA. Pt. took trials of honey thick liquids via straw x10. Pt. completed simple tongue base strengthening exercises x5, 10 reps each. Pt. Completed elan maneuver x10. Other: No family present. Pt. [employer]. Re: honey thick liquids (no ice in drinks) and minced and moist diet at home. Pt. Verbalized understanding. ST to continue for dysphagia via homecare.      Plan:  [x] Continue ST services    [] Discharge from ST:      Discharge recommendations: [] Inpatient Rehab   [] [place]   [] Outpatient Therapy  [] Follow up at trauma clinic   [] Other:       Treatment completed by: Norma Lopez A.CCC/SLP

## 2021-04-27 NOTE — PROGRESS NOTES
Physical Medicine & Rehabilitation  Progress Note    4/27/2021 12:33 PM     CC: Ambulatory and ADL dysfunction due to ischemic CVA left nondominant hemiparesis    Subjective:   No complaints. Feels well. Anxious for discharge. ROS:  Denies fevers, chills, sweats. No chest pain, palpitations, lightheadedness. Denies coughing, wheezing or shortness of breath. Denies abdominal pain, nausea, diarrhea or constipation. No new areas of joint pain. Denies new areas of numbness or weakness. Denies new anxiety or depression issues. No new skin problems. Rehabilitation:   PT:  Restrictions/Precautions: Fall Risk, Modified Diet, Swallowing - Thickened Liquids, General Precautions  Implants present? : (L TKA)   Transfers  Sit to Stand: Modified independent  Stand to sit: Modified independent  Bed to Chair: Modified independent  Stand Pivot Transfers: Modified independent  Squat Pivot Transfers: Maximum Assistance(to left , no AD , pt sequences correctly after pre instructi)  Comment: patient needs extra time for stability. demonstrates posterior weight shift. demonstrates fair control to descend to chair. Ambulation 1  Surface: level tile  Device: Rolling Walker  Other Apparatus: (IV)  Assistance: Modified Independent  Quality of Gait: increased trunk flexion , UE weight bearing heavily on walker, hips beyond LEN of walker  Gait Deviations: Slow Zuly  Distance: 10 feet; 50 feet  Comments: posture safety concerns and increased potential fall risk. patient demonstrates task without intervention short distance for home management.           OT:  ADL  Feeding: Modified independent   Grooming: Modified independent (seated)  UE Bathing: Setup  LE Bathing: Contact guard assistance(CGA standing, intermittent A for thoroughness with buttocks)  UE Dressing: Setup(A to hook bra, seated)  LE Dressing: Contact guard assistance(CGA standing, declines TEDs, shoe setup with foot funnel)  Toileting: Contact guard assistance(CGA with standing balance)  Additional Comments: declines ADL tasks this date         Balance  Sitting Balance: Modified independent   Standing Balance: Contact guard assistance(CGA/SBA, varies with task)   Standing Balance  Time: 1-2 min x5  Activity: functional mobility, toilet transfer/toileting tasks, ADL tasks  Comment: cues for upright posture and body positioning in RW for increased safety; fair return  Functional Mobility  Functional - Mobility Device: Rolling Walker  Activity: To/from bathroom, Other  Assist Level: Stand by assistance(close SBA)  Functional Mobility Comments: VCs for safety, posture and proper tech     Bed mobility  Bridging: Modified independent   Rolling to Left: Modified independent  Rolling to Right: Modified independent  Supine to Sit: Modified independent  Sit to Supine: Modified independent  Scooting: Modified independent  Comment: needes extra time and modifications to preform bed mobility patient demonstrated on flat firm surface with out handrails; patient use of leg  and limitations by BLE weakness and arthritic knees; needs cueing with tactile cues to preform verbal instruction at times. does well with leg  on LLE and right side of bed. Transfers  Stand Step Transfers: Stand by assistance(using R/W, cues for safety)  Stand Pivot Transfers: Contact guard assistance(GBs in bathroom)  Sit to stand: Contact guard assistance  Stand to sit: Contact guard assistance, Stand by assistance  Transfer Comments: cuing for proper tech   Toilet Transfers  Toilet - Technique: Ambulating(R/W)  Equipment Used: Raised toilet seat without rails(GB and sink utilized for support)  Toilet Transfer: Contact guard assistance  Toilet Transfers Comments: slow to move, no LOB noted, cueing for technique. Shower Transfers  Shower - Transfer From: Walker(rolling)  Shower - Transfer Type: To and From  Shower - Transfer To:  Transfer tub bench  Shower - Technique: Ambulating  Shower Transfers: Contact Guard  Shower Transfers Comments: verbal and tactile cues for proper tech and safety over threshold using R/W  Wheelchair Bed Transfers  Wheelchair/Bed - Technique: Ambulating  Equipment Used: Bed, Wheelchair(rolling walker)  Level of Asssistance: 2 Person assistance, Moderate assistance  Wheelchair Transfers Comments: during PM session      ST:    Attention:   Occasional redirection needed.       Verbal expression: General expression functional in conversation.       Dysphagia: Vital stim completed for 23 minutes at 2.5 mA. Pt. took trials of honey thick liquids via straw x10.         Pt. completed simple tongue base strengthening exercises x5, 10 reps each.   Pt. Completed elan maneuver x10.        Other: No family present. Pt. Evaline Jungling. Re: honey thick liquids (no ice in drinks) and minced and moist diet at home. Pt. Verbalized understanding. ST to continue for dysphagia via homecare.          Objective:  BP (!) 161/63   Pulse 85   Temp 98.4 °F (36.9 °C) (Oral)   Resp 18   Ht 5' 7\" (1.702 m)   Wt 149 lb (67.6 kg)   SpO2 97%   BMI 23.34 kg/m²  I Body mass index is 23.34 kg/m². I   Wt Readings from Last 1 Encounters:   21 149 lb (67.6 kg)      Temp (24hrs), Av.4 °F (36.9 °C), Min:98.4 °F (36.9 °C), Max:98.4 °F (36.9 °C)         GEN: well developed, well nourished, no acute distress  HEENT: Normocephalic atraumatic, EOMI, mucous membranes pink and moist  CV: RRR, no murmurs, rubs or gallops  PULM: CTAB, no rales or rhonchi. Respirations WNL and unlabored  ABD: soft, NT, ND, +BS and equal  NEURO: A&O x3 currently knew year, president and location, follows commands, named objects. No change, sensation intact to light touch. MSK: 4/5 upper and lower extremities  EXTREMITIES: No calf tenderness to palpation bilaterally. No edema BLEs  SKIN: warm dry and intact with good turgor  PSYCH: appropriately interactive. Affect WNL.   Good spirits        Medications   Scheduled Meds:   miconazole   Topical BID    lactobacillus  1 capsule Oral Daily with breakfast    enoxaparin  40 mg Subcutaneous Daily    aspirin  81 mg Oral Daily    atorvastatin  40 mg Oral Nightly    ferrous sulfate  325 mg Oral BID    levothyroxine  112 mcg Oral Daily    metoprolol tartrate  12.5 mg Oral BID    oxybutynin  5 mg Oral Daily    pantoprazole  40 mg Oral QAM AC    polyethylene glycol  17 g Oral Daily     Continuous Infusions:  PRN Meds:.ondansetron, melatonin, acetaminophen, bisacodyl, magnesium hydroxide     Diagnostics:     CBC:   No results for input(s): WBC, RBC, HGB, HCT, MCV, RDW, PLT in the last 72 hours. BMP:   Recent Labs     04/26/21  0631      K 4.2      CO2 24   BUN 20   CREATININE 1.12*     BNP: No results for input(s): BNP in the last 72 hours. PT/INR: No results for input(s): PROTIME, INR in the last 72 hours. APTT: No results for input(s): APTT in the last 72 hours. CARDIAC ENZYMES: No results for input(s): CKMB, CKMBINDEX, TROPONINT in the last 72 hours. Invalid input(s): CKTOTAL;3  FASTING LIPID PANEL:  Lab Results   Component Value Date    CHOL 121 04/02/2021    HDL 55 04/02/2021    TRIG 61 04/02/2021     LIVER PROFILE: No results for input(s): AST, ALT, ALB, BILIDIR, BILITOT, ALKPHOS in the last 72 hours. I/O (24Hr): Intake/Output Summary (Last 24 hours) at 4/27/2021 1233  Last data filed at 4/27/2021 0533  Gross per 24 hour   Intake 240 ml   Output 1250 ml   Net -1010 ml       Glu last 24 hour  No results for input(s): POCGLU in the last 72 hours. No results for input(s): CLARITYU, COLORU, PHUR, SPECGRAV, PROTEINU, RBCUA, BLOODU, BACTERIA, NITRU, WBCUA, LEUKOCYTESUR, YEAST, GLUCOSEU, BILIRUBINUR in the last 72 hours. Impression/Plan:   Impaired ADLs, gait, and mobility due to:     1. 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 Karyn Rhodes today 4/27, concern of patient safety and risk of fallsdiscussed with patientshe notes that family and friends checking on her frequentlyshe is adamant on discharge. Family is also aware of concerns as noted below. Swallow study done todaycontinues on dysphagia diet. Discussed with Mitchel Eduardo of  speechnotes patient and family has been educated on the dysphagia diet as well as aspiration precautions. Discussed with notes patient has someone there most of the time, they also provide her meals, etc.  2. CVAon 3 weeks ASA, Plavix with ASA to continue after 4/22.  On atorvastatin. 3. Dysphagia: On minced and moist solids and moderately-thick liquids. SLP treating and including vital stim. Will not start free water protocol due to having a lot of food residue in her mouth after each meal; additionally, cognitive impairment will likely make it difficult for her to understand when she can have free water and when she cannot. 4. Dehydration/hyponatremia:  Resolved.  IM treated with IV fluid until 4/12 and serial lab tests - improved Na and creatinine. Stable  5. HTN:  amlodipine - discontinued 4/22 by IM, metoprolol tartrate. Follow up Dr. Elissa Lucero in [facility] in 4 weeks for stress test.   6. Orthostasis: Ensure she wears ARSH hose and abdominal binder when out of bed. Encourage hydration. Amlodipine discontinued as above. Internal medicine followingappears to have improved  7. OA/L rotator cuff injury:  Stable. Will monitor  8. CKD:  Stable. Will monitor BMP  9. Hypothyroidism: On levothyroxine  10. Anemia: On ferrous sulfate. Monitoring Hb  11. Hx chronic cystitis:  On oxybutynin ER. Frequent incontinence is normal baseline for patient. Urine culture positive for E Coli. Keflex renally adjusted through 4/17. Leukocytosis resolved 4/11. Completed course of antibiotics  12. GERD:  On pantoprazole  13. Bowel Management: Miralax daily, senokot prn, milk of magnesia prn, dulcolax prn. On probiotic for loose stools which are improved. Last BM 4/26  14. DVT Prophylaxis: Lovenox SCD's while in bed and ARSH's   15. Internal medicine for medical management  16. Dr. Arline Chavis discharge plan with patient and family on 4/20. Team recommending 24-hour supervision for patient at home but family is unable to provide this level of supervision. Daughter states that family members and friends frequently check in on the patient throughout the day. After patient and family discussions with multiple team members, family would like patient to return home at discharge with home health services. Family aware of concerns of falls, diet, etc. , patient also adamant of home discharge  17. Discharge 4/27 if okay with internal medicine, follow-up with 1. PCP 2. Rehab[facility] 3.  Neurology 4. CardiologyDr. Kayden Lopez for stress test,  BMP in 1 week to PCP continue dysphagia diet, prescription sent to [address] [place] [address]. Keaton Scanlon MD       This note is created with the assistance of a speech recognition program.  While intending to generate a document that actually reflects the content of the visit, the document can still have some errors including those of syntax and sound a like substitutions which may escape proof reading.   In such instances, actual meaning can be extrapolated by contextual diversion

## 2021-04-27 NOTE — PROGRESS NOTES
[address]   ACUTE REHABILITATION OCCUPATIONAL THERAPY  DAILY NOTE    Date: 21  Patient Name: Ana Obrien      Room: 8415/0779-26    MRN: [id number]   : 1932  (80 y.o.)  Gender: female   Referring Practitioner: Valente Bueno MD  Diagnosis: Acute CVA  Additional Pertinent Hx:  Per ARU preadmission assessment:80year-old female admitted with acute left hemiparesis causing a fall of her chair. Noted acute left-sided weakness worse than previous date. .  She has chronic left lower extremity weakness from previous double knee replacements and left upper extremity weakness due to shoulder surgery per the daughterColin Ricardo She was life flighted to [place]. Patient on baby aspirin at home. Patient given TPA during TPA became hysterical repeat CT showed no change completed TPA. 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 Pt admitted to rehab unit on 21. Restrictions  Restrictions/Precautions: Fall Risk, Modified Diet, Swallowing - Thickened Liquids, General Precautions  Implants present? : (L TKA)  Required Braces or Orthoses?: No  Equipment Used: Bed, Wheelchair(rolling walker)    Subjective  Subjective: \"I just don't feel on top of things today\"  Comments: pt reports intermittent dizziness with initial ambulation, no LOB noted, agreeable to washing up sinkside this date  Patient Currently in Pain: Denies  Restrictions/Precautions: Fall Risk;Modified Diet;Swallowing - Thickened Liquids; General Precautions  Overall Orientation Status: Impaired  Orientation Level: Oriented to person;Disoriented to place; Disoriented to time;Oriented to situation  Patient Observation  Observations: CAROL slow for tasks  Pain Assessment  Pain Assessment: 0-10  Pain Level: 8  Pain Type: Acute pain  Pain Location: Arm  Pain Orientation: Left  Pain Descriptors: Sore    Objective  Cognition  Overall Cognitive Status: Impaired  Attention Span: Attends with cues to redirect  Memory: Decreased short term memory;Decreased recall of recent events  Following Commands: Follows multistep commands with repetition  Safety Judgement: Decreased awareness of need for assistance  Awareness of Errors: Assistance required to identify errors made  Insights: Decreased awareness of deficits  Sequencing and Organization: Assistance required to implement solutions;Assistance required to generate solutions;Assistance required to identify errors made  Perception  Overall Perceptual Status: Impaired  Initiation: Cues to initiate tasks  Balance  Sitting Balance: Modified independent   Standing Balance: Contact guard assistance(CGA/SBA, varies with task)  Bed mobility  Rolling to Left: Modified independent  Rolling to Right: Modified independent  Supine to Sit: Modified independent  Scooting: Modified independent  Transfers  Stand Step Transfers: Stand by assistance(using R/W, cues for safety)  Sit to stand: Contact guard assistance  Stand to sit: Contact guard assistance;Stand by assistance  Transfer Comments: cuing for proper tech  Standing Balance  Time: 1-2 min x5  Activity: functional mobility, toilet transfer/toileting tasks, ADL tasks  Comment: cues for upright posture and body positioning in RW for increased safety; fair return  Functional Mobility  Functional - Mobility Device: Rolling Walker  Activity: To/from bathroom; Other  Assist Level: Stand by assistance(close SBA)  Functional Mobility Comments: VCs for safety, posture and proper tech  Toilet Transfers  Toilet - Technique: Ambulating(R/W)  Equipment Used: Raised toilet seat without rails(GB and sink utilized for support)  Toilet Transfer: Contact guard assistance  Toilet Transfers Comments: slow to move, no LOB noted, cueing for technique.                               ADL  Feeding: Modified independent   Grooming: Modified independent (seated)  UE Bathing: Setup  LE Bathing: Contact guard assistance(CGA standing, intermittent A for thoroughness with buttocks)  UE Dressing: Setup(A to hook bra, seated)  LE Dressing: Contact guard assistance(CGA standing, declines TEDs, shoe setup with foot funnel)  Toileting: Contact guard assistance(CGA with standing balance)          Assessment  Performance deficits / Impairments: Decreased functional mobility ; Decreased strength;Decreased endurance;Decreased balance;Decreased high-level IADLs;Decreased posture;Decreased ADL status; Decreased ROM; Decreased safe awareness;Decreased cognition;Decreased fine motor control;Decreased coordination  Prognosis: Good  Discharge Recommendations: Home with assist PRN;Home with Home health OT  Activity Tolerance: Patient Tolerated treatment well  Activity Tolerance: fatigued, rest breaks req, increased time req  Safety Devices in place: Yes  Type of devices: Left in chair;Nurse notified; Chair alarm in place;Call light within reach(recliner)  Equipment Recommendations  Equipment Needed: Yes  Other: foot funnel, family reports pt having DME/AE at home       Patient Education: transfer safety, ADL tasks, increasing indep   Patient Goals   Patient goals : To return home with family support  Learner:patient  Method: demonstration and explanation       Outcome: needs reinforcement        Plan  Plan  Times per week: 5-7  Times per day: Twice a day  Current Treatment Recommendations: Strengthening, ROM, Safety Education & Training, Positioning, Balance Training, Patient/Caregiver Education & Training, Self-Care / ADL, Functional Mobility Training, Endurance Training, Neuromuscular Re-education, Wheelchair Mobility Training, Cognitive Reorientation, Equipment Evaluation, Education, & procurement, Home Management Training, Cognitive/Perceptual Training  Patient Goals   Patient goals :  To return home with family support  Short term goals  Time Frame for Short term goals: 7 to 10 days  Short term goal 1: Pt will perform upper body bathing/dressing with stand by assist.  Short term goal 2: Pt will perform lower body bathing/dressing and toileting tasks with Moderate assist and Good safety. Short term goal 3: Pt will perform functional functional transfers and mobility during self-care with Moderate assist, least restrictive mobility device, and Good safety. Short term goal 4: Pt will for 4+ minutes with 1-2 UE support and Minimal assist while engaging in functional activity of choice. Short term goal 5: Pt will actively participate in 30+ minutes of therapeutic exercise/functional activities to promote increased independence with self-care and mobility. Short term goal 6: Pt will verbalize/demonstrate Good understanding of assistive equipment/durable medical equipment/modified techniques for increased independence with self-care and mobility. Long term goals  Time Frame for Long term goals : By discharge  Long term goal 1: Pt will perform bathing during shower with stand by assist and Good safety. Long term goal 2: Pt will perform dressing and toileting tasks with modified independence and Good safety. Long term goal 3: Pt will stand for 8+ minutes with 1-2 UE support, modified independence, no loss of balance while engaging in functional activity of choice. Long term goal 4: Pt will perform functional transfers and mobility with modified independence, least restrictive mobility device, and Good safety. Long term goal 5: Pt will verbalize/demonstrate Good understanding of Fall Prevention Strategies for increased independence with self-care and mobility.   Long term goals 6: 9 hole peg and box and blocks to be assessed for LUE as appropriate        04/27/21 1501   OT Individual Minutes   Time In 0745   Time Out 0830   Minutes 45 (DC in PM)     Electronically signed by AYESHA Aguilar on 4/27/21 at 3:03 PM EDT

## 2021-04-27 NOTE — PLAN OF CARE
Problem: Skin Integrity:  Goal: Will show no infection signs and symptoms  Description: Will show no infection signs and symptoms  4/27/2021 0135 by Kimberly Varela RN  Outcome: Ongoing     Problem: Skin Integrity:  Goal: Absence of new skin breakdown  Description: Absence of new skin breakdown  Outcome: Ongoing     Problem: Confusion - Acute:  Goal: Absence of continued neurological deterioration signs and symptoms  Description: Absence of continued neurological deterioration signs and symptoms  Outcome: Ongoing     Problem: Confusion - Acute:  Goal: Mental status will be restored to baseline  Description: Mental status will be restored to baseline  Outcome: Ongoing     Problem: Discharge Planning:  Goal: Ability to perform activities of daily living will improve  Description: Ability to perform activities of daily living will improve  Outcome: Ongoing     Problem: Discharge Planning:  Goal: Participates in care planning  Description: Participates in care planning  Outcome: Ongoing     Problem: Injury - Risk of, Physical Injury:  Goal: Absence of physical injury  Description: Absence of physical injury  4/27/2021 0135 by Kimberly Varela RN  Outcome: Ongoing     Problem: Injury - Risk of, Physical Injury:  Goal: Will remain free from falls  Description: Will remain free from falls  Outcome: Ongoing     Problem: Mood - Altered:  Goal: Mood stable  Description: Mood stable  Outcome: Ongoing     Problem: Mood - Altered:  Goal: Absence of abusive behavior  Description: Absence of abusive behavior  Outcome: Ongoing     Problem: Mood - Altered:  Goal: Verbalizations of feeling emotionally comfortable while being cared for will increase  Description: Verbalizations of feeling emotionally comfortable while being cared for will increase  Outcome: Ongoing     Problem: Psychomotor Activity - Altered:  Goal: Absence of psychomotor disturbance signs and symptoms  Description: Absence of psychomotor disturbance signs and symptoms  Outcome: Ongoing     Problem: Sensory Perception - Impaired:  Goal: Demonstrations of improved sensory functioning will increase  Description: Demonstrations of improved sensory functioning will increase  Outcome: Ongoing     Problem: Sensory Perception - Impaired:  Goal: Decrease in sensory misperception frequency  Description: Decrease in sensory misperception frequency  Outcome: Ongoing     Problem: Sensory Perception - Impaired:  Goal: Able to refrain from responding to false sensory perceptions  Description: Able to refrain from responding to false sensory perceptions  Outcome: Ongoing     Problem: Sensory Perception - Impaired:  Goal: Demonstrates accurate environmental perceptions  Description: Demonstrates accurate environmental perceptions  Outcome: Ongoing     Problem: Sensory Perception - Impaired:  Goal: Able to distinguish between reality-based and nonreality-based thinking  Description: Able to distinguish between reality-based and nonreality-based thinking  Outcome: Ongoing     Problem: Sensory Perception - Impaired:  Goal: Able to interrupt nonreality-based thinking  Description: Able to interrupt nonreality-based thinking  Outcome: Ongoing     Problem: Sleep Pattern Disturbance:  Goal: Appears well-rested  Description: Appears well-rested  Outcome: Ongoing     Problem: Neurological  Goal: Maximum potential motor/sensory/cognitive function  Outcome: Ongoing     Problem: Nutrition  Goal: Optimal nutrition therapy  Description: Nutrition Problem #1: Inadequate oral intake  Intervention: Food and/or Nutrient Delivery: Continue Current Diet, Start Oral Nutrition Supplement  Nutritional Goals: intake more than 75%     Outcome: Ongoing     Problem: Neurological  Goal: Maximum potential motor/sensory/cognitive function  Outcome: Ongoing     Problem: Pain:  Goal: Pain level will decrease  Description: Pain level will decrease  4/27/2021 0135 by Raf Aviles RN  Outcome: Ongoing     Problem:

## 2021-04-27 NOTE — PROGRESS NOTES
home          Orientation     Cognition      Objective   Bed mobility  Bridging: Modified independent   Rolling to Left: Modified independent  Rolling to Right: Modified independent  Supine to Sit: Modified independent  Sit to Supine: Modified independent  Scooting: Modified independent  Transfers  Sit to Stand: Modified independent  Stand to sit: Modified independent  Bed to Chair: Modified independent  Stand Pivot Transfers: Modified independent  Comment: patient needs extra time for stability. demonstrates posterior weight shift. demonstrates fair control to descend to chair. Ambulation 1  Surface: level tile  Device: Rolling Walker  Assistance: Modified Independent  Distance: 10 feet; 50 feet  Comments: posture safety concerns and increased potential fall risk. patient demonstrates task without intervention short distance for home management. Other exercises  Other exercises 1: Supine, seated, standing review of written HEP                             OutComes Score  Balance Score: 5 (04/26/21 1000)  Gait Score: 7 (04/26/21 1000)     Tinetti Total Score: 12 (04/26/21 1000)                                                   Goals  Short term goals  Time Frame for Short term goals:  8 to 9 days  Short term goal 1: Pt to perform bed mobility at SBA with rail  Short term goal 2: Pt to demonstrate transfers at min A  Short term goal 3: Pt to amb 50 to 80 ft with appropriate device, min/mod A   Short term goal 4: Pt to improve L LE strength by 1/3 MMG to improve fucntion. Short term goal 5: Pt able to go up and down 5 steps with 2 UE support, mod A   Long term goals  Time Frame for Long term goals : By LOS  Long term goal 1: Pt able to perform bed mobility mod-I  Long term goal 2: Pt able to perform transfers from varies surfaces at mod-I /supervsion level. Long term goal 3: Pt able to ambulate with appropriate device distance of 80 ft, mod-I/supervsion level.    Long term goal 4: Pt able to perform step curb with assisitive device at min A  Long term goal 5: Pt able to go up and down 5 steps with 2 rails at min A to improve LE strength. Long term goal 6: Improve PASS score to atleast 25//36 improve overall fucntion  Long term goal 7: Improve Tinetti balance score to 20 or more/28 to reduce fall risk. Long term goal 8: Pt able to propel w/c on level surfaces 150 ft, with set up/supervsion. Patient Goals   Patient goals : I want to move my Left side to walk better    Plan    Plan  Times per week: 1.5 hr/day, 5 to 7 days/week  Plan weeks: 6  Current Treatment Recommendations: Strengthening, Neuromuscular Re-education, Home Exercise Program, Safety Education & Training, Patient/Caregiver Education & Training, Equipment Evaluation, Education, & procurement, Functional Mobility Training, Balance Training, Endurance Training, Transfer Training, Gait Training, ROM, Stair training  Safety Devices  Type of devices:  All fall risk precautions in place, Call light within reach, Gait belt, Left in chair, Chair alarm in place  Restraints  Initially in place: No     Therapy Time   Individual Concurrent Group Co-treatment   Time In 0920         Time Out 0945         Minutes [address] [facility], [facility]

## 2021-04-27 NOTE — DISCHARGE INSTR - ACTIVITY
Learning About the Diet for Swallowing Problems  What are swallowing problems? Difficulty swallowing is also called dysphagia (say \"kip-DTX-mxn-uh\"). It is most often a sign of a problem with your throat or esophagus. This is the tube that moves food and liquids from the back of your mouth to your stomach. Trouble swallowing can occur when the muscles and nerves that move food through the throat and esophagus are not working right. To help you swallow food, your doctor or speech therapist may advise a special dysphagia diet for you. Why is a special diet important? A dysphagia diet can help you handle some problems that can occur when it's hard to swallow food and liquids easily. These problems can include:  · Malnutrition. This means you aren't getting enough healthy foods to keep your body working well. · Dehydration. This means you aren't getting enough liquids to keep your body healthy. · Aspiration. This means that food, liquid, or saliva goes down your windpipe (trachea) into your lungs, instead of down your esophagus to your stomach. This can lead to aspiration pneumonia, which is an inflammation of the lungs. What is the dysphagia diet? In the dysphagia diet, you change the foods you eat and the liquids you drink to make it easier to swallow them. You may:  · Change the texture of the foods you eat. Your doctor or speech therapist may advise you to eat one of these types of foods:  ? Easy-to-chew foods. These are foods that are soft or tender. ? Soft and bite-sized foods. These are soft foods that have been cut into small pieces. ? Minced and moist foods. These are very soft, small, and moist lumps of food that need very little chewing. ? Pureed foods. These are foods that have been blended smooth. The puree must be thick enough to hold its shape on a spoon. These foods don't need to be chewed. ? Liquidized foods.  These are foods that have been blended smooth but are not as thick as pureed foods. You can drink them from a cup. · Thicken the liquids you drink. Your doctor or speech therapist will tell you what kind of thickener to use and how thick to make the liquids. ? Slightly thick liquids. These are thicker than water but can flow through a straw. ? Mildly thick liquids. These can be sipped from a cup but take some effort to drink with a straw. ? Moderately thick liquids. These liquids are thick enough to drink from a cup or from a spoon. But they are hard to drink through a wide straw. ? Extremely thick liquids. These are thick enough to hold their shape on a spoon. They are too thick to drink from a cup or suck through a straw. Your speech therapist will help you learn exercises to train your muscles to work together so you can swallow. You may also need to learn how to position your body or how to put food in your mouth to be able to swallow better. Follow-up care is a key part of your treatment and safety. Be sure to make and go to all appointments, and call your doctor if you are having problems. It's also a good idea to know your test results and keep a list of the medicines you take. Where can you learn more? Go to https://drumbipeStorage [employer] Corporation.Nearway. org and sign in to your [employer] account. Enter C917 in the [facility] box to learn more about \"Learning About the Diet for Swallowing Problems. \"     If you do not have an account, please click on the \"Sign Up Now\" link. Current as of: February 26, 2020               Content Version: 12.8  © 2006-2021 Healthwise, Incorporated. Care instructions adapted under license by [facility] ([facility]). If you have questions about a medical condition or this instruction, always ask your healthcare professional. [place] [address] any warranty or liability for your use of this information. Learning About the Diet for Swallowing Problems  What are swallowing problems?   Difficulty swallowing is also called dysphagia (say \"ifu-GWT-gwc-giselle\"). It is most often a sign of a problem with your throat or esophagus. This is the tube that moves food and liquids from the back of your mouth to your stomach. Trouble swallowing can occur when the muscles and nerves that move food through the throat and esophagus are not working right. To help you swallow food, your doctor or speech therapist may advise a special dysphagia diet for you. Why is a special diet important? A dysphagia diet can help you handle some problems that can occur when it's hard to swallow food and liquids easily. These problems can include:  · Malnutrition. This means you aren't getting enough healthy foods to keep your body working well. · Dehydration. This means you aren't getting enough liquids to keep your body healthy. · Aspiration. This means that food, liquid, or saliva goes down your windpipe (trachea) into your lungs, instead of down your esophagus to your stomach. This can lead to aspiration pneumonia, which is an inflammation of the lungs. What is the dysphagia diet? In the dysphagia diet, you change the foods you eat and the liquids you drink to make it easier to swallow them. You may:  · Change the texture of the foods you eat. Your doctor or speech therapist may advise you to eat one of these types of foods:  ? Easy-to-chew foods. These are foods that are soft or tender. ? Soft and bite-sized foods. These are soft foods that have been cut into small pieces. ? Minced and moist foods. These are very soft, small, and moist lumps of food that need very little chewing. ? Pureed foods. These are foods that have been blended smooth. The puree must be thick enough to hold its shape on a spoon. These foods don't need to be chewed. ? Liquidized foods. These are foods that have been blended smooth but are not as thick as pureed foods. You can drink them from a cup. · Thicken the liquids you drink.  Your doctor or speech therapist will tell you what kind of thickener to use and how thick to make the liquids. ? Slightly thick liquids. These are thicker than water but can flow through a straw. ? Mildly thick liquids. These can be sipped from a cup but take some effort to drink with a straw. ? Moderately thick liquids. These liquids are thick enough to drink from a cup or from a spoon. But they are hard to drink through a wide straw. ? Extremely thick liquids. These are thick enough to hold their shape on a spoon. They are too thick to drink from a cup or suck through a straw. Your speech therapist will help you learn exercises to train your muscles to work together so you can swallow. You may also need to learn how to position your body or how to put food in your mouth to be able to swallow better. Follow-up care is a key part of your treatment and safety. Be sure to make and go to all appointments, and call your doctor if you are having problems. It's also a good idea to know your test results and keep a list of the medicines you take. Where can you learn more? Go to https://NovalactmayraTruist.Mc4. org and sign in to your [employer] account. Enter C894 in the [employer] box to learn more about \"Learning About the Diet for Swallowing Problems. \"     If you do not have an account, please click on the \"Sign Up Now\" link. Current as of: February 26, 2020               Content Version: 12.8  © 7972-1966 [employer], Incorporated. Care instructions adapted under license by [facility] ([facility] [place]). If you have questions about a medical condition or this instruction, always ask your healthcare professional. [place] [address] any warranty or liability for your use of this information.

## 2021-04-28 ENCOUNTER — TELEPHONE (OUTPATIENT)
Dept: PHYSICAL MEDICINE AND REHAB | Age: 86
End: 2021-04-28

## 2021-04-28 NOTE — DISCHARGE SUMMARY
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  Dysphagia  Hypertension  Hyponatremia  Chronic kidney disease  Hypothyroidism  Anemia  Chronic cystitis  Reflux  Orthostasis      Patient Active Problem List   Diagnosis    Anemia    Arthritis    HTN (hypertension)    Hypothyroidism    Renal insufficiency    Other long term (current) drug therapy    Anemia due to stage 3 chronic kidney disease    Chronic UTI    Mixed hyperlipidemia    Gastroesophageal reflux disease without esophagitis    S/P revision of total knee, left    Muscular weakness    Other instability, left knee    Primary osteoarthritis of both hips    Primary osteoarthritis of right knee    Cerebrovascular accident (CVA) ([address]    Received intravenous tissue plasminogen activator (tPA) in emergency department    Acute left hemiparesis ([address]    Chest pain in adult    Acute CVA (cerebrovascular accident) ([address]    Nausea    Malaise and fatigue       Admission History:  Sherri Lyons  is a 80 y.o. right-handed     female admitted to the [facility] Rehabiliation unit on 2021. She was originally admitted to [facility] on 2021 with Cerebrovascular Accident     80year-old female admitted with acute left hemiparesis causing a fall of her chair.  Noted acute left-sided weakness worse than previous date. Alondra Solis has chronic left lower extremity weakness from previous double knee replacements and left upper extremity weakness due to shoulder surgery per the daughter. Alondra Solis was life flighted to [address] [facility] on baby aspirin at home.  Patient given TPA during TPA became hysterical repeat CT showed no change completed TPA     Neurologyfound to have right paramedian pontine acute ischemic infarct status post TPAon aspirin, Plavix for 3 weeks and long-term aspirin, Lipitor, echo and A1c pending, continue Lovenox     Cardiology consulted 4/5 for L sided chest pain and ruled out NSTEMI. For outpatient follow-up in 2 weeks for stress test.    Inpatient Rehabilitation Course:   Charleen Hemphill was admitted to inpatient rehabilitation on 4/7/2021. Rehab course:  Steady progress. Family training done. Discussed recommendation of 24/7 supervision. However patient and family felt they provide significant support they provide meals, etc.  Not able to provide complete 24/7 supervision however will wanted discharge to home. .  She continued on dysphagia diet repeat swallow study prior to discharge with no improvement. She will have to continue with dysphagia diet. Speech therapy notes reviewed with family. Monitor for dehydration. .  Blood pressure medications adjusted by internal medicine. Will need follow-up stress test.  Had orthostasisimproved with hydration and decrease blood pressure medications. Continue with chronic cystitis. Discharge status:  good    Discharge Dispostiion:   Home with [address]      The patient participated in an aggressive multidisciplinary inpatient rehabilitation program involving 3 hours per day, 5 days per week of rehabilitation. Patient benefited from inpatient rehab and was discharged in good and stable condition.     Consults:   Internal medicine      Significant Diagnostics:   CBC:   Lab Results   Component Value Date    WBC 7.6 04/22/2021    RBC 3.68 04/22/2021    RBC 3.51 02/25/2021    HGB 11.2 04/22/2021    HCT 33.7 04/22/2021    MCV 91.4 04/22/2021    MCH 30.4 04/22/2021    MCHC 33.2 04/22/2021    RDW 14.0 04/22/2021     04/22/2021    MPV 8.9 04/22/2021     BMP:    Lab Results   Component Value Date     04/26/2021    K 4.2 04/26/2021     04/26/2021    CO2 24 04/26/2021    BUN 20 04/26/2021    LABALBU 3.9 02/25/2021 CREATININE 1.12 04/26/2021    CREATININE 1.06 02/25/2021    CALCIUM 8.7 04/26/2021    GFRAA 56 04/26/2021    LABGLOM 46 04/26/2021    GLUCOSE 95 04/26/2021    GLUCOSE 96 02/25/2021       Discharge Functional Status:    Physical therapy:  Bed Mobility: Supine to Sit: Modified independent  Sit to Supine: Modified independent  Scooting: Modified independent  Transfers: Sit to Stand: Modified independent  Stand to sit: Modified independent  Bed to Chair: Modified independent  Squat Pivot Transfers: Maximum Assistance(to left , no AD , pt sequences correctly after pre instructi), Ambulation 1  Surface: level tile  Device: Rolling Walker  Other Apparatus: (IV)  Assistance: Modified Independent  Quality of Gait: increased trunk flexion , UE weight bearing heavily on walker, hips beyond LEN of walker  Gait Deviations: Slow Zuly  Distance: 10 feet; 50 feet  Comments: posture safety concerns and increased potential fall risk. patient demonstrates task without intervention short distance for home management. , Stairs  # Steps : 5  Stairs Height: 6\"  Rails: Bilateral  Curbs: 6\"  Device: Rolling walker(with curb step)  Assistance: Stand by assistance  Comment: slow steady pace cues needed for sequencing. Mobility:  , PT Equipment Recommendations  Equipment Needed: No, Assessment: Pt reports her left knee was unstable weak prior to surgery. Reports has arthritis in her R knee too. Reports she was not able to stand too long due to \"bad\" knees prior to her stroke. Pt required modAx2 to perform sit to stand with RW, unable to perform ambulation due to weakness and decreased balance. Pt is a high fall risk due to level of assistance required and balance deficits. Recommending continued skilled physical therapy to address balance deficits to return pt to prior level of independence.      Occupational therapy:  , Equipment Recommendations  Equipment Needed: Yes  Other: foot funnel, family reports pt having DME/AE at home, Assessment: Education provided to pt on AE, DME, and home safety     Speech therapy:  Comprehension: 5 - Patient understands basic needs (hungry/hot/pain)  Expression: 5 - Expresses basic ideas/needs only (hungry/hot/pain)  Social Interaction: 6 - Patient requires medication for mood and/or effect  Problem Solvin - Patient solves simple/routine tasks 75-90%+   Memory: 4 - Patient remembers 75-90%+ of the time    Patient Instructions:    follow-up with 1. PCP 2. Rehab[facility] 3.  Neurology 4.   CardiologyDr. Domenica Bella for stress test,  BMP in 1 week to PCP continue dysphagia diet    Medications, precautions and follow up reviewed with patient and family    Follow-up visits: See after visit summary from hospitalization    Discharge Medications:   Karis Brand   Home Medication Instructions COU:[id number]    Printed on:21 1820   Medication Information                      aspirin 81 MG EC tablet  Take 1 tablet by mouth daily             atorvastatin (LIPITOR) 40 MG tablet  Take 1 tablet by mouth nightly             Calcium Carb-Cholecalciferol (CALCIUM + D3) 600-200 MG-UNIT TABS tablet  Take 1 tablet by mouth Daily with lunch             docusate sodium (COLACE) 100 MG capsule  Take 1 capsule by mouth every other day             Ferrous Sulfate 324 MG TBEC  Take 1 tablet by mouth 2 times daily             lactobacillus (CULTURELLE) capsule  Take 1 capsule by mouth daily (with breakfast)             levothyroxine (SYNTHROID) 112 MCG tablet  Take 1 tablet by mouth Daily             metoprolol tartrate (LOPRESSOR) 25 MG tablet  Take 0.5 tablets by mouth 2 times daily             Multiple Vitamins-Minerals (THERAPEUTIC MULTIVITAMIN-MINERALS) tablet  Take 1 tablet by mouth daily             oxybutynin (DITROPAN-XL) 5 MG extended release tablet  Take 1 tablet by mouth daily             pantoprazole (PROTONIX) 40 MG tablet  Take 1 tablet by mouth every morning (before breakfast)             polyethylene glycol (GLYCOLAX) 17 g packet  Take 17 g by mouth daily                 sIsa oRmero MD

## 2021-04-28 NOTE — TELEPHONE ENCOUNTER
Patient was discharged from [facility] 04/27 and has an appt with you on 6/15/2021; 3:00 PM for Acute Rehab follow up    Her daughter Barbara Maldonado is her transportation and called to see if there is anyone that she can see for the follow up that is closer to home in [facility]? She is worried about the long drive to get mom to this appt.        Please return call to daughter Barbara Maldonado at [phone]  Thank you

## 2021-05-03 NOTE — TELEPHONE ENCOUNTER
Called Anamaria and moved appt to same day she has to be at [facility] so she only has to make one trip into town.

## 2021-05-06 PROBLEM — J18.9 PNEUMONIA: Status: ACTIVE | Noted: 2021-05-06

## 2021-05-06 PROBLEM — R79.89 ELEVATED TROPONIN: Status: ACTIVE | Noted: 2021-05-06

## 2021-05-06 PROBLEM — R77.8 ELEVATED TROPONIN: Status: ACTIVE | Noted: 2021-05-06

## 2021-05-06 PROBLEM — N10 ACUTE PYELONEPHRITIS: Status: ACTIVE | Noted: 2021-05-06

## 2021-05-10 PROBLEM — R79.89 ELEVATED TROPONIN: Status: RESOLVED | Noted: 2021-05-06 | Resolved: 2021-05-10

## 2021-05-10 PROBLEM — N10 ACUTE PYELONEPHRITIS: Status: RESOLVED | Noted: 2021-05-06 | Resolved: 2021-05-10

## 2021-05-10 PROBLEM — J18.9 PNEUMONIA: Status: RESOLVED | Noted: 2021-05-06 | Resolved: 2021-05-10

## 2021-05-10 PROBLEM — R77.8 ELEVATED TROPONIN: Status: RESOLVED | Noted: 2021-05-06 | Resolved: 2021-05-10

## 2021-05-10 PROBLEM — M16.0 PRIMARY OSTEOARTHRITIS OF BOTH HIPS: Status: RESOLVED | Noted: 2021-03-10 | Resolved: 2021-05-10

## 2021-05-10 PROBLEM — M62.81 MUSCULAR WEAKNESS: Status: RESOLVED | Noted: 2021-03-10 | Resolved: 2021-05-10

## 2021-05-11 PROBLEM — N32.3 BLADDER DIVERTICULUM: Status: ACTIVE | Noted: 2021-05-11

## 2021-05-11 PROBLEM — R33.9 INCOMPLETE BLADDER EMPTYING: Status: ACTIVE | Noted: 2021-05-11

## 2021-05-27 ENCOUNTER — OFFICE VISIT (OUTPATIENT)
Dept: PHYSICAL MEDICINE AND REHAB | Age: 86
End: 2021-05-27
Payer: MEDICARE

## 2021-05-27 ENCOUNTER — OFFICE VISIT (OUTPATIENT)
Dept: NEUROLOGY | Age: 86
End: 2021-05-27
Payer: MEDICARE

## 2021-05-27 VITALS
BODY MASS INDEX: 24.34 KG/M2 | WEIGHT: 155.4 LBS | DIASTOLIC BLOOD PRESSURE: 77 MMHG | SYSTOLIC BLOOD PRESSURE: 160 MMHG | HEART RATE: 60 BPM | TEMPERATURE: 97.3 F

## 2021-05-27 VITALS
HEIGHT: 67 IN | HEART RATE: 62 BPM | SYSTOLIC BLOOD PRESSURE: 151 MMHG | BODY MASS INDEX: 24.33 KG/M2 | WEIGHT: 155 LBS | DIASTOLIC BLOOD PRESSURE: 73 MMHG

## 2021-05-27 DIAGNOSIS — I63.9 CEREBROVASCULAR ACCIDENT (CVA), UNSPECIFIED MECHANISM (HCC): Primary | ICD-10-CM

## 2021-05-27 DIAGNOSIS — I63.9 ACUTE CVA (CEREBROVASCULAR ACCIDENT) (HCC): Primary | ICD-10-CM

## 2021-05-27 DIAGNOSIS — I10 ESSENTIAL HYPERTENSION: ICD-10-CM

## 2021-05-27 DIAGNOSIS — R41.89 COGNITIVE IMPAIRMENT: ICD-10-CM

## 2021-05-27 DIAGNOSIS — Z92.82 RECEIVED INTRAVENOUS TISSUE PLASMINOGEN ACTIVATOR (TPA) IN EMERGENCY DEPARTMENT: ICD-10-CM

## 2021-05-27 DIAGNOSIS — E78.2 MIXED HYPERLIPIDEMIA: ICD-10-CM

## 2021-05-27 DIAGNOSIS — Z74.09 IMPAIRED MOBILITY: ICD-10-CM

## 2021-05-27 DIAGNOSIS — G81.94 ACUTE LEFT HEMIPARESIS (HCC): ICD-10-CM

## 2021-05-27 DIAGNOSIS — R13.10 DYSPHAGIA, UNSPECIFIED TYPE: ICD-10-CM

## 2021-05-27 DIAGNOSIS — Z86.39 HISTORY OF HYPOTHYROIDISM: ICD-10-CM

## 2021-05-27 PROCEDURE — 1123F ACP DISCUSS/DSCN MKR DOCD: CPT | Performed by: STUDENT IN AN ORGANIZED HEALTH CARE EDUCATION/TRAINING PROGRAM

## 2021-05-27 PROCEDURE — 1090F PRES/ABSN URINE INCON ASSESS: CPT | Performed by: STUDENT IN AN ORGANIZED HEALTH CARE EDUCATION/TRAINING PROGRAM

## 2021-05-27 PROCEDURE — G8420 CALC BMI NORM PARAMETERS: HCPCS | Performed by: STUDENT IN AN ORGANIZED HEALTH CARE EDUCATION/TRAINING PROGRAM

## 2021-05-27 PROCEDURE — 4040F PNEUMOC VAC/ADMIN/RCVD: CPT | Performed by: STUDENT IN AN ORGANIZED HEALTH CARE EDUCATION/TRAINING PROGRAM

## 2021-05-27 PROCEDURE — 1036F TOBACCO NON-USER: CPT | Performed by: STUDENT IN AN ORGANIZED HEALTH CARE EDUCATION/TRAINING PROGRAM

## 2021-05-27 PROCEDURE — 1111F DSCHRG MED/CURRENT MED MERGE: CPT | Performed by: STUDENT IN AN ORGANIZED HEALTH CARE EDUCATION/TRAINING PROGRAM

## 2021-05-27 PROCEDURE — G8427 DOCREV CUR MEDS BY ELIG CLIN: HCPCS | Performed by: STUDENT IN AN ORGANIZED HEALTH CARE EDUCATION/TRAINING PROGRAM

## 2021-05-27 PROCEDURE — 99213 OFFICE O/P EST LOW 20 MIN: CPT | Performed by: STUDENT IN AN ORGANIZED HEALTH CARE EDUCATION/TRAINING PROGRAM

## 2021-05-27 PROCEDURE — 99215 OFFICE O/P EST HI 40 MIN: CPT | Performed by: STUDENT IN AN ORGANIZED HEALTH CARE EDUCATION/TRAINING PROGRAM

## 2021-05-27 PROCEDURE — G8428 CUR MEDS NOT DOCUMENT: HCPCS | Performed by: STUDENT IN AN ORGANIZED HEALTH CARE EDUCATION/TRAINING PROGRAM

## 2021-05-27 ASSESSMENT — ENCOUNTER SYMPTOMS
APNEA: 0
SHORTNESS OF BREATH: 0
SORE THROAT: 0
NAUSEA: 0
CHEST TIGHTNESS: 0
CONSTIPATION: 0
COUGH: 0
ABDOMINAL PAIN: 0
EYE DISCHARGE: 0
STRIDOR: 0
ABDOMINAL DISTENTION: 0
DIARRHEA: 0
WHEEZING: 0
EYE REDNESS: 0
VOMITING: 0

## 2021-05-27 NOTE — PROGRESS NOTES
Attending Physician Statement:    I have discussed the case of Sreedhar Michel, including pertinent history and exam findings with the resident. I have seen and examined the patient and the key elements of the encounter have been performed by me. I have reviewed medications, clinical laboratory, imaging and other diagnostic tests with the residents. I agree with the assessment, plan and orders as documented by the resident with changes made to the note as needed. History:  Ms. Sreedhar Michel is a 80 y.o. female was seen in the clinic for post hospital follow-up. Patient presented on 4/1/2021 with acute onset left-sided weakness. EMS was called by patient's daughter. Patient was noted to have left-sided facial droop, slurring of speech and left lower extremity weakness. Patient was evaluated by stroke team.  CT head did not show any acute finding  CTA head and neck did not show any large dislocation or stenosis. Patient received IV TPA. Repeat CT head did not show any hemorrhage. MRI brain showed acute right paramedian pontine infarct. 2D echo showed EF of 55% negative bubble study. Patient was treated with aspirin Plavix for 3 weeks followed by aspirin monotherapy. On 4/5/2021 patient was noted to have left-sided chest pain, cardiology was consulted. Recommend outpatient follow-up in 2 weeks along with a stress test.  Patient was discharged to inpatient rehab at [facility].      Since discharge from the hospital patient endorses doing great. Denies any new neurologic concerns, endorses improvement in her speech and left-sided weakness. Undergoing speech therapy along with PT and OT. Denies any other new neurologic concerns during this visit. She is compliant with aspirin and statin. Denies any side effects from that. Impression and Plan:     Acute right paramedian pontine infarct in April 2021. Patient presented with acute onset left-sided weakness slurring speech.   Received IV TPA.  Hypertension  Hypothyroidism  Anemia      LDL 54  HbA1c 5.3    Plan  -Patient received aspirin 81 mg and Plavix 75 mg for 21 days followed by aspirin monotherapy. Continue aspirin 81 mg  -Continue statins, goal LDL less than 70    -Continue PT/OT    - Stroke risk factors including hypertension, hyperlipidemia and Diabetes management as per PCP  - Education regarding secondary stroke prevention was provided. - Follow up in the clinic with the resident in 3 months. Can do virtual visit  - Instructed patient to call the clinic if symptoms worsen or develop any new symptoms. This note is created with the assistance of a speech recognition program.  While intending to generate a document that actually reflects the content of the visit, the document can still have some errors including those of syntax and sound a like substitutions which may escape proof reading. In such instances, actual meaning can be extrapolated by contextual diversion.     Davina Dunn MD 5/27/2021 4:00 PM    Neurology

## 2021-05-27 NOTE — PROGRESS NOTES
[address] [place] [address], [address] [id number]  Dept: [phone]  Dept Fax: [phone]    NEUROLOGY NEW PATIENT NOTE       PATIENT NAME: Jenelle Goss  PATIENT MRN: [id number]  PRIMARY CARE PHYSICIAN: Nalini Gonzalez, APRN - CNP      HPI:      Jenelle Goss is a 80 y.o. female as a hospital follow up for acute left sided weakness, left facial droop, slurring. CTH/CTA negative, received tPA. MRI showing acute right paramedian CVA. She was discharged to rehab and finished ARU course. Has been doing well since then with NIHSS 2 left facial droop and LUE. Accompanied by her daughter and son in law who helped corroborate the events. HgA1c 5.2%  LDL 54  ECHO 55 with neg bubble    NIHSS 2 left facial droop, LUE drift            PREVIOUS WORKUP:     Lab Results   Component Value Date    WBC 6.3 05/25/2021    HGB 11.0 (L) 05/25/2021    HCT 34.9 (L) 05/25/2021    MCV 95 05/25/2021     05/25/2021       Past Medical History:   Diagnosis Date    Anemia     Hypertension     Hypothyroidism     Osteoarthritis     Other long term (current) drug therapy     Renal insufficiency     Stroke ([facility] [address]         No past surgical history on file. Social History     Socioeconomic History    Marital status:       Spouse name: Not on file    Number of children: Not on file    Years of education: Not on file    Highest education level: Not on file   Occupational History    Not on file   Tobacco Use    Smoking status: Never Smoker    Smokeless tobacco: Never Used   Vaping Use    Vaping Use: Never used   Substance and Sexual Activity    Alcohol use: Never    Drug use: Never    Sexual activity: Not on file   Other Topics Concern    Not on file   Social History Narrative    Not on file     Social Determinants of Health     Financial Resource Strain:     Difficulty of Paying Living Expenses:    Food Insecurity:     Worried About Running Out of [place] in the Last Year:    [address] in the Last Year:    Transportation Needs:     Lack of Transportation (Medical):      Lack of Transportation (Non-Medical):    Physical Activity:     Days of Exercise per Week:     Minutes of Exercise per Session:    Stress:     Feeling of Stress :    Social Connections:     Frequency of Communication with Friends and Family:     Frequency of Social Gatherings with Friends and Family:     Attends Quaker Services:     Active Member of Clubs or Organizations:     Attends Club or Organization Meetings:     Marital Status:    Intimate Partner Violence:     Fear of Current or Ex-Partner:     Emotionally Abused:     Physically Abused:     Sexually Abused:         Current Outpatient Medications   Medication Sig Dispense Refill    amLODIPine (NORVASC) 5 MG tablet Take 5 mg by mouth daily Take 1 hour after morning pills      Ferrous Sulfate 324 MG TBEC Take 1 tablet by mouth 2 times daily 180 tablet 0    levothyroxine (SYNTHROID) 112 MCG tablet Take 1 tablet by mouth Daily 90 tablet 0    pantoprazole (PROTONIX) 40 MG tablet Take 1 tablet by mouth every morning (before breakfast) 30 tablet 0    estradiol (ESTRACE VAGINAL) 0.1 MG/GM vaginal cream Place a pea-sized amount vaginally nightly 3 nights per week thereafter 1 Tube 3    oxybutynin (DITROPAN-XL) 5 MG extended release tablet Take 5 mg by mouth daily      polyethylene glycol (GLYCOLAX) 17 GM/SCOOP powder Take 17 g by mouth daily      docusate sodium (COLACE) 100 MG capsule Take 100 mg by mouth daily      aspirin 81 MG EC tablet Take 1 tablet by mouth daily 30 tablet 3    lactobacillus (CULTURELLE) capsule Take 1 capsule by mouth daily (with breakfast) 30 capsule 0    atorvastatin (LIPITOR) 40 MG tablet Take 1 tablet by mouth nightly 30 tablet 3    metoprolol tartrate (LOPRESSOR) 25 MG tablet Take 0.5 tablets by mouth 2 times daily 60 tablet 3    Calcium Carb-Cholecalciferol (CALCIUM + D3) 600-200 MG-UNIT TABS tablet Take 1 tablet by mouth Daily with lunch      Multiple Vitamins-Minerals (THERAPEUTIC MULTIVITAMIN-MINERALS) tablet Take 1 tablet by mouth daily       No current facility-administered medications for this visit. Allergies   Allergen Reactions    Ciprofloxacin     Hydrocodone     Macrobid [Nitrofurantoin]     Ciprofloxacin Rash        REVIEW OF SYSTEMS:     Review of Systems   Constitutional: Negative for activity change, appetite change, chills, diaphoresis, fatigue and fever. HENT: Negative for sore throat. Eyes: Negative for discharge and redness. Respiratory: Negative for apnea, cough, chest tightness, shortness of breath, wheezing and stridor. Cardiovascular: Negative for chest pain and leg swelling. Gastrointestinal: Negative for abdominal distention, abdominal pain, constipation, diarrhea, nausea and vomiting. Genitourinary: Negative for difficulty urinating, dysuria, flank pain, frequency, hematuria and urgency. Musculoskeletal: Negative for arthralgias. Neurological: Negative for dizziness, tremors, seizures, syncope, facial asymmetry, speech difficulty, weakness, light-headedness, numbness and headaches. Hematological: Negative for adenopathy. VITALS  BP (!) 151/73   Pulse 62   Ht 5' 7\" (1.702 m)   Wt 155 lb (70.3 kg)   BMI 24.28 kg/m²      PHYSICAL EXAMINATION:     Physical Exam  Vitals and nursing note reviewed. Constitutional:       General: She is not in acute distress. Appearance: She is well-developed. She is not diaphoretic. HENT:      Head: Normocephalic and atraumatic. Right Ear: External ear normal.      Left Ear: External ear normal.   Eyes:      General: No scleral icterus. Right eye: No discharge. Left eye: No discharge. Conjunctiva/sclera: Conjunctivae normal.      Pupils: Pupils are equal, round, and reactive to light.    Pulmonary:      Effort: Pulmonary effort is normal.   Abdominal:      General: language, no hallucination or delusion   CRANIAL NERVES: II     -      Visual fields intact to confrontation  III,IV,VI -  PERR, EOMs full, no ptosis  V     -     Normal facial sensation   VII    -     Normal facial symmetry  VIII   -     Intact hearing   IX,X -     Symmetrical palate  XI    -     Symmetrical shoulder shrug  XII   -     Midline tongue, no atrophy    MOTOR FUNCTION: RUE: Significant for good strength of grade 5/5 in proximal and distal muscle groups   LUE: Significant for good strength of grade 5/5 in proximal and distal muscle groups   RLE: Significant for good strength of grade 5/5 in proximal and distal muscle groups   LLE: Significant for good strength of grade 5/5 in proximal and distal muscle groups      Normal bulk, normal tone and no involuntary movements, no tremor   SENSORY FUNCTION:  Normal touch, normal pin, normal vibration, normal proprioception   CEREBELLAR FUNCTION:  Intact fine motor control over upper limbs and lower limbs   REFLEX FUNCTION:  Symmetric in upper and lower extremities, no Babinski sign   STATION and GAIT  Normal gait and tandem station, normal tip toes and heel walking     IMAGING:       MRI BRAIN WO CONTRAST (4/2/2021)  Impression   Acute infarct within the rhonda, on the right-hand side. CTA HEAD NECK W CONTRAST (4/1/2021)  Impression   No evidence for acute intracranial hemorrhage, territorial infarction or   intracranial mass lesion.       Moderate chronic microangiopathic ischemic disease.       Moderate generalized volume loss.       Scattered areas of calcification throughout the head and neck vasculature. Otherwise unremarkable CTA of the head and neck.  No hemodynamically   significant stenosis.  No dissection.  No aneurysm. ASSESSMENT:      Case of a 80year old F hospital follow up for an acute right pontine stroke s/p tPA. PLAN:     1. Continue ASA 81 qD  2. Continue Lipitor 40 qHS  3. She completed acute rehab at [address] [place] [address]  4.  RTC 3 months Ms. Rina Cristobal received counseling on the following healthy behaviors: medical compliance, smoking cessation, blood pressure control, regular follow up with primary doctor.           Kaitlin Hammond MD, VICTOR M  PGY-3 Neurology   5/27/2021 at 5:27 PM

## 2021-05-27 NOTE — PROGRESS NOTES
[facility]  [facility] PHYSICAL MEDICINE & REHABILITATION  [address] [place] [address]  [place] [address]  Dept: [phone]  Dept Fax: [phone]    Outpatient Followup Note    Nola Bumpers is a 80y.o.-year-old female presenting for follow up of stroke. HPI:     Initial History:  She was initially admitted to [facility] on 4/1/21 with acute left hemiparesis. She was given tPA for stroke and imaging showed right paramedian pontine CVA. She was admitted to the [address] [place] [address] Unit from 4/7/21 to 4/27/21. She was on minced and moist solids and moderately-thick liquids at the time of discharge for dysphagia. Interval History:  She reports doing pretty well since discharge from acute rehab. She and her daughter state that she is finishing up with home therapies. She has been \"doing laps\" at home for exercise as well. She uses a 2-wheeled walker at home as well as a manual wheelchair for mobility. She uses a rollator in the community. She and daughter report that she is able to complete ADLs mostly independently but requires help with bathing/showering. She remains on minced and moist solids and thickened liquids at this time. She notes some ongoing dysarthria and memory impairment as well. Daughter notes that patient has been feeling more fatigued lately, which she believes is related to a recent change in blood pressure medications. Patient reports continued left lower limb weakness - she states that it \"goes out\" sometimes. She denies any falls at home. She notes that left upper limb weakness has improved. She denies any numbness/tingling in the limbs. She reports that sleep and mood have been good. She was admitted to [facility] from 5/6/21 to 5/10/21 due to acute pyelonephritis. She has been able to follow up with her PCP since discharge from acute rehab. She has follow up with neurology today as well.       Past Medical History:   Diagnosis Date    Anemia     Hypertension     Hypothyroidism     Osteoarthritis     Other long term (current) drug therapy     Renal insufficiency     Stroke ([facility] [address]       No past surgical history on file. No family history on file. Social History     Socioeconomic History    Marital status:      Spouse name: None    Number of children: None    Years of education: None    Highest education level: None   Occupational History    None   Tobacco Use    Smoking status: Never Smoker    Smokeless tobacco: Never Used   Vaping Use    Vaping Use: Never used   Substance and Sexual Activity    Alcohol use: Never    Drug use: Never    Sexual activity: None   Other Topics Concern    None   Social History Narrative    None     Social Determinants of Health     Financial Resource Strain:     Difficulty of Paying Living Expenses:    Food Insecurity:     Worried About Running Out of Food in the Last Year:     Ran Out of Food in the Last Year:    Transportation Needs:     Lack of Transportation (Medical):  Lack of Transportation (Non-Medical):    Physical Activity:     Days of Exercise per Week:     Minutes of Exercise per Session:    Stress:     Feeling of Stress :    Social Connections:     Frequency of Communication with Friends and Family:     Frequency of Social Gatherings with Friends and Family:     Attends Temple Services:     Active Member of Clubs or Organizations:     Attends Club or Organization Meetings:     Marital Status:    Intimate Partner Violence:     Fear of Current or Ex-Partner:     Emotionally Abused:     Physically Abused:     Sexually Abused:         Allergies   Allergen Reactions    Ciprofloxacin     Hydrocodone     Macrobid [Nitrofurantoin]     Ciprofloxacin Rash       Current Outpatient Medications   Medication Sig Dispense Refill    Ferrous Sulfate 324 MG TBEC Take 1 tablet by mouth 2 times daily 180 tablet 0    levothyroxine (SYNTHROID) 112 MCG tablet Take 1 tablet by mouth Daily 90 tablet 0    pantoprazole (PROTONIX) 40 MG tablet Take 1 tablet by mouth every morning (before breakfast) 30 tablet 0    aspirin 81 MG EC tablet Take 1 tablet by mouth daily 30 tablet 3    lactobacillus (CULTURELLE) capsule Take 1 capsule by mouth daily (with breakfast) 30 capsule 0    atorvastatin (LIPITOR) 40 MG tablet Take 1 tablet by mouth nightly 30 tablet 3    metoprolol tartrate (LOPRESSOR) 25 MG tablet Take 0.5 tablets by mouth 2 times daily 60 tablet 3    Calcium Carb-Cholecalciferol (CALCIUM + D3) 600-200 MG-UNIT TABS tablet Take 1 tablet by mouth Daily with lunch      Multiple Vitamins-Minerals (THERAPEUTIC MULTIVITAMIN-MINERALS) tablet Take 1 tablet by mouth daily      amLODIPine (NORVASC) 5 MG tablet Take 1 tablet by mouth daily 30 tablet 0    amoxicillin-clavulanate (AUGMENTIN) 875-125 MG per tablet Take 1 tablet by mouth every 12 hours for 12 days 24 tablet 0     No current facility-administered medications for this visit. Subjective:      Review of Systems   Constitutional: Positive for fatigue. Negative for activity change. HENT: Positive for trouble swallowing. Musculoskeletal: Positive for arthralgias. Neurological: Positive for speech difficulty and weakness. Negative for numbness. Psychiatric/Behavioral: Negative for dysphoric mood. +memory impairment     Objective:     Physical Exam  BP (!) 160/77   Pulse 60   Temp 97.3 °F (36.3 °C)   Wt 155 lb 6.4 oz (70.5 kg)   BMI 24.34 kg/m²     Constitutional: She appears well-developed and well-nourished. In no distress. HEENT: NCAT, EOMI. Hearing grossly intact. Pulmonary/Chest: Respirations WNL and unlabored. MSK:  Decreased AROM in the left shoulder and left hip (chronic). Otherwise functional ROM in all limbs. Strength 4+/5 in all limbs. Neurological: She is alert. Has some memory impairment. Speech fluent with dysarthria. Sensation to light touch intact.   Skin: Skin is warm dry and intact with good turgor. Psychiatric: She has a normal mood and affect. Her behavior is normal. Thought content normal.    Nursing note and vitals reviewed. Assessment:       Diagnosis Orders   1. Cerebrovascular accident (CVA), unspecified mechanism [facility])  External Referral To Physical Therapy    External Referral To Speech Therapy   2. Impaired mobility  External Referral To Physical Therapy   3. Dysphagia, unspecified type  External Referral To Speech Therapy   4. Cognitive impairment  External Referral To Speech Therapy        Plan:      - Placed referral for outpatient physical and speech therapies  - Continue home exercise program  - Continue modified diet for dysphagia  - Continue use of walker for assistance with ambulation  - Follow up with neurology as directed      Orders Placed This Encounter   Procedures    External Referral To Physical Therapy     Referral Priority:   Routine     Referral Type:   Eval and Treat     Referral Reason:   Specialty Services Required     Requested Specialty:   Physical Therapy     Number of Visits Requested:   1    External Referral To Speech Therapy     Referral Priority:   Routine     Referral Type:   Eval and Treat     Referral Reason:   Specialty Services Required     Requested Specialty:   Speech Pathology     Number of Visits Requested:   1       Return in about 3 months (around 8/27/2021).        Electronically signed by Morelia Mejias MD on 6/8/2021 at 10:13 PM.

## 2021-06-01 PROBLEM — N12 PYELONEPHRITIS: Status: ACTIVE | Noted: 2021-06-01

## 2021-06-02 PROBLEM — R65.20 SEPSIS WITH ACUTE RENAL FAILURE WITHOUT SEPTIC SHOCK (HCC): Status: ACTIVE | Noted: 2021-06-02

## 2021-06-02 PROBLEM — N17.9 SEPSIS WITH ACUTE RENAL FAILURE (HCC): Status: ACTIVE | Noted: 2021-06-02

## 2021-06-02 PROBLEM — A41.9 SEPSIS WITH ACUTE RENAL FAILURE (HCC): Status: ACTIVE | Noted: 2021-06-02

## 2021-06-02 PROBLEM — N17.9 SEPSIS WITH ACUTE RENAL FAILURE WITHOUT SEPTIC SHOCK (HCC): Status: ACTIVE | Noted: 2021-06-02

## 2021-06-02 PROBLEM — N18.9 ACUTE KIDNEY INJURY SUPERIMPOSED ON CKD (HCC): Status: ACTIVE | Noted: 2021-06-02

## 2021-06-02 PROBLEM — R65.20 SEPSIS WITH ACUTE RENAL FAILURE (HCC): Status: ACTIVE | Noted: 2021-06-02

## 2021-06-02 PROBLEM — A41.9 SEPSIS WITH ACUTE RENAL FAILURE WITHOUT SEPTIC SHOCK (HCC): Status: ACTIVE | Noted: 2021-06-02

## 2021-06-08 ASSESSMENT — ENCOUNTER SYMPTOMS: TROUBLE SWALLOWING: 1

## 2021-08-16 RX ORDER — ATORVASTATIN CALCIUM 40 MG/1
TABLET, FILM COATED ORAL
Qty: 30 TABLET | Refills: 0 | OUTPATIENT
Start: 2021-08-16

## 2021-08-20 ENCOUNTER — TELEPHONE (OUTPATIENT)
Dept: PHYSICAL MEDICINE AND REHAB | Age: 86
End: 2021-08-20

## 2021-08-20 NOTE — TELEPHONE ENCOUNTER
Pt dgtr called to [facility] follow-up appt with dr. Lauro Phelan stating that her mom is doing very well and since the live so far away. She would like to just cancel appt and see her dr. Ирина Barba her. She states that if she needs to see dr. Lauro Phelan, she will call us back to schedule appt.

## 2021-08-23 ENCOUNTER — TELEPHONE (OUTPATIENT)
Dept: UROLOGY | Age: 86
End: 2021-08-23

## 2021-08-23 RX ORDER — CEFDINIR 300 MG/1
300 CAPSULE ORAL 2 TIMES DAILY
Qty: 20 CAPSULE | Refills: 0 | Status: SHIPPED | OUTPATIENT
Start: 2021-08-23 | End: 2021-09-02

## 2021-08-23 NOTE — TELEPHONE ENCOUNTER
Urine culture from primary care reviewed. We did send in culture specific cefdinir. Take this antibiotic until gone. Take this with food and eat yogurt once per day to prevent GI upset. If you develop nausea, vomiting, or fevers call the office or go to the ER. If your urinary symptoms do not improve once completing the antibiotics call our office.

## 2022-12-06 PROBLEM — F33.0 MAJOR DEPRESSIVE DISORDER, RECURRENT, MILD (HCC): Status: ACTIVE | Noted: 2022-12-06

## 2023-02-06 ENCOUNTER — TELEPHONE (OUTPATIENT)
Dept: UROLOGY | Age: 88
End: 2023-02-06

## 2023-02-06 NOTE — TELEPHONE ENCOUNTER
[facility] from [facility]

## 2023-04-06 PROBLEM — R41.89 COGNITIVE IMPAIRMENT: Status: ACTIVE | Noted: 2023-04-06

## 2023-04-20 PROBLEM — G30.9 ALZHEIMER'S DISEASE, UNSPECIFIED (CODE) (HCC): Status: ACTIVE | Noted: 2023-04-20

## 2023-11-24 PROBLEM — J18.9 PNEUMONIA DUE TO ORGANISM: Status: ACTIVE | Noted: 2023-11-24

## 2023-11-24 PROBLEM — J98.01 BRONCHOSPASM: Status: ACTIVE | Noted: 2023-11-24

## 2023-11-29 PROBLEM — R55 SYNCOPE AND COLLAPSE: Status: ACTIVE | Noted: 2023-10-31

## 2023-11-29 PROBLEM — S10.93XA CONTUSION OF FACE, SCALP, AND NECK, INITIAL ENCOUNTER: Status: ACTIVE | Noted: 2023-09-14

## 2023-11-29 PROBLEM — S00.83XA CONTUSION OF FACE, SCALP, AND NECK, INITIAL ENCOUNTER: Status: ACTIVE | Noted: 2023-09-14

## 2023-11-29 PROBLEM — S00.03XA CONTUSION OF FACE, SCALP, AND NECK, INITIAL ENCOUNTER: Status: ACTIVE | Noted: 2023-09-14
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
## 2024-03-25 NOTE — PROGRESS NOTES
[address] [facility]    ACUTE REHABILITATION OCCUPATIONAL THERAPY  DAILY NOTE    Date: 21  Patient Name: Melissa Hickey      Room: 4058/5227-18    MRN: [id number]   : 1932  (80 y.o.)  Gender: female   Referring Practitioner: Pawan Barrett MD  Diagnosis: Acute CVA  Additional Pertinent Hx:  Per ARU preadmission assessment:80year-old female admitted with acute left hemiparesis causing a fall of her chair. Noted acute left-sided weakness worse than previous date. .  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 Patient on baby aspirin at home. Patient given TPA during TPA became hysterical repeat CT showed no change completed TPA. 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 Pt admitted to rehab unit on 21. Restrictions  Restrictions/Precautions: Fall Risk, Modified Diet, Swallowing - Thickened Liquids, General Precautions  Implants present? : (L TKA)  Required Braces or Orthoses?: No  Equipment Used: Bed, Wheelchair(rolling walker)    Subjective  Subjective: \"oh that felt so good! \"   Comments: pt states in regards to showering this date  Patient Currently in Pain: Denies  Restrictions/Precautions: Fall Risk;Modified Diet;Swallowing - Thickened Liquids; General Precautions     Patient Observation  Observations: [facility]  Pain Assessment  Pain Assessment: 0-10  Pain Level: 8  Pain Type: Acute pain  Pain Location: Arm  Pain Orientation: Left  Pain Descriptors: Sore    Objective  Cognition  Overall Cognitive Status: Impaired  Arousal/Alertness: Delayed responses to stimuli  Attention Span: Attends with cues to redirect  Memory: Decreased short term memory;Decreased recall of recent events  Following Commands:  Follows multistep commands x  Horizontal ABduction: x  Horizontal ADduction: x  Elbow Flexion: x  Elbow Extension: x                       ADL  Feeding: Setup  Grooming: Setup  UE Bathing: Setup  LE Bathing: Minimal assistance(A for perineal hygiene due to fecal matter on skin)  UE Dressing: Setup(A bra hooks, seated)  LE Dressing: Contact guard assistance(CGA/close SBA in standing, pt able to don socks/pants/brief with setup, foot funnel utilized to don shoes)  Toileting: Minimal assistance(A for hygiene with incontinence in brief)  Additional Comments: pt completed full shower this date, incontinent in shower, A for hygiene, increased time req with cues for tasks, CGA/close SBA using GBs for washing when standing, pt able to place foot funnel in shoe and don with cuing          Assessment  Performance deficits / Impairments: Decreased functional mobility ; Decreased strength;Decreased endurance;Decreased balance;Decreased high-level IADLs;Decreased posture;Decreased ADL status; Decreased ROM; Decreased safe awareness;Decreased cognition;Decreased fine motor control;Decreased coordination  Prognosis: Good  Discharge Recommendations: Home with assist PRN;Home with Home health OT  Activity Tolerance: Patient limited by fatigue;Patient Tolerated treatment well  Activity Tolerance: fatigued, rest breaks req, increased time req  Safety Devices in place: Yes  Type of devices: Nurse notified; Left in chair;Patient at risk for falls;Call light within reach; Chair alarm in place  Equipment Recommendations  Equipment Needed: (TBD)       Patient Education: transfer safety, ADL tasks, increasing indep, DC planning  Patient Goals   Patient goals :  To return home with family support  Learner:patient  Method: demonstration and explanation       Outcome: needs reinforcement        Plan  Plan  Times per week: 5-7  Times per day: Twice a day  Current Treatment Recommendations: Strengthening, ROM, Safety Education & Training, Positioning, Balance Training, Patient/Caregiver Education & Training, Self-Care / ADL, Functional Mobility Training, Endurance Training, Neuromuscular Re-education, Wheelchair Mobility Training, Cognitive Reorientation, Equipment Evaluation, Education, & procurement, Home Management Training, Cognitive/Perceptual Training  Patient Goals   Patient goals : To return home with family support  Short term goals  Time Frame for Short term goals: 7 to 10 days  Short term goal 1: Pt will perform upper body bathing/dressing with stand by assist.  Short term goal 2: Pt will perform lower body bathing/dressing and toileting tasks with Moderate assist and Good safety. Short term goal 3: Pt will perform functional functional transfers and mobility during self-care with Moderate assist, least restrictive mobility device, and Good safety. Short term goal 4: Pt will for 4+ minutes with 1-2 UE support and Minimal assist while engaging in functional activity of choice. Short term goal 5: Pt will actively participate in 30+ minutes of therapeutic exercise/functional activities to promote increased independence with self-care and mobility. Short term goal 6: Pt will verbalize/demonstrate Good understanding of assistive equipment/durable medical equipment/modified techniques for increased independence with self-care and mobility. Long term goals  Time Frame for Long term goals : By discharge  Long term goal 1: Pt will perform bathing during shower with stand by assist and Good safety. Long term goal 2: Pt will perform dressing and toileting tasks with modified independence and Good safety. Long term goal 3: Pt will stand for 8+ minutes with 1-2 UE support, modified independence, no loss of balance while engaging in functional activity of choice. Long term goal 4: Pt will perform functional transfers and mobility with modified independence, least restrictive mobility device, and Good safety.   Long term goal 5: Pt will verbalize/demonstrate Good understanding of Fall Prevention Strategies for increased independence with self-care and mobility.   Long term goals 6: 9 hole peg and box and blocks to be assessed for LUE as appropriate        04/23/21 1036 04/23/21 1607   OT Individual Minutes   Time In 1036 1554   Time Out 1129 1604   Minutes 53 10     Electronically signed by AYESHA Carter on 4/23/21 at 3:40 PM EDT 36.4

## 2024-07-31 PROBLEM — N39.0 ACUTE UTI: Status: ACTIVE | Noted: 2024-07-31

## 2024-08-01 PROBLEM — E87.6 HYPOKALEMIA: Status: ACTIVE | Noted: 2024-08-01

## 2024-08-03 PROBLEM — A49.9 ESBL (EXTENDED SPECTRUM BETA-LACTAMASE) PRODUCING BACTERIA INFECTION: Status: ACTIVE | Noted: 2024-08-03

## 2024-08-03 PROBLEM — N30.00 ACUTE CYSTITIS: Status: ACTIVE | Noted: 2024-08-03

## 2024-08-03 PROBLEM — Z16.12 ESBL (EXTENDED SPECTRUM BETA-LACTAMASE) PRODUCING BACTERIA INFECTION: Status: ACTIVE | Noted: 2024-08-03

## 2024-08-03 PROBLEM — R06.00 DYSPNEA: Status: ACTIVE | Noted: 2024-08-03

## 2024-08-03 PROBLEM — R78.81 BACTEREMIA: Status: ACTIVE | Noted: 2024-08-03

## 2024-08-04 PROBLEM — I48.91 NEW ONSET ATRIAL FIBRILLATION (HCC): Status: ACTIVE | Noted: 2024-08-04

## 2024-08-26 PROBLEM — N39.0 UTI (URINARY TRACT INFECTION): Status: ACTIVE | Noted: 2024-08-26

## 2024-08-27 PROBLEM — R53.1 GENERAL WEAKNESS: Status: ACTIVE | Noted: 2024-08-27

## 2024-08-27 PROBLEM — N39.0 URINARY TRACT INFECTION: Status: ACTIVE | Noted: 2024-08-27

## 2024-09-25 PROBLEM — N39.0 UTI (URINARY TRACT INFECTION): Status: RESOLVED | Noted: 2024-08-26 | Resolved: 2024-09-25

## 2024-09-26 PROBLEM — N39.0 URINARY TRACT INFECTION: Status: RESOLVED | Noted: 2024-08-27 | Resolved: 2024-09-26

## 2024-10-07 PROBLEM — I87.8 POOR VENOUS ACCESS: Status: ACTIVE | Noted: 2024-10-07

## 2024-12-16 PROBLEM — M79.671 PAIN IN BOTH FEET: Status: ACTIVE | Noted: 2024-12-16

## 2024-12-16 PROBLEM — M79.672 PAIN IN BOTH FEET: Status: ACTIVE | Noted: 2024-12-16

## 2024-12-16 PROBLEM — B35.1 DERMATOPHYTOSIS OF NAIL: Status: ACTIVE | Noted: 2024-12-16

## 2024-12-16 PROBLEM — G60.9 IDIOPATHIC PERIPHERAL NEUROPATHY: Status: ACTIVE | Noted: 2024-12-16

## 2024-12-16 PROBLEM — S90.222A SUBUNGUAL HEMATOMA OF SECOND TOE OF LEFT FOOT: Status: ACTIVE | Noted: 2024-12-16

## 2025-02-19 PROBLEM — R53.1 EPISODE OF GENERALIZED WEAKNESS: Status: ACTIVE | Noted: 2025-02-19

## 2025-04-07 PROBLEM — E87.5 HYPERKALEMIA: Status: ACTIVE | Noted: 2025-01-24

## 2025-04-07 PROBLEM — N25.0 RENAL OSTEODYSTROPHY: Status: ACTIVE | Noted: 2025-01-24

## 2025-04-07 PROBLEM — I12.9 HYPERTENSIVE CHRONIC KIDNEY DISEASE WITH STAGE 1 THROUGH STAGE 4 CHRONIC KIDNEY DISEASE, OR UNSPECIFIED CHRONIC KIDNEY DISEASE: Status: ACTIVE | Noted: 2025-01-24

## 2025-04-07 PROBLEM — I73.9 PERIPHERAL ARTERIAL DISEASE: Status: ACTIVE | Noted: 2024-12-19

## 2025-04-07 PROBLEM — N30.00 ACUTE CYSTITIS WITHOUT HEMATURIA: Status: ACTIVE | Noted: 2024-07-31

## 2025-04-07 PROBLEM — R42 DIZZINESS AND GIDDINESS: Status: ACTIVE | Noted: 2025-01-27

## 2025-08-13 PROBLEM — S82.131A CLOSED FRACTURE OF MEDIAL PLATEAU OF RIGHT TIBIA: Status: ACTIVE | Noted: 2025-08-13

## 2025-08-13 PROBLEM — S82.141A TIBIAL PLATEAU FRACTURE, RIGHT, CLOSED, INITIAL ENCOUNTER: Status: ACTIVE | Noted: 2025-08-13

## 2025-08-15 PROBLEM — A08.11 ENTERITIS DUE TO NOROVIRUS: Status: ACTIVE | Noted: 2025-08-15

## 2025-08-16 PROBLEM — R52 PAIN CRISIS: Status: ACTIVE | Noted: 2025-08-16

## 2025-08-19 PROBLEM — S10.93XA CONTUSION OF FACE, SCALP, AND NECK, INITIAL ENCOUNTER: Status: RESOLVED | Noted: 2023-09-14 | Resolved: 2025-08-19

## 2025-08-19 PROBLEM — S90.222A SUBUNGUAL HEMATOMA OF SECOND TOE OF LEFT FOOT: Status: RESOLVED | Noted: 2024-12-16 | Resolved: 2025-08-19

## 2025-08-19 PROBLEM — R06.00 DYSPNEA: Status: RESOLVED | Noted: 2024-08-03 | Resolved: 2025-08-19

## 2025-08-19 PROBLEM — I73.9 PERIPHERAL ARTERIAL DISEASE: Chronic | Status: ACTIVE | Noted: 2024-12-19

## 2025-08-19 PROBLEM — N30.00 ACUTE CYSTITIS: Status: RESOLVED | Noted: 2024-08-03 | Resolved: 2025-08-19

## 2025-08-19 PROBLEM — J18.9 PNEUMONIA OF BOTH LOWER LOBES DUE TO INFECTIOUS ORGANISM: Status: RESOLVED | Noted: 2021-05-06 | Resolved: 2025-08-19

## 2025-08-19 PROBLEM — R53.1 EPISODE OF GENERALIZED WEAKNESS: Status: RESOLVED | Noted: 2025-02-19 | Resolved: 2025-08-19

## 2025-08-19 PROBLEM — N10 ACUTE PYELONEPHRITIS: Status: RESOLVED | Noted: 2021-05-06 | Resolved: 2025-08-19

## 2025-08-19 PROBLEM — M17.11 PRIMARY OSTEOARTHRITIS OF RIGHT KNEE: Status: RESOLVED | Noted: 2021-03-10 | Resolved: 2025-08-19

## 2025-08-19 PROBLEM — M25.362 OTHER INSTABILITY, LEFT KNEE: Status: RESOLVED | Noted: 2021-03-10 | Resolved: 2025-08-19

## 2025-08-19 PROBLEM — M79.671 PAIN IN BOTH FEET: Status: RESOLVED | Noted: 2024-12-16 | Resolved: 2025-08-19

## 2025-08-19 PROBLEM — B35.1 DERMATOPHYTOSIS OF NAIL: Status: RESOLVED | Noted: 2024-12-16 | Resolved: 2025-08-19

## 2025-08-19 PROBLEM — Z96.652 S/P REVISION OF TOTAL KNEE, LEFT: Status: RESOLVED | Noted: 2021-03-10 | Resolved: 2025-08-19

## 2025-08-19 PROBLEM — J18.9 PNEUMONIA DUE TO ORGANISM: Status: RESOLVED | Noted: 2023-11-24 | Resolved: 2025-08-19

## 2025-08-19 PROBLEM — A08.11 ENTERITIS DUE TO NOROVIRUS: Status: RESOLVED | Noted: 2025-08-15 | Resolved: 2025-08-19

## 2025-08-19 PROBLEM — E87.6 HYPOKALEMIA: Status: RESOLVED | Noted: 2024-08-01 | Resolved: 2025-08-19

## 2025-08-19 PROBLEM — R41.89 COGNITIVE IMPAIRMENT: Chronic | Status: ACTIVE | Noted: 2023-04-06

## 2025-08-19 PROBLEM — N25.0 RENAL OSTEODYSTROPHY: Chronic | Status: ACTIVE | Noted: 2025-01-24

## 2025-08-19 PROBLEM — D64.9 ANEMIA: Status: RESOLVED | Noted: 2020-10-21 | Resolved: 2025-08-19

## 2025-08-19 PROBLEM — I10 HTN (HYPERTENSION): Chronic | Status: ACTIVE | Noted: 2020-10-21

## 2025-08-19 PROBLEM — N39.0 ACUTE UTI: Status: RESOLVED | Noted: 2024-07-31 | Resolved: 2025-08-19

## 2025-08-19 PROBLEM — J98.01 BRONCHOSPASM: Status: RESOLVED | Noted: 2023-11-24 | Resolved: 2025-08-19

## 2025-08-19 PROBLEM — R78.81 BACTEREMIA: Chronic | Status: ACTIVE | Noted: 2024-08-03

## 2025-08-19 PROBLEM — R79.89 ELEVATED BRAIN NATRIURETIC PEPTIDE (BNP) LEVEL: Status: RESOLVED | Noted: 2021-05-06 | Resolved: 2025-08-19

## 2025-08-19 PROBLEM — R52 PAIN CRISIS: Status: RESOLVED | Noted: 2025-08-16 | Resolved: 2025-08-19

## 2025-08-19 PROBLEM — Z16.12 ESBL (EXTENDED SPECTRUM BETA-LACTAMASE) PRODUCING BACTERIA INFECTION: Status: RESOLVED | Noted: 2024-08-03 | Resolved: 2025-08-19

## 2025-08-19 PROBLEM — S00.03XA CONTUSION OF FACE, SCALP, AND NECK, INITIAL ENCOUNTER: Status: RESOLVED | Noted: 2023-09-14 | Resolved: 2025-08-19

## 2025-08-19 PROBLEM — N28.9 RENAL INSUFFICIENCY: Status: RESOLVED | Noted: 2020-10-21 | Resolved: 2025-08-19

## 2025-08-19 PROBLEM — S00.83XA CONTUSION OF FACE, SCALP, AND NECK, INITIAL ENCOUNTER: Status: RESOLVED | Noted: 2023-09-14 | Resolved: 2025-08-19

## 2025-08-19 PROBLEM — G60.9 IDIOPATHIC PERIPHERAL NEUROPATHY: Chronic | Status: ACTIVE | Noted: 2024-12-16

## 2025-08-19 PROBLEM — R78.81 BACTEREMIA: Chronic | Status: RESOLVED | Noted: 2024-08-03 | Resolved: 2025-08-19

## 2025-08-19 PROBLEM — A49.9 ESBL (EXTENDED SPECTRUM BETA-LACTAMASE) PRODUCING BACTERIA INFECTION: Status: RESOLVED | Noted: 2024-08-03 | Resolved: 2025-08-19

## 2025-08-19 PROBLEM — Z79.899 OTHER LONG TERM (CURRENT) DRUG THERAPY: Status: RESOLVED | Noted: 2020-10-21 | Resolved: 2025-08-19

## 2025-08-19 PROBLEM — N17.9 AKI (ACUTE KIDNEY INJURY): Status: RESOLVED | Noted: 2021-06-02 | Resolved: 2025-08-19

## 2025-08-19 PROBLEM — E87.5 HYPERKALEMIA: Status: RESOLVED | Noted: 2025-01-24 | Resolved: 2025-08-19

## 2025-08-19 PROBLEM — M79.672 PAIN IN BOTH FEET: Status: RESOLVED | Noted: 2024-12-16 | Resolved: 2025-08-19

## 2025-08-19 PROBLEM — N18.30 ANEMIA DUE TO STAGE 3 CHRONIC KIDNEY DISEASE (HCC): Status: RESOLVED | Noted: 2020-10-22 | Resolved: 2025-08-19
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
## 2025-08-19 PROBLEM — R53.1 GENERAL WEAKNESS: Chronic | Status: ACTIVE | Noted: 2024-08-27

## 2025-08-19 PROBLEM — F33.0 MAJOR DEPRESSIVE DISORDER, RECURRENT, MILD: Chronic | Status: ACTIVE | Noted: 2022-12-06

## 2025-08-19 PROBLEM — N30.00 ACUTE CYSTITIS WITHOUT HEMATURIA: Status: RESOLVED | Noted: 2024-07-31 | Resolved: 2025-08-19

## 2025-08-19 PROBLEM — E03.9 HYPOTHYROIDISM: Chronic | Status: ACTIVE | Noted: 2020-10-21

## 2025-08-19 PROBLEM — R42 DIZZINESS AND GIDDINESS: Status: RESOLVED | Noted: 2025-01-27 | Resolved: 2025-08-19

## 2025-08-19 PROBLEM — D63.1 ANEMIA DUE TO STAGE 3 CHRONIC KIDNEY DISEASE (HCC): Status: RESOLVED | Noted: 2020-10-22 | Resolved: 2025-08-19

## 2025-08-19 PROBLEM — G30.9 ALZHEIMER'S DISEASE, UNSPECIFIED (CODE) (HCC): Chronic | Status: ACTIVE | Noted: 2023-04-20

## 2025-08-19 PROBLEM — I63.9 ACUTE CVA (CEREBROVASCULAR ACCIDENT) (HCC): Status: RESOLVED | Noted: 2021-04-07 | Resolved: 2025-08-19

## 2025-08-20 ENCOUNTER — TELEPHONE (OUTPATIENT)
Dept: UROLOGY | Age: 89
End: 2025-08-20